# Patient Record
Sex: MALE | Race: WHITE | NOT HISPANIC OR LATINO | Employment: OTHER | URBAN - METROPOLITAN AREA
[De-identification: names, ages, dates, MRNs, and addresses within clinical notes are randomized per-mention and may not be internally consistent; named-entity substitution may affect disease eponyms.]

---

## 2017-01-23 ENCOUNTER — RADIANT APPOINTMENT (OUTPATIENT)
Dept: GENERAL RADIOLOGY | Facility: CLINIC | Age: 57
End: 2017-01-23
Attending: FAMILY MEDICINE
Payer: COMMERCIAL

## 2017-01-23 ENCOUNTER — OFFICE VISIT (OUTPATIENT)
Dept: FAMILY MEDICINE | Facility: CLINIC | Age: 57
End: 2017-01-23
Payer: COMMERCIAL

## 2017-01-23 VITALS
HEART RATE: 109 BPM | BODY MASS INDEX: 38.8 KG/M2 | HEIGHT: 69 IN | DIASTOLIC BLOOD PRESSURE: 106 MMHG | WEIGHT: 262 LBS | SYSTOLIC BLOOD PRESSURE: 164 MMHG | TEMPERATURE: 98.7 F | RESPIRATION RATE: 14 BRPM

## 2017-01-23 DIAGNOSIS — R73.9 HYPERGLYCEMIA: ICD-10-CM

## 2017-01-23 DIAGNOSIS — Z01.818 PREOP GENERAL PHYSICAL EXAM: Primary | ICD-10-CM

## 2017-01-23 DIAGNOSIS — Z87.891 FORMER SMOKER: ICD-10-CM

## 2017-01-23 DIAGNOSIS — H26.9 LEFT CATARACT: ICD-10-CM

## 2017-01-23 DIAGNOSIS — I10 BENIGN ESSENTIAL HYPERTENSION: ICD-10-CM

## 2017-01-23 LAB
ALBUMIN UR-MCNC: NEGATIVE MG/DL
ANION GAP SERPL CALCULATED.3IONS-SCNC: 11 MMOL/L (ref 3–14)
APPEARANCE UR: CLEAR
BILIRUB UR QL STRIP: NEGATIVE
BUN SERPL-MCNC: 15 MG/DL (ref 7–30)
CALCIUM SERPL-MCNC: 8.8 MG/DL (ref 8.5–10.1)
CHLORIDE SERPL-SCNC: 94 MMOL/L (ref 94–109)
CO2 SERPL-SCNC: 24 MMOL/L (ref 20–32)
COLOR UR AUTO: YELLOW
CREAT SERPL-MCNC: 0.61 MG/DL (ref 0.66–1.25)
ERYTHROCYTE [DISTWIDTH] IN BLOOD BY AUTOMATED COUNT: 12.2 % (ref 10–15)
GFR SERPL CREATININE-BSD FRML MDRD: ABNORMAL ML/MIN/1.7M2
GLUCOSE SERPL-MCNC: 321 MG/DL (ref 70–99)
GLUCOSE UR STRIP-MCNC: >=1000 MG/DL
HBA1C MFR BLD: 12.3 % (ref 4.3–6)
HCT VFR BLD AUTO: 45.3 % (ref 40–53)
HGB BLD-MCNC: 15.3 G/DL (ref 13.3–17.7)
HGB UR QL STRIP: ABNORMAL
KETONES UR STRIP-MCNC: 15 MG/DL
LEUKOCYTE ESTERASE UR QL STRIP: NEGATIVE
MCH RBC QN AUTO: 28.2 PG (ref 26.5–33)
MCHC RBC AUTO-ENTMCNC: 33.8 G/DL (ref 31.5–36.5)
MCV RBC AUTO: 83 FL (ref 78–100)
NITRATE UR QL: NEGATIVE
PH UR STRIP: 5 PH (ref 5–7)
PLATELET # BLD AUTO: 342 10E9/L (ref 150–450)
POTASSIUM SERPL-SCNC: 4.3 MMOL/L (ref 3.4–5.3)
RBC # BLD AUTO: 5.43 10E12/L (ref 4.4–5.9)
RBC #/AREA URNS AUTO: NORMAL /HPF (ref 0–2)
SODIUM SERPL-SCNC: 129 MMOL/L (ref 133–144)
SP GR UR STRIP: 1.02 (ref 1–1.03)
TSH SERPL DL<=0.005 MIU/L-ACNC: 1.6 MU/L (ref 0.4–4)
URN SPEC COLLECT METH UR: ABNORMAL
UROBILINOGEN UR STRIP-ACNC: 0.2 EU/DL (ref 0.2–1)
WBC # BLD AUTO: 9.2 10E9/L (ref 4–11)
WBC #/AREA URNS AUTO: NORMAL /HPF (ref 0–2)

## 2017-01-23 PROCEDURE — 80048 BASIC METABOLIC PNL TOTAL CA: CPT | Performed by: FAMILY MEDICINE

## 2017-01-23 PROCEDURE — 99205 OFFICE O/P NEW HI 60 MIN: CPT | Performed by: FAMILY MEDICINE

## 2017-01-23 PROCEDURE — 81001 URINALYSIS AUTO W/SCOPE: CPT | Performed by: FAMILY MEDICINE

## 2017-01-23 PROCEDURE — 84443 ASSAY THYROID STIM HORMONE: CPT | Performed by: FAMILY MEDICINE

## 2017-01-23 PROCEDURE — 71020 XR CHEST 2 VW: CPT

## 2017-01-23 PROCEDURE — 85027 COMPLETE CBC AUTOMATED: CPT | Performed by: FAMILY MEDICINE

## 2017-01-23 PROCEDURE — 93000 ELECTROCARDIOGRAM COMPLETE: CPT | Performed by: FAMILY MEDICINE

## 2017-01-23 PROCEDURE — 83036 HEMOGLOBIN GLYCOSYLATED A1C: CPT | Performed by: FAMILY MEDICINE

## 2017-01-23 PROCEDURE — 36415 COLL VENOUS BLD VENIPUNCTURE: CPT | Performed by: FAMILY MEDICINE

## 2017-01-23 RX ORDER — METOPROLOL SUCCINATE 25 MG/1
12.5 TABLET, EXTENDED RELEASE ORAL DAILY
Qty: 15 TABLET | Refills: 0 | Status: SHIPPED | OUTPATIENT
Start: 2017-01-23 | End: 2017-02-16

## 2017-01-23 NOTE — MR AVS SNAPSHOT
After Visit Summary   1/23/2017    Juan Balderrama    MRN: [id number]           Patient Information     Date Of Birth          1960        Visit Information        Provider Department      1/23/2017 9:00 AM Mauricio Pearce MD [facility]        Today's Diagnoses     Preop general physical exam    -  1     Right cataract         Benign essential hypertension         Former smoker           Care Instructions    Due to uncontrolled blood pressure, start Metoprolol XL 12.5 mg once a night.  Return to clinic on Thursday January 26 for recheck of your blood pressure.  Your blood pressure today is uncontrolled.  Due to this medical treatment should be started to prevent complications.  Blood, urine, chest xray and EKG tests are ordered as baseline.  Low salt, low fat diet. Exercise as tolerated 30 mins per day 3 days a week.  Take meds as prescribed; call if with side effects.       Before Your Surgery      Call your surgeon if there is any change in your health. This includes signs of a cold or flu (such as a sore throat, runny nose, cough, rash or fever).    Do not smoke, drink alcohol or take over the counter medicine (unless your surgeon or primary care doctor tells you to) for the 24 hours before and after surgery.    If you take prescribed drugs: Follow your doctor s orders about which medicines to take and which to stop until after surgery.    Eating and drinking prior to surgery: follow the instructions from your surgeon  Take a shower or bath the night before surgery. Use the soap your surgeon gave you to gently clean your skin. If you do not have soap from your surgeon, use your regular soap. Do not shave or scrub the surgery site.  Wear clean pajamas and have clean sheets on your bed.   Low-Salt Diet (2 Grams/Day)  This diet eliminates foods that are high in salt and restricts the amount of salt that you cook with. It is most often used for patients with high blood  pressure, edema (fluid retention), kidney, liver, and heart disease.  Table salt contains the mineral sodium. The body needs sodium to work normally. But too much sodium can make your health problems worse. Your health care provider is recommending a low-salt (also called low-sodium) diet for you. Your total daily allowance of salt (sodium) is 2 grams. This equals 2,000 milligrams (mg). It is less than 1 teaspoon of table salt. This means you can have only about 700 mg of sodium at each meal.  When you cook, limit the salt you use. And if you can avoid using salt, even better. Do not add salt at the table. So, throw away the saltshaker!  When shopping, read the package labels. Salt is often called  sodium  on the label. Choose foods that are salt-free, low salt, or very low salt. Note that foods with reduced salt or reduced sodium may not lower your salt intake enough.    Beverages  Ok: Tea, coffee, carbonated beverages, juices  Avoid: Flavored international coffees, electrolyte replacement drinks, sports beverages  Bread and cereals  Ok: Low sodium bread and rolls, cereals, cakes; low-salt crackers, matzo crackers  Avoid: Salted crackers, pretzels, popcorn; Maldivian toast, pancakes, muffins  Desserts  Ok: Ice cream, frozen yogurt, juice bars, gelatin, cookies and pies, sugar, honey, jelly, hard candy  Avoid: Most pies, cakes and cookies prepared or processed with salt, instant pudding  Meats  Ok: All fresh meat, fish, poultry, low-salt tuna, eggs, egg substitute  Avoid: Smoked, pickled, brine-cured, or salted meats and fish. This includes donovan, chipped beef, corned beef, hot dogs, luncheon meats, ham, kosher meats, salt pork, sausage, canned tuna, salted codfish, smoked salmon, herring, sardines, or anchovies.  Dairy  Ok: Milk, chocolate milk, hot chocolate mix,  low-salt  cheeses, and yogurt  Avoid: Processed cheese, cheese spreads, Roquefort, Camembert, and cottage cheese, buttermilk, instant breakfast  drink  Beans, potatoes, and pasta  Ok: Dry beans, split peas, lentils, potatoes, rice, macaroni, pasta, spaghetti without added salt  Avoid: Potato chips, tortilla chips, and similar products  Soups  Ok: Low-salt soups and broths made with allowed foods  Avoid: Bouillon cubes, soups with smoked or salted meats, regular soup and broth  Vegetables  Ok: Most are okay; low-salt tomato and vegetable juices  Avoid: Sauerkraut and other brine-soaked vegetables, pickles and other pickled vegetables, tomato juice, olives  Seasoning and spices  Ok: Most seasonings are okay. Good substitutes for salt include: fresh herb blends, hot sauce, lemon, garlic, wood, vinegar, dry mustard, parsley, cilantro, horseradish, tomato paste, regular margarine, mayonnaise, butter, cream cheese, vegetable oil, cream, low-salt salad dressing and gravy  Avoid: Regular ketchup, relishes, pickles, soy sauce, teriyaki sauce, Worcestershire sauce, BBQ sauce, tartar sauce, meat tenderizer, chili sauce, regular gravy, regular salad dressing    7064-9384 [employer]. [address] [place] [address]. All rights reserved. This information is not intended as a substitute for professional medical care. Always follow your healthcare professional's instructions.                Follow-ups after your visit        Who to contact     If you have questions or need follow up information about today's clinic visit or your schedule please contact [facility] directly at [phone].  Normal or non-critical lab and imaging results will be communicated to you by MyChart, letter or phone within 4 business days after the clinic has received the results. If you do not hear from us within 7 days, please contact the clinic through MyChart or phone. If you have a critical or abnormal lab result, we will notify you by phone as soon as possible.  Submit refill requests through [employer] or call your pharmacy and they will forward the refill  "request to us. Please allow 3 business days for your refill to be completed.          Additional Information About Your Visit        [employer] Information     [employer] lets you send messages to your doctor, view your test results, renew your prescriptions, schedule appointments and more. To sign up, go to [email or web address] . Click on \"Log in\" on the left side of the screen, which will take you to the Welcome page. Then click on \"Sign up Now\" on the right side of the page.     You will be asked to enter the access code listed below, as well as some personal information. Please follow the directions to create your username and password.     Your access code is: [email or web address]  Expires: 2017  9:29 AM     Your access code will  in 90 days. If you need help or a new code, please call your [facility] clinic or [phone].        Care EveryWhere ID     This is your [facility] EveryWhere ID. This could be used by other organizations to access your [facility] medical records  [id number]        Your Vitals Were     Pulse Temperature Respirations Height BMI (Body Mass Index)       109 98.7  F (37.1  C) (Tympanic) 16 5' 9.13\" (1.756 m) 38.54 kg/m2        Blood Pressure from Last 3 Encounters:   17 185/105   14 155/100    Weight from Last 3 Encounters:   17 262 lb (118.842 kg)   14 265 lb (120.203 kg)              We Performed the Following     *UA reflex to Microscopic     Basic metabolic panel     CBC with platelets     EKG 12-lead complete w/read - Clinics     TSH with free T4 reflex     XR Chest 2 Views          Today's Medication Changes          These changes are accurate as of: 17  9:29 AM.  If you have any questions, ask your nurse or doctor.               Start taking these medicines.        Dose/Directions    metoprolol 25 MG 24 hr tablet   Commonly known as:  TOPROL-XL   Used for:  Benign essential hypertension   Started by:  Mauricio Pearce MD        Dose:  12.5 mg   Take " 0.5 tablets (12.5 mg) by mouth daily   Quantity:  15 tablet   Refills:  0            Where to get your medicines      These medications were sent to [facility] - [place], [place] - [address]   [address] [facility] [address]     Phone:  [phone]    - metoprolol 25 MG 24 hr tablet             Primary Care Provider    None       No address on file        Thank you!     Thank you for choosing [facility]  for your care. Our goal is always to provide you with excellent care. Hearing back from our patients is one way we can continue to improve our services. Please take a few minutes to complete the written survey that you may receive in the mail after your visit with us. Thank you!             Your Updated Medication List - Protect others around you: Learn how to safely use, store and throw away your medicines at www.disposemymeds.org.          This list is accurate as of: 1/23/17  9:29 AM.  Always use your most recent med list.                   Brand Name Dispense Instructions for use    EPINEPHrine 0.3 MG/0.3ML injection     1 each    Inject 0.3 mLs (0.3 mg) into the muscle once as needed for anaphylaxis       metoprolol 25 MG 24 hr tablet    TOPROL-XL    15 tablet    Take 0.5 tablets (12.5 mg) by mouth daily

## 2017-01-23 NOTE — NURSING NOTE
"Chief Complaint   Patient presents with     Pre-Op Exam     DOS:  2/6/17 for KPE PCL surger (right eye cataract)       Initial /105 mmHg  Pulse 109  Temp(Src) 98.7  F (37.1  C) (Tympanic)  Resp 16  Ht 5' 9.13\" (1.756 m)  Wt 262 lb (118.842 kg)  BMI 38.54 kg/m2 Estimated body mass index is 38.54 kg/(m^2) as calculated from the following:    Height as of this encounter: 5' 9.13\" (1.756 m).    Weight as of this encounter: 262 lb (118.842 kg).  BP completed using cuff size: large  "

## 2017-01-23 NOTE — PROGRESS NOTES
[facility]  [address] [address]  [phone]  Dept: [phone]    PRE-OP EVALUATION:  Today's date: 2017    Juan Balderrama (: 1960) presents for pre-operative evaluation assessment as requested by Dr. Jed Khalli.  He requires evaluation and anesthesia risk assessment prior to undergoing surgery/procedure for treatment of KPE PCL (left eye cataract) surgery.    Date of Surgery/ Procedure: 2017  Time of Surgery/ Procedure: to be determined  [facility]Surgical [facility]: [facility]  Fax number for surgical facility: [phone]  Primary Physician: None  Type of Anesthesia Anticipated: to be determined    Patient has a Health Care Directive or Living Will:  NO    1. NO - Do you have a history of heart attack, stroke, stent, bypass or surgery on an artery in the head, neck, heart or legs?  2. NO - Do you ever have any pain or discomfort in your chest?  3. NO - Do you have a history of  Heart Failure?  4. NO - Are you troubled by shortness of breath when: walking on the level, up a slight hill or at night?  5. NO - Do you currently have a cold, bronchitis or other respiratory infection?  6. NO - Do you have a cough, shortness of breath or wheezing?  7. NO - Do you sometimes get pains in the calves of your legs when you walk?  8. NO - Do you or anyone in your family have previous history of blood clots?  9. NO - Do you or does anyone in your family have a serious bleeding problem such as prolonged bleeding following surgeries or cuts?  10. NO - Have you ever had problems with anemia or been told to take iron pills?  11. NO - Have you had any abnormal blood loss such as black, tarry or bloody stools, or abnormal vaginal bleeding?  12. NO - Have you ever had a blood transfusion?  13. NO - Have you or any of your relatives ever had problems with anesthesia?  14. NO - Do you have sleep apnea, excessive snoring or daytime drowsiness?  15. NO - Do you have any  prosthetic heart valves?  16. NO - Do you have prosthetic joints?  17. NO - Is there any chance that you may be pregnant?      HPI:                                                      Brief HPI related to upcoming procedure: Patient has left cataract that interferes with vision, hence procedure planned.  Reviewed above with patient.      Patient has no documented chronic condition but has very high blood pressure today. Patient does not have a PCP anywhere.   HYPERTENSION - Patient has longstanding history of mod-severe HTN , currently denies any symptoms referable to elevated blood pressure. Specifically denies chest pain, palpitations, dyspnea, orthopnea, PND or peripheral edema. Blood pressure readings have not been in normal range. Current medication regimen is as listed below. Patient denies any side effects of medication.                                                                                                                                                                                          .    MEDICAL HISTORY:                                                      Patient Active Problem List    Diagnosis Date Noted     Left cataract 01/25/2017     Priority: Medium     Benign essential hypertension 01/23/2017     Priority: Medium     Former smoker 01/23/2017     Priority: Medium     Uncontrolled type 2 diabetes mellitus without complication, without long-term current use of insulin (H) 01/23/2017     Priority: Medium      History reviewed. No pertinent past medical history.  History reviewed. No pertinent past surgical history.  Current Outpatient Prescriptions   Medication Sig Dispense Refill     metoprolol (TOPROL-XL) 25 MG 24 hr tablet Take 0.5 tablets (12.5 mg) by mouth daily 15 tablet 0     EPINEPHrine (EPIPEN) 0.3 MG/0.3ML injection Inject 0.3 mLs (0.3 mg) into the muscle once as needed for anaphylaxis 1 each 1     OTC products: Advil PRN.    Allergies   Allergen Reactions     Penicillins  "Swelling     Sulfamethoxazole Swelling      Latex Allergy: NO    Social History   Substance Use Topics     Smoking status: Never Smoker      Smokeless tobacco: Not on file     Alcohol Use: Yes      Comment: social     History   Drug Use No       REVIEW OF SYSTEMS:                                                    C: NEGATIVE for fever, chills, change in weight  I: NEGATIVE for worrisome rashes, moles or lesions  E: NEGATIVE for vision changes or irritation  E/M: NEGATIVE for ear, mouth and throat problems  R: NEGATIVE for significant cough or SOB  CV: NEGATIVE for chest pain, palpitations or peripheral edema  GI: NEGATIVE for nausea, abdominal pain, heartburn, or change in bowel habits  : NEGATIVE for frequency, dysuria, or hematuria  M: NEGATIVE for significant arthralgias or myalgia  N: NEGATIVE for weakness, dizziness or paresthesias  E: NEGATIVE for temperature intolerance, skin/hair changes  H: NEGATIVE for bleeding problems  P: NEGATIVE for changes in mood or affect    EXAM:                                                    /106 mmHg  Pulse 109  Temp(Src) 98.7  F (37.1  C) (Tympanic)  Resp 14  Ht 5' 9.13\" (1.756 m)  Wt 262 lb (118.842 kg)  BMI 38.54 kg/m2  GENERAL APPEARANCE:  Obese, alert and no distress  EYES: pink conj, no icterus, PERRL, EOMI  HENT: ear canals and TM's normal, nose and mouth without ulcers or lesions, oropharynx clear and oral mucous membranes moist  NECK: no adenopathy, no asymmetry, masses, or scars and thyroid normal to palpation  RESP: lungs clear to auscultation - no rales, rhonchi or wheezes  CV: regular rates and rhythm, normal S1 S2, no S3 or S4, no murmur, click or rub, no peripheral edema and peripheral pulses strong  ABDOMEN: soft, nontender, no hepatosplenomegaly, no masses and bowel sounds normal  RECTAL: normal sphincter tone, no rectal masses, prostate slightly enlarged but smooth, soft and nontender  MS: no musculoskeletal defects are noted and gait is age " appropriate without ataxia  SKIN: no suspicious lesions or rashes  NEURO: Normal strength and tone, sensory exam grossly normal, mentation intact and speech normal    DIAGNOSTICS:                                                      EKG: sinus rhythm with poor R wave progression, normal axis, normal intervals, no acute ST/T changes c/w ischemia, no LVH by voltage criteria, there are no prior tracings available  Labs Resulted Today:   Results for orders placed or performed in visit on 01/23/17   XR Chest 2 Views    Narrative    XR CHEST 2 VW 1/23/2017 10:01 AM    COMPARISON: None.    HISTORY: Hypertension      Impression    IMPRESSION: Cardiac silhouette and pulmonary vasculature are within  normal limits. No focal airspace disease, pleural effusion or  pneumothorax.    SEYMOUR GRAFF   CBC with platelets   Result Value Ref Range    WBC 9.2 4.0 - 11.0 10e9/L    RBC Count 5.43 4.4 - 5.9 10e12/L    Hemoglobin 15.3 13.3 - 17.7 g/dL    Hematocrit 45.3 40.0 - 53.0 %    MCV 83 78 - 100 fl    MCH 28.2 26.5 - 33.0 pg    MCHC 33.8 31.5 - 36.5 g/dL    RDW 12.2 10.0 - 15.0 %    Platelet Count 342 150 - 450 10e9/L   Basic metabolic panel   Result Value Ref Range    Sodium 129 (L) 133 - 144 mmol/L    Potassium 4.3 3.4 - 5.3 mmol/L    Chloride 94 94 - 109 mmol/L    Carbon Dioxide 24 20 - 32 mmol/L    Anion Gap 11 3 - 14 mmol/L    Glucose 321 (H) 70 - 99 mg/dL    Urea Nitrogen 15 7 - 30 mg/dL    Creatinine 0.61 (L) 0.66 - 1.25 mg/dL    GFR Estimate >90  Non  GFR Calc   >60 mL/min/1.7m2    GFR Estimate If Black >90   GFR Calc   >60 mL/min/1.7m2    Calcium 8.8 8.5 - 10.1 mg/dL   *UA reflex to Microscopic   Result Value Ref Range    Color Urine Yellow     Appearance Urine Clear     Glucose Urine >=1000 (A) NEG mg/dL    Bilirubin Urine Negative NEG    Ketones Urine 15 (A) NEG mg/dL    Specific Gravity Urine 1.025 1.003 - 1.035    Blood Urine Trace (A) NEG    pH Urine 5.0 5.0 - 7.0 pH    Protein Albumin  Urine Negative NEG mg/dL    Urobilinogen Urine 0.2 0.2 - 1.0 EU/dL    Nitrite Urine Negative NEG    Leukocyte Esterase Urine Negative NEG    Source Midstream Urine    TSH with free T4 reflex   Result Value Ref Range    TSH 1.60 0.40 - 4.00 mU/L   Urine Microscopic   Result Value Ref Range    WBC Urine O - 2 0 - 2 /HPF    RBC Urine O - 2 0 - 2 /HPF   Hemoglobin A1c   Result Value Ref Range    Hemoglobin A1C 12.3 (H) 4.3 - 6.0 %       No results for input(s): HGB, PLT, INR, NA, POTASSIUM, CR, A1C in the last 82386 hours.     IMPRESSION:                                                    Reason for surgery/procedure: right cataract.  Diagnosis/reason for consult: preop evaluation; hypertension, fromer smoker    The proposed surgical procedure is considered LOW risk.    REVISED CARDIAC RISK INDEX  The patient has the following serious cardiovascular risks for perioperative complications such as (MI, PE, VFib and 3  AV Block):  Uncontrolled hypertension; new hyperglycemia/type 2 diabetes mellitus  INTERPRETATION: 2 risks: Class III (moderate risk - 6.6% complication rate)    The patient has the following additional risks for perioperative complications:  No identified additional risks  Score not calculated. Missing: Total Cholesterol, HDL      ICD-10-CM    1. Preop general physical exam Z01.818 Urine Microscopic     DIABETES EDUCATOR REFERRAL   2. Left cataract H26.9    3. Benign essential hypertension I10 metoprolol (TOPROL-XL) 25 MG 24 hr tablet     EKG 12-lead complete w/read - Clinics     CBC with platelets     Basic metabolic panel     *UA reflex to Microscopic     TSH with free T4 reflex     XR Chest 2 Views     DIABETES EDUCATOR REFERRAL   4. Former smoker Z87.891    5. Hyperglycemia R73.9 Hemoglobin A1c   6. Uncontrolled type 2 diabetes mellitus without complication, without long-term current use of insulin (H) E11.65 DIABETES EDUCATOR REFERRAL       RECOMMENDATIONS:                                                       --Consult hospital rounder / IM to assist post-op medical management  Routine DVT precautions recommended.    Cardiovascular Risk  Performs 4 METs exercise without symptoms (Light housework (dusting, washing dishes), Climb a flight of stairs and Walk on level ground at 15 minutes per mile (4 miles/hour)) .       --Patient is to take all scheduled medications on the day of surgery EXCEPT for modifications listed below.    Diabetes Medication Use  Incidental finding of hyperglycemia on labs today.  Will add a1c to labs done today.  Patient will be requested to follow up in clinic for diabetes management start.  He will be started on meds and approval for surgery will be based on his a1c today.        ACE Inhibitor or Angiotensin Receptor Blocker (ARB) Use  Not on ACE-I yet but patient will be started on this for HTN and nephroprotection, and he may take it on day of surgery to help maintain blood pressure.      Surgery is NOT recommended due to uncontrolled diabetes (A1C >8.5%, Glucose >200 mg/dl). Stabilization required prior to elective surgery. Referral to Diabetes Educator (Preoperative Intensive Glucose Management)  and patient to be seen in next 1-2 days in clinic by provider for diabetes treatment start.       Signed Electronically by: Mauricio Pearce MD    Copy of this evaluation report is provided to requesting physician.    [facility] Preop Guidelines

## 2017-01-23 NOTE — PATIENT INSTRUCTIONS
Due to uncontrolled blood pressure, start Metoprolol XL 12.5 mg once a night.  Return to clinic on Thursday January 26 for recheck of your blood pressure.  Your blood pressure today is uncontrolled.  Due to this medical treatment should be started to prevent complications.  Blood, urine, chest xray and EKG tests are ordered as baseline.  Low salt, low fat diet. Exercise as tolerated 30 mins per day 3 days a week.  Take meds as prescribed; call if with side effects.       Before Your Surgery      Call your surgeon if there is any change in your health. This includes signs of a cold or flu (such as a sore throat, runny nose, cough, rash or fever).    Do not smoke, drink alcohol or take over the counter medicine (unless your surgeon or primary care doctor tells you to) for the 24 hours before and after surgery.    If you take prescribed drugs: Follow your doctor s orders about which medicines to take and which to stop until after surgery.    Eating and drinking prior to surgery: follow the instructions from your surgeon  Take a shower or bath the night before surgery. Use the soap your surgeon gave you to gently clean your skin. If you do not have soap from your surgeon, use your regular soap. Do not shave or scrub the surgery site.  Wear clean pajamas and have clean sheets on your bed.   Low-Salt Diet (2 Grams/Day)  This diet eliminates foods that are high in salt and restricts the amount of salt that you cook with. It is most often used for patients with high blood pressure, edema (fluid retention), kidney, liver, and heart disease.  Table salt contains the mineral sodium. The body needs sodium to work normally. But too much sodium can make your health problems worse. Your health care provider is recommending a low-salt (also called low-sodium) diet for you. Your total daily allowance of salt (sodium) is 2 grams. This equals 2,000 milligrams (mg). It is less than 1 teaspoon of table salt. This means you can have only  about 700 mg of sodium at each meal.  When you cook, limit the salt you use. And if you can avoid using salt, even better. Do not add salt at the table. So, throw away the saltshaker!  When shopping, read the package labels. Salt is often called  sodium  on the label. Choose foods that are salt-free, low salt, or very low salt. Note that foods with reduced salt or reduced sodium may not lower your salt intake enough.    Beverages  Ok: Tea, coffee, carbonated beverages, juices  Avoid: Flavored international coffees, electrolyte replacement drinks, sports beverages  Bread and cereals  Ok: Low sodium bread and rolls, cereals, cakes; low-salt crackers, matzo crackers  Avoid: Salted crackers, pretzels, popcorn; Luxem[place]ish toast, pancakes, muffins  Desserts  Ok: Ice cream, frozen yogurt, juice bars, gelatin, cookies and pies, sugar, honey, jelly, hard candy  Avoid: Most pies, cakes and cookies prepared or processed with salt, instant pudding  Meats  Ok: All fresh meat, fish, poultry, low-salt tuna, eggs, egg substitute  Avoid: Smoked, pickled, brine-cured, or salted meats and fish. This includes donovan, chipped beef, corned beef, hot dogs, luncheon meats, ham, kosher meats, salt pork, sausage, canned tuna, salted codfish, smoked salmon, herring, sardines, or anchovies.  Dairy  Ok: Milk, chocolate milk, hot chocolate mix,  low-salt  cheeses, and yogurt  Avoid: Processed cheese, cheese spreads, Roquefort, Camembert, and cottage cheese, buttermilk, instant breakfast drink  Beans, potatoes, and pasta  Ok: Dry beans, split peas, lentils, potatoes, rice, macaroni, pasta, spaghetti without added salt  Avoid: Potato chips, tortilla chips, and similar products  Soups  Ok: Low-salt soups and broths made with allowed foods  Avoid: Bouillon cubes, soups with smoked or salted meats, regular soup and broth  Vegetables  Ok: Most are okay; low-salt tomato and vegetable juices  Avoid: Sauerkraut and other brine-soaked vegetables, pickles and  other pickled vegetables, tomato juice, olives  Seasoning and spices  Ok: Most seasonings are okay. Good substitutes for salt include: fresh herb blends, hot sauce, lemon, garlic, wood, vinegar, dry mustard, parsley, cilantro, horseradish, tomato paste, regular margarine, mayonnaise, butter, cream cheese, vegetable oil, cream, low-salt salad dressing and gravy  Avoid: Regular ketchup, relishes, pickles, soy sauce, teriyaki sauce, Worcestershire sauce, BBQ sauce, tartar sauce, meat tenderizer, chili sauce, regular gravy, regular salad dressing    9397-5401 [employer]. [address] [place] [address], [place] [address]. All rights reserved. This information is not intended as a substitute for professional medical care. Always follow your healthcare professional's instructions.

## 2017-01-25 ENCOUNTER — OFFICE VISIT (OUTPATIENT)
Dept: FAMILY MEDICINE | Facility: CLINIC | Age: 57
End: 2017-01-25
Payer: COMMERCIAL

## 2017-01-25 VITALS
WEIGHT: 262 LBS | SYSTOLIC BLOOD PRESSURE: 168 MMHG | BODY MASS INDEX: 38.8 KG/M2 | DIASTOLIC BLOOD PRESSURE: 112 MMHG | HEART RATE: 103 BPM | HEIGHT: 69 IN | TEMPERATURE: 97.7 F

## 2017-01-25 DIAGNOSIS — E66.01 MORBID OBESITY, UNSPECIFIED OBESITY TYPE (H): ICD-10-CM

## 2017-01-25 DIAGNOSIS — H26.9 LEFT CATARACT: ICD-10-CM

## 2017-01-25 DIAGNOSIS — I10 UNCONTROLLED HYPERTENSION: ICD-10-CM

## 2017-01-25 DIAGNOSIS — L97.529 FOOT ULCER, LEFT, WITH UNSPECIFIED SEVERITY (H): ICD-10-CM

## 2017-01-25 LAB
CREAT UR-MCNC: 86 MG/DL
MICROALBUMIN UR-MCNC: 121 MG/L
MICROALBUMIN/CREAT UR: 140.7 MG/G CR (ref 0–17)

## 2017-01-25 PROCEDURE — 99215 OFFICE O/P EST HI 40 MIN: CPT | Performed by: FAMILY MEDICINE

## 2017-01-25 PROCEDURE — 99207 C FOOT EXAM  NO CHARGE: CPT | Performed by: FAMILY MEDICINE

## 2017-01-25 PROCEDURE — 82043 UR ALBUMIN QUANTITATIVE: CPT | Performed by: FAMILY MEDICINE

## 2017-01-25 RX ORDER — LISINOPRIL 5 MG/1
5 TABLET ORAL DAILY
Qty: 30 TABLET | Refills: 0 | Status: SHIPPED | OUTPATIENT
Start: 2017-01-25 | End: 2017-02-01

## 2017-01-25 RX ORDER — ATORVASTATIN CALCIUM 10 MG/1
10 TABLET, FILM COATED ORAL DAILY
Qty: 90 TABLET | Refills: 0 | Status: SHIPPED | OUTPATIENT
Start: 2017-01-25 | End: 2017-04-27

## 2017-01-25 NOTE — PROGRESS NOTES
"  SUBJECTIVE:                                                    Juan Balderrama is a 56 year old male who presents to clinic today for the following health issues:  Chief Complaint   Patient presents with     Diabetes     Pt here to discuss plans for new dx of diabetes.       Concern - new Diabetic     Onset: Incidental finding on preop labs 2 days ago; patient has not known himself to be diabetic; no previous regular primary care      Description:   A1c was 12.3  And random glucose was >300 2 days ago  Patient denies polyuria, polydipsia, polyphagia, dizziness, LOC, HA, BOV, numbness or tingling in extremities    Intensity: severe    Progression of Symptoms:  n/a    Accompanying Signs & Symptoms:  At end of encounter, patient did say he has a \"blister on my left foot\" that has been present for the last month.  He denies pain, discharge or bleeding from it; denies treatment.       Previous history of similar problem:   Patient does not know this dx before 2 days ago    Precipitating factors:   Worsened by: n/a    Alleviating factors:  Improved by: n/a       Therapies Tried and outcome: none yet    Also still having high b/p.  Started metoprolol XL 12.5 mg daily 2 days ago.   Denies chest pain, dyspnea, HA, BOV, dizziness or urinary changes.          Amount of exercise or daily activities, outside of work: none    Problems taking medications regularly No    Medication side effects: none    Diet: low salt    Social History   Substance Use Topics     Smoking status: Never Smoker      Smokeless tobacco: Not on file     Alcohol Use: Yes      Comment: social        Problem list and histories reviewed & adjusted, as indicated.  Additional history: as documented    Current med at start of this visit: Metoprolol XL 12.5 mg daily.    Patient Active Problem List   Diagnosis     Benign essential hypertension     Former smoker     Uncontrolled type 2 diabetes mellitus without complication, without long-term current use of insulin " "(H)     Left cataract     Morbid obesity, unspecified obesity type (H)     History reviewed. No pertinent past surgical history.    Social History   Substance Use Topics     Smoking status: Never Smoker      Smokeless tobacco: Not on file     Alcohol Use: Yes      Comment: social     History reviewed. No pertinent family history.      Allergies   Allergen Reactions     Penicillins Swelling     Sulfamethoxazole Swelling     Recent Labs   Lab Test  01/23/17   1001  01/23/17   1000   A1C  12.3*   --    CR   --   0.61*   GFRESTIMATED   --   >90  Non  GFR Calc     GFRESTBLACK   --   >90   GFR Calc     POTASSIUM   --   4.3   TSH   --   1.60      BP Readings from Last 3 Encounters:   01/25/17 168/112   01/23/17 164/106   08/26/14 155/100    Wt Readings from Last 3 Encounters:   01/25/17 262 lb (118.842 kg)   01/23/17 262 lb (118.842 kg)   08/26/14 265 lb (120.203 kg)                  Labs reviewed in EPIC    ROS:  C: NEGATIVE for fever, chills, change in weight  I: NEGATIVE for worrisome rashes, moles or lesions  EYES: BOV due to cataracts  E/M: NEGATIVE for ear, mouth and throat problems  R: NEGATIVE for significant cough or SOB  CV: NEGATIVE for chest pain, palpitations or peripheral edema  GI: NEGATIVE for nausea, abdominal pain, heartburn, or change in bowel habits  : NEGATIVE for frequency, dysuria, or hematuria  M: NEGATIVE for significant arthralgias or myalgia  N: NEGATIVE for weakness, dizziness or paresthesias  E: NEGATIVE for temperature intolerance, skin/hair changes  H: NEGATIVE for bleeding problems  P: NEGATIVE for changes in mood or affect    OBJECTIVE:                                                    /112 mmHg  Pulse 103  Temp(Src) 97.7  F (36.5  C) (Tympanic)  Ht 5' 9\" (1.753 m)  Wt 262 lb (118.842 kg)  BMI 38.67 kg/m2  Body mass index is 38.67 kg/(m^2).  GENERAL: morbidly obese, alert and no distress, ambulatory w/o assist  EYES: no icterus; pink " conjunctivae.  MS: no edema  SKIN: no suspicious lesions, no rashes  ABD: protuberant  nontender  FOOT EXAM: on left foot, there is a 3 cm round 4 mm deep dry ulcer with dark reddish brown base and hyperkeratotic borders; no erythema either foot; pedal pulses fair bilaterally; monofilament: no deficit    Rest of exam deferred as it has been done 2 days ago    Diagnostic test results:  Diagnostic Test Results:  Results for orders placed or performed in visit on 01/23/17   XR Chest 2 Views    Narrative    XR CHEST 2 VW 1/23/2017 10:01 AM    COMPARISON: None.    HISTORY: Hypertension      Impression    IMPRESSION: Cardiac silhouette and pulmonary vasculature are within  normal limits. No focal airspace disease, pleural effusion or  pneumothorax.    [facility]YM[facility] GRAFF   CBC with platelets   Result Value Ref Range    WBC 9.2 4.0 - 11.0 10e9/L    RBC Count 5.43 4.4 - 5.9 10e12/L    Hemoglobin 15.3 13.3 - 17.7 g/dL    Hematocrit 45.3 40.0 - 53.0 %    MCV 83 78 - 100 fl    MCH 28.2 26.5 - 33.0 pg    MCHC 33.8 31.5 - 36.5 g/dL    RDW 12.2 10.0 - 15.0 %    Platelet Count 342 150 - 450 10e9/L   Basic metabolic panel   Result Value Ref Range    Sodium 129 (L) 133 - 144 mmol/L    Potassium 4.3 3.4 - 5.3 mmol/L    Chloride 94 94 - 109 mmol/L    Carbon Dioxide 24 20 - 32 mmol/L    Anion Gap 11 3 - 14 mmol/L    Glucose 321 (H) 70 - 99 mg/dL    Urea Nitrogen 15 7 - 30 mg/dL    Creatinine 0.61 (L) 0.66 - 1.25 mg/dL    GFR Estimate >90  Non  GFR Calc   >60 mL/min/1.7m2    GFR Estimate If Black >90   GFR Calc   >60 mL/min/1.7m2    Calcium 8.8 8.5 - 10.1 mg/dL   *UA reflex to Microscopic   Result Value Ref Range    Color Urine Yellow     Appearance Urine Clear     Glucose Urine >=1000 (A) NEG mg/dL    Bilirubin Urine Negative NEG    Ketones Urine 15 (A) NEG mg/dL    Specific Gravity Urine 1.025 1.003 - 1.035    Blood Urine Trace (A) NEG    pH Urine 5.0 5.0 - 7.0 pH    Protein Albumin Urine Negative NEG mg/dL     Urobilinogen Urine 0.2 0.2 - 1.0 EU/dL    Nitrite Urine Negative NEG    Leukocyte Esterase Urine Negative NEG    Source Midstream Urine    TSH with free T4 reflex   Result Value Ref Range    TSH 1.60 0.40 - 4.00 mU/L   Urine Microscopic   Result Value Ref Range    WBC Urine O - 2 0 - 2 /HPF    RBC Urine O - 2 0 - 2 /HPF   Hemoglobin A1c   Result Value Ref Range    Hemoglobin A1C 12.3 (H) 4.3 - 6.0 %        ASSESSMENT/PLAN:                                                        ICD-10-CM    1. Uncontrolled type 2 diabetes mellitus with complication, with long-term current use of insulin (H) E11.8 FOOT EXAM    E11.65 Lipid panel reflex to direct LDL    Z79.4 Albumin Random Urine Quantitative     atorvastatin (LIPITOR) 10 MG tablet     metFORMIN (GLUCOPHAGE) 500 MG tablet     insulin glargine (LANTUS SOLOSTAR) 100 UNIT/ML injection     insulin pen needle (BD YENNY U/F) 32G X 4 MM     order for DME     order for DME     order for DME     ORTHO  REFERRAL  New diagnosis; uncontrolled.  Spent more than half this visit discussing nature, course, treatment, monitoring, lifestyle changes, and preventative recommendations for diabetics.  Daily foot care, flu vaccine every year, pneumovax 23, annual eye exam.     2. Uncontrolled hypertension I10 lisinopril (PRINIVIL/ZESTRIL) 5 MG tablet  Continue Metoprolol XL 12.5 daily.  Low salt, low fat diet. Exercise as written in AVS discussed.  Take meds as prescribed; call if with side effects.   RN visit 1 week for BP recheck.     3. Foot ulcer, left, with unspecified severity (H) L97.529 ORTHO  REFERRAL  Discussed with patient the ulcer he showed today will likely need further evaluation and possible treatment by podiatry. He concurred with referral.  Advised daily foot care.     4. Left cataract H26.9 Discussed with patient current guidelines for approval for surgical procedures.    His cataract surgery won't be approved until his diabetes and blood pressure is under  stable control.     5. Morbid obesity, unspecified obesity type (H) E66.01 Discussed risks of obesity: heart disease, stroke, end-organ damage, worsening DM, decreased quality of life  Counselled on diet, exercise and weight loss regimen       Total time: 40 mins, more than 50 % spent in addressing the above conditions    Follow up with Provider - 2 months   Follow up with: Nurse 1 week for BP recheck  Patient Instructions   You are diagnosed with Uncontrolled Type 2 diabetes mellitus.  As discussed today, the following are recommended:    - Medications:   Start the following    Lantus 14 units subcutaneously (injection) every bedtime.    Metformin 500 mg twice a day orally    Lisinopril 5 mg orally once a day.    Atorvastatin 10 mg orally once a day.   Continue the following:    Metoprolol XL 12.5 mg orally once a day.  - Carbohydrate controlled diet; see below for details  - Exercise: 150 mins of aerobic activity per week as minimum goal  - Weight management  - Diabetes educator consultation - schedule this by returning the call you received yesterday  - Check Blood sugars at home: once before breakfast and once 1-2 hrs after dinner  - Do not walk barefoot; check feet daily for sores.  - You will be contacted in 1-2 business days to get a schedule for the foot specialist  - get a flu vaccine every fall  - you are recommended to be vaccinated for pneumonia; you may request for this at your soonest convenience.  - get annual eye exam, and regular dental exams  - Repeat blood tests: Mid-March 2017 - call to make lab appointment for then    Schedule nurse visit in 7-10 days for recheck of your blood pressure.  Further adjustment of your blood pressure medication will be given then as appropriate.    For your planned cataract surgery, approval to proceed with the procedure may be given once your blood pressure is under good and stable control, and your hemoglobin A1c blood test is below 8.5%.        Mauricio Pearce,  MD  [facility]

## 2017-01-25 NOTE — MR AVS SNAPSHOT
After Visit Summary   1/25/2017    Juan Balderrama    MRN: [id number]           Patient Information     Date Of Birth          1960        Visit Information        Provider Department      1/25/2017 9:20 AM Mauricio Pearce MD [facility]        Today's Diagnoses     Uncontrolled type 2 diabetes mellitus without complication, without long-term current use of insulin (H)    -  1     Uncontrolled hypertension         Foot ulcer, left, with unspecified severity (H)         Left cataract           Care Instructions    You are diagnosed with Uncontrolled Type 2 diabetes mellitus.  As discussed today, the following are recommended:    - Medications:   Start the following    Lantus 14 units subcutaneously (injection) every bedtime.    Metformin 500 mg twice a day orally    Lisinopril 5 mg orally once a day.    Atorvastatin 10 mg orally once a day.   Continue the following:    Metoprolol XL 12.5 mg orally once a day.  - Carbohydrate controlled diet; see below for details  - Exercise: 150 mins of aerobic activity per week as minimum goal  - Weight management  - Diabetes educator consultation - schedule this by returning the call you received yesterday  - Check Blood sugars at home: once before breakfast and once 1-2 hrs after dinner  - Do not walk barefoot; check feet daily for sores.  - You will be contacted in 1-2 business days to get a schedule for the foot specialist  - get a flu vaccine every fall  - you are recommended to be vaccinated for pneumonia; you may request for this at your soonest convenience.  - get annual eye exam, and regular dental exams  - Repeat blood tests: Mid-March 2017 - call to make lab appointment for then    Schedule nurse visit in 7-10 days for recheck of your blood pressure.  Further adjustment of your blood pressure medication will be given then as appropriate.    For your planned cataract surgery, approval to proceed with the procedure may be given once  your blood pressure is under good and stable control, and your hemoglobin A1c blood test is below 8.5%.        Follow-ups after your visit        Additional Services     ORTHO  REFERRAL       [facility] is referring you to the Orthopedic  Services at [facility].       The  Representative will assist you in the coordination of your Orthopedic and Musculoskeletal Care as prescribed by your physician.    The  Representative will call you within 1 business day to help schedule your appointment, or you may contact the  Representative at:    All areas ~ [phone]     Type of Referral : [facility]       Timeframe requested: 1 - 2 days    Coverage of these services is subject to the terms and limitations of your health insurance plan.  Please call member services at your health plan with any benefit or coverage questions.      If X-rays, CT or MRI's have been performed, please contact the facility where they were done to arrange for , prior to your scheduled appointment.  Please bring this referral request to your appointment and present it to your specialist.                  Your next 10 appointments already scheduled     Jan 26, 2017  7:00 AM   Pre-[place]icio [place]ce [place]   [facility]    [address] [address]   [phone]            Feb 01, 2017 11:15 AM   Nurse Only with [place] [facility] [place]   [facility]    [address] [address]   [phone]            Feb 01, 2017 12:00 PM   LAB with [place] [facility]   [facility]    [address] [address]   [phone]           Patient must bring picture ID.  Patient should be prepared to give a urine specimen  Please do not eat 10-12 hours before your  "appointment if you are coming in fasting for labs on lipids, cholesterol, or glucose (sugar).  Pregnant women should follow their Care Team instructions. Water with medications is okay. Do not drink coffee or other fluids.   If you have concerns about taking  your medications, please ask at office or if scheduling via Zixi, send a message by clicking on Secure Messaging, Message Your Care Team.            Feb 01, 2017  1:00 PM   Diabetic Education with [place] [facility] ED RESOURCE   [facility] ([facility])    [address] [address] [phone]              Future tests that were ordered for you today     Open Future Orders        Priority Expected Expires Ordered    Lipid panel reflex to direct LDL Routine 1/26/2017 1/25/2018 1/25/2017            Who to contact     If you have questions or need follow up information about today's clinic visit or your schedule please contact [facility] directly at [phone].  Normal or non-critical lab and imaging results will be communicated to you by Careerisehart, letter or phone within 4 business days after the clinic has received the results. If you do not hear from us within 7 days, please contact the clinic through Careerisehart or phone. If you have a critical or abnormal lab result, we will notify you by phone as soon as possible.  Submit refill requests through Zixi or call your pharmacy and they will forward the refill request to us. Please allow 3 business days for your refill to be completed.          Additional Information About Your Visit        Zixi Information     Zixi lets you send messages to your doctor, view your test results, renew your prescriptions, schedule appointments and more. To sign up, go to [email or web address] . Click on \"Log in\" on the left side of the screen, which will take you to the Welcome page. Then click on \"Sign up Now\" on the right side of the page.     You will be asked to " "enter the access code listed below, as well as some personal information. Please follow the directions to create your username and password.     Your access code is: [email or web address]  Expires: 2017  9:29 AM     Your access code will  in 90 days. If you need help or a new code, please call your [facility] clinic or [phone].        Care EveryWhere ID     This is your Care EveryWhere ID. This could be used by other organizations to access your [facility] medical records  [id number]        Your Vitals Were     Pulse Temperature Height BMI (Body Mass Index)          103 97.7  F (36.5  C) (Tympanic) 5' 9\" (1.753 m) 38.67 kg/m2         Blood Pressure from Last 3 Encounters:   17 168/112   17 164/106   14 155/100    Weight from Last 3 Encounters:   17 262 lb (118.842 kg)   17 262 lb (118.842 kg)   14 265 lb (120.203 kg)              We Performed the Following     Albumin Random Urine Quantitative     FOOT EXAM     ORTHO  REFERRAL          Today's Medication Changes          These changes are accurate as of: 17 10:11 AM.  If you have any questions, ask your nurse or doctor.               Start taking these medicines.        Dose/Directions    atorvastatin 10 MG tablet   Commonly known as:  LIPITOR   Used for:  Uncontrolled type 2 diabetes mellitus without complication, without long-term current use of insulin (H)   Started by:  Mauricio Pearce MD        Dose:  10 mg   Take 1 tablet (10 mg) by mouth daily   Quantity:  90 tablet   Refills:  0       insulin glargine 100 UNIT/ML injection   Commonly known as:  LANTUS SOLOSTAR   Used for:  Uncontrolled type 2 diabetes mellitus without complication, without long-term current use of insulin (H)   Started by:  Mauricio Pearce MD        Dose:  14 Units   Inject 14 Units Subcutaneous At Bedtime   Quantity:  15 mL   Refills:  1       insulin pen needle 32G X 4 MM   Commonly known as:  BD YENNY U/F   Used " for:  Uncontrolled type 2 diabetes mellitus without complication, without long-term current use of insulin (H)   Started by:  Mauricio Pearce MD        Use one time at bedtime or as directed.   Quantity:  100 each   Refills:  prn       lisinopril 5 MG tablet   Commonly known as:  PRINIVIL/ZESTRIL   Used for:  Uncontrolled hypertension   Started by:  Mauricio Pearce MD        Dose:  5 mg   Take 1 tablet (5 mg) by mouth daily   Quantity:  30 tablet   Refills:  0       metFORMIN 500 MG tablet   Commonly known as:  GLUCOPHAGE   Used for:  Uncontrolled type 2 diabetes mellitus without complication, without long-term current use of insulin (H)   Started by:  Mauricio Pearce MD        Dose:  500 mg   Take 1 tablet (500 mg) by mouth 2 times daily (with meals)   Quantity:  180 tablet   Refills:  0       * order for DME   Used for:  Uncontrolled type 2 diabetes mellitus without complication, without long-term current use of insulin (H)   Started by:  Mauricio Pearce MD        Glucometer, brand as covered by insurance.   Quantity:  1 each   Refills:  0       * order for DME   Used for:  Uncontrolled type 2 diabetes mellitus without complication, without long-term current use of insulin (H)   Started by:  Mauricio Pearce MD        Lancets bid and prn   Quantity:  200 each   Refills:  4       * order for DME   Used for:  Uncontrolled type 2 diabetes mellitus without complication, without long-term current use of insulin (H)   Started by:  Mauricio Pearce MD        Test strips for pt's glucometer, brand as covered by insurance Test bid and prn.   Quantity:  200 each   Refills:  4       * Notice:  This list has 3 medication(s) that are the same as other medications prescribed for you. Read the directions carefully, and ask your doctor or other care provider to review them with you.         Where to get your medicines      These medications were sent to [id number] - [place], [place] - [address]   [address] [facility] [address]     Phone:  [phone]    - atorvastatin 10 MG tablet  - insulin glargine 100 UNIT/ML injection  - insulin pen needle 32G X 4 MM  - lisinopril 5 MG tablet  - metFORMIN 500 MG tablet      Some of these will need a paper prescription and others can be bought over the counter.  Ask your nurse if you have questions.     Bring a paper prescription for each of these medications    - order for DME  - order for DME  - order for DME             Primary Care Provider    None       No address on file        Thank you!     Thank you for choosing [facility]  for your care. Our goal is always to provide you with excellent care. Hearing back from our patients is one way we can continue to improve our services. Please take a few minutes to complete the written survey that you may receive in the mail after your visit with us. Thank you!             Your Updated Medication List - Protect others around you: Learn how to safely use, store and throw away your medicines at www.disposemymeds.org.          This list is accurate as of: 1/25/17 10:11 AM.  Always use your most recent med list.                   Brand Name Dispense Instructions for use    atorvastatin 10 MG tablet    LIPITOR    90 tablet    Take 1 tablet (10 mg) by mouth daily       EPINEPHrine 0.3 MG/0.3ML injection     1 each    Inject 0.3 mLs (0.3 mg) into the muscle once as needed for anaphylaxis       insulin glargine 100 UNIT/ML injection    LANTUS SOLOSTAR    15 mL    Inject 14 Units Subcutaneous At Bedtime       insulin pen needle 32G X 4 MM    BD YENNY U/F    100 each    Use one time at bedtime or as directed.       lisinopril 5 MG tablet    PRINIVIL/ZESTRIL    30 tablet    Take 1 tablet (5 mg) by mouth daily       metFORMIN 500 MG tablet    GLUCOPHAGE    180 tablet    Take 1 tablet (500 mg) by mouth 2 times daily (with meals)       metoprolol 25 MG 24 hr tablet     TOPROL-XL    15 tablet    Take 0.5 tablets (12.5 mg) by mouth daily       * order for DME     1 each    Glucometer, brand as covered by insurance.       * order for DME     200 each    Lancets bid and prn       * order for DME     200 each    Test strips for pt's glucometer, brand as covered by insurance Test bid and prn.       * Notice:  This list has 3 medication(s) that are the same as other medications prescribed for you. Read the directions carefully, and ask your doctor or other care provider to review them with you.

## 2017-01-25 NOTE — PATIENT INSTRUCTIONS
You are diagnosed with Uncontrolled Type 2 diabetes mellitus.  As discussed today, the following are recommended:    - Medications:   Start the following    Lantus 14 units subcutaneously (injection) every bedtime.    Metformin 500 mg twice a day orally    Lisinopril 5 mg orally once a day.    Atorvastatin 10 mg orally once a day.   Continue the following:    Metoprolol XL 12.5 mg orally once a day.  - Carbohydrate controlled diet; see below for details  - Exercise: 150 mins of aerobic activity per week as minimum goal  - Weight management  - Diabetes educator consultation - schedule this by returning the call you received yesterday  - Check Blood sugars at home: once before breakfast and once 1-2 hrs after dinner  - Do not walk barefoot; check feet daily for sores.  - You will be contacted in 1-2 business days to get a schedule for the foot specialist  - get a flu vaccine every fall  - you are recommended to be vaccinated for pneumonia; you may request for this at your soonest convenience.  - get annual eye exam, and regular dental exams  - Repeat blood tests: Mid-March 2017 - call to make lab appointment for then    Schedule nurse visit in 7-10 days for recheck of your blood pressure.  Further adjustment of your blood pressure medication will be given then as appropriate.    For your planned cataract surgery, approval to proceed with the procedure may be given once your blood pressure is under good and stable control, and your hemoglobin A1c blood test is below 8.5%.

## 2017-01-25 NOTE — NURSING NOTE
"Chief Complaint   Patient presents with     Diabetes     Pt here to discuss plans for new dx of diabetes.       Initial /112 mmHg  Pulse 103  Temp(Src) 97.7  F (36.5  C) (Tympanic)  Ht 5' 9\" (1.753 m)  Wt 262 lb (118.842 kg)  BMI 38.67 kg/m2 Estimated body mass index is 38.67 kg/(m^2) as calculated from the following:    Height as of this encounter: 5' 9\" (1.753 m).    Weight as of this encounter: 262 lb (118.842 kg).  BP completed using cuff size: eryn Villegas CMA    "

## 2017-02-01 ENCOUNTER — TELEPHONE (OUTPATIENT)
Dept: FAMILY MEDICINE | Facility: CLINIC | Age: 57
End: 2017-02-01

## 2017-02-01 ENCOUNTER — ALLIED HEALTH/NURSE VISIT (OUTPATIENT)
Dept: FAMILY MEDICINE | Facility: CLINIC | Age: 57
End: 2017-02-01
Payer: COMMERCIAL

## 2017-02-01 ENCOUNTER — ALLIED HEALTH/NURSE VISIT (OUTPATIENT)
Dept: EDUCATION SERVICES | Facility: CLINIC | Age: 57
End: 2017-02-01
Payer: COMMERCIAL

## 2017-02-01 VITALS — HEART RATE: 90 BPM | DIASTOLIC BLOOD PRESSURE: 96 MMHG | OXYGEN SATURATION: 99 % | SYSTOLIC BLOOD PRESSURE: 160 MMHG

## 2017-02-01 VITALS — BODY MASS INDEX: 38.67 KG/M2 | WEIGHT: 262 LBS

## 2017-02-01 DIAGNOSIS — E11.9 DIABETES MELLITUS, TYPE 2 (H): Primary | ICD-10-CM

## 2017-02-01 DIAGNOSIS — I10 BENIGN ESSENTIAL HYPERTENSION: Primary | ICD-10-CM

## 2017-02-01 DIAGNOSIS — I10 UNCONTROLLED HYPERTENSION: Primary | ICD-10-CM

## 2017-02-01 LAB
CHOLEST SERPL-MCNC: 168 MG/DL
HDLC SERPL-MCNC: 32 MG/DL
LDLC SERPL CALC-MCNC: 75 MG/DL
NONHDLC SERPL-MCNC: 136 MG/DL
TRIGL SERPL-MCNC: 303 MG/DL

## 2017-02-01 PROCEDURE — 80061 LIPID PANEL: CPT | Performed by: FAMILY MEDICINE

## 2017-02-01 PROCEDURE — G0108 DIAB MANAGE TRN  PER INDIV: HCPCS

## 2017-02-01 PROCEDURE — 99207 ZZC NO CHARGE NURSE ONLY: CPT

## 2017-02-01 PROCEDURE — 36415 COLL VENOUS BLD VENIPUNCTURE: CPT | Performed by: FAMILY MEDICINE

## 2017-02-01 RX ORDER — METFORMIN HCL 500 MG
TABLET, EXTENDED RELEASE 24 HR ORAL
Qty: 120 TABLET | Refills: 3 | Status: SHIPPED | OUTPATIENT
Start: 2017-02-01 | End: 2017-06-14

## 2017-02-01 NOTE — PATIENT INSTRUCTIONS
My Diabetes Care Goals:    Healthy Eating: Eat three meals and up to three snacks per day.  Meals: 45-60 grams Snacks: 15-30 grams Keep a food log.    Being Active: Block time for exercise.  3 days a week. Log your activity.    Monitoring: Test blood sugar 2 times per day.  Blood sugar goals are: Before meals:  2 hrs after a meal: <180    Taking Medication: Increase Lantus to 17 units at bedtime.  Increase Metformin 500 mg to 1 tablet before breakfast and 2 tablets before dinner for 1 week and then increase to 2 twice a day.    Follow up:  Contact me after you have been on Metformin at 2000 mg daily and let me know where blood sugar is. [phone].  We will decide if we need to increase Lantus.  Follow-up diabetes education appointment scheduled on March 1 at 1pm.      Bring blood glucose meter and logbook with you to all doctor and follow-up appointments.     [facility] for the [facility]:  For Your Diabetes Education and Nutrition Appointments Call:  [phone]   For Diabetes Education or Nutrition Related Questions:   Phone: [phone]  E-mail: [email or web address]  Fax: [phone]   If you need a medication refill please contact your pharmacy. Please allow 3 business days for your refills to be completed.    Instructions for emailing the Diabetes Educators    If you need to communicate a non-urgent message to a Diabetes Educator via email, please send to [email or web address].    Please follow the following email guidelines:    Subject line: Secure: your clinic name (example: Secure: [facility])  In the email please include: First name, middle initial, last name and date of birth.    We will be in touch with you within one (1) business day.

## 2017-02-01 NOTE — PROGRESS NOTES
Diabetes Self Management Training: Initial Assessment Visit for Newly Diagnosed Patients (Complete AADE Goals Flowsheet)    Juan Balderrama presents today for education related to Type 2 diabetes.    He is accompanied by self    Patient's diabetes management related comments/concerns: does not want to take drugs    Patient's emotional response to diabetes: expresses readiness to learn and concern for health and well-being    Patient would like this visit to be focused around the following diabetes-related behaviors and goals: Assistance with making lifestyle changes    ASSESSMENT:  Patient Problem List and Family Medical History reviewed for relevant medical history, current medical status, and diabetes risk factors.    Current Diabetes Management per Patient:  Taking diabetes medications?   yes:     Diabetes Medication(s)     Biguanides Sig    metFORMIN (GLUCOPHAGE) 500 MG tablet Take 1 tablet (500 mg) by mouth 2 times daily (with meals)    Insulin Sig    insulin glargine (LANTUS SOLOSTAR) 100 UNIT/ML injection Inject 14 Units Subcutaneous At Bedtime          *Abbreviated insulin dose documentation key: Insulin Trade Name ([id number]-bedtime) - i.e. Humalog 5-5-5-0 (Humalog 5 units at breakfast, 5 units at lunch, and 5 units at dinner).    Past Diabetes Education: No    Patient glucose self monitoring as follows: two times daily.   BG meter: Accu-Chek Pauline Connect meter  BG results: Running in the mid 200s    BG values are: Not in goal  Patient's most recent A1C     12.3   1/23/2017 is not meeting goal of <7.0    Nutrition:  Patient eats 3 meals per day and eats out more than once a week    Breakfast - (7a) 1/2 grapefruit, 8 oz skim milk, 1 breakfast bar  Lunch - (11-1p) chicken cheese wrap, diet pepsi  Dinner - (6-8p) turkey breast, vegetables, 1 potato, 12 oz milk  Snacks - peanuts or ice cream--has cut these out    Beverages: Alcohol 1-2 on Fridays/day, Milk 3-4/day, Pop (Diet) 2/day, Coffee 0-1/day  "and Water 4-6/day    Cultural/Mormonism diet restrictions: No     Biggest Challenge to Healthy Eating: travel, eating is part of the job    Physical Activity:    Type: walking, 100 situps, 50 push ups, has [employer] gym membership   Duration: 30 minutes  Frequency: 3-5days/week  Barriers: time  Limitations:none    Diabetes Risk Factors:  age over 45 years, hypertension, overweight/obesity and inactivity    Diabetes Complications:  Acute Complications: At risk for hypoglycemia? yes  Symptoms of low blood sugar? Has not ha  Patient carries a carbohydrate source with them regularly: No     Vitals:  Wt 118.842 kg (262 lb)  Estimated body mass index is 38.67 kg/(m^2) as calculated from the following:    Height as of 1/25/17: 1.753 m (5' 9\").    Weight as of this encounter: 118.842 kg (262 lb).   Last 3 BP:   BP Readings from Last 3 Encounters:   02/01/17 160/96   01/25/17 168/112   01/23/17 164/106       History   Smoking status     Never Smoker    Smokeless tobacco     Not on file       Labs:  A1C     12.3   1/23/2017  GLC      321   1/23/2017  No results found for this basename: LDL  No results found for: HDL]  GFR ESTIMATE   Date Value Ref Range Status   01/23/2017 >90  Non  GFR Calc   >60 mL/min/1.7m2 Final     GFR ESTIMATE IF BLACK   Date Value Ref Range Status   01/23/2017 >90   GFR Calc   >60 mL/min/1.7m2 Final     CR     0.61   1/23/2017  No results found for this basename: microalbumin    Socio/Economic Considerations:    Support system: family    Health Beliefs and Attitudes:   Patient Activation Measure Survey Score:  No flowsheet data found.    Stage of Change: ACTION (Actively working towards change)      Diabetes knowledge and skills assessment:     Patient is knowledgeable in diabetes management concepts related to: Monitoring and Taking Medication    Patient needs further education on the following diabetes management concepts: Healthy Eating, Being Active, Monitoring, " Taking Medication and Problem Solving    Barriers to Learning Assessment: No Barriers identified    Based on learning assessment above, most appropriate setting for further diabetes education would be: Individual setting.    INTERVENTION:   Blood sugar remains above target despite Metformin and insulin.  Will increase both today.      We covered carb counting and insulin use today.  Grupo seemed to understand the information.  He wants to lose weight and run a 5 FanGo this year.    Education provided today on:  AADE Self-Care Behaviors:  Healthy Eating: carbohydrate counting, consistency in amount, composition, and timing of food intake, weight reduction and label reading  Being Active: relationship to blood glucose, describe appropriate activity program and precautions to take  Monitoring: log and interpret results, individual blood glucose targets and frequency of monitoring  Taking Medication: action of prescribed medication, drawing up, administering and storing injectable diabetes medications, proper site selection and rotation for injections, side effects of prescribed medications and when to take medications  Problem Solving: high blood glucose - causes, signs/symptoms, treatment and prevention, low blood glucose - causes, signs/symptoms, treatment and prevention, carrying a carbohydrate source at all times and when to call health care provider    Opportunities for ongoing education and support in diabetes-self management were discussed.    Pt verbalized understanding of concepts discussed and recommendations provided today.       Education Materials Provided:  [place] Understanding Diabetes Booklet, Safe Disposal Options for Needles & Syringes and BG Log Sheet    PLAN:  See Patient Instructions for co-developed, patient-stated behavior change goals.  AVS printed and provided to patient today.    FOLLOW-UP:  Follow-up appointment scheduled on March 1.  Chart routed to referring provider.    Ongoing plan for  education and support: Follow-up visit with diabetes educator in 4 weeks  Time Spent: 60 minutes  Encounter Type: Individual    Any diabetes medication dose changes were made via the CDE Protocol and Collaborative Practice Agreement with the patient's referring provider. A copy of this encounter was shared with the provider.

## 2017-02-01 NOTE — NURSING NOTE
No side effects of meds. Watching salt intake. 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 Blood pressure is still elevated in clinic today. He will meet with diabetic education today. He also had labs drawn today.   BP Readings from Last 6 Encounters:   02/01/17 160/96   01/25/17 168/112   01/23/17 164/106   08/26/14 155/100          [facility] to leave detailed message.

## 2017-02-01 NOTE — MR AVS SNAPSHOT
After Visit Summary   2/1/2017    Juan Balderrama    MRN: [id number]           Patient Information     Date Of Birth          1960        Visit Information        Provider Department      2/1/2017 1:00 PM [place] [facility]        Care Instructions    My Diabetes Care Goals:    Healthy Eating: Eat three meals and up to three snacks per day.  Meals: 45-60 grams Snacks: 15-30 grams Keep a food log.    Being Active: Block time for exercise.  3 days a week. Log your activity.    Monitoring: Test blood sugar 2 times per day.  Blood sugar goals are: Before meals:  2 hrs after a meal: <180    Taking Medication: Increase Lantus to 17 units at bedtime.  Increase Metformin 500 mg to 1 tablet before breakfast and 2 tablets before dinner for 1 week and then increase to 2 twice a day.    Follow up:  Contact me after you have been on Metformin at 2000 mg daily and let me know where blood sugar is. [phone].  We will decide if we need to increase Lantus.  Follow-up diabetes education appointment scheduled on March 1 at 1pm.      Bring blood glucose meter and logbook with you to all doctor and follow-up appointments.     [place] [facility] for the [facility]:  For Your Diabetes Education and Nutrition Appointments Call:  [phone]   For Diabetes Education or Nutrition Related Questions:   Phone: [phone]  E-mail: [email or web address]  Fax: [phone]   If you need a medication refill please contact your pharmacy. Please allow 3 business days for your refills to be completed.    Instructions for emailing the Diabetes Educators    If you need to communicate a non-urgent message to a Diabetes Educator via email, please send to [email or web address].    Please follow the following email guidelines:    Subject line: Secure: your clinic name (example: Secure: [facility])  In the email please include: First name, middle initial,  "last name and date of birth.    We will be in touch with you within one (1) business day.           Follow-ups after your visit        Your next 10 appointments already scheduled     Feb 02, 2017  8:00 AM   New Visit with Joel Boston DPM   [facility] ([facility])    [address]  [address]  [place] [address]   [phone]            Mar 01, 2017  1:00 PM   Diabetic Education with [place] [facility]   [facility] ([facility])    [address] [address]   [phone]              Who to contact     If you have questions or need follow up information about today's clinic visit or your schedule please contact [facility] directly at [phone].  Normal or non-critical lab and imaging results will be communicated to you by [employer]hart, letter or phone within 4 business days after the clinic has received the results. If you do not hear from us within 7 days, please contact the clinic through MyChart or phone. If you have a critical or abnormal lab result, we will notify you by phone as soon as possible.  Submit refill requests through [employer] or call your pharmacy and they will forward the refill request to us. Please allow 3 business days for your refill to be completed.          Additional Information About Your Visit        [employer] Information     [employer] lets you send messages to your doctor, view your test results, renew your prescriptions, schedule appointments and more. To sign up, go to [email or web address] . Click on \"Log in\" on the left side of the screen, which will take you to the Welcome page. Then click on \"Sign up Now\" on the right side of the page.     You will be asked to enter the access code listed below, as well as some personal information. Please follow the directions to create your username and password.     Your access code is: [email or web address]  Expires: 4/23/2017  9:29 " AM     Your access code will  in 90 days. If you need help or a new code, please call your [facility] clinic or [phone].        Care EveryWhere ID     This is your Care EveryWhere ID. This could be used by other organizations to access your [facility] medical records  [id number]         Blood Pressure from Last 3 Encounters:   17 160/96   17 168/112   17 164/106    Weight from Last 3 Encounters:   17 118.842 kg (262 lb)   17 118.842 kg (262 lb)   17 118.842 kg (262 lb)              Today, you had the following     No orders found for display       Primary Care Provider    None       No address on file        Thank you!     Thank you for choosing [facility]  for your care. Our goal is always to provide you with excellent care. Hearing back from our patients is one way we can continue to improve our services. Please take a few minutes to complete the written survey that you may receive in the mail after your visit with us. Thank you!             Your Updated Medication List - Protect others around you: Learn how to safely use, store and throw away your medicines at www.disposemymeds.org.          This list is accurate as of: 17  1:52 PM.  Always use your most recent med list.                   Brand Name Dispense Instructions for use    atorvastatin 10 MG tablet    LIPITOR    90 tablet    Take 1 tablet (10 mg) by mouth daily       EPINEPHrine 0.3 MG/0.3ML injection     1 each    Inject 0.3 mLs (0.3 mg) into the muscle once as needed for anaphylaxis       insulin glargine 100 UNIT/ML injection    LANTUS SOLOSTAR    15 mL    Inject 14 Units Subcutaneous At Bedtime       insulin pen needle 32G X 4 MM    BD YENNY U/F    100 each    Use one time at bedtime or as directed.       lisinopril 5 MG tablet    PRINIVIL/ZESTRIL    30 tablet    Take 1 tablet (5 mg) by mouth daily       metFORMIN 500 MG tablet    GLUCOPHAGE    180 tablet    Take 1 tablet (500 mg) by mouth  2 times daily (with meals)       metoprolol 25 MG 24 hr tablet    TOPROL-XL    15 tablet    Take 0.5 tablets (12.5 mg) by mouth daily       * order for DME     1 each    Glucometer, brand as covered by insurance.       * order for DME     200 each    Lancets bid and prn       * order for DME     200 each    Test strips for pt's glucometer, brand as covered by insurance Test bid and prn.       * Notice:  This list has 3 medication(s) that are the same as other medications prescribed for you. Read the directions carefully, and ask your doctor or other care provider to review them with you.

## 2017-02-01 NOTE — TELEPHONE ENCOUNTER
No side effects of meds. Watching salt intake. 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 Blood pressure is still elevated in clinic today. He will meet with diabetic education today. He also had labs drawn today.   BP Readings from Last 6 Encounters:   02/01/17 160/96   01/25/17 168/112   01/23/17 164/106   08/26/14 155/100          [facility] to leave detailed message.

## 2017-02-02 ENCOUNTER — OFFICE VISIT (OUTPATIENT)
Dept: PODIATRY | Facility: CLINIC | Age: 57
End: 2017-02-02
Payer: COMMERCIAL

## 2017-02-02 VITALS — HEIGHT: 69 IN | BODY MASS INDEX: 38.8 KG/M2 | WEIGHT: 262 LBS

## 2017-02-02 DIAGNOSIS — L97.529 DIABETIC ULCER OF LEFT FOOT ASSOCIATED WITH TYPE 2 DIABETES MELLITUS (H): Primary | ICD-10-CM

## 2017-02-02 DIAGNOSIS — E11.621 DIABETIC ULCER OF LEFT FOOT ASSOCIATED WITH TYPE 2 DIABETES MELLITUS (H): Primary | ICD-10-CM

## 2017-02-02 PROCEDURE — 99203 OFFICE O/P NEW LOW 30 MIN: CPT | Mod: 25 | Performed by: PODIATRIST

## 2017-02-02 PROCEDURE — 11042 DBRDMT SUBQ TIS 1ST 20SQCM/<: CPT | Performed by: PODIATRIST

## 2017-02-02 RX ORDER — LISINOPRIL 10 MG/1
10 TABLET ORAL DAILY
Qty: 30 TABLET | Refills: 0 | Status: SHIPPED | OUTPATIENT
Start: 2017-02-02 | End: 2017-03-10

## 2017-02-02 NOTE — PROGRESS NOTES
Juan Balderrama is a 56 year old male who presents to the office with a chief complaint of an ulcer on the bottom of the left foot.  The patient relates the ulcer has been present for the past several weeks.  The patient denies any fever, chills, nausea or vomiting.  The patient denies any pus or blood draining from the ulcer.  The patient denies any redness or swelling around the ulcer.  The patient denies of any pain involving the ulcer.  The patient denies any previous ulceration on either foot or leg.  The patient relates blood sugars are under control.       REVIEW OF SYSTEMS:  Constitutional, HEENT, cardiovascular, pulmonary, GI, , musculoskeletal, neuro, skin, endocrine and psych systems are negative, except as otherwise noted.     PAST MEDICAL HISTORY: No past medical history on file.     PAST SURGICAL HISTORY: No past surgical history on file.     MEDICATIONS:   Current outpatient prescriptions:      order for DME, Equipment being ordered:  walker shoe, Disp: 1 Units, Rfl: 0     metFORMIN (GLUCOPHAGE-XR) 500 MG 24 hr tablet, Take 1 before breakfast and 2 before dinner for a week and then increase to two before breakfast and two before dinner, Disp: 120 tablet, Rfl: 3     atorvastatin (LIPITOR) 10 MG tablet, Take 1 tablet (10 mg) by mouth daily, Disp: 90 tablet, Rfl: 0     insulin pen needle (BD YENNY U/F) 32G X 4 MM, Use one time at bedtime or as directed., Disp: 100 each, Rfl: prn     order for DME, Glucometer, brand as covered by insurance., Disp: 1 each, Rfl: 0     order for DME, Lancets bid and prn, Disp: 200 each, Rfl: 4     order for DME, Test strips for pt's glucometer, brand as covered by insurance Test bid and prn., Disp: 200 each, Rfl: 4     metoprolol (TOPROL-XL) 25 MG 24 hr tablet, Take 0.5 tablets (12.5 mg) by mouth daily, Disp: 15 tablet, Rfl: 0     EPINEPHrine (EPIPEN) 0.3 MG/0.3ML injection, Inject 0.3 mLs (0.3 mg) into the muscle once as needed for anaphylaxis, Disp: 1 each, Rfl: 1      "lisinopril (PRINIVIL/ZESTRIL) 10 MG tablet, Take 1 tablet (10 mg) by mouth daily, Disp: 30 tablet, Rfl: 0     insulin glargine (LANTUS SOLOSTAR) 100 UNIT/ML injection, Inject 17 Units Subcutaneous At Bedtime, Disp: 15 mL, Rfl: 1     ALLERGIES:    Allergies   Allergen Reactions     Penicillins Swelling     Sulfamethoxazole Swelling        SOCIAL HISTORY:   Social History     Social History     Marital Status:      Spouse Name: N/A     Number of Children: N/A     Years of Education: N/A     Occupational History     Not on file.     Social History Main Topics     Smoking status: Never Smoker      Smokeless tobacco: Not on file     Alcohol Use: Yes      Comment: social     Drug Use: No     Sexual Activity: Not on file     Other Topics Concern     Parent/Sibling W/ Cabg, Mi Or Angioplasty Before 65f 55m? No     Social History Narrative        FAMILY HISTORY: No family history on file.     EXAM:Vitals: Ht 1.753 m (5' 9\")  Wt 118.842 kg (262 lb)  BMI 38.67 kg/m2  BMI= Body mass index is 38.67 kg/(m^2).  Weight management plan: Patient was referred to their PCP to discuss a diet and exercise plan.    General appearance: Patient is alert and fully cooperative with history & exam.  No sign of distress is noted during the visit.     Psychiatric: Affect is pleasant & appropriate.  Patient appears motivated to improve health.     Respiratory: Breathing is regular & unlabored while sitting.     HEENT: Hearing is intact to spoken word.  Speech is clear.  No gross evidence of visual impairment that would impact ambulation.     Dermatologic:  One notes grade II ulceration located on the plantar aspect of the foot underlying the third metatarsal on the right.  The ulcer measures out to be approximately 2.5 cm x 3.0 cm x 0.2 cm.  One notes negative probing to bone, negative tracking.  The subcutaneous granular base appears beefy red with hyperkeratotic wound borders as well as a slight serous drainage and absence of mal odor. "  There is no noted swelling, purulent drainage or ascending erythema.    Vascular: DP & PT pulses are intact & regular bilaterally.  No significant edema or varicosities noted.  CFT and skin temperature is normal to both lower extremities.     Neurologic: Lower extremity sensation is absent to light touch.  No evidence of weakness or contracture in the lower extremities.  Noted evidence of neuropathy.     Musculoskeletal: Patient is ambulatory without assistive device or brace.  No gross ankle deformity noted.  No foot or ankle joint effusion is noted.    Radiographic evaluation including an AP, lateral and medial oblique evaluation of the left foot reveals no cortical erosions or periosteal elevation.  All joint margins appear stable.  There is no apparent fracture or tumor formation noted.  There is no evidence of foreign body or gas in the tissue.    Assessment: Grade 2 mal perforans ulceration associated with Type II Diabetes on the left foot.    Plan:  I have explained to Juan the condition of the diabetic malperforans ulceration.  After verbal consent, #15 blade was used to debride ulcer down to and including subcutaneous tissue.  Bleeding controlled with light pressure.   No drainage noted.  No anesthesia was used due to neuropathy. Dry dressing applied to foot.  Patient tolerated procedure well.  I explained to him  potential risks infection and loss of tissue, including loss of limb.  The patient was prescribed a DH walker shoe.  The patient will follow up in two weeks for further evaluation.        JESUS Boston D.P.M., [email or web address].

## 2017-02-02 NOTE — NURSING NOTE
"Chief Complaint   Patient presents with     Consult     left foot ulcer       Initial Ht 1.753 m (5' 9\")  Wt 118.842 kg (262 lb)  BMI 38.67 kg/m2 Estimated body mass index is 38.67 kg/(m^2) as calculated from the following:    Height as of this encounter: 1.753 m (5' 9\").    Weight as of this encounter: 118.842 kg (262 lb).  BP completed using cuff size: NA (Not Taken)      "

## 2017-02-02 NOTE — TELEPHONE ENCOUNTER
Uncontrolled BP in spite of lisinopril 5 mg daily.  Increase lisinopril to 10 mg once a day.  Reinforce sodium restriction.  Recheck BP in 2 weeks.  Rx with new dose sent to patient's pharmacy.

## 2017-02-16 ENCOUNTER — OFFICE VISIT (OUTPATIENT)
Dept: PODIATRY | Facility: CLINIC | Age: 57
End: 2017-02-16
Payer: COMMERCIAL

## 2017-02-16 VITALS — WEIGHT: 262 LBS | HEIGHT: 69 IN | BODY MASS INDEX: 38.8 KG/M2

## 2017-02-16 DIAGNOSIS — E11.621 DIABETIC ULCER OF LEFT FOOT ASSOCIATED WITH TYPE 2 DIABETES MELLITUS (H): ICD-10-CM

## 2017-02-16 DIAGNOSIS — L97.529 DIABETIC ULCER OF LEFT FOOT ASSOCIATED WITH TYPE 2 DIABETES MELLITUS (H): ICD-10-CM

## 2017-02-16 DIAGNOSIS — M79.672 LEFT FOOT PAIN: Primary | ICD-10-CM

## 2017-02-16 DIAGNOSIS — I10 BENIGN ESSENTIAL HYPERTENSION: ICD-10-CM

## 2017-02-16 PROCEDURE — 11042 DBRDMT SUBQ TIS 1ST 20SQCM/<: CPT | Performed by: PODIATRIST

## 2017-02-16 RX ORDER — METOPROLOL SUCCINATE 25 MG/1
12.5 TABLET, EXTENDED RELEASE ORAL DAILY
Qty: 45 TABLET | Refills: 0 | Status: SHIPPED | OUTPATIENT
Start: 2017-02-16 | End: 2017-03-07

## 2017-02-16 NOTE — TELEPHONE ENCOUNTER
"Per Dr Pearce's note 2/1/17 \"Uncontrolled BP in spite of lisinopril 5 mg daily.  Increase lisinopril to 10 mg once a day.  Reinforce sodium restriction.  Recheck BP in 2 weeks.  Rx with new dose sent to patient's pharmacy.\"    Prescription approved per [facility] Refill Protocol.  Martina Hill RNC    "

## 2017-02-16 NOTE — TELEPHONE ENCOUNTER
Metoprolol      Last Written Prescription Date: 01/23/17  Last Fill Quantity: 15, # refills: 0    Last Office Visit with G, P or [facility] prescribing provider:  01/25/17   Future Office Visit:        BP Readings from Last 3 Encounters:   02/01/17 (!) 160/96   01/25/17 (!) 168/112   01/23/17 (!) 164/106

## 2017-02-16 NOTE — NURSING NOTE
"Chief Complaint   Patient presents with     RECHECK     f/u foot ulcer       Initial Ht 1.753 m (5' 9\")  Wt 118.8 kg (262 lb)  BMI 38.69 kg/m2 Estimated body mass index is 38.69 kg/(m^2) as calculated from the following:    Height as of this encounter: 1.753 m (5' 9\").    Weight as of this encounter: 118.8 kg (262 lb).  Medication Reconciliation: complete    "

## 2017-02-16 NOTE — PATIENT INSTRUCTIONS
FOOT ULCER (WOUND) EDUCATION    Ulceration ofthe foot involves a break or hole in the skin. Skin is our best protection against infection. Skin is quite durable, however, the underlying tissues are fragile. For this reason, the wound is likely to deepen rapidly. Deep wounds usually get infected and require amputation. Prompt healing is therefore essential to avoid limb loss.     Foot ulcers do not heal without intervention. Walking on the foot and living your normal life is not typically compatible with healing the sore. Successful healing will require several months of significant alteration of your daily activities.   Ulcer complications frequently develop. This primarily includes infection of skin, which then spreads deep into your joints, bones and tendons. Spreading infection may travel up your leg and into other parts of your body. Deep infection is usually treated with amputation ofpart ofyour foot or your leg. Signs of infection include fever, chills, nausea, vomiting, erratic blood sugars, local redness, pus, strong odor and localized warmth. Signs of infection should be taken seriously. Prompt evaluation in the clinic or hospital emergency room is required.   Ulcer treatment requires debridement or surgical removal of devitalized tissue. Your doctor will trim away callused, moistened, unhealthy tissue from the wound surface and margin. This helps to clean the wound and allows proper inspection. Debridement also stimulates healing even though the wound originally appears larger. Expect some bleeding with each debridement. You will be given instruction regarding wound bandaging. This often includes ointment and gauze. Avoid tape directly on the skin. Hand washing is essential since most infections will come from your fingertips. Ulcer care requires a no touch technique. Your fingers should not touch the margin or base of the wound.    Important areas to Help with healin. Debriding the wound -getting rid  of bad tissue makes way for good tissue to promote       healing  2. Addressing foot deformities - such as hammertoes and bunions that could be causing     increased pressure  3. Pressure Reduction - If pressure remains to the wound, it won't heal  4. Good Pulses - If bloodflow is not getting to the foot, the ulcer will not heal  5. Good nutrition - If you are not getting proper nutrition, again, your body can't heal.       Proteins are really good.  6. Infection control - keeping the ulcer clean with wound cleanser. Do NOT SOAK the      foot.    7. Moisture control - keeping edema down and making sure that drainage is getting      pulled away from the ulcer is important.      Key Benefits Of Debridement  So why is debridement so important? Debridement has the following purposes.4,5    Debridement reduces bioburden to help control or reduce infection. Even if an ulcer is not  infected,  the bacterial bioburden causes increased local inflammation.    Debridement allows more accurate visualization of the wound base and edges, which allows for more precise staging.    Debridement removes necrotic/non-viable tissue, which impedes wound healing, causes protein loss and can be a nidus for infection.    Debridement stimulates new circulation (angiogenesis) and allows adequate oxygen delivery to the wound.    Debridement removes undermining and tunneling, and may help reduce abscess formation.    Debridement releases healing growth factors at the edge of the wound.    Debridement prepares the wound bed by leaving only tissues that are capable of regenerating.

## 2017-02-16 NOTE — PROGRESS NOTES
Juan returns to the office for reevaluation of the left foot.  The patient relates following the instructions given at the last visit with noted healing of the ulcer.  The patient relates overall more  improvement in healing of the ulcer of the left foot.  The patient relates no fever or chills.  .    PAST MEDICAL HISTORY: No past medical history on file.    BMI= Body mass index is 38.69 kg/(m^2).    Weight management plan: Patient was referred to their PCP to discuss a diet and exercise plan.    Physical Exam:    General: The patient appears to have a pleasant mental affect.    Lower extremity physical exam:  Neurovascular status is intact with palpable pedal pulses and intact epicritic sensations.  Muscular exam is within normal limits to major muscle groups.  Integument is intact.      One notes decreased edema.  One notes a full thickness ulcer on the plantar aspect of the fifth metatarsal measuring approximately 1 cm x 1 cm. X 0.5 cm.  No exposed bone noted.  Granular base with hyperkeratotic borders noted.  No drainage noted.         Assessment:      ICD-10-CM    1. Left foot pain M79.672 DEBRIDE SKIN/SUBQ TISSUE   2. Diabetic ulcer of left foot associated with type 2 diabetes mellitus (H) E11.621 DEBRIDE SKIN/SUBQ TISSUE    L97.529        Plan:  I have explained to Juan about the conditions.   After verbal consent, excisional debridement was performed on ulcer.  #15 blade was used to debride ulcer down to and including subcutaneous tissue. Bleeding controlled with light pressure.   No drainage noted.  No anesthesia was used due to neuropathy. Dry dressing applied to foot.  Patient tolerated procedure well.  The patient is to remain in the  walker shoe to continue to offload the ulcer.  The patient will return in two weeks for reevaluation of the ulcer.      Disclaimer: This note consists of symbols derived from keyboarding, dictation and/or voice recognition software. As a result, there may be errors  in the script that have gone undetected. Please consider this when interpreting information found in this chart.       JESUS Boston D.P.M., [email or web address].

## 2017-02-16 NOTE — MR AVS SNAPSHOT
After Visit Summary   2/16/2017    Juan Balderrama    MRN: [id number]           Patient Information     Date Of Birth          1960        Visit Information        Provider Department      2/16/2017 8:00 AM Joel oBston DPM [facility] [place]        Today's Diagnoses     Left foot pain    -  1    Diabetic ulcer of left foot associated with type 2 diabetes mellitus (H)          Care Instructions    FOOT ULCER (WOUND) EDUCATION    Ulceration ofthe foot involves a break or hole in the skin. Skin is our best protection against infection. Skin is quite durable, however, the underlying tissues are fragile. For this reason, the wound is likely to deepen rapidly. Deep wounds usually get infected and require amputation. Prompt healing is therefore essential to avoid limb loss.     Foot ulcers do not heal without intervention. Walking on the foot and living your normal life is not typically compatible with healing the sore. Successful healing will require several months of significant alteration of your daily activities.   Ulcer complications frequently develop. This primarily includes infection of skin, which then spreads deep into your joints, bones and tendons. Spreading infection may travel up your leg and into other parts of your body. Deep infection is usually treated with amputation ofpart ofyour foot or your leg. Signs of infection include fever, chills, nausea, vomiting, erratic blood sugars, local redness, pus, strong odor and localized warmth. Signs of infection should be taken seriously. Prompt evaluation in the clinic or hospital emergency room is required.   Ulcer treatment requires debridement or surgical removal of devitalized tissue. Your doctor will trim away callused, moistened, unhealthy tissue from the wound surface and margin. This helps to clean the wound and allows proper inspection. Debridement also stimulates healing even though the wound originally  appears larger. Expect some bleeding with each debridement. You will be given instruction regarding wound bandaging. This often includes ointment and gauze. Avoid tape directly on the skin. Hand washing is essential since most infections will come from your fingertips. Ulcer care requires a no touch technique. Your fingers should not touch the margin or base of the wound.    Important areas to Help with healin. Debriding the wound -getting rid of bad tissue makes way for good tissue to promote       healing  2. Addressing foot deformities - such as hammertoes and bunions that could be causing     increased pressure  3. Pressure Reduction - If pressure remains to the wound, it won't heal  4. Good Pulses - If bloodflow is not getting to the foot, the ulcer will not heal  5. Good nutrition - If you are not getting proper nutrition, again, your body can't heal.       Proteins are really good.  6. Infection control - keeping the ulcer clean with wound cleanser. Do NOT SOAK the      foot.    7. Moisture control - keeping edema down and making sure that drainage is getting      pulled away from the ulcer is important.      Key Benefits Of Debridement  So why is debridement so important? Debridement has the following purposes.4,5    Debridement reduces bioburden to help control or reduce infection. Even if an ulcer is not  infected,  the bacterial bioburden causes increased local inflammation.    Debridement allows more accurate visualization of the wound base and edges, which allows for more precise staging.    Debridement removes necrotic/non-viable tissue, which impedes wound healing, causes protein loss and can be a nidus for infection.    Debridement stimulates new circulation (angiogenesis) and allows adequate oxygen delivery to the wound.    Debridement removes undermining and tunneling, and may help reduce abscess formation.    Debridement releases healing growth factors at the edge of the wound.    Debridement  "prepares the wound bed by leaving only tissues that are capable of regenerating.            Follow-ups after your visit        Follow-up notes from your care team     Return in about 2 weeks (around 3/2/2017).      Your next 10 appointments already scheduled     Mar 01, 2017  1:00 PM CST   Diabetic Education with [place] [facility]   [facility] ([facility])    [address] [address]   [phone]              Who to contact     If you have questions or need follow up information about today's clinic visit or your schedule please contact [facility] directly at [phone].  Normal or non-critical lab and imaging results will be communicated to you by MyChart, letter or phone within 4 business days after the clinic has received the results. If you do not hear from us within 7 days, please contact the clinic through MyChart or phone. If you have a critical or abnormal lab result, we will notify you by phone as soon as possible.  Submit refill requests through Status Overload or call your pharmacy and they will forward the refill request to us. Please allow 3 business days for your refill to be completed.          Additional Information About Your Visit        MyChart Information     Status Over[employer] lets you send messages to your doctor, view your test results, renew your prescriptions, schedule appointments and more. To sign up, go to [email or web address] . Click on \"Log in\" on the left side of the screen, which will take you to the Welcome page. Then click on \"Sign up Now\" on the right side of the page.     You will be asked to enter the access code listed below, as well as some personal information. Please follow the directions to create your username and password.     Your access code is: [email or web address]  Expires: 2017  9:29 AM     Your access code will  in 90 days. If you need help or a new code, please call your [place] clinic or " "[phone].        Care EveryWhere ID     This is your Care EveryWhere ID. This could be used by other organizations to access your [facility] medical records  [id number]        Your Vitals Were     Height BMI (Body Mass Index)                1.753 m (5' 9\") 38.69 kg/m2           Blood Pressure from Last 3 Encounters:   02/01/17 (!) 160/96   01/25/17 (!) 168/112   01/23/17 (!) 164/106    Weight from Last 3 Encounters:   02/16/17 118.8 kg (262 lb)   02/02/17 118.8 kg (262 lb)   02/01/17 118.8 kg (262 lb)              We Performed the Following     DEBRIDE SKIN/SUBQ TISSUE        Primary Care Provider    None       No address on file        Thank you!     Thank you for choosing [facility]  for your care. Our goal is always to provide you with excellent care. Hearing back from our patients is one way we can continue to improve our services. Please take a few minutes to complete the written survey that you may receive in the mail after your visit with us. Thank you!             Your Updated Medication List - Protect others around you: Learn how to safely use, store and throw away your medicines at www.disposemymeds.org.          This list is accurate as of: 2/16/17  8:23 AM.  Always use your most recent med list.                   Brand Name Dispense Instructions for use    atorvastatin 10 MG tablet    LIPITOR    90 tablet    Take 1 tablet (10 mg) by mouth daily       EPINEPHrine 0.3 MG/0.3ML injection     1 each    Inject 0.3 mLs (0.3 mg) into the muscle once as needed for anaphylaxis       insulin glargine 100 UNIT/ML injection    LANTUS SOLOSTAR    15 mL    Inject 17 Units Subcutaneous At Bedtime       insulin pen needle 32G X 4 MM    BD YENNY U/F    100 each    Use one time at bedtime or as directed.       lisinopril 10 MG tablet    PRINIVIL/ZESTRIL    30 tablet    Take 1 tablet (10 mg) by mouth daily       metFORMIN 500 MG 24 hr tablet    GLUCOPHAGE-XR    120 tablet    Take 1 " before breakfast and 2 before dinner for a week and then increase to two before breakfast and two before dinner       metoprolol 25 MG 24 hr tablet    TOPROL-XL    15 tablet    Take 0.5 tablets (12.5 mg) by mouth daily       * order for DME     1 each    Glucometer, brand as covered by insurance.       * order for DME     200 each    Lancets bid and prn       * order for DME     200 each    Test strips for pt's glucometer, brand as covered by insurance Test bid and prn.       * order for DME     1 Units    Equipment being ordered: DH walker shoe       * Notice:  This list has 4 medication(s) that are the same as other medications prescribed for you. Read the directions carefully, and ask your doctor or other care provider to review them with you.

## 2017-02-28 ENCOUNTER — TELEPHONE (OUTPATIENT)
Dept: FAMILY MEDICINE | Facility: CLINIC | Age: 57
End: 2017-02-28

## 2017-02-28 NOTE — TELEPHONE ENCOUNTER
BP Readings from Last 6 Encounters:   02/01/17 (!) 160/96   01/25/17 (!) 168/112   01/23/17 (!) 164/106   08/26/14 (!) 155/100   Advised needs an appt. Jahaira Howard RN

## 2017-02-28 NOTE — TELEPHONE ENCOUNTER
Reason for Call:  Other pre-op     Detailed comments: pt is calling stating he needs surgical approval for his eye surgery   Seen in clinic 1/25/17 and was told his hypertension was uncontrolled and was not given clearance  Pt is wanting this pre-op/office approved for his surgery  Pt knows PCP is out of office and does not feel another clinic apt is needed to get this addressed        Phone Number Patient can be reached at: Home number on file [phone] (home)    Best Time: any     Can we leave a detailed message on this number? YES    Call taken on 2/28/2017 at 1:30 PM by Sara Ratliff

## 2017-03-01 ENCOUNTER — ALLIED HEALTH/NURSE VISIT (OUTPATIENT)
Dept: EDUCATION SERVICES | Facility: CLINIC | Age: 57
End: 2017-03-01

## 2017-03-01 VITALS — BODY MASS INDEX: 37.8 KG/M2 | WEIGHT: 256 LBS

## 2017-03-01 NOTE — PATIENT INSTRUCTIONS
My Diabetes Care Goals:    Healthy Eating: Eat three meals and up to three snacks per day.  Try to stick to the following meal plan:  Meals 45-60 grams Snacks: 15-30 grams    Being Active: Keep doing your exercise and start walking when you can    Monitoring: Check your blood sugar twice a day.    Taking Medication: Increase your Lantus to 23 units at bedtime. Increase your Lantus by 2 units every three days until your first morning blood sugar is under 130 mg/dl      Follow up:  Follow-up diabetes education appointment scheduled on 3/31/17.  1pm     Bring blood glucose meter and logbook with you to all doctor and follow-up appointments.     [facility] for the [facility]:  For Your Diabetes Education and Nutrition Appointments Call:  [phone]   For Diabetes Education or Nutrition Related Questions:   Phone: [phone]  E-mail: [email or web address]  Fax: [phone]   If you need a medication refill please contact your pharmacy. Please allow 3 business days for your refills to be completed.    Instructions for emailing the Diabetes Educators    If you need to communicate a non-urgent message to a Diabetes Educator via email, please send to [email or web address].    Please follow the following email guidelines:    Subject line: Secure: your clinic name (example: Secure: [facility])  In the email please include: First name, middle initial, last name and date of birth.    We will be in touch with you within one (1) business day.

## 2017-03-01 NOTE — PROGRESS NOTES
Diabetes Self Management Training: Follow-up Visit    Juan Balderrama presents today for education related to Type 2 diabetes.    He is accompanied by self    Patient's diabetes management related comments/concerns: would like to get off insulin and be on Metformin    His feelings about diabetes are stressed.      Patient would like this visit to be focused around the following diabetes-related behaviors and goals: Diabetes pathophysiology    ASSESSMENT:  Patient Problem List reviewed for relevant medical history and current medical status.    Current Diabetes Management per Patient:  Taking diabetes medications?   yes:     Diabetes Medication(s)     Biguanides Sig    metFORMIN (GLUCOPHAGE-XR) 500 MG 24 hr tablet Take 1 before breakfast and 2 before dinner for a week and then increase to two before breakfast and two before dinner    Insulin Sig    insulin glargine (LANTUS SOLOSTAR) 100 UNIT/ML injection Inject 17 Units Subcutaneous At Bedtime      , Oral Medications: Metformin - Dose: 1000, Time: breakfast and supper, Injectable Medications: Lantus 17 units at bedtime, Problems taking diabetes medications regularly? No , Side effects? No     *Abbreviated insulin dose documentation key: Insulin Trade Name (gedlvhgof-vmrfz-pxohan-bedtime) - i.e. Humalog 5-5-5-0 (Humalog 5 units at breakfast, 5 units at lunch, and 5 units at dinner).    Patient glucose self monitoring as follows: one time daily.   BG meter: One Touch Verio Flex meter  BG results: not available     BG values are: Not in goal  Patient's most recent   Lab Results   Component Value Date    A1C 12.3 01/23/2017    is not meeting goal of <8.0    Nutrition:  Patient eats 3 meals per day, snacks frequently throughout the day, eats out more than once a week and counts carbohydrates in grams      Biggest Challenge to Healthy Eating: eating out    Physical Activity:    Type: push ups and situps, plans to start walking when feet heal  Duration:15 minutes  Frequency:  "5 days/week  Barriers: travels for work  Limitations: open area on foot    Diabetes Complications:  Acute Complications: At risk for hypoglycemia? no  Symptoms of low blood? Has never had  Frequency of hypoglycemia: never  Patient carries a carbohydrate source with them regularly: Yes   Type of carbohydrate: lifesavers  Patient wears medical identification for diabetes: No   Symptoms of hyperglycemia? none    Vitals:    Estimated body mass index is 38.69 kg/(m^2) as calculated from the following:    Height as of 2/16/17: 1.753 m (5' 9\").    Weight as of 2/16/17: 118.8 kg (262 lb).   Last 3 BP:   BP Readings from Last 3 Encounters:   02/01/17 (!) 160/96   01/25/17 (!) 168/112   01/23/17 (!) 164/106       History   Smoking Status     Never Smoker   Smokeless Tobacco     Not on file       Labs:  Lab Results   Component Value Date    A1C 12.3 01/23/2017     Lab Results   Component Value Date     01/23/2017     Lab Results   Component Value Date    LDL 75 02/01/2017     HDL Cholesterol   Date Value Ref Range Status   02/01/2017 32 (L) >39 mg/dL Final   ]  GFR Estimate   Date Value Ref Range Status   01/23/2017 >90  Non  GFR Calc   >60 mL/min/1.7m2 Final     GFR Estimate If Black   Date Value Ref Range Status   01/23/2017 >90   GFR Calc   >60 mL/min/1.7m2 Final     Lab Results   Component Value Date    CR 0.61 01/23/2017     No results found for: MICROALBUMIN    Health Beliefs and Attitudes:   Patient Activation Measure Survey Score:  No flowsheet data found.    Stage of Change: ACTION (Actively working towards change)    Progress toward meeting diabetes-related behavioral goals:    GOALS % Met Goal   Healthy Eating  100%   Physical Activity     Monitoring  unknown   Medication Taking     Problem Solving  100%   Healthy Coping     Risk Reduction           Diabetes knowledge and skills assessment:     Patient is knowledgeable in diabetes management concepts related to: Healthy Eating, " Being Active and Taking Medication    Patient needs further education on the following diabetes management concepts: Being Active, Taking Medication, Problem Solving, Reducing Risks and Healthy Coping    Barriers to Learning Assessment: No Barriers identified    Based on learning assessment above, most appropriate setting for further diabetes education would be: Individual setting.    INTERVENTION:  Juan did not bring his meter today.  He reports blood sugars in the low 200s.  He did not increase his insulin at all.  He states he would like to get off insulin today.      Some time was spent discussing a titration schedule.  He needs to go up on his insulin.  He has concern that this will cause his weight to go up.      He did return with a completed food log and he appears to be carb counting correctly.    He has had an open area on his foot and was instructed to stay off it it until it is healed.  He has done calisthenics for 15 minutes 5 days a week though.    Education provided today on:  AADE Self-Care Behaviors:  Being Active: relationship to blood glucose, describe appropriate activity program and precautions to take  Monitoring: log and interpret results, individual blood glucose targets and frequency of monitoring  Taking Medication: action of prescribed medication, side effects of prescribed medications and when to take medications  Problem Solving: high blood glucose - causes, signs/symptoms, treatment and prevention, low blood glucose - causes, signs/symptoms, treatment and prevention, carrying a carbohydrate source at all times, when to call health care provider and medical identification  Reducing Risks: A1C - goals, relating to blood glucose levels, how often to check  Healthy Coping: benefits of making appropriate lifestyle changes, utilize support systems and methods for coping with stress    Opportunities for ongoing education and support in diabetes-self management were discussed.    Pt  verbalized understanding of concepts discussed and recommendations provided today.       Education Materials Provided:  BG Log Sheet    PLAN:  See Patient Instructions for co-developed, patient-stated behavior change goals.  AVS printed and provided to patient today.    FOLLOW-UP:  Follow-up appointment scheduled on 3/31/17.  Chart routed to referring provider.    Ongoing plan for education and support: Follow-up visit with diabetes educator in 1 month    Time Spent: 30 minutes  Encounter Type: Individual    Any diabetes medication dose changes were made via the CDE Protocol and Collaborative Practice Agreement with the patient's referring provider. A copy of this encounter was shared with the provider.

## 2017-03-01 NOTE — MR AVS SNAPSHOT
After Visit Summary   3/1/2017    Juan Balderrama    MRN: [id number]           Patient Information     Date Of Birth          1960        Visit Information        Provider Department      3/1/2017 1:00 PM [place] [facility] RESOURCE [facility]        Care Instructions    My Diabetes Care Goals:    Healthy Eating: Eat three meals and up to three snacks per day.  Try to stick to the following meal plan:  Meals 45-60 grams Snacks: 15-30 grams    Being Active: Keep doing your exercise and start walking when you can    Monitoring: Check your blood sugar twice a day.    Taking Medication: Increase your Lantus to 23 units at bedtime. Increase your Lantus by 2 units every three days until your first morning blood sugar is under 130 mg/dl      Follow up:  Follow-up diabetes education appointment scheduled on 3/31/17.  1pm     Bring blood glucose meter and logbook with you to all doctor and follow-up appointments.     [facility]:  For Your Diabetes Education and Nutrition Appointments Call:  [phone]   For Diabetes Education or Nutrition Related Questions:   Phone: [phone]  E-mail: [email or web address]  Fax: [phone]   If you need a medication refill please contact your pharmacy. Please allow 3 business days for your refills to be completed.    Instructions for emailing the Diabetes Educators    If you need to communicate a non-urgent message to a Diabetes Educator via email, please send to [email or web address].    Please follow the following email guidelines:    Subject line: Secure: your clinic name (example: Secure: [facility])  In the email please include: First name, middle initial, last name and date of birth.    We will be in touch with you within one (1) business day.           Follow-ups after your visit        Your next 10 appointments already scheduled     Mar 06, 2017  7:40 AM [facility] with Mauricio Guzman  "Yumiko Pearce [facility]   [facility]    [address]  [facility] [address]   [phone]            Mar 31, 2017  1:00 PM CDT   Diabetic Education with [place] [facility] [place] RESOURCE   [facility] ([facility])    [address]  [facility] [address]   [phone]              Who to contact     If you have questions or need follow up information about today's clinic visit or your schedule please contact [facility] directly at [phone].  Normal or non-critical lab and imaging results will be communicated to you by [employer], letter or phone within 4 business days after the clinic has received the results. If you do not hear from us within 7 days, please contact the clinic through [employer] or phone. If you have a critical or abnormal lab result, we will notify you by phone as soon as possible.  Submit refill requests through [employer] or call your pharmacy and they will forward the refill request to us. Please allow 3 business days for your refill to be completed.          Additional Information About Your Visit        [employer] Information     [employer] lets you send messages to your doctor, view your test results, renew your prescriptions, schedule appointments and more. To sign up, go to [email or web address] . Click on \"Log in\" on the left side of the screen, which will take you to the Welcome page. Then click on \"Sign up Now\" on the right side of the page.     You will be asked to enter the access code listed below, as well as some personal information. Please follow the directions to create your username and password.     Your access code is: [email or web address]  Expires: 2017  9:29 AM     Your access code will  in 90 days. If you need help or a new code, please call your [facility] or [phone].        Care EveryWhere ID     This is your Care EveryWhere ID. This could be used by other organizations to access your " [facility] medical records  [id number]        Your Vitals Were     BMI (Body Mass Index)                   37.8 kg/m2            Blood Pressure from Last 3 Encounters:   02/01/17 (!) 160/96   01/25/17 (!) 168/112   01/23/17 (!) 164/106    Weight from Last 3 Encounters:   03/01/17 116.1 kg (256 lb)   02/16/17 118.8 kg (262 lb)   02/02/17 118.8 kg (262 lb)              Today, you had the following     No orders found for display       Primary Care Provider    None       No address on file        Thank you!     Thank you for choosing [facility]  for your care. Our goal is always to provide you with excellent care. Hearing back from our patients is one way we can continue to improve our services. Please take a few minutes to complete the written survey that you may receive in the mail after your visit with us. Thank you!             Your Updated Medication List - Protect others around you: Learn how to safely use, store and throw away your medicines at www.disposemymeds.org.          This list is accurate as of: 3/1/17  1:25 PM.  Always use your most recent med list.                   Brand Name Dispense Instructions for use    atorvastatin 10 MG tablet    LIPITOR    90 tablet    Take 1 tablet (10 mg) by mouth daily       EPINEPHrine 0.3 MG/0.3ML injection     1 each    Inject 0.3 mLs (0.3 mg) into the muscle once as needed for anaphylaxis       insulin glargine 100 UNIT/ML injection    LANTUS SOLOSTAR    15 mL    Inject 17 Units Subcutaneous At Bedtime       insulin pen needle 32G X 4 MM    BD YENNY U/F    100 each    Use one time at bedtime or as directed.       lisinopril 10 MG tablet    PRINIVIL/ZESTRIL    30 tablet    Take 1 tablet (10 mg) by mouth daily       metFORMIN 500 MG 24 hr tablet    GLUCOPHAGE-XR    120 tablet    Take 1 before breakfast and 2 before dinner for a week and then increase to two before breakfast and two before dinner       metoprolol 25 MG 24 hr tablet    TOPROL-XL    45 tablet     Take 0.5 tablets (12.5 mg) by mouth daily       * order for DME     1 each    Glucometer, brand as covered by insurance.       * order for DME     200 each    Lancets bid and prn       * order for DME     200 each    Test strips for pt's glucometer, brand as covered by insurance Test bid and prn.       * order for DME     1 Units    Equipment being ordered: DH walker shoe       * Notice:  This list has 4 medication(s) that are the same as other medications prescribed for you. Read the directions carefully, and ask your doctor or other care provider to review them with you.

## 2017-03-02 ENCOUNTER — TELEPHONE (OUTPATIENT)
Dept: FAMILY MEDICINE | Facility: CLINIC | Age: 57
End: 2017-03-02

## 2017-03-06 ENCOUNTER — OFFICE VISIT (OUTPATIENT)
Dept: FAMILY MEDICINE | Facility: CLINIC | Age: 57
End: 2017-03-06
Payer: COMMERCIAL

## 2017-03-06 VITALS
HEIGHT: 69 IN | SYSTOLIC BLOOD PRESSURE: 164 MMHG | BODY MASS INDEX: 38.06 KG/M2 | TEMPERATURE: 97.6 F | DIASTOLIC BLOOD PRESSURE: 90 MMHG | WEIGHT: 257 LBS | HEART RATE: 89 BPM

## 2017-03-06 DIAGNOSIS — Z01.818 PREOP GENERAL PHYSICAL EXAM: Primary | ICD-10-CM

## 2017-03-06 DIAGNOSIS — I10 BENIGN ESSENTIAL HYPERTENSION: ICD-10-CM

## 2017-03-06 DIAGNOSIS — E66.01 MORBID OBESITY, UNSPECIFIED OBESITY TYPE (H): ICD-10-CM

## 2017-03-06 DIAGNOSIS — H26.9 LEFT CATARACT: ICD-10-CM

## 2017-03-06 LAB
ANION GAP SERPL CALCULATED.3IONS-SCNC: 9 MMOL/L (ref 3–14)
BUN SERPL-MCNC: 20 MG/DL (ref 7–30)
CALCIUM SERPL-MCNC: 9.4 MG/DL (ref 8.5–10.1)
CHLORIDE SERPL-SCNC: 98 MMOL/L (ref 94–109)
CO2 SERPL-SCNC: 27 MMOL/L (ref 20–32)
CREAT SERPL-MCNC: 0.7 MG/DL (ref 0.66–1.25)
GFR SERPL CREATININE-BSD FRML MDRD: ABNORMAL ML/MIN/1.7M2
GLUCOSE SERPL-MCNC: 195 MG/DL (ref 70–99)
HBA1C MFR BLD: 10.3 % (ref 4.3–6)
POTASSIUM SERPL-SCNC: 4.6 MMOL/L (ref 3.4–5.3)
SODIUM SERPL-SCNC: 134 MMOL/L (ref 133–144)

## 2017-03-06 PROCEDURE — 80048 BASIC METABOLIC PNL TOTAL CA: CPT | Performed by: FAMILY MEDICINE

## 2017-03-06 PROCEDURE — 83036 HEMOGLOBIN GLYCOSYLATED A1C: CPT | Performed by: FAMILY MEDICINE

## 2017-03-06 PROCEDURE — 36415 COLL VENOUS BLD VENIPUNCTURE: CPT | Performed by: FAMILY MEDICINE

## 2017-03-06 PROCEDURE — 99215 OFFICE O/P EST HI 40 MIN: CPT | Performed by: FAMILY MEDICINE

## 2017-03-06 RX ORDER — METOPROLOL SUCCINATE 25 MG/1
25 TABLET, EXTENDED RELEASE ORAL DAILY
Qty: 30 TABLET | Refills: 1 | COMMUNITY
Start: 2017-03-06 | End: 2017-03-09

## 2017-03-06 NOTE — NURSING NOTE
"Chief Complaint   Patient presents with     Pre-Op Exam       Initial /86  Pulse 89  Temp 97.6  F (36.4  C) (Tympanic)  Ht 5' 9\" (1.753 m)  Wt 257 lb (116.6 kg)  BMI 37.95 kg/m2 Estimated body mass index is 37.95 kg/(m^2) as calculated from the following:    Height as of this encounter: 5' 9\" (1.753 m).    Weight as of this encounter: 257 lb (116.6 kg).  Medication Reconciliation: complete    "

## 2017-03-06 NOTE — PATIENT INSTRUCTIONS
Go to [facility] now for your blood draw.  You will be contacted in 1-2 days for results of your lab tests.  Further recommendations to be given once lab tests are available.    Increase Metoprolol XL to 25 mg (total) daily.  Continue Lisinopril 10 mg daily.  Low salt, low fat diet.   SCHEDULE NURSE VISIT IN 1 WEEK FOR RECHECK OF YOUR BLOOD PRESSURE.    Observe low salt, low fat, and carbohydrate-controlled diet.    Be consistent with moderate intensity exercise with a goal total of 150 minutes per week.    Your eye surgeon will be notified of the recommendations once all lab tests are available.    Your blood sugars are too high to safely have surgery. High blood sugars before and after surgery can increase your risk of getting an infection after surgery and can increase the time it takes you to recover from surgery. We need to work together to get your blood sugars better managed before you have surgery.    Continue to see diabetes education.    Before your visit, it's important that you check your blood sugars before each meal. Bring your meter and your log book to the appointment.    Continue titrating your Lantus as prescribed.    Once your blood sugar readings have been below 200 for a full week, we can work with your surgeon to get surgery scheduled.      Before Your Surgery      Call your surgeon if there is any change in your health. This includes signs of a cold or flu (such as a sore throat, runny nose, cough, rash or fever).    Do not smoke, drink alcohol or take over the counter medicine (unless your surgeon or primary care doctor tells you to) for the 24 hours before and after surgery.    If you take prescribed drugs: Follow your doctor s orders about which medicines to take and which to stop until after surgery.    Eating and drinking prior to surgery: follow the instructions from your surgeon    Take a shower or bath the night before surgery. Use the soap your surgeon gave you to gently clean your  skin. If you do not have soap from your surgeon, use your regular soap. Do not shave or scrub the surgery site.  Wear clean pajamas and have clean sheets on your bed.

## 2017-03-06 NOTE — MR AVS SNAPSHOT
After Visit Summary   3/6/2017    Juan Balderrama    MRN: [id number]           Patient Information     Date Of Birth          1960        Visit Information        Provider Department      3/6/2017 7:40 AM Mauricio Pearce MD [facility]        Today's Diagnoses     Preop general physical exam    -  1    Left cataract        Uncontrolled type 2 diabetes mellitus without complication, without long-term current use of insulin (H)        Benign essential hypertension        Morbid obesity, unspecified obesity type (H)          Care Instructions    Go to [facility] now for your blood draw.  You will be contacted in 1-2 days for results of your lab tests.  Further recommendations to be given once lab tests are available.    Increase Metoprolol XL to 25 mg (total) daily.  Continue Lisinopril 10 mg daily.  Low salt, low fat diet.   SCHEDULE NURSE VISIT IN 1 WEEK FOR RECHECK OF YOUR BLOOD PRESSURE.    Observe low salt, low fat, and carbohydrate-controlled diet.    Be consistent with moderate intensity exercise with a goal total of 150 minutes per week.    Your eye surgeon will be notified of the recommendations once all lab tests are available.      Before Your Surgery      Call your surgeon if there is any change in your health. This includes signs of a cold or flu (such as a sore throat, runny nose, cough, rash or fever).    Do not smoke, drink alcohol or take over the counter medicine (unless your surgeon or primary care doctor tells you to) for the 24 hours before and after surgery.    If you take prescribed drugs: Follow your doctor s orders about which medicines to take and which to stop until after surgery.    Eating and drinking prior to surgery: follow the instructions from your surgeon    Take a shower or bath the night before surgery. Use the soap your surgeon gave you to gently clean your skin. If you do not have soap from your surgeon, use your regular soap. Do not shave or  "scrub the surgery site.  Wear clean pajamas and have clean sheets on your bed.         Follow-ups after your visit        Follow-up notes from your care team     Return in about 1 week (around 3/13/2017) for BP Recheck.      Your next 10 appointments already scheduled     Mar 31, 2017  1:00 PM CDT   Diabetic Education with [place] [facility] ED RESOURCE   [facility] ([facility])    [address] [address] [phone]              Who to contact     If you have questions or need follow up information about today's clinic visit or your schedule please contact [facility] directly at [phone].  Normal or non-critical lab and imaging results will be communicated to you by MyChart, letter or phone within 4 business days after the clinic has received the results. If you do not hear from us within 7 days, please contact the clinic through [employer] or phone. If you have a critical or abnormal lab result, we will notify you by phone as soon as possible.  Submit refill requests through [employer] or call your pharmacy and they will forward the refill request to us. Please allow 3 business days for your refill to be completed.          Additional Information About Your Visit        MyChart Information     [employer] lets you send messages to your doctor, view your test results, renew your prescriptions, schedule appointments and more. To sign up, go to [email or web address] . Click on \"Log in\" on the left side of the screen, which will take you to the Welcome page. Then click on \"Sign up Now\" on the right side of the page.     You will be asked to enter the access code listed below, as well as some personal information. Please follow the directions to create your username and password.     Your access code is: [email or web address]  Expires: 2017  9:29 AM     Your access code will  in 90 days. If you need help or a new code, please call your [facility] or " "[phone].        Care EveryWhere ID     This is your Care EveryWhere ID. This could be used by other organizations to access your [facility] medical records  [id number]        Your Vitals Were     Pulse Temperature Height BMI (Body Mass Index)          89 97.6  F (36.4  C) (Tympanic) 5' 9\" (1.753 m) 37.95 kg/m2         Blood Pressure from Last 3 Encounters:   03/06/17 164/90   02/01/17 (!) 160/96   01/25/17 (!) 168/112    Weight from Last 3 Encounters:   03/06/17 257 lb (116.6 kg)   03/01/17 256 lb (116.1 kg)   02/16/17 262 lb (118.8 kg)              We Performed the Following     Basic metabolic panel     Hemoglobin A1c          Today's Medication Changes          These changes are accurate as of: 3/6/17  8:17 AM.  If you have any questions, ask your nurse or doctor.               These medicines have changed or have updated prescriptions.        Dose/Directions    metoprolol 25 MG 24 hr tablet   Commonly known as:  TOPROL-XL   This may have changed:  how much to take   Used for:  Benign essential hypertension   Changed by:  Mauricio Pearce MD        Dose:  25 mg   Take 1 tablet (25 mg) by mouth daily   Quantity:  30 tablet   Refills:  1                Primary Care Provider    None       No address on file        Thank you!     Thank you for choosing [facility]  for your care. Our goal is always to provide you with excellent care. Hearing back from our patients is one way we can continue to improve our services. Please take a few minutes to complete the written survey that you may receive in the mail after your visit with us. Thank you!             Your Updated Medication List - Protect others around you: Learn how to safely use, store and throw away your medicines at www.disposemymeds.org.          This list is accurate as of: 3/6/17  8:17 AM.  Always use your most recent med list.                   Brand Name Dispense Instructions for use    atorvastatin 10 MG tablet    LIPITOR    90 " tablet    Take 1 tablet (10 mg) by mouth daily       EPINEPHrine 0.3 MG/0.3ML injection     1 each    Inject 0.3 mLs (0.3 mg) into the muscle once as needed for anaphylaxis       insulin glargine 100 UNIT/ML injection    LANTUS SOLOSTAR    15 mL    23 units at bedtime And increase by 2 units every 3 days until your fasting blood sugar is less than 130  Max dose 68 units       insulin pen needle 32G X 4 MM    BD YENNY U/F    100 each    Use one time at bedtime or as directed.       lisinopril 10 MG tablet    PRINIVIL/ZESTRIL    30 tablet    Take 1 tablet (10 mg) by mouth daily       metFORMIN 500 MG 24 hr tablet    GLUCOPHAGE-XR    120 tablet    Take 1 before breakfast and 2 before dinner for a week and then increase to two before breakfast and two before dinner       metoprolol 25 MG 24 hr tablet    TOPROL-XL    30 tablet    Take 1 tablet (25 mg) by mouth daily       * order for DME     1 each    Glucometer, brand as covered by insurance.       * order for DME     200 each    Lancets bid and prn       * order for DME     200 each    Test strips for pt's glucometer, brand as covered by insurance Test bid and prn.       * order for DME     1 Units    Equipment being ordered: DH walker shoe       * Notice:  This list has 4 medication(s) that are the same as other medications prescribed for you. Read the directions carefully, and ask your doctor or other care provider to review them with you.

## 2017-03-06 NOTE — PROGRESS NOTES
[facility]  [address] [address]  [phone]  Dept: [phone]    PRE-OP EVALUATION:  Today's date: 3/6/2017    Juan Balderrama (: 1960) presents for pre-operative evaluation assessment as requested by Dr. Khalil.  He requires evaluation and anesthesia risk assessment prior to undergoing surgery/procedure for treatment of  .  Proposed procedure: Left Cataract Surgery     Date of Surgery/ Procedure: 3/14/2017  Time of Surgery/ Procedure: 830  [facility]   Fax number for surgical facility: [phone]  Primary Physician: None  Type of Anesthesia Anticipated: Local    Patient has a Health Care Directive or Living Will:  NO    1. NO - Do you have a history of heart attack, stroke, stent, bypass or surgery on an artery in the head, neck, heart or legs?  2. NO - Do you ever have any pain or discomfort in your chest?  3. NO - Do you have a history of  Heart Failure?  4. NO - Are you troubled by shortness of breath when: walking on the level, up a slight hill or at night?  5. NO - Do you currently have a cold, bronchitis or other respiratory infection?  6. NO - Do you have a cough, shortness of breath or wheezing?  7. NO - Do you sometimes get pains in the calves of your legs when you walk?  8. NO - Do you or anyone in your family have previous history of blood clots?  9. NO - Do you or does anyone in your family have a serious bleeding problem such as prolonged bleeding following surgeries or cuts?  10. NO - Have you ever had problems with anemia or been told to take iron pills?  11. NO - Have you had any abnormal blood loss such as black, tarry or bloody stools, or abnormal vaginal bleeding?  12. NO - Have you ever had a blood transfusion?  13. NO - Have you or any of your relatives ever had problems with anesthesia?  14. NO - Do you have sleep apnea, excessive snoring or daytime drowsiness?  15. NO - Do you have any  prosthetic heart valves?  16. NO - Do you have prosthetic joints?  17. NO - Is there any chance that you may be pregnant?      HPI:                                                      Brief HPI related to upcoming procedure: Patient has left cataract causing cloudy vision.  Hence the planned procedure.  His planned surgery in January was held because of new uncontrolled DM and hypertension.      See problem list for active medical problems.  Problems all longstanding and stable, except as noted/documented.  See ROS for pertinent symptoms related to these conditions.                                                                                                  .  DIABETES - Patient has a longstanding history of DiabetesType Type II . Patient is being treated with diet, oral agents and insulin injections and denies significant side effects. Control has been poor. Complicating factors include but are not limited to: high cholesterol uncontrolled due to uncontrolled glucose, eye - cataract, HTN - uncontrolled still and obesity - needs weight management.                                                                                                             .  HYPERTENSION - Patient has longstanding history of mod-severe HTN , currently denies any symptoms referable to elevated blood pressure. Specifically denies chest pain, palpitations, dyspnea, orthopnea, PND or peripheral edema. Blood pressure readings have not been in normal range. Current medication regimen is as listed below. Patient denies any side effects of medication.                                                                                                                                                                                            .  HYPERLIPIDEMIA - Patient has a long history of significant Hyperlipidemia requiring medication for treatment with recent poor control. Patient reports no problems or side effects with the medication.                                                                                                                                                        .    MEDICAL HISTORY:                                                      Patient Active Problem List    Diagnosis Date Noted     Left cataract 01/25/2017     Priority: Medium     Morbid obesity, unspecified obesity type (H) 01/25/2017     Priority: Medium     Benign essential hypertension 01/23/2017     Priority: Medium     Former smoker 01/23/2017     Priority: Medium     Uncontrolled type 2 diabetes mellitus without complication, without long-term current use of insulin (H) 01/23/2017     Priority: Medium      History reviewed. No pertinent past medical history.  History reviewed. No pertinent past surgical history.  Current Outpatient Prescriptions   Medication Sig Dispense Refill     metoprolol (TOPROL-XL) 25 MG 24 hr tablet Take 1 tablet (25 mg) by mouth daily 30 tablet 1     insulin glargine (LANTUS SOLOSTAR) 100 UNIT/ML injection 23 units at bedtime And increase by 2 units every 3 days until your fasting blood sugar is less than 130  Max dose 68 units 15 mL 1     lisinopril (PRINIVIL/ZESTRIL) 10 MG tablet Take 1 tablet (10 mg) by mouth daily 30 tablet 0     metFORMIN (GLUCOPHAGE-XR) 500 MG 24 hr tablet Take 1 before breakfast and 2 before dinner for a week and then increase to two before breakfast and two before dinner 120 tablet 3     atorvastatin (LIPITOR) 10 MG tablet Take 1 tablet (10 mg) by mouth daily 90 tablet 0     order for DME Equipment being ordered: DH walker shoe 1 Units 0     insulin pen needle (BD YENNY U/F) 32G X 4 MM Use one time at bedtime or as directed. 100 each prn     order for DME Glucometer, brand as covered by insurance. 1 each 0     order for DME Lancets bid and prn 200 each 4     order for DME Test strips for pt's glucometer, brand as covered by insurance Test bid and prn. 200 each 4     EPINEPHrine (EPIPEN) 0.3 MG/0.3ML injection Inject  "0.3 mLs (0.3 mg) into the muscle once as needed for anaphylaxis 1 each 1     OTC products: None, except as noted above    Allergies   Allergen Reactions     Penicillins Swelling     Sulfamethoxazole Swelling      Latex Allergy: NO    Social History   Substance Use Topics     Smoking status: Never Smoker     Smokeless tobacco: Not on file     Alcohol use Yes      Comment: social     History   Drug Use No       REVIEW OF SYSTEMS:                                                    C: NEGATIVE for fever, chills, change in weight  I: NEGATIVE for worrisome rashes, moles or lesions  E: NEGATIVE for vision changes or irritation  E/M: NEGATIVE for ear, mouth and throat problems  R: NEGATIVE for significant cough or SOB  CV: NEGATIVE for chest pain, palpitations or peripheral edema  GI: NEGATIVE for nausea, abdominal pain, heartburn, or change in bowel habits  : NEGATIVE for frequency, dysuria, or hematuria  M: NEGATIVE for significant arthralgias or myalgia  N: NEGATIVE for weakness, dizziness or paresthesias  E: NEGATIVE for temperature intolerance, skin/hair changes  H: NEGATIVE for bleeding problems  P: NEGATIVE for changes in mood or affect    EXAM:                                                    /90  Pulse 89  Temp 97.6  F (36.4  C) (Tympanic)  Ht 5' 9\" (1.753 m)  Wt 257 lb (116.6 kg)  BMI 37.95 kg/m2  GENERAL APPEARANCE: morbidly obese, alert and no distress; ambulatory w/o assist  EYES: pink conj, no icterus, PERRL, EOMI  HENT: ear canals and TM's normal, nose and mouth without ulcers or lesions, oropharynx clear and oral mucous membranes moist  NECK: no adenopathy, no asymmetry, masses, or scars and thyroid normal to palpation  RESP: lungs clear to auscultation - no rales, rhonchi or wheezes  CV: regular rates and rhythm, normal S1 S2, no S3 or S4, no murmur, click or rub, no peripheral edema and peripheral pulses strong  ABDOMEN: soft, nontender, no hepatosplenomegaly, no masses and bowel sounds " normal  MS: no musculoskeletal defects are noted and gait is age appropriate without ataxia  SKIN: good turgor, no rash/jaundice/ecchymosis  NEURO: Normal strength and tone, sensory exam grossly normal, mentation intact and speech normal    DIAGNOSTICS:                                                        Labs Resulted Today:   Results for orders placed or performed in visit on 03/06/17   Hemoglobin A1c   Result Value Ref Range    Hemoglobin A1C 10.3 (H) 4.3 - 6.0 %   Basic metabolic panel   Result Value Ref Range    Sodium 134 133 - 144 mmol/L    Potassium 4.6 3.4 - 5.3 mmol/L    Chloride 98 94 - 109 mmol/L    Carbon Dioxide 27 20 - 32 mmol/L    Anion Gap 9 3 - 14 mmol/L    Glucose 195 (H) 70 - 99 mg/dL    Urea Nitrogen 20 7 - 30 mg/dL    Creatinine 0.70 0.66 - 1.25 mg/dL    GFR Estimate >90  Non  GFR Calc   >60 mL/min/1.7m2    GFR Estimate If Black >90   GFR Calc   >60 mL/min/1.7m2    Calcium 9.4 8.5 - 10.1 mg/dL       Recent Labs   Lab Test  01/23/17   1001  01/23/17   1000   HGB   --   15.3   PLT   --   342   NA   --   129*   POTASSIUM   --   4.3   CR   --   0.61*   A1C  12.3*   --         IMPRESSION:                                                    Reason for surgery/procedure: left cataract  Diagnosis/reason for consult: preop evaluation, uncontrolled type 2 DM, uncontrolled HTN, morbid obesity    The proposed surgical procedure is considered LOW risk.    REVISED CARDIAC RISK INDEX  The patient has the following serious cardiovascular risks for perioperative complications such as (MI, PE, VFib and 3  AV Block):  Diabetes Mellitus (on Insulin)  INTERPRETATION: 1 risks: Class II (low risk - 0.9% complication rate)    The patient has the following additional risks for perioperative complications:  The 10-year ASCVD risk score (Nareningrid SHOOK Jr., et al., 2013) is: 20.3%    Values used to calculate the score:      Age: 56 years      Sex: Male      Is Non- : No       Diabetic: Yes      Tobacco smoker: No      Systolic Blood Pressure: 164 mmHg      Is Prescribed Antihypertensives: Yes      HDL Cholesterol: 32 mg/dL      Total Cholesterol: 168 mg/dL  Uncontrolled hypertension      ICD-10-CM    1. Preop general physical exam Z01.818 CANCELED: EKG 12-lead complete w/read - Clinics   2. Left cataract H26.9    3. Uncontrolled type 2 diabetes mellitus without complication, without long-term current use of insulin (H) E11.65 Hemoglobin A1c     Basic metabolic panel   4. Benign essential hypertension I10 Basic metabolic panel     metoprolol (TOPROL-XL) 25 MG 24 hr tablet     CANCELED: EKG 12-lead complete w/read - Clinics   5. Morbid obesity, unspecified obesity type (H) E66.01        RECOMMENDATIONS:                                                      --Consult hospital rounder / IM to assist post-op medical management    Cardiovascular Risk  Performs 4 METs exercise without symptoms (Light housework (dusting, washing dishes), Climb a flight of stairs and Walk on level ground at 15 minutes per mile (4 miles/hour)) .   Patient is started on metoprolol xl due to persistently uncontrolled HTN.  Continue lisinopril as prescribed.  Uncontrolled diabetes is considered high cardiovascular risk, and per anesthesia guidelines for preoperative approval, A1c is recommended to be less than 8.0 to minimize risks.      --Patient is to take all scheduled medications on the day of surgery EXCEPT for modifications listed below.    Diabetes Medication Use  -----Take 80% of long acting insulin (e.g. Lantus, NPH) while NPO (fasting)      Anticoagulant or Antiplatelet Medication Use  ASPIRIN: Patient is at increased risk of thrombosis (e.g. MI, CVA) and aspirin 81 mg daily should be continued in the perioperative period  NSAIDS: Ibuprofen (Motrin):         Stop one day prior to surgery  Naproxen (Naprosyn):   Stop 2-3 days prior to surgery        ACE Inhibitor or Angiotensin Receptor Blocker (ARB)  Use  Ace inhibitor or Angiotensin Receptor Blocker (ARB) and will continue this medication due to the higher risk of uncontrolled perioerative hypertension     Surgery is NOT recommended due to uncontrolled diabetes (A1C >8.5%, Glucose >200 mg/dl). Stabilization required prior to elective surgery. Referral to Diabetes Educator (Preoperative Intensive Glucose Management) and strongly advised patient to keep titrating his lantus until desired glucose goals are met.       Signed Electronically by: Mauricio Pearce MD    Copy of this evaluation report is provided to requesting physician.    [place] Preop Guidelines

## 2017-03-09 ENCOUNTER — TELEPHONE (OUTPATIENT)
Dept: FAMILY MEDICINE | Facility: CLINIC | Age: 57
End: 2017-03-09

## 2017-03-09 DIAGNOSIS — I10 BENIGN ESSENTIAL HYPERTENSION: ICD-10-CM

## 2017-03-09 DIAGNOSIS — I10 UNCONTROLLED HYPERTENSION: ICD-10-CM

## 2017-03-09 RX ORDER — METOPROLOL SUCCINATE 25 MG/1
25 TABLET, EXTENDED RELEASE ORAL DAILY
Qty: 30 TABLET | Refills: 1 | Status: SHIPPED | OUTPATIENT
Start: 2017-03-09 | End: 2017-05-22

## 2017-03-09 NOTE — TELEPHONE ENCOUNTER
Routing refill request to provider for review/approval because:  Medication is reported/historical     Thank you  Kristie SKAGGS RN

## 2017-03-09 NOTE — TELEPHONE ENCOUNTER
"Reason for Call:  Other med request    Detailed comments: Patient states his dose of Metoprolol was increased but the pharmacy never got the Rx.  Looks like it was entered as \"historical\".  Please send to [facility] as patient is out of medication.    Phone Number Patient can be reached at: Home number on file [phone] (home)    Best Time: any    Can we leave a detailed message on this number? YES    Call taken on 3/9/2017 at 7:13 AM by Ashlyn Brown      "

## 2017-03-10 RX ORDER — LISINOPRIL 10 MG/1
10 TABLET ORAL DAILY
Qty: 30 TABLET | Refills: 0 | Status: SHIPPED | OUTPATIENT
Start: 2017-03-10 | End: 2017-03-28

## 2017-03-10 NOTE — TELEPHONE ENCOUNTER
Dr. Pearce,   Routing refill request to provider for review/approval because:  Last B/P   BP Readings from Last 6 Encounters:   03/06/17 164/90   02/01/17 (!) 160/96   01/25/17 (!) 168/112   01/23/17 (!) 164/106   08/26/14 (!) 155/100     Please advise, refill pending. Charis

## 2017-03-10 NOTE — TELEPHONE ENCOUNTER
Pt is also calling about Lisinopril    Lisinopril       Last Written Prescription Date: 2/2/2017  Last Fill Quantity: 30, # refills: 0    Last Office Visit with [facility], [facility] or [facility] prescribing provider:  3/6/2017   Future Office Visit:    Next 5 appointments (look out 90 days)     Mar 13, 2017  8:30 AM CDT   Nurse Only with AHMET KIM FP/IM RN   [facility]    [address] [address]   [phone]            Mar 28, 2017  7:00 AM CDT   Office Visit with Mauricio Pearce MD   [facility]    [address] [address]   [phone]                    BP Readings from Last 3 Encounters:   03/06/17 164/90   02/01/17 (!) 160/96   01/25/17 (!) 168/112

## 2017-03-13 ENCOUNTER — ALLIED HEALTH/NURSE VISIT (OUTPATIENT)
Dept: FAMILY MEDICINE | Facility: CLINIC | Age: 57
End: 2017-03-13
Payer: COMMERCIAL

## 2017-03-13 VITALS — SYSTOLIC BLOOD PRESSURE: 151 MMHG | DIASTOLIC BLOOD PRESSURE: 91 MMHG | HEART RATE: 86 BPM

## 2017-03-13 DIAGNOSIS — I10 HTN (HYPERTENSION): Primary | ICD-10-CM

## 2017-03-13 PROCEDURE — 99207 ZZC NO CHARGE NURSE ONLY: CPT

## 2017-03-13 NOTE — TELEPHONE ENCOUNTER
Patient notified.   He verbalized understanding and agreed with plan of controlling Blood Sugars, continuing taking Metoprolol, and increasing Lisinopril to 1.5mg daily.    He was advised to bring in his BP monitor from home in a week and compare with clinic BP monitor.    Patient is very anxious about getting cataract surgery done, reports he can hardly see out of his left eye.  Advised patient again about compliance with Blood Sugars, increasing Lisinopril, taking Metoprol and to concentrate on those things to decrease his anxiety which can elevate his BP.  Patient agreed.  Pt has appt scheduled with PCP on 3/28/17.    Michaela CASTANON Rn

## 2017-03-13 NOTE — NURSING NOTE
"Patient here to follow up on BP after Pre op exam 3/6/17.  Patient is anxious about getting his cataract surgery done.  He reports his BP has been under control at home and he is taking his BP medications as prescribed.  BP's from home are:  3/6/17:    147/83  3/7/17:    122/78  3/8/17:    138/79  3/9/17:    130/81  3/10/17:  133/78  3/11/17:  129/78  3/12/17:  136/81    He thinks he has \"white coat syndrome\".  Encouraged patient to bring in his home monitor to compare with clinics.    BP's today:    1st readin/95  2nd readin/91 (5 min after 1st reading.)    Above goal.  Patient want to know if there is anything he can do to get his BP down so he can have surgery.    Routing to provider.    Michaela CASTANON Rn    "

## 2017-03-13 NOTE — MR AVS SNAPSHOT
After Visit Summary   3/13/2017    Juan Balderrama    MRN: [id number]           Patient Information     Date Of Birth          1960        Visit Information        Provider Department      3/13/2017 8:30 AM AHMET KIM FP/GAMALIEL [facility]STE[facility]        Today's Diagnoses     HTN (hypertension)    -  1       Follow-ups after your visit        Your next 10 appointments already scheduled     Mar 28, 2017  7:00 AM CDT   Office Visit with Mauricio Pearce MD   [facility]    [address] [facility] [id number] [phone]           Bring a current list of meds and any records pertaining to this visit.  For Physicals, please bring immunization records and any forms needing to be filled out.  Please arrive 10 minutes early to complete paperwork.            Mar 31, 2017  1:00 PM CDT   Diabetic Education with [place] [facility]   [facility] ([facility])    [address] [id number] [phone]              Who to contact     If you have questions or need follow up information about today's clinic visit or your schedule please contact [facility] directly at [phone].  Normal or non-critical lab and imaging results will be communicated to you by MyChart, letter or phone within 4 business days after the clinic has received the results. If you do not hear from us within 7 days, please contact the clinic through MyChart or phone. If you have a critical or abnormal lab result, we will notify you by phone as soon as possible.  Submit refill requests through [employer] or call your pharmacy and they will forward the refill request to us. Please allow 3 business days for your refill to be completed.          Additional Information About Your Visit        [employer] Information     [employer] lets you send messages to your doctor, view your test results, renew your prescriptions, schedule  "appointments and more. To sign up, go to [email or web address] . Click on \"Log in\" on the left side of the screen, which will take you to the Welcome page. Then click on \"Sign up Now\" on the right side of the page.     You will be asked to enter the access code listed below, as well as some personal information. Please follow the directions to create your username and password.     Your access code is: [email or web address]  Expires: 2017 10:29 AM     Your access code will  in 90 days. If you need help or a new code, please call your [place] clinic or [phone].        Care EveryWhere ID     This is your Care EveryWhere ID. This could be used by other organizations to access your [place] medical records  [id number]        Your Vitals Were     Pulse                   86            Blood Pressure from Last 3 Encounters:   17 (!) 151/91   17 164/90   17 (!) 160/96    Weight from Last 3 Encounters:   17 257 lb (116.6 kg)   17 256 lb (116.1 kg)   17 262 lb (118.8 kg)              Today, you had the following     No orders found for display       Primary Care Provider    None       No address on file        Thank you!     Thank you for choosing [facility]  for your care. Our goal is always to provide you with excellent care. Hearing back from our patients is one way we can continue to improve our services. Please take a few minutes to complete the written survey that you may receive in the mail after your visit with us. Thank you!             Your Updated Medication List - Protect others around you: Learn how to safely use, store and throw away your medicines at www.disposemymeds.org.          This list is accurate as of: 3/13/17  8:40 AM.  Always use your most recent med list.                   Brand Name Dispense Instructions for use    atorvastatin 10 MG tablet    LIPITOR    90 tablet    Take 1 tablet (10 mg) by mouth daily       EPINEPHrine 0.3 MG/0.3ML " injection     1 each    Inject 0.3 mLs (0.3 mg) into the muscle once as needed for anaphylaxis       insulin glargine 100 UNIT/ML injection    LANTUS SOLOSTAR    15 mL    23 units at bedtime And increase by 2 units every 3 days until your fasting blood sugar is less than 130  Max dose 68 units       insulin pen needle 32G X 4 MM    BD YENNY U/F    100 each    Use one time at bedtime or as directed.       lisinopril 10 MG tablet    PRINIVIL/ZESTRIL    30 tablet    Take 1 tablet (10 mg) by mouth daily       metFORMIN 500 MG 24 hr tablet    GLUCOPHAGE-XR    120 tablet    Take 1 before breakfast and 2 before dinner for a week and then increase to two before breakfast and two before dinner       metoprolol 25 MG 24 hr tablet    TOPROL-XL    30 tablet    Take 1 tablet (25 mg) by mouth daily       * order for DME     1 each    Glucometer, brand as covered by insurance.       * order for DME     200 each    Lancets bid and prn       * order for DME     200 each    Test strips for pt's glucometer, brand as covered by insurance Test bid and prn.       * order for DME     1 Units    Equipment being ordered: DH walker shoe       * Notice:  This list has 4 medication(s) that are the same as other medications prescribed for you. Read the directions carefully, and ask your doctor or other care provider to review them with you.

## 2017-03-13 NOTE — TELEPHONE ENCOUNTER
Agree with having his blood pressure machine measurements compared against the clinic's machines.  Most of his systolic BP still in the 130's.  For diabetics, best to keep blood pressures less than 130/80.    I suggest increasing Lisinopril to 1.5 mg daily.  Continue Metoprolol XL 25 mg daily.    His diabetes is still uncontrolled and surgery cannot be approved until his blood sugars are in better control.  Plan has been given to him last week - refer to previous tel encounters and preop note.

## 2017-03-20 ENCOUNTER — ALLIED HEALTH/NURSE VISIT (OUTPATIENT)
Dept: FAMILY MEDICINE | Facility: CLINIC | Age: 57
End: 2017-03-20
Payer: COMMERCIAL

## 2017-03-20 VITALS — HEART RATE: 76 BPM | SYSTOLIC BLOOD PRESSURE: 140 MMHG | DIASTOLIC BLOOD PRESSURE: 84 MMHG

## 2017-03-20 DIAGNOSIS — I10 BENIGN ESSENTIAL HYPERTENSION: Primary | ICD-10-CM

## 2017-03-20 PROCEDURE — 99207 ZZC NO CHARGE NURSE ONLY: CPT

## 2017-03-20 NOTE — NURSING NOTE
Pt presented to clinic today to recheck blood pressure.  He is EAGER to get to cataract surgery and his elevated blood pressure and DM 2 have been precluding surgery.    Today's blood pressure is 140/92, pulse of 84.  Recheck 7 min later is 140/84, pulse of 76.    Pt's monitor from home is compared and reads 135/84, pulse of 80.  Monitor is accurate.    Pt has been working with Katey Joe regarding his blood glucoses and DM2 management.    Will route a telephone note to Dr Pearce regarding further recommendations.  Pt has a pending follow-up appt too for surgical clearance.  Naila Odom RN      BP Readings from Last 3 Encounters:   03/20/17 140/84   03/13/17 (!) 151/91   03/06/17 164/90     Lab Results   Component Value Date    CR 0.70 03/06/2017     Lab Results   Component Value Date    POTASSIUM 4.6 03/06/2017     Lab Results   Component Value Date    A1C 10.3 03/06/2017    A1C 12.3 01/23/2017

## 2017-03-20 NOTE — MR AVS SNAPSHOT
After Visit Summary   3/20/2017    Juan Balderrama    MRN: [id number]           Patient Information     Date Of Birth          1960        Visit Information        Provider Department      3/20/2017 1:00 PM AHMET KIM FP/GAMALIEL BERNSTEIN [facility]        Today's Diagnoses     Benign essential hypertension    -  1       Follow-ups after your visit        Your next 10 appointments already scheduled     Mar 28, 2017  7:00 AM CDT   Office Visit with Mauricio Pearce MD   [facility]    [address] [facility] [id number] [phone]           Bring a current list of meds and any records pertaining to this visit.  For Physicals, please bring immunization records and any forms needing to be filled out.  Please arrive 10 minutes early to complete paperwork.            Mar 31, 2017  1:00 PM CDT   Diabetic Education with [place] [facility] ED RESOURCE   [facility] ([facility])    [address] [id number] [phone]              Who to contact     If you have questions or need follow up information about today's clinic visit or your schedule please contact [facility] directly at [phone].  Normal or non-critical lab and imaging results will be communicated to you by MyChart, letter or phone within 4 business days after the clinic has received the results. If you do not hear from us within 7 days, please contact the clinic through MyChart or phone. If you have a critical or abnormal lab result, we will notify you by phone as soon as possible.  Submit refill requests through Deligic or call your pharmacy and they will forward the refill request to us. Please allow 3 business days for your refill to be completed.          Additional Information About Your Visit        Winning [employer]utta Information     Deligic lets you send messages to your doctor, view your test results, renew your prescriptions,  "schedule appointments and more. To sign up, go to [email or web address] . Click on \"Log in\" on the left side of the screen, which will take you to the Welcome page. Then click on \"Sign up Now\" on the right side of the page.     You will be asked to enter the access code listed below, as well as some personal information. Please follow the directions to create your username and password.     Your access code is: [email or web address]  Expires: 2017 10:29 AM     Your access code will  in 90 days. If you need help or a new code, please call your [place] clinic or [phone].        Care EveryWhere ID     This is your Care EveryWhere ID. This could be used by other organizations to access your [place] medical records  [id number]        Your Vitals Were     Pulse                   76            Blood Pressure from Last 3 Encounters:   17 140/84   17 (!) 151/91   17 164/90    Weight from Last 3 Encounters:   17 257 lb (116.6 kg)   17 256 lb (116.1 kg)   17 262 lb (118.8 kg)              Today, you had the following     No orders found for display         Today's Medication Changes          These changes are accurate as of: 3/20/17  5:28 PM.  If you have any questions, ask your nurse or doctor.               These medicines have changed or have updated prescriptions.        Dose/Directions    insulin glargine 100 UNIT/ML injection   Commonly known as:  LANTUS SOLOSTAR   This may have changed:    - how much to take  - when to take this  - additional instructions   Used for:  Uncontrolled type 2 diabetes mellitus with complication, with long-term current use of insulin (H)        23 units at bedtime And increase by 2 units every 3 days until your fasting blood sugar is less than 130  Max dose 68 units   Quantity:  15 mL   Refills:  1       metFORMIN 500 MG 24 hr tablet   Commonly known as:  GLUCOPHAGE-XR   This may have changed:    - how much to take  - when to take this  - " additional instructions   Used for:  Diabetes mellitus, type 2 (H)        Take 1 before breakfast and 2 before dinner for a week and then increase to two before breakfast and two before dinner   Quantity:  120 tablet   Refills:  3                Primary Care Provider Office Phone # Fax #    Mauricio Pearce [phone]       [facility]        [address] [address]        Thank you!     Thank you for choosing [facility]  for your care. Our goal is always to provide you with excellent care. Hearing back from our patients is one way we can continue to improve our services. Please take a few minutes to complete the written survey that you may receive in the mail after your visit with us. Thank you!             Your Updated Medication List - Protect others around you: Learn how to safely use, store and throw away your medicines at www.disposemymeds.org.          This list is accurate as of: 3/20/17  5:28 PM.  Always use your most recent med list.                   Brand Name Dispense Instructions for use    atorvastatin 10 MG tablet    LIPITOR    90 tablet    Take 1 tablet (10 mg) by mouth daily       EPINEPHrine 0.3 MG/0.3ML injection     1 each    Inject 0.3 mLs (0.3 mg) into the muscle once as needed for anaphylaxis       insulin glargine 100 UNIT/ML injection    LANTUS SOLOSTAR    15 mL    23 units at bedtime And increase by 2 units every 3 days until your fasting blood sugar is less than 130  Max dose 68 units       lisinopril 10 MG tablet    PRINIVIL/ZESTRIL    30 tablet    Take 1 tablet (10 mg) by mouth daily       metFORMIN 500 MG 24 hr tablet    GLUCOPHAGE-XR    120 tablet    Take 1 before breakfast and 2 before dinner for a week and then increase to two before breakfast and two before dinner       metoprolol 25 MG 24 hr tablet    TOPROL-XL    30 tablet    Take 1 tablet (25 mg) by mouth daily

## 2017-03-21 ENCOUNTER — TELEPHONE (OUTPATIENT)
Dept: FAMILY MEDICINE | Facility: CLINIC | Age: 57
End: 2017-03-21

## 2017-03-21 NOTE — TELEPHONE ENCOUNTER
Pt presented to clinic today to recheck blood pressure.  He is EAGER to get to cataract surgery and his elevated blood pressure and DM 2 have been precluding surgery.     Today's blood pressure is 140/92, pulse of 84.  Recheck 7 min later is 140/84, pulse of 76.     Pt's monitor from home is compared and reads 135/84, pulse of 80. Monitor is accurate.     Pt has been working with Katey Joe regarding his blood glucoses and DM2 management.     Will route a telephone note to Dr Pearce regarding further recommendations.  Any more adjustments until he is seen next week?      Pt has a pending follow-up appt too on 3/28/17 for surgical clearance.  Naila Odom RN            BP Readings from Last 3 Encounters:   03/20/17 140/84   03/13/17 (!) 151/91   03/06/17 164/90      Lab Results   Component Value Date     CR 0.70 03/06/2017            Lab Results   Component Value Date     POTASSIUM 4.6 03/06/2017            Lab Results   Component Value Date     A1C 10.3 03/06/2017     A1C 12.3 01/23/2017

## 2017-03-21 NOTE — TELEPHONE ENCOUNTER
Also,   1.  Pt brought in blood pressure readings from home for dr's review.  2.  Pt has been titrating his Lantus upward according to Katey Joe's orders/instructions.  He is now up to 40 units every evening.  Fasting sugars are generally under 130's now per pt's report.  Naila Odom RN

## 2017-03-21 NOTE — TELEPHONE ENCOUNTER
Keep current doses of antihypertensives.  Patient should bring his glucose monitor on his visit on 3/28/2017 to be reviewed.

## 2017-03-28 ENCOUNTER — OFFICE VISIT (OUTPATIENT)
Dept: FAMILY MEDICINE | Facility: CLINIC | Age: 57
End: 2017-03-28
Payer: COMMERCIAL

## 2017-03-28 VITALS
DIASTOLIC BLOOD PRESSURE: 78 MMHG | TEMPERATURE: 97.1 F | SYSTOLIC BLOOD PRESSURE: 150 MMHG | BODY MASS INDEX: 36.73 KG/M2 | HEART RATE: 86 BPM | WEIGHT: 248 LBS | HEIGHT: 69 IN

## 2017-03-28 DIAGNOSIS — H26.9 LEFT CATARACT: ICD-10-CM

## 2017-03-28 DIAGNOSIS — I10 BENIGN ESSENTIAL HYPERTENSION: Primary | ICD-10-CM

## 2017-03-28 PROCEDURE — 99214 OFFICE O/P EST MOD 30 MIN: CPT | Performed by: FAMILY MEDICINE

## 2017-03-28 RX ORDER — LISINOPRIL 20 MG/1
20 TABLET ORAL DAILY
Qty: 60 TABLET | Refills: 0 | Status: SHIPPED | OUTPATIENT
Start: 2017-03-28 | End: 2017-05-25

## 2017-03-28 RX ORDER — BLOOD-GLUCOSE METER
EACH MISCELLANEOUS
COMMUNITY
Start: 2017-01-25 | End: 2019-01-22

## 2017-03-28 RX ORDER — BLOOD SUGAR DIAGNOSTIC
STRIP MISCELLANEOUS
COMMUNITY
Start: 2017-03-22 | End: 2019-01-22

## 2017-03-28 RX ORDER — PREDNISOLONE ACETATE 10 MG/ML
SUSPENSION/ DROPS OPHTHALMIC
COMMUNITY
Start: 2017-01-23 | End: 2017-06-28

## 2017-03-28 RX ORDER — GATIFLOXACIN 5 MG/ML
SOLUTION/ DROPS OPHTHALMIC
COMMUNITY
Start: 2017-01-23 | End: 2017-06-28

## 2017-03-28 RX ORDER — PEN NEEDLE, DIABETIC 32GX 5/32"
NEEDLE, DISPOSABLE MISCELLANEOUS
COMMUNITY
Start: 2017-03-05 | End: 2018-12-03

## 2017-03-28 RX ORDER — LANCETS 33 GAUGE
EACH MISCELLANEOUS
COMMUNITY
Start: 2017-03-05 | End: 2019-01-22

## 2017-03-28 RX ORDER — KETOROLAC TROMETHAMINE 5 MG/ML
SOLUTION OPHTHALMIC
COMMUNITY
Start: 2017-01-23 | End: 2019-07-29

## 2017-03-28 NOTE — MR AVS SNAPSHOT
After Visit Summary   3/28/2017    Juan Balderrama    MRN: [id number]           Patient Information     Date Of Birth          1960        Visit Information        Provider Department      3/28/2017 7:00 AM Mauricio Pearce MD [facility]        Today's Diagnoses     Uncontrolled type 2 diabetes mellitus with complication, with long-term current use of insulin (H)    -  1    Benign essential hypertension        Uncontrolled hypertension          Care Instructions    Blood pressure still slightly out of ranve, but not contraindication to proceed to surgery.  Increase lisinopril to 20 mg daily.  Low salt, low fat diet. Exercise as tolerated 30 mins per day 3 days a week.  Take meds as prescribed; call if with side effects.     Blood sugar measurements better.  Continue titrating lantus, and taking metformin.  Observe low salt, low fat, and carbohydrate-controlled diet.    Continue exercise regimen discussed in the past visits.  Regular foot care; don't walk barefoot  Annual eye exam.    Flu vaccine every fall (October-November).  Blood tests: schedule lab appointment towards end of May 2017, then follow up with Dr. Pearce.    Schedule nurse visit when you get back in town in May 2017 for a blood pressure check.        Follow-ups after your visit        Your next 10 appointments already scheduled     Mar 31, 2017  1:00 PM CDT   Diabetic Education with [place] [facility] ED RESOURCE   [facility] ([facility])    [address] [id number] [phone]              Future tests that were ordered for you today     Open Future Orders        Priority Expected Expires Ordered    Hemoglobin A1c Routine 5/22/2017 6/30/2017 3/28/2017            Who to contact     If you have questions or need follow up information about today's clinic visit or your schedule please contact [facility] directly at [phone].  Normal or non-critical lab and  "imaging results will be communicated to you by MyChart, letter or phone within 4 business days after the clinic has received the results. If you do not hear from us within 7 days, please contact the clinic through [employer] or phone. If you have a critical or abnormal lab result, we will notify you by phone as soon as possible.  Submit refill requests through [employer] or call your pharmacy and they will forward the refill request to us. Please allow 3 business days for your refill to be completed.          Additional Information About Your Visit        [employer]system[employer] Data Information     [employer]Jet lets you send messages to your doctor, view your test results, renew your prescriptions, schedule appointments and more. To sign up, go to [email or web address] . Click on \"Log in\" on the left side of the screen, which will take you to the Welcome page. Then click on \"Sign up Now\" on the right side of the page.     You will be asked to enter the access code listed below, as well as some personal information. Please follow the directions to create your username and password.     Your access code is: [email or web address]  Expires: 2017 10:29 AM     Your access code will  in 90 days. If you need help or a new code, please call your [place] clinic or [phone].        Care EveryWhere ID     This is your Care EveryWhere ID. This could be used by other organizations to access your [place] medical records  [id number]        Your Vitals Were     Pulse Temperature Height BMI (Body Mass Index)          86 97.1  F (36.2  C) (Tympanic) 5' 9\" (1.753 m) 36.62 kg/m2         Blood Pressure from Last 3 Encounters:   17 150/78   17 140/84   17 (!) 151/91    Weight from Last 3 Encounters:   17 248 lb (112.5 kg)   17 257 lb (116.6 kg)   17 256 lb (116.1 kg)                 Today's Medication Changes          These changes are accurate as of: 3/28/17  7:38 AM.  If you have any questions, ask your nurse or doctor. "               These medicines have changed or have updated prescriptions.        Dose/Directions    insulin glargine 100 UNIT/ML injection   Commonly known as:  LANTUS SOLOSTAR   This may have changed:    - how much to take  - when to take this  - additional instructions   Used for:  Uncontrolled type 2 diabetes mellitus with complication, with long-term current use of insulin (H)        23 units at bedtime And increase by 2 units every 3 days until your fasting blood sugar is less than 130  Max dose 68 units   Quantity:  15 mL   Refills:  1       lisinopril 20 MG tablet   Commonly known as:  PRINIVIL/ZESTRIL   This may have changed:    - medication strength  - how much to take   Used for:  Uncontrolled hypertension   Changed by:  Mauricio Pearce MD        Dose:  20 mg   Take 1 tablet (20 mg) by mouth daily   Quantity:  60 tablet   Refills:  0       metFORMIN 500 MG 24 hr tablet   Commonly known as:  GLUCOPHAGE-XR   This may have changed:    - how much to take  - when to take this  - additional instructions   Used for:  Diabetes mellitus, type 2 (H)        Take 1 before breakfast and 2 before dinner for a week and then increase to two before breakfast and two before dinner   Quantity:  120 tablet   Refills:  3            Where to get your medicines      These medications were sent to [facility] [place], [place] - [address]   [address] [facility] [address]     Phone:  [phone]     lisinopril 20 MG tablet                Primary Care Provider Office Phone # Fax #    Mauricio Pearce [phone]       [facility]        [address] [address]        Thank you!     Thank you for choosing [facility]  for your care. Our goal is always to provide you with excellent care. Hearing back from our patients is one way we can continue to improve our services. Please take a few minutes to complete the written  survey that you may receive in the mail after your visit with us. Thank you!             Your Updated Medication List - Protect others around you: Learn how to safely use, store and throw away your medicines at www.disposemymeds.org.          This list is accurate as of: 3/28/17  7:38 AM.  Always use your most recent med list.                   Brand Name Dispense Instructions for use    atorvastatin 10 MG tablet    LIPITOR    90 tablet    Take 1 tablet (10 mg) by mouth daily       EPINEPHrine 0.3 MG/0.3ML injection     1 each    Inject 0.3 mLs (0.3 mg) into the muscle once as needed for anaphylaxis       gatifloxacin 0.5 % ophthalmic solution    ZYMAXID         insulin glargine 100 UNIT/ML injection    LANTUS SOLOSTAR    15 mL    23 units at bedtime And increase by 2 units every 3 days until your fasting blood sugar is less than 130  Max dose 68 units       ketorolac 0.5 % ophthalmic solution    ACULAR         lisinopril 20 MG tablet    PRINIVIL/ZESTRIL    60 tablet    Take 1 tablet (20 mg) by mouth daily       metFORMIN 500 MG 24 hr tablet    GLUCOPHAGE-XR    120 tablet    Take 1 before breakfast and 2 before dinner for a week and then increase to two before breakfast and two before dinner       metoprolol 25 MG 24 hr tablet    TOPROL-XL    30 tablet    Take 1 tablet (25 mg) by mouth daily       ONE TOUCH VERIO IQ test strip   Generic drug:  blood glucose monitoring          ONETOUCH DELICA LANCETS 33G Misc          ONETOUCH VERIO FLEX SYSTEM W/DEVICE Kit          prednisoLONE acetate 1 % ophthalmic susp    PRED FORTE         RELION PEN NEEDLES 32G X 4 MM   Generic drug:  insulin pen needle

## 2017-03-28 NOTE — PROGRESS NOTES
SUBJECTIVE:                                                    Juan Balderrama is a 56 year old male who presents to clinic today for the following health issues:  Chief Complaint   Patient presents with     Hypertension     Pt here for a f/u on b/p.  Scheduled for surgery on 4/3/17 and trying to get clearance for eye surgery.       Hypertension Follow-up      Outpatient blood pressures are being checked at home.  Results are 130/s/80's..    Low Salt Diet: no added salt    Denies chest pain, dyspnea, HA, BOV, dizziness or urinary changes.   On lisinopril 15 mg dailycurrently.    Patient states he will be traveling for about a month starting 2 weeks from now.      Amount of exercise or physical activity: 6-7 days/week for an average of 30-45 minutes    Problems taking medications regularly: No    Medication side effects: none    Diet: low salt and carbohydrate counting      Diabetes  Patient has been titrating lantus - now at 46 units once a night.  Glucometer review showed measurements mostly below 140 fasting in the morning; a handful of 170-180's; and one above 200.  Patient is aiming to get a left cataract surgery done 4/3/2017.  Patient has been trying to lose weight- states he has lost about 15-18 lbs.      Problem list and histories reviewed & adjusted, as indicated.  Additional history: as documented    Patient Active Problem List   Diagnosis     Benign essential hypertension     Former smoker     Left cataract     Morbid obesity, unspecified obesity type (H)     Uncontrolled type 2 diabetes mellitus with complication, with long-term current use of insulin (H)     History reviewed. No pertinent surgical history.    Social History   Substance Use Topics     Smoking status: Never Smoker     Smokeless tobacco: Not on file     Alcohol use Yes      Comment: social     History reviewed. No pertinent family history.      Current Outpatient Prescriptions   Medication Sig Dispense Refill     lisinopril  (PRINIVIL/ZESTRIL) 20 MG tablet Take 1 tablet (20 mg) by mouth daily 60 tablet 0     metoprolol (TOPROL-XL) 25 MG 24 hr tablet Take 1 tablet (25 mg) by mouth daily 30 tablet 1     insulin glargine (LANTUS SOLOSTAR) 100 UNIT/ML injection 23 units at bedtime And increase by 2 units every 3 days until your fasting blood sugar is less than 130  Max dose 68 units (Patient taking differently: 40 Units At Bedtime 23 units at bedtime And increase by 2 units every 3 days until your fasting blood sugar is less than 130  Max dose 68 units) 15 mL 1     metFORMIN (GLUCOPHAGE-XR) 500 MG 24 hr tablet Take 1 before breakfast and 2 before dinner for a week and then increase to two before breakfast and two before dinner (Patient taking differently: 1,000 mg 2 times daily (with meals) Take 1 before breakfast and 2 before dinner for a week and then increase to two before breakfast and two before dinner) 120 tablet 3     atorvastatin (LIPITOR) 10 MG tablet Take 1 tablet (10 mg) by mouth daily 90 tablet 0     Blood Glucose Monitoring Suppl (ONETOUCH VERIO FLEX SYSTEM) W/DEVICE KIT        gatifloxacin (ZYMAXID) 0.5 % ophthalmic solution        ONE TOUCH VERIO IQ test strip        RELION PEN NEEDLES 32G X 4 MM        ketorolac (ACULAR) 0.5 % ophthalmic solution        ONETOUCH DELICA LANCETS 33G MISC        prednisoLONE acetate (PRED FORTE) 1 % ophthalmic susp        [DISCONTINUED] lisinopril (PRINIVIL/ZESTRIL) 10 MG tablet Take 1 tablet (10 mg) by mouth daily 30 tablet 0     EPINEPHrine (EPIPEN) 0.3 MG/0.3ML injection Inject 0.3 mLs (0.3 mg) into the muscle once as needed for anaphylaxis 1 each 1     Allergies   Allergen Reactions     Penicillins Swelling     Sulfamethoxazole Swelling       Reviewed and updated as needed this visit by clinical staff  Tobacco  Allergies  Med Hx  Surg Hx  Fam Hx  Soc Hx      Reviewed and updated as needed this visit by Provider         ROS:  C: NEGATIVE for fever, chills, change in weight  I: NEGATIVE  "for worrisome rashes, moles or lesions  E: NEGATIVE for vision changes or irritation  R: NEGATIVE for significant cough or SOB  CV: NEGATIVE for chest pain, palpitations or peripheral edema  GI: NEGATIVE for nausea, abdominal pain, heartburn, or change in bowel habits  : NEGATIVE for frequency, dysuria, or hematuria  M: NEGATIVE for significant arthralgias or myalgia  N: NEGATIVE for weakness, dizziness or paresthesias  E: NEGATIVE for temperature intolerance, skin/hair changes  H: NEGATIVE for bleeding problems    OBJECTIVE:                                                    /78  Pulse 86  Temp 97.1  F (36.2  C) (Tympanic)  Ht 5' 9\" (1.753 m)  Wt 248 lb (112.5 kg)  BMI 36.62 kg/m2  Body mass index is 36.62 kg/(m^2).  GENERAL:  alert and no distress, ambulatory w/o assist  EYES: no icterus; pink conjunctivae  NECK: no tenderness, no adenopathy,  Thyroid not enlarged  RESP: lungs clear to auscultation - no rales, no rhonchi, no wheezes  CV: regular rates and rhythm, no murmur  MS: no edema  SKIN: no suspicious lesions, no rashes  ABD:  Protuberant, nontender    Diagnostic test results:  Diagnostic Test Results:  Results for orders placed or performed in visit on 03/06/17   Hemoglobin A1c   Result Value Ref Range    Hemoglobin A1C 10.3 (H) 4.3 - 6.0 %   Basic metabolic panel   Result Value Ref Range    Sodium 134 133 - 144 mmol/L    Potassium 4.6 3.4 - 5.3 mmol/L    Chloride 98 94 - 109 mmol/L    Carbon Dioxide 27 20 - 32 mmol/L    Anion Gap 9 3 - 14 mmol/L    Glucose 195 (H) 70 - 99 mg/dL    Urea Nitrogen 20 7 - 30 mg/dL    Creatinine 0.70 0.66 - 1.25 mg/dL    GFR Estimate >90  Non  GFR Calc   >60 mL/min/1.7m2    GFR Estimate If Black >90   GFR Calc   >60 mL/min/1.7m2    Calcium 9.4 8.5 - 10.1 mg/dL        ASSESSMENT/PLAN:                                                        ICD-10-CM    1. Benign essential hypertension I10 lisinopril (PRINIVIL/ZESTRIL) 20 MG " tablet  STill not optimally controlled but improved.  Increase lisinopril to 20 mg daily.  Low salt, low fat diet. Exercise as tolerated.  Take meds as prescribed; call if with side effects.   Patient may proceed with surgery as long as blood pressure stays below 150/90.     2. Uncontrolled type 2 diabetes mellitus with complication, with long-term current use of insulin (H) E11.8 Blood Glucose Monitoring Suppl (ONETOUCH VERIO FLEX SYSTEM) W/DEVICE KIT    E11.65 ONE TOUCH VERIO IQ test strip    Z79.4 RELION PEN NEEDLES 32G X 4 MM     ONETOUCH DELICA LANCETS 33G MISC     Hemoglobin A1c  Uncontrolled A1c 4 weeks ago, but his glucometer reviewed showed glucose mostly below 140, and frequently below 130 for the last 3 weeks.  He may continue lantus titration subQ, and metformin oral.  Carb controlled diet reinforced, exercise to gradually increase.     3. Left cataract H26.9 gatifloxacin (ZYMAXID) 0.5 % ophthalmic solution     ketorolac (ACULAR) 0.5 % ophthalmic solution     prednisoLONE acetate (PRED FORTE) 1 % ophthalmic susp  Since blood glucose mostly below 130-140 the last 2 weeks, and blood pressure at or below 150/90, there are no other absolute contraindication to proceeding with left cataract surgery.        Follow up with Provider - 2 months  RN visit when he gets back from traveling in May 2017.    Patient Instructions   Blood pressure still slightly out of ranve, but not contraindication to proceed to surgery.  Increase lisinopril to 20 mg daily.  Low salt, low fat diet. Exercise as tolerated 30 mins per day 3 days a week.  Take meds as prescribed; call if with side effects.     Blood sugar measurements better.  Continue titrating lantus, and taking metformin.  Observe low salt, low fat, and carbohydrate-controlled diet.    Continue exercise regimen discussed in the past visits.  Regular foot care; don't walk barefoot  Annual eye exam.    Flu vaccine every fall (October-November).  Blood tests: schedule lab  appointment towards end of May 2017, then follow up with Dr. Pearce.    Schedule nurse visit when you get back in town in May 2017 for a blood pressure check.      Mauricio Pearce MD  [facility]

## 2017-03-28 NOTE — NURSING NOTE
"Chief Complaint   Patient presents with     Hypertension     Pt here for a f/u on b/p.  Scheduled for surgery on 4/3/17 and trying to get clearance for eye surgery.       Initial /77  Pulse 86  Temp 97.1  F (36.2  C) (Tympanic)  Ht 5' 9\" (1.753 m)  Wt 248 lb (112.5 kg)  BMI 36.62 kg/m2 Estimated body mass index is 36.62 kg/(m^2) as calculated from the following:    Height as of this encounter: 5' 9\" (1.753 m).    Weight as of this encounter: 248 lb (112.5 kg).  Medication Reconciliation: complete  Chasity Villegas CMA    "

## 2017-03-28 NOTE — PATIENT INSTRUCTIONS
Blood pressure still slightly out of ranve, but not contraindication to proceed to surgery.  Increase lisinopril to 20 mg daily.  Low salt, low fat diet. Exercise as tolerated 30 mins per day 3 days a week.  Take meds as prescribed; call if with side effects.     Blood sugar measurements better.  Continue titrating lantus, and taking metformin.  Observe low salt, low fat, and carbohydrate-controlled diet.    Continue exercise regimen discussed in the past visits.  Regular foot care; don't walk barefoot  Annual eye exam.    Flu vaccine every fall (October-November).  Blood tests: schedule lab appointment towards end of May 2017, then follow up with Dr. Pearce.    Schedule nurse visit when you get back in town in May 2017 for a blood pressure check.

## 2017-03-29 ENCOUNTER — TELEPHONE (OUTPATIENT)
Dept: FAMILY MEDICINE | Facility: CLINIC | Age: 57
End: 2017-03-29

## 2017-03-29 NOTE — TELEPHONE ENCOUNTER
Reason for Call:  Other clarify     Detailed comments: [facility] is calling because they need to know if pt is cleared for surgery   They do not have a rounding MD to clear him per pre-op   They are an op-day surgery so they need to be cleared     Phone Number Patient can be reached at: Other phone number:  [phone] Daniel BERNSTEIN     Best Time: ASAP     Can we leave a detailed message on this number? YES    Call taken on 3/29/2017 at 1:48 PM by Sara Ratliff

## 2017-03-29 NOTE — PROGRESS NOTES
Patient was reevaluated on 3/28/2017.      Blood pressure in better range and his medications are being adjusted.  His glucose measurements reviewed in his glucometer and are mostly less than 140's for the last 2 weeks.    He may proceed with his planned left cataract surgery without further evaluation.    When he is NPO, he may take 80% of his Lantus dose at the scheduled time the night before surgery.    Follow up as recommended and stated on his clinic visit on 3/28/2017.

## 2017-03-29 NOTE — TELEPHONE ENCOUNTER
Attempted to call the surgery center but got a answering machine.  Left message for that RN to call us back. Nichole SMITH RN        Patient Instructions   Blood pressure still slightly out of ranve, but not contraindication to proceed to surgery.  Increase lisinopril to 20 mg daily.  Low salt, low fat diet. Exercise as tolerated 30 mins per day 3 days a week. Take meds as prescribed; call if with side effects.

## 2017-03-29 NOTE — TELEPHONE ENCOUNTER
Dr. Pearce,    Very confusing on the H and P and the follow up visit yesterday.  The surgery RN is wanting to know if he is cleared for the eye surgery.  I don't see the patient follow up with diabetic Ed or even had his Hgb A1C repeated.  We need to call the surgery RN back.  They are very anxious about this.  Please advise. Nichole SMITH RN

## 2017-03-29 NOTE — TELEPHONE ENCOUNTER
Dr. Pearce,    I have called the surgery center to tell them patient is approved for the cataract surgery.  They would like the addend pre op faxed to [phone].  We can do this when its done just let us know.  Thanks, Nichole SMITH RN

## 2017-03-30 NOTE — TELEPHONE ENCOUNTER
I have copied the addend H and P and faxed it to the surgery center.  I have called the surgery ctr and left a message that this has been faxed and to call us if problems. Nichole SMITH RN

## 2017-03-30 NOTE — TELEPHONE ENCOUNTER
Dr. Pearce,    At the end of the office visit notes from 3/6/17 it states patient is not approved for surgery in bold print.  Please clarify.  Thank you, Nichole SMITH RN

## 2017-04-27 ENCOUNTER — TELEPHONE (OUTPATIENT)
Dept: FAMILY MEDICINE | Facility: CLINIC | Age: 57
End: 2017-04-27

## 2017-04-27 NOTE — TELEPHONE ENCOUNTER
Atorvastatin     Last Written Prescription Date: 01/25/2017  Last Fill Quantity: 90, # refills: 0  Last Office Visit with [facility], P or [facility] prescribing provider: 03/28/2017       Lab Results   Component Value Date    CHOL 168 02/01/2017     Lab Results   Component Value Date    HDL 32 02/01/2017     Lab Results   Component Value Date    LDL 75 02/01/2017     Lab Results   Component Value Date    TRIG 303 02/01/2017     No results found for: YONIS PAL (R)

## 2017-04-28 NOTE — TELEPHONE ENCOUNTER
Routing refill request to provider for review/approval because:  Notes Recorded by Mauricio Pearce MD on 2/2/2017 at 5:32 PM  Please call patient.    Triglycerides are high and this is usually high when blood sugars are high.  HDL slighly low.      Getting diabetes under better control is expected to bring this down.  He should continue statin given on visit as this can help bring down triglyceride levels also.  Regular exercise as discussed on visit will help bring the above abnormla levels to normal.  Repeat fasting lipid panel with hemoglobin A1c and BMP in end of March 2017.    Thank you  Kristie SKAGGS RN

## 2017-05-03 ENCOUNTER — TELEPHONE (OUTPATIENT)
Dept: FAMILY MEDICINE | Facility: CLINIC | Age: 57
End: 2017-05-03

## 2017-05-03 RX ORDER — ATORVASTATIN CALCIUM 10 MG/1
TABLET, FILM COATED ORAL
Qty: 90 TABLET | Refills: 3 | Status: SHIPPED | OUTPATIENT
Start: 2017-05-03 | End: 2018-11-30

## 2017-05-03 NOTE — TELEPHONE ENCOUNTER
Panel Management Review      Composite cancer screening  Chart review shows that this patient is due/due soon for the following Colonoscopy  Summary:    Patient is due/failing the following:   COLONOSCOPY    Action needed:   Patient needs referral/order: colonoscopy    Type of outreach:    Sent letter.    Questions for provider review:    None                                                                                                                                    Chasity Villegas CMA

## 2017-05-03 NOTE — LETTER
[facility]  [address]  [place] [address]  Phone: [phone]    May 3, 2017    Juan Balderrama  [address]  [place] [address]              Dear Mr. Balderrama,    At this time you are due for a Colon Cancer Screening. Here is some information regarding this testing.     Recommended every 5-10 years, depending on your history, in order to prevent and detect colon cancer at its earliest stages.  Colon cancer is now the second leading cause of death in the [place] for both men and women and there are over 130,000 new cases and 50,000 deaths per year from colon cancer.  Colonoscopies can prevent 90-95% of these deaths.  Problem lesions can be removed before they ever become cancer. This test is not only looking for cancer, but also getting rid of precancerious lesions. You are usually given some sedation which makes the test very comfortable for most people.      If you do not wish to do a colonoscopy or cannot afford to do one, at this time, there is another option. It is called a FIT test or Fecal Immunochemical Occult Blood Test (take home stool sample kit).  It does not replace the colonoscopy for colorectal cancer screening, but it can detect hidden bleeding in the lower colon.  It does need to be repeated every year and if a positive result is obtained, you would be referred for a colonoscopy. The FIT test is really easy to do and does not require any  diet or medication restrictions and involves only one collection sample.      If you have completed either one of these tests or had a flexible sigmoidoscopy in the past five years at another facility, please have the records sent to our clinic so that we can best coordinate your care and update your chart.  Please call us if you have questions or would like arrange either to do a colonoscopy or obtain the necessary test kit for the Fecal Occult Test.      Sincerely,      Mauricio Pearce MD / mami

## 2017-05-08 NOTE — TELEPHONE ENCOUNTER
Jax Ruiz         Last Written Prescription Date: 03/02/17  Last Fill Quantity: 15ml, # refills: 1  [facility] Visit with [facility] or [facility] prescribing provider:  03/28/17        BP Readings from Last 3 Encounters:   03/28/17 150/78   03/20/17 140/84   03/13/17 (!) 151/91     Lab Results   Component Value Date    MICROL 121 01/25/2017     Lab Results   Component Value Date    UMALCR 140.70 01/25/2017     Creatinine   Date Value Ref Range Status   03/06/2017 0.70 0.66 - 1.25 mg/dL Final   ]  GFR Estimate   Date Value Ref Range Status   03/06/2017 >90  Non  GFR Calc   >60 mL/min/1.7m2 Final   01/23/2017 >90  Non  GFR Calc   >60 mL/min/1.7m2 Final     GFR Estimate If Black   Date Value Ref Range Status   03/06/2017 >90   GFR Calc   >60 mL/min/1.7m2 Final   01/23/2017 >90   GFR Calc   >60 mL/min/1.7m2 Final     Lab Results   Component Value Date    CHOL 168 02/01/2017     Lab Results   Component Value Date    HDL 32 02/01/2017     Lab Results   Component Value Date    LDL 75 02/01/2017     Lab Results   Component Value Date    TRIG 303 02/01/2017     No results found for: CHOLHDLRATIO  No results found for: AST  No results found for: ALT  Lab Results   Component Value Date    A1C 10.3 03/06/2017    A1C 12.3 01/23/2017     Potassium   Date Value Ref Range Status   03/06/2017 4.6 3.4 - 5.3 mmol/L Final

## 2017-05-22 DIAGNOSIS — I10 BENIGN ESSENTIAL HYPERTENSION: ICD-10-CM

## 2017-05-22 NOTE — TELEPHONE ENCOUNTER
Metoprolol     Last Written Prescription Date: 03/09/17  Last Fill Quantity: 30, # refills: 1    Last Office Visit with G, P or [facility] prescribing provider:  03/28/17   Future Office Visit:        BP Readings from Last 3 Encounters:   03/28/17 150/78   03/20/17 140/84   03/13/17 (!) 151/91

## 2017-05-25 ENCOUNTER — TELEPHONE (OUTPATIENT)
Dept: FAMILY MEDICINE | Facility: CLINIC | Age: 57
End: 2017-05-25

## 2017-05-25 DIAGNOSIS — I10 BENIGN ESSENTIAL HYPERTENSION: ICD-10-CM

## 2017-05-25 RX ORDER — METOPROLOL SUCCINATE 25 MG/1
TABLET, EXTENDED RELEASE ORAL
Qty: 30 TABLET | Refills: 0 | Status: SHIPPED | OUTPATIENT
Start: 2017-05-25 | End: 2017-05-30

## 2017-05-30 RX ORDER — METOPROLOL SUCCINATE 25 MG/1
25 TABLET, EXTENDED RELEASE ORAL DAILY
Qty: 30 TABLET | Refills: 0 | Status: SHIPPED | OUTPATIENT
Start: 2017-05-30 | End: 2018-11-30

## 2017-05-30 RX ORDER — LISINOPRIL 20 MG/1
TABLET ORAL
Qty: 30 TABLET | Refills: 0 | Status: SHIPPED | OUTPATIENT
Start: 2017-05-30 | End: 2017-06-28

## 2017-05-30 NOTE — TELEPHONE ENCOUNTER
Pt is calling wanting his medication filled today because he is going out of town   Please advise  [phone]  Thank you     Sara Avelar  [facility]

## 2017-05-30 NOTE — TELEPHONE ENCOUNTER
Patient was wanting lisinopril and metoprolol refilled for a trip to [place].  He is leaving tomorrow. Refills handled in another encounter.    Haley Shelton RN

## 2017-06-04 ENCOUNTER — TELEPHONE (OUTPATIENT)
Dept: FAMILY MEDICINE | Facility: CLINIC | Age: 57
End: 2017-06-04

## 2017-06-05 NOTE — TELEPHONE ENCOUNTER
Lantsu solostar         Last Written Prescription Date: 05/08/2017  Last Fill Quantity: 12, # refills: 0  Last Office Visit with G, [facility] or [facility] prescribing provider:  03/28/2017        BP Readings from Last 3 Encounters:   03/28/17 150/78   03/20/17 140/84   03/13/17 (!) 151/91     Lab Results   Component Value Date    MICROL 121 01/25/2017     Lab Results   Component Value Date    UMALCR 140.70 01/25/2017     Creatinine   Date Value Ref Range Status   03/06/2017 0.70 0.66 - 1.25 mg/dL Final   ]  GFR Estimate   Date Value Ref Range Status   03/06/2017 >90  Non  GFR Calc   >60 mL/min/1.7m2 Final   01/23/2017 >90  Non  GFR Calc   >60 mL/min/1.7m2 Final     GFR Estimate If Black   Date Value Ref Range Status   03/06/2017 >90   GFR Calc   >60 mL/min/1.7m2 Final   01/23/2017 >90   GFR Calc   >60 mL/min/1.7m2 Final     Lab Results   Component Value Date    CHOL 168 02/01/2017     Lab Results   Component Value Date    HDL 32 02/01/2017     Lab Results   Component Value Date    LDL 75 02/01/2017     Lab Results   Component Value Date    TRIG 303 02/01/2017     No results found for: CHOLHDLRATIO  No results found for: AST  No results found for: ALT  Lab Results   Component Value Date    A1C 10.3 03/06/2017    A1C 12.3 01/23/2017     Potassium   Date Value Ref Range Status   03/06/2017 4.6 3.4 - 5.3 mmol/L Final     Erik PAL (R)

## 2017-06-06 RX ORDER — INSULIN GLARGINE 100 [IU]/ML
INJECTION, SOLUTION SUBCUTANEOUS
Qty: 20 ML | Refills: 0 | Status: SHIPPED | OUTPATIENT
Start: 2017-06-06 | End: 2017-06-28

## 2017-06-06 NOTE — TELEPHONE ENCOUNTER
Patient notified he needs to be seen. [place] so we scheduled him for 6/28/2017 and one month sent over to the pharmacy.    Liliana Jackson RN

## 2017-06-06 NOTE — TELEPHONE ENCOUNTER
Pt will be out of this tonight and wondering if he can get this refilled.    [facility]

## 2017-06-14 ENCOUNTER — TELEPHONE (OUTPATIENT)
Dept: FAMILY MEDICINE | Facility: CLINIC | Age: 57
End: 2017-06-14

## 2017-06-14 DIAGNOSIS — E11.9 DIABETES MELLITUS, TYPE 2 (H): ICD-10-CM

## 2017-06-14 NOTE — TELEPHONE ENCOUNTER
Glucophage         Last Written Prescription Date: 02/01/2017  Last Fill Quantity: 120, # refills: 3  Last Office Visit with [facility], [facility] or  [facility] prescribing provider:  03/28/2017   Next 5 appointments (look out 90 days)     Jun 28, 2017  9:00 AM CDT   SHORT with Mauricio [facility]ce MD   [facility]    [address] [address]   [phone]                   BP Readings from Last 3 Encounters:   03/28/17 150/78   03/20/17 140/84   03/13/17 (!) 151/91     Lab Results   Component Value Date    MICROL 121 01/25/2017     Lab Results   Component Value Date    UMALCR 140.70 01/25/2017     Creatinine   Date Value Ref Range Status   03/06/2017 0.70 0.66 - 1.25 mg/dL Final   ]  GFR Estimate   Date Value Ref Range Status   03/06/2017 >90  Non  GFR Calc   >60 mL/min/1.7m2 Final   01/23/2017 >90  Non  GFR Calc   >60 mL/min/1.7m2 Final     GFR Estimate If Black   Date Value Ref Range Status   03/06/2017 >90   GFR Calc   >60 mL/min/1.7m2 Final   01/23/2017 >90   GFR Calc   >60 mL/min/1.7m2 Final     Lab Results   Component Value Date    CHOL 168 02/01/2017     Lab Results   Component Value Date    HDL 32 02/01/2017     Lab Results   Component Value Date    LDL 75 02/01/2017     Lab Results   Component Value Date    TRIG 303 02/01/2017     No results found for: CHOLHDLRATIO  No results found for: AST  No results found for: ALT  Lab Results   Component Value Date    A1C 10.3 03/06/2017    A1C 12.3 01/23/2017     Potassium   Date Value Ref Range Status   03/06/2017 4.6 3.4 - 5.3 mmol/L Final

## 2017-06-20 RX ORDER — METFORMIN HCL 500 MG
1000 TABLET, EXTENDED RELEASE 24 HR ORAL 2 TIMES DAILY WITH MEALS
Qty: 120 TABLET | Refills: 0 | Status: SHIPPED | OUTPATIENT
Start: 2017-06-20 | End: 2018-11-30

## 2017-06-20 NOTE — TELEPHONE ENCOUNTER
Pt reports that he is out of this medication. I have refilled for one month. Pt has an appt to see the provider on 6/28/17.  Pt remains very upset with the process of obtaining refills. I have advised the pt to follow up on these complaints during the next office visit.     Nancy Keyes RN

## 2017-06-28 ENCOUNTER — OFFICE VISIT (OUTPATIENT)
Dept: FAMILY MEDICINE | Facility: CLINIC | Age: 57
End: 2017-06-28
Payer: COMMERCIAL

## 2017-06-28 VITALS
SYSTOLIC BLOOD PRESSURE: 162 MMHG | DIASTOLIC BLOOD PRESSURE: 98 MMHG | WEIGHT: 273 LBS | TEMPERATURE: 98.7 F | RESPIRATION RATE: 14 BRPM | HEART RATE: 89 BPM | BODY MASS INDEX: 40.43 KG/M2 | HEIGHT: 69 IN

## 2017-06-28 DIAGNOSIS — I10 BENIGN ESSENTIAL HYPERTENSION: ICD-10-CM

## 2017-06-28 DIAGNOSIS — Z11.59 NEED FOR HEPATITIS C SCREENING TEST: ICD-10-CM

## 2017-06-28 DIAGNOSIS — Z23 NEED FOR VACCINATION: ICD-10-CM

## 2017-06-28 DIAGNOSIS — Z12.11 SCREENING FOR MALIGNANT NEOPLASM OF COLON: ICD-10-CM

## 2017-06-28 LAB
ANION GAP SERPL CALCULATED.3IONS-SCNC: 7 MMOL/L (ref 3–14)
BUN SERPL-MCNC: 15 MG/DL (ref 7–30)
CALCIUM SERPL-MCNC: 9.2 MG/DL (ref 8.5–10.1)
CHLORIDE SERPL-SCNC: 100 MMOL/L (ref 94–109)
CO2 SERPL-SCNC: 27 MMOL/L (ref 20–32)
CREAT SERPL-MCNC: 0.66 MG/DL (ref 0.66–1.25)
GFR SERPL CREATININE-BSD FRML MDRD: ABNORMAL ML/MIN/1.7M2
GLUCOSE SERPL-MCNC: 139 MG/DL (ref 70–99)
HBA1C MFR BLD: 7.3 % (ref 4.3–6)
HCV AB SERPL QL IA: NORMAL
POTASSIUM SERPL-SCNC: 4.7 MMOL/L (ref 3.4–5.3)
SODIUM SERPL-SCNC: 134 MMOL/L (ref 133–144)

## 2017-06-28 PROCEDURE — 83036 HEMOGLOBIN GLYCOSYLATED A1C: CPT | Performed by: FAMILY MEDICINE

## 2017-06-28 PROCEDURE — 90715 TDAP VACCINE 7 YRS/> IM: CPT | Performed by: FAMILY MEDICINE

## 2017-06-28 PROCEDURE — 90472 IMMUNIZATION ADMIN EACH ADD: CPT | Performed by: FAMILY MEDICINE

## 2017-06-28 PROCEDURE — 80048 BASIC METABOLIC PNL TOTAL CA: CPT | Performed by: FAMILY MEDICINE

## 2017-06-28 PROCEDURE — 99214 OFFICE O/P EST MOD 30 MIN: CPT | Mod: 25 | Performed by: FAMILY MEDICINE

## 2017-06-28 PROCEDURE — 90732 PPSV23 VACC 2 YRS+ SUBQ/IM: CPT | Performed by: FAMILY MEDICINE

## 2017-06-28 PROCEDURE — 36415 COLL VENOUS BLD VENIPUNCTURE: CPT | Performed by: FAMILY MEDICINE

## 2017-06-28 PROCEDURE — 90471 IMMUNIZATION ADMIN: CPT | Performed by: FAMILY MEDICINE

## 2017-06-28 PROCEDURE — 86803 HEPATITIS C AB TEST: CPT | Performed by: FAMILY MEDICINE

## 2017-06-28 RX ORDER — LISINOPRIL 20 MG/1
30 TABLET ORAL DAILY
Qty: 45 TABLET | Refills: 0 | Status: SHIPPED | OUTPATIENT
Start: 2017-06-28 | End: 2018-11-29 | Stop reason: SINTOL

## 2017-06-28 NOTE — NURSING NOTE
"Chief Complaint   Patient presents with     Diabetes     3 month recheck/refills.       Initial BP (!) 166/94  Pulse 89  Temp 98.7  F (37.1  C) (Tympanic)  Resp 16  Ht 5' 9\" (1.753 m)  Wt 273 lb (123.8 kg)  BMI 40.32 kg/m2 Estimated body mass index is 40.32 kg/(m^2) as calculated from the following:    Height as of this encounter: 5' 9\" (1.753 m).    Weight as of this encounter: 273 lb (123.8 kg).    Medication Reconciliation:  complete    Tarah Nava CMA (AAMA)  "

## 2017-06-28 NOTE — MR AVS SNAPSHOT
After Visit Summary   6/28/2017    Juan Balderrama    MRN: [id number]           Patient Information     Date Of Birth          1960        Visit Information        Provider Department      6/28/2017 9:00 AM Mauricio Pearce MD [facility]        Today's Diagnoses     Uncontrolled type 2 diabetes mellitus with complication, with long-term current use of insulin (H)    -  1    Benign essential hypertension        Need for hepatitis C screening test        Screening for malignant neoplasm of colon          Care Instructions    Go to [facility] now for your blood draw.  You will be contacted in 1-2 days for results of your lab tests.    TAke your medications as prescribed.  Increase lisinopril 20mg to 1.5 tablets orally once a day.  Schedule nurse visit in 7-10 days for recheck of your blood pressure.    Observe carbohydrate-controlled, low salt and low fat diet.  Eat 5 cups of vegetables, fruits and whole grains per day.  Limit starchy food (white rice, white bread, white pasta, white potatoes) to less than a cup per meal.  Minimize sweets, junk food and fastfood.  Exercise: moderate intensity sustained for at least 30 mins per episode, goal of 150 mins per week at least  Weight loss goal: 5-10 % of current weight in 4-6 months for starters.    Schedule colonoscopy for screening for colon cancer at your soonest convenience.    For further refills of mediations, request the refill at least 2-3 days before you run out of any medication.          Follow-ups after your visit        Additional Services     GASTROENTEROLOGY ADULT REF PROCEDURE ONLY       Last Lab Result: Creatinine (mg/dL)       Date                     Value                 03/06/2017               0.70             ----------  Body mass index is 40.32 kg/(m^2).     Needed:  No  Language:  English    Patient will be contacted to schedule procedure.     Please be aware that coverage of these services is subject  to the terms and limitations of your health insurance plan.  Call member services at your health plan with any benefit or coverage questions.  Any procedures must be performed at a [facility] facility OR coordinated by your clinic's referral office.    Please bring the following with you to your appointment:    (1) Any X-Rays, CTs or MRIs which have been performed.  Contact the facility where they were done to arrange for  prior to your scheduled appointment.    (2) List of current medications   (3) This referral request   (4) Any documents/labs given to you for this referral                  Who to contact     If you have questions or need follow up information about today's clinic visit or your schedule please contact [facility] directly at [phone].  Normal or non-critical lab and imaging results will be communicated to you by Lavantehart, letter or phone within 4 business days after the clinic has received the results. If you do not hear from us within 7 days, please contact the clinic through Lavantehart or phone. If you have a critical or abnormal lab result, we will notify you by phone as soon as possible.  Submit refill requests through Zakazaka or call your pharmacy and they will forward the refill request to us. Please allow 3 business days for your refill to be completed.          Additional Information About Your Visit        Zakazaka Information     Zakazaka gives you secure access to your electronic health record. If you see a primary care provider, you can also send messages to your care team and make appointments. If you have questions, please call your primary care clinic.  If you do not have a primary care provider, please call [phone] and they will assist you.        Care EveryWhere ID     This is your Care EveryWhere ID. This could be used by other organizations to access your [facility] medical records  [id number]        Your Vitals Were     Pulse Temperature Respirations Height  "BMI (Body Mass Index)       89 98.7  F (37.1  C) (Tympanic) 16 5' 9\" (1.753 m) 40.32 kg/m2        Blood Pressure from Last 3 Encounters:   06/28/17 (!) 166/94   03/28/17 150/78   03/20/17 140/84    Weight from Last 3 Encounters:   06/28/17 273 lb (123.8 kg)   03/28/17 248 lb (112.5 kg)   03/06/17 257 lb (116.6 kg)              We Performed the Following     **Hepatitis C Screen Reflex to RNA FUTURE anytime     Basic metabolic panel     GASTROENTEROLOGY ADULT REF PROCEDURE ONLY     Hemoglobin A1c          Today's Medication Changes          These changes are accurate as of: 6/28/17  9:34 AM.  If you have any questions, ask your nurse or doctor.               These medicines have changed or have updated prescriptions.        Dose/Directions    insulin glargine 100 UNIT/ML injection   Commonly known as:  LANTUS SOLOSTAR   This may have changed:  See the new instructions.   Used for:  Uncontrolled type 2 diabetes mellitus with complication, with long-term current use of insulin (H)        INJECT 23-68 UNITS SUBCUTANEOUSLY AT BEDTIME AS DIRECTED   Quantity:  20 mL   Refills:  0       lisinopril 20 MG tablet   Commonly known as:  PRINIVIL/ZESTRIL   This may have changed:  See the new instructions.   Used for:  Benign essential hypertension        Dose:  30 mg   Take 1.5 tablets (30 mg) by mouth daily   Quantity:  45 tablet   Refills:  0            Where to get your medicines      These medications were sent to [facility] [address] [place] [address] [address]   [address] [facility] [address]     Phone:  [phone]     insulin glargine 100 UNIT/ML injection    lisinopril 20 MG tablet                Primary Care Provider Office Phone # Fax #    Mauricio Pearce [phone]       [address] [address]        Equal Access to Services     ADRIAN LEZAMA AH: Marcel Serrano, alfreda puentes, scottie israel " marlene websterbrenda victorianoshree arriazaaan ah. So [facility] [phone].    ATENCIÓN: Si candela mary, tiene a haynes disposición servicios gratuitos de asistencia lingüística. Omar al [phone].    We comply with applicable federal civil rights laws and [place] laws. We do not discriminate on the basis of race, color, national origin, age, disability sex, sexual orientation or gender identity.            Thank you!     Thank you for choosing [facility]  for your care. Our goal is always to provide you with excellent care. Hearing back from our patients is one way we can continue to improve our services. Please take a few minutes to complete the written survey that you may receive in the mail after your visit with us. Thank you!             Your Updated Medication List - Protect others around you: Learn how to safely use, store and throw away your medicines at www.disposemymeds.org.          This list is accurate as of: 6/28/17  9:34 AM.  Always use your most recent med list.                   Brand Name Dispense Instructions for use Diagnosis    atorvastatin 10 MG tablet    LIPITOR    90 tablet    TAKE ONE TABLET BY MOUTH ONCE DAILY    Uncontrolled type 2 diabetes mellitus with complication, with long-term current use of insulin (H)       EPINEPHrine 0.3 MG/0.3ML injection     1 each    Inject 0.3 mLs (0.3 mg) into the muscle once as needed for anaphylaxis        insulin glargine 100 UNIT/ML injection    LANTUS SOLOSTAR    20 mL    INJECT 23-68 UNITS SUBCUTANEOUSLY AT BEDTIME AS DIRECTED    Uncontrolled type 2 diabetes mellitus with complication, with long-term current use of insulin (H)       ketorolac 0.5 % ophthalmic solution    ACULAR      Left cataract       lisinopril 20 MG tablet    PRINIVIL/ZESTRIL    45 tablet    Take 1.5 tablets (30 mg) by mouth daily    Benign essential hypertension       metFORMIN 500 MG 24 hr tablet    GLUCOPHAGE-XR    120 tablet    Take 2 tablets (1,000 mg) by  mouth 2 times daily (with meals)    Diabetes mellitus, type 2 (H)       metoprolol 25 MG 24 hr tablet    TOPROL-XL    30 tablet    Take 1 tablet (25 mg) by mouth daily    Benign essential hypertension       ONE TOUCH VERIO IQ test strip   Generic drug:  blood glucose monitoring       Uncontrolled type 2 diabetes mellitus with complication, with long-term current use of insulin (H)       ONETOUCH DELICA LANCETS 33G Misc       Uncontrolled type 2 diabetes mellitus with complication, with long-term current use of insulin (H)       StudioNow VERIO FLEX SYSTEM W/DEVICE Kit       Uncontrolled type 2 diabetes mellitus with complication, with long-term current use of insulin (H)       RELION PEN NEEDLES 32G X 4 MM   Generic drug:  insulin pen needle       Uncontrolled type 2 diabetes mellitus with complication, with long-term current use of insulin (H)

## 2017-06-28 NOTE — PATIENT INSTRUCTIONS
Go to [facility] now for your blood draw.  You will be contacted in 1-2 days for results of your lab tests.    TAke your medications as prescribed.  Increase lisinopril 20mg to 1.5 tablets orally once a day.  Schedule nurse visit in 7-10 days for recheck of your blood pressure.    Observe carbohydrate-controlled, low salt and low fat diet.  Eat 5 cups of vegetables, fruits and whole grains per day.  Limit starchy food (white rice, white bread, white pasta, white potatoes) to less than a cup per meal.  Minimize sweets, junk food and fastfood.  Exercise: moderate intensity sustained for at least 30 mins per episode, goal of 150 mins per week at least  Weight loss goal: 5-10 % of current weight in 4-6 months for starters.    Schedule colonoscopy for screening for colon cancer at your soonest convenience.    For further refills of mediations, request the refill at least 2-3 days before you run out of any medication.

## 2017-06-28 NOTE — PROGRESS NOTES
SUBJECTIVE:                                                    Juan Balderrama is a 56 year old male who presents to clinic today for the following health issues:      Diabetes Follow-up    Patient is checking blood sugars: twice daily.    Blood sugar testing frequency justification: Uncontrolled diabetes  Results are as follows:         am - 120-140 before breakfast; again around 5:00 pm. 110 blood sugars    Diabetic concerns: None     Symptoms of hypoglycemia (low blood sugar): none     Paresthesias (numbness or burning in feet) or sores: No     Date of last diabetic eye exam: April 3, 2017; [facility]  See last DM lab below.  Patient states he is currently at Lantus 58 units SQ nightly.      Amount of exercise or physical activity: None    Problems taking medications regularly: No    Medication side effects: none    Diet: regular (no restrictions), low salt and carbohydrate counting    Patient states glucometer is broken. He states he is supposed to get a new one from the .    Hypertension Follow-up      Outpatient blood pressures are not being checked.    Low Salt Diet: not monitoring salt    Patient reports he is taking his med.      Problem list and histories reviewed & adjusted, as indicated.  Additional history: as documented    Patient Active Problem List   Diagnosis     Benign essential hypertension     Former smoker     Left cataract     Morbid obesity, unspecified obesity type (H)     Uncontrolled type 2 diabetes mellitus with complication, with long-term current use of insulin (H)     No past surgical history on file.    Social History   Substance Use Topics     Smoking status: Never Smoker     Smokeless tobacco: Not on file     Alcohol use Yes      Comment: social     No family history on file.      Current Outpatient Prescriptions   Medication Sig Dispense Refill     insulin glargine (LANTUS SOLOSTAR) 100 UNIT/ML injection INJECT 23-68 UNITS SUBCUTANEOUSLY AT BEDTIME AS DIRECTED 20  mL 0     lisinopril (PRINIVIL/ZESTRIL) 20 MG tablet Take 1.5 tablets (30 mg) by mouth daily 45 tablet 0     metFORMIN (GLUCOPHAGE-XR) 500 MG 24 hr tablet Take 2 tablets (1,000 mg) by mouth 2 times daily (with meals) 120 tablet 0     metoprolol (TOPROL-XL) 25 MG 24 hr tablet Take 1 tablet (25 mg) by mouth daily 30 tablet 0     atorvastatin (LIPITOR) 10 MG tablet TAKE ONE TABLET BY MOUTH ONCE DAILY 90 tablet 3     Blood Glucose Monitoring Suppl (ONETOUCH VERIO FLEX SYSTEM) W/DEVICE KIT        [DISCONTINUED] LANTUS SOLOSTAR 100 UNIT/ML soln INJECT 23-68 UNITS SUBCUTANEOUSLY AT BEDTIME AS DIRECTED; NEEDS TO BE SEEN FOR FURTHER REFILLS 20 mL 0     [DISCONTINUED] lisinopril (PRINIVIL/ZESTRIL) 20 MG tablet TAKE ONE TABLET BY MOUTH ONCE DAILY 30 tablet 0     ONE TOUCH VERIO IQ test strip        RELION PEN NEEDLES 32G X 4 MM        ketorolac (ACULAR) 0.5 % ophthalmic solution        ONETOUCH DELICA LANCETS 33G MISC        EPINEPHrine (EPIPEN) 0.3 MG/0.3ML injection Inject 0.3 mLs (0.3 mg) into the muscle once as needed for anaphylaxis 1 each 1     Allergies   Allergen Reactions     Penicillins Swelling     Sulfamethoxazole Swelling       Reviewed and updated as needed this visit by clinical staff  Allergies       Reviewed and updated as needed this visit by Provider  Allergies         ROS:  C: NEGATIVE for fever, chills, change in weight  I: NEGATIVE for worrisome rashes, moles or lesions  E: NEGATIVE for vision changes or irritation  E/M: NEGATIVE for ear, mouth and throat problems  R: NEGATIVE for significant cough or SOB  CV: NEGATIVE for chest pain, palpitations or peripheral edema  GI: NEGATIVE for nausea, abdominal pain, heartburn, or change in bowel habits  : NEGATIVE for frequency, dysuria, or hematuria  M: NEGATIVE for significant arthralgias or myalgia  N: NEGATIVE for weakness, dizziness or paresthesias  E: NEGATIVE for temperature intolerance, skin/hair changes  H: NEGATIVE for bleeding problems    OBJECTIVE:  "                                                   BP (!) 166/94  Pulse 89  Temp 98.7  F (37.1  C) (Tympanic)  Resp 16  Ht 5' 9\" (1.753 m)  Wt 273 lb (123.8 kg)  BMI 40.32 kg/m2  Body mass index is 40.32 kg/(m^2).  GENERAL: obese,  alert and no distress, ambulatory w/o assist  EYES: no icterus; pink conjunctivae.  NECK: no tenderness, no adenopathy,  Thyroid not enlarged  RESP: lungs clear to auscultation - no rales, no rhonchi, no wheezes  CV: regular rates and rhythm, no murmur  MS: no edema  SKIN: no suspicious lesions, no rashes  NEURO: strength and tone- normal, sensory exam- grossly normal, mentation- intact, speech- normal, reflexes- symmetric  ABD:  nontender  FOOT EXAM: no ulcer/erythema; pedal pulses strong bilaterally; monofilament: no deficit    Diagnostic test results:  Diagnostic Test Results:  Results for orders placed or performed in visit on 06/28/17   Hemoglobin A1c   Result Value Ref Range    Hemoglobin A1C 7.3 (H) 4.3 - 6.0 %   Basic metabolic panel   Result Value Ref Range    Sodium 134 133 - 144 mmol/L    Potassium 4.7 3.4 - 5.3 mmol/L    Chloride 100 94 - 109 mmol/L    Carbon Dioxide 27 20 - 32 mmol/L    Anion Gap 7 3 - 14 mmol/L    Glucose 139 (H) 70 - 99 mg/dL    Urea Nitrogen 15 7 - 30 mg/dL    Creatinine 0.66 0.66 - 1.25 mg/dL    GFR Estimate >90  Non  GFR Calc   >60 mL/min/1.7m2    GFR Estimate If Black >90   GFR Calc   >60 mL/min/1.7m2    Calcium 9.2 8.5 - 10.1 mg/dL        ASSESSMENT/PLAN:                                                        ICD-10-CM    1. Uncontrolled type 2 diabetes mellitus with complication, with long-term current use of insulin (H) E11.8 insulin glargine (LANTUS SOLOSTAR) 100 UNIT/ML injection    E11.65 Hemoglobin A1c    Z79.4 Basic metabolic panel  Needed to have A1c available to assess overall progress.  Will keep current meds as is.  If A1c high, will need to reinforce lantus titration.  Reinforced carbohydrate control.  " Regular exercise as tolerated. Weight management.  Regular foot care, annual eye exam.  Flu vaccine every year.     2. Benign essential hypertension I10 lisinopril (PRINIVIL/ZESTRIL) 20 MG tablet     Basic metabolic panel  Low salt, low fat diet. Exercise as tolerated.  Take meds as prescribed; call if with side effects.      3. Need for hepatitis C screening test Z11.59 **Hepatitis C Screen Reflex to RNA FUTURE anytime  Patient was advised on recommended screening and preventive health recommendations.  He verbalized understanding and agreed to the plans below.     4. Screening for malignant neoplasm of colon Z12.11 GASTROENTEROLOGY ADULT REF PROCEDURE ONLY     5. Need for vaccination Z23 Pneumococcal vaccine 23 valent PPSV23  (Pneumovax) [30962]     ADMIN: Vaccine, Initial (74956)     TDAP VACCINE (ADACEL)     EA ADD'L VACCINE       Follow up with Provider - depending on lab results.   Patient Instructions   Go to [facility] now for your blood draw.  You will be contacted in 1-2 days for results of your lab tests.    TAke your medications as prescribed.  Increase lisinopril 20mg to 1.5 tablets orally once a day.  Schedule nurse visit in 7-10 days for recheck of your blood pressure.    Observe carbohydrate-controlled, low salt and low fat diet.  Eat 5 cups of vegetables, fruits and whole grains per day.  Limit starchy food (white rice, white bread, white pasta, white potatoes) to less than a cup per meal.  Minimize sweets, junk food and fastfood.  Exercise: moderate intensity sustained for at least 30 mins per episode, goal of 150 mins per week at least  Weight loss goal: 5-10 % of current weight in 4-6 months for starters.    Schedule colonoscopy for screening for colon cancer at your soonest convenience.    For further refills of mediations, request the refill at least 2-3 days before you run out of any medication.      Mauricio Pearce MD  [facility]

## 2017-06-28 NOTE — NURSING NOTE
Screening Questionnaire for Adult Immunization    Are you sick today?   No   Do you have allergies to medications, food, a vaccine component or latex?   No   Have you ever had a serious reaction after receiving a vaccination?   No   Do you have a long-term health problem with heart disease, lung disease, asthma, kidney disease, metabolic disease (e.g. diabetes), anemia, or other blood disorder?   No   Do you have cancer, leukemia, HIV/AIDS, or any other immune system problem?   No   In the past 3 months, have you taken medications that affect  your immune system, such as prednisone, other steroids, or anticancer drugs; drugs for the treatment of rheumatoid arthritis, Crohn s disease, or psoriasis; or have you had radiation treatments?   No   Have you had a seizure, or a brain or other nervous system problem?   No   During the past year, have you received a transfusion of blood or blood     products, or been given immune (gamma) globulin or antiviral drug?   No   For women: Are you pregnant or is there a chance you could become        pregnant during the next month?   No   Have you received any vaccinations in the past 4 weeks?   No     Immunization questionnaire answers were all negative.      MNVFC doesn't apply on this patient    Per orders of Dr. Pearce, injection of adacel, pneumovax given by Chasity Villegas. Patient instructed to remain in clinic for 20 minutes afterwards, and to report any adverse reaction to me immediately.       Screening performed by Chasity Villegas on 6/28/2017 at 9:45 AM.

## 2017-08-02 ENCOUNTER — TELEPHONE (OUTPATIENT)
Dept: FAMILY MEDICINE | Facility: CLINIC | Age: 57
End: 2017-08-02

## 2017-08-02 NOTE — TELEPHONE ENCOUNTER
Records show that last BP recorded is elevated.  Patient did not schedule Bp recheck with RN as directed on visit 6/28/2017.  Please have patient schedule nurse visit for recheck of blood pressure.  Please remind patient to make sure he takes his blood pressure everyday.   If no phone contact after 2 attempts, please send letter.

## 2017-08-07 NOTE — TELEPHONE ENCOUNTER
Left detailed message on patient's answering machine to schedule free BP check with clinic RN.  Also asked patient to return call to clinic and ask for Dr. Pearce's care team.  Will postphone for one week to see if patient has scheduled BP recheck.

## 2017-10-09 ENCOUNTER — TELEPHONE (OUTPATIENT)
Dept: FAMILY MEDICINE | Facility: CLINIC | Age: 57
End: 2017-10-09

## 2017-10-09 NOTE — LETTER
October 24, 2017      Juan Balderrama  [address] [place]  [place] [address]        Dear Juan,     In order to ensure we are providing the best quality care, we have reviewed your chart and see that you are due for:  Your blood pressure was elevated at your last visit and your provider would like you to come in for an office visit for a follow up.    Please call the clinic at your earliest convenience to schedule an appointment.  [phone]    Thank you for trusting us with your health care.  Sincerely,    Your [facility] Care Team/lw

## 2017-10-10 NOTE — TELEPHONE ENCOUNTER
Pt due for f/u office visit for hypertension.  Left message for pt to return call.  Chasity Villegas,CMA

## 2017-10-24 NOTE — TELEPHONE ENCOUNTER
Panel Management Review      Patient has the following on his problem list:     Hypertension   Last three blood pressure readings:  BP Readings from Last 3 Encounters:   06/28/17 (!) 162/98   03/28/17 150/78   03/20/17 140/84     Blood pressure: FAILED    HTN Guidelines:  Age 18-59 BP range:  Less than 140/90  Age 60-85 with Diabetes:  Less than 140/90  Age 60-85 without Diabetes:  less than 150/90        Composite cancer screening  Chart review shows that this patient is due/due soon for the following Colonoscopy  Summary:    Patient is due/failing the following:   BP CHECK    Action needed:   Patient needs office visit for hypertension.    Type of outreach:    No response to phone calls, letter with recommendations mailed to patient.    Questions for provider review:    None                                                                                                                                    MICHELLE Estevez, CMA

## 2017-10-30 NOTE — TELEPHONE ENCOUNTER
Please contact patient re: his elevated blood pressure and to schedule at least a RN visit for a recheck.    Patient has had a letter sent out to him to schedule colon cancer screening from [facility].

## 2017-10-31 NOTE — TELEPHONE ENCOUNTER
See 10/19/17 panel management encounter, letter has been mailed to the patient 10/24/17, no response to the phone calls.

## 2017-12-06 ENCOUNTER — TELEPHONE (OUTPATIENT)
Dept: FAMILY MEDICINE | Facility: CLINIC | Age: 57
End: 2017-12-06

## 2017-12-06 DIAGNOSIS — E78.5 HYPERLIPIDEMIA LDL GOAL <70: ICD-10-CM

## 2017-12-13 ENCOUNTER — TELEPHONE (OUTPATIENT)
Dept: FAMILY MEDICINE | Facility: CLINIC | Age: 57
End: 2017-12-13

## 2017-12-13 NOTE — LETTER
Juan Maloney  [address] [place]  [place] [address]        Dear Juan:      We have a message for you from your healthcare provider Dr. Pearce.  We have attempted my chart and telephone.  Please see the message below and if you have questions call us at [phone].        Good afternoon, Juan!     I am checking in with you regarding your diabetes and hypertension.       To verify that your diabetes and hypertension management and control is optimal, you are advised to schedule a lab appointment at your soonest convenience. Be fasting for 12 hours (no food but can drink water).     After that lab is done, please also schedule a clinic appointment to discuss result and recheck your blood pressure again.     Be advised that letting these conditions not in good control poses a great health risk to you!     We look forward to seeing you in the clinic.     Yours truly,   Dr. Pearce

## 2017-12-13 NOTE — TELEPHONE ENCOUNTER
Patient has not read [employer] message.  Please call:    Flex afternoonJuan!     I am checking in with you regarding your diabetes and hypertension.       To verify that your diabetes and hypertension management and control is optimal, you are advised to schedule a lab appointment at your soonest convenience. Be fasting for 12 hours (no food but can drink water).     After that lab is done, please also schedule a clinic appointment to discuss result and recheck your blood pressure again.     Be advised that letting these conditions not in good control poses a great health risk to you!     We look forward to seeing you in the clinic.     Yours truly,   Dr. Pearce

## 2017-12-14 NOTE — TELEPHONE ENCOUNTER
Left message for the patient to call the clinic.  I have sent a letter with the my chart message from Dr. Pearce. Nichole SMITH RN

## 2017-12-18 NOTE — TELEPHONE ENCOUNTER
Left message for patient to call back. Alphonso KAMINSKI     
Letter was sent by RN 12/14/17.  
[employer] message sent for DM and HTN panel management.  Labs ordered.    If patient does not reply by 12/11/2017, please call patient.  
(0) independent

## 2018-04-03 ENCOUNTER — TELEPHONE (OUTPATIENT)
Dept: FAMILY MEDICINE | Facility: CLINIC | Age: 58
End: 2018-04-03

## 2018-04-03 NOTE — TELEPHONE ENCOUNTER
Pt is due for Diabetes/BP recheck with Dr. Pearce. Thanks!  Panel Management Review      Patient has the following on his problem list:     Diabetes    ASA: Failed    Last A1C  Lab Results   Component Value Date    A1C 7.3 06/28/2017    A1C 10.3 03/06/2017    A1C 12.3 01/23/2017     A1C tested: Passed    Last LDL:    Lab Results   Component Value Date    CHOL 168 02/01/2017     Lab Results   Component Value Date    HDL 32 02/01/2017     Lab Results   Component Value Date    LDL 75 02/01/2017     Lab Results   Component Value Date    TRIG 303 02/01/2017     No results found for: CHOLHDLRATIO  Lab Results   Component Value Date    NHDL 136 02/01/2017       Is the patient on a Statin? YES             Is the patient on Aspirin? NO    Medications     HMG CoA Reductase Inhibitors    atorvastatin (LIPITOR) 10 MG tablet          Last three blood pressure readings:  BP Readings from Last 3 Encounters:   06/28/17 (!) 162/98   03/28/17 150/78   03/20/17 140/84       Date of last diabetes office visit: 6/28/17     Tobacco History:     History   Smoking Status     Never Smoker   Smokeless Tobacco     Not on file         Hypertension   Last three blood pressure readings:  BP Readings from Last 3 Encounters:   06/28/17 (!) 162/98   03/28/17 150/78   03/20/17 140/84     Blood pressure: FAILED    HTN Guidelines:  Age 18-59 BP range:  Less than 140/90  Age 60-85 with Diabetes:  Less than 140/90  Age 60-85 without Diabetes:  less than 150/90      Composite cancer screening  Chart review shows that this patient is due/due soon for the following Colonoscopy  Summary:    Patient is due/failing the following:   BP CHECK and COLONOSCOPY    Action needed:   Patient needs office visit for Diabetes and Hypertension Recheck.    Type of outreach:    Phone, left message for patient to call back.     Questions for provider review:    None                                                                                                                                     Deann Mcmullen MA  2:01 PM 4/3/2018       Chart routed to none .

## 2018-11-29 ENCOUNTER — OFFICE VISIT (OUTPATIENT)
Dept: FAMILY MEDICINE | Facility: CLINIC | Age: 58
End: 2018-11-29
Payer: COMMERCIAL

## 2018-11-29 VITALS
HEART RATE: 74 BPM | TEMPERATURE: 98 F | DIASTOLIC BLOOD PRESSURE: 82 MMHG | RESPIRATION RATE: 14 BRPM | HEIGHT: 68 IN | BODY MASS INDEX: 42.65 KG/M2 | OXYGEN SATURATION: 99 % | SYSTOLIC BLOOD PRESSURE: 154 MMHG | WEIGHT: 281.4 LBS

## 2018-11-29 DIAGNOSIS — Z71.89 ADVANCED DIRECTIVES, COUNSELING/DISCUSSION: ICD-10-CM

## 2018-11-29 DIAGNOSIS — E11.9 CONTROLLED TYPE 2 DIABETES MELLITUS WITHOUT COMPLICATION, WITHOUT LONG-TERM CURRENT USE OF INSULIN (H): Primary | ICD-10-CM

## 2018-11-29 DIAGNOSIS — E78.5 HYPERLIPIDEMIA LDL GOAL <70: ICD-10-CM

## 2018-11-29 DIAGNOSIS — I10 UNCONTROLLED HYPERTENSION: ICD-10-CM

## 2018-11-29 DIAGNOSIS — Z12.11 SCREENING FOR MALIGNANT NEOPLASM OF COLON: ICD-10-CM

## 2018-11-29 LAB
ANION GAP SERPL CALCULATED.3IONS-SCNC: 6 MMOL/L (ref 3–14)
BUN SERPL-MCNC: 28 MG/DL (ref 7–30)
CALCIUM SERPL-MCNC: 8.9 MG/DL (ref 8.5–10.1)
CHLORIDE SERPL-SCNC: 103 MMOL/L (ref 94–109)
CHOLEST SERPL-MCNC: 138 MG/DL
CO2 SERPL-SCNC: 28 MMOL/L (ref 20–32)
CREAT SERPL-MCNC: 0.89 MG/DL (ref 0.66–1.25)
CREAT UR-MCNC: 96 MG/DL
GFR SERPL CREATININE-BSD FRML MDRD: 88 ML/MIN/1.7M2
GLUCOSE SERPL-MCNC: 149 MG/DL (ref 70–99)
HBA1C MFR BLD: 6.5 % (ref 0–5.6)
HDLC SERPL-MCNC: 33 MG/DL
LDLC SERPL CALC-MCNC: 37 MG/DL
MICROALBUMIN UR-MCNC: 9 MG/L
MICROALBUMIN/CREAT UR: 9.44 MG/G CR (ref 0–17)
NONHDLC SERPL-MCNC: 105 MG/DL
POTASSIUM SERPL-SCNC: 5.2 MMOL/L (ref 3.4–5.3)
SODIUM SERPL-SCNC: 137 MMOL/L (ref 133–144)
TRIGL SERPL-MCNC: 340 MG/DL
TSH SERPL DL<=0.005 MIU/L-ACNC: 3.07 MU/L (ref 0.4–4)

## 2018-11-29 PROCEDURE — 83036 HEMOGLOBIN GLYCOSYLATED A1C: CPT | Performed by: FAMILY MEDICINE

## 2018-11-29 PROCEDURE — 82043 UR ALBUMIN QUANTITATIVE: CPT | Performed by: FAMILY MEDICINE

## 2018-11-29 PROCEDURE — 99214 OFFICE O/P EST MOD 30 MIN: CPT | Performed by: FAMILY MEDICINE

## 2018-11-29 PROCEDURE — 36415 COLL VENOUS BLD VENIPUNCTURE: CPT | Performed by: FAMILY MEDICINE

## 2018-11-29 PROCEDURE — 84443 ASSAY THYROID STIM HORMONE: CPT | Performed by: FAMILY MEDICINE

## 2018-11-29 PROCEDURE — 99207 C FOOT EXAM  NO CHARGE: CPT | Performed by: FAMILY MEDICINE

## 2018-11-29 PROCEDURE — 80061 LIPID PANEL: CPT | Performed by: FAMILY MEDICINE

## 2018-11-29 PROCEDURE — 80048 BASIC METABOLIC PNL TOTAL CA: CPT | Performed by: FAMILY MEDICINE

## 2018-11-29 RX ORDER — LOSARTAN POTASSIUM 100 MG/1
100 TABLET ORAL DAILY
COMMUNITY
End: 2018-11-29

## 2018-11-29 RX ORDER — LOSARTAN POTASSIUM 100 MG/1
100 TABLET ORAL DAILY
Qty: 90 TABLET | Refills: 3 | Status: SHIPPED | OUTPATIENT
Start: 2018-11-29 | End: 2019-11-12

## 2018-11-29 RX ORDER — HYDROCHLOROTHIAZIDE 25 MG/1
25 TABLET ORAL DAILY
COMMUNITY
End: 2018-12-27

## 2018-11-29 NOTE — MR AVS SNAPSHOT
After Visit Summary   11/29/2018    Juan Balderrama    MRN: [id number]           Patient Information     Date Of Birth          1960        Visit Information        Provider Department      11/29/2018 7:20 AM Mauricio Pearce MD [facility]        Today's Diagnoses     Controlled type 2 diabetes mellitus without complication, without long-term current use of insulin (H)    -  1    Uncontrolled hypertension        Hyperlipidemia LDL goal <70        Screening for malignant neoplasm of colon        Advanced directives, counseling/discussion          Care Instructions    Go to [facility] now for your blood draw.    You will be contacted in 1-2 days for results of your lab tests.    Be sure to take all your medications as scheduled. Do not miss doses.    Be consistent with carbohydrate controlled, low salt, low trans fat and saturated fat diet.  Eat food rich in omega-3-fatty acids as you tolerate. (salmon, olive oil)  Eat 5 cups of vegetables, fruits and whole grains per day.  Limit starchy food (white rice, white bread, white pasta, white potatoes) to less than a cup per meal.  Minimize sweets, junk food and fastfood. Limit soda beverages to one serving per day; best to avoid it altogether though.  Exercise: moderate intensity sustained for at least 30 mins per episode, goal of 150 mins per week at least  Combine cardiovascular and resistance exercises.  These exercise recommendations are in addition to your daily activity at work or home.  Work on losing weight.    Regular foot care; don't walk barefoot  Annual eye exam.    Flu vaccine every fall (October-November).    Read the Honoring Choices handout, and fill out the Advance Directives form.  You may have it notarized or witnessed, and provide clinic a copy of the form.          Follow-ups after your visit        Who to contact     If you have questions or need follow up information about today's clinic visit or your schedule  "please contact [facility] directly at [phone].  Normal or non-critical lab and imaging results will be communicated to you by MyChart, letter or phone within 4 business days after the clinic has received the results. If you do not hear from us within 7 days, please contact the clinic through Phybridgehart or phone. If you have a critical or abnormal lab result, we will notify you by phone as soon as possible.  Submit refill requests through KnowNow or call your pharmacy and they will forward the refill request to us. Please allow 3 business days for your refill to be completed.          Additional Information About Your Visit        PhybridgeharElectrikus Information     KnowNow gives you secure access to your electronic health record. If you see a primary care provider, you can also send messages to your care team and make appointments. If you have questions, please call your primary care clinic.  If you do not have a primary care provider, please call [phone] and they will assist you.        Care EveryWhere ID     This is your Care EveryWhere ID. This could be used by other organizations to access your [facility] medical records  [id number]        Your Vitals Were     Pulse Temperature Respirations Height Pulse Oximetry BMI (Body Mass Index)    74 98  F (36.7  C) (Tympanic) 14 5' 8\" (1.727 m) 99% 42.79 kg/m2       Blood Pressure from Last 3 Encounters:   11/29/18 154/82   06/28/17 (!) 162/98   03/28/17 150/78    Weight from Last 3 Encounters:   11/29/18 281 lb 6.4 oz (127.6 kg)   06/28/17 273 lb (123.8 kg)   03/28/17 248 lb (112.5 kg)              We Performed the Following     Albumin Random Urine Quantitative with Creat Ratio     Basic metabolic panel     FOOT EXAM     Hemoglobin A1c     Lipid panel reflex to direct LDL Fasting     TSH with free T4 reflex          Today's Medication Changes          These changes are accurate as of 11/29/18  8:05 AM.  If you have any questions, ask your nurse or doctor.       "         Start taking these medicines.        Dose/Directions    order for DME   Used for:  Controlled type 2 diabetes mellitus without complication, without long-term current use of insulin (H)   Started by:  Mauricio Pearce MD        Test strips for pt's glucometer, brand as covered by insurance Test once a day and prn.   Quantity:  200 each   Refills:  4         Stop taking these medicines if you haven't already. Please contact your care team if you have questions.     lisinopril 20 MG tablet   Commonly known as:  PRINIVIL/ZESTRIL   Stopped by:  Mauricio Pearce MD                Where to get your medicines      These medications were sent to [facility] - [place], [place] - [address]  [address] [address]     Phone:  [phone]     insulin glargine 100 UNIT/ML pen    losartan 100 MG tablet         Some of these will need a paper prescription and others can be bought over the counter.  Ask your nurse if you have questions.     Bring a paper prescription for each of these medications     order for DME                Primary Care Provider Office Phone # Fax #    Mauricio Pearce [phone] [address] [facility] [address]        Equal Access to Services     ADRIAN LEZAMA AH: Marcel barth Soruy, waaxda luqadaha, qaybta kaalmada adeegyada, marlene rodriguez. So [facility] [phone].    ATENCIÓN: Si habla español, tiene a haynes disposición servicios gratuitos de asistencia lingüística. Omar al [phone].    We comply with applicable federal civil rights laws and [place] laws. We do not discriminate on the basis of race, color, national origin, age, disability, sex, sexual orientation, or gender identity.            Thank you!     Thank you for choosing [facility]  for your care. Our goal is always to provide you with excellent care. Hearing back from our patients is  one way we can continue to improve our services. Please take a few minutes to complete the written survey that you may receive in the mail after your visit with us. Thank you!             Your Updated Medication List - Protect others around you: Learn how to safely use, store and throw away your medicines at www.disposemymeds.org.          This list is accurate as of 11/29/18  8:05 AM.  Always use your most recent med list.                   Brand Name Dispense Instructions for use Diagnosis    atorvastatin 10 MG tablet    LIPITOR    90 tablet    TAKE ONE TABLET BY MOUTH ONCE DAILY    Uncontrolled type 2 diabetes mellitus with complication, with long-term current use of insulin (H)       EPINEPHrine 0.3 MG/0.3ML injection 2-pack    EPIPEN/ADRENACLICK/or ANY BX GENERIC EQUIV    1 each    Inject 0.3 mLs (0.3 mg) into the muscle once as needed for anaphylaxis        hydrochlorothiazide 25 MG tablet    HYDRODIURIL     Take 25 mg by mouth daily        insulin glargine 100 UNIT/ML pen    LANTUS SOLOSTAR    20 mL    INJECT 23-68 UNITS SUBCUTANEOUSLY AT BEDTIME AS DIRECTED    Controlled type 2 diabetes mellitus without complication, without long-term current use of insulin (H)       ketorolac 0.5 % ophthalmic solution    ACULAR      Left cataract       losartan 100 MG tablet    COZAAR    90 tablet    Take 1 tablet (100 mg) by mouth daily    Controlled type 2 diabetes mellitus without complication, without long-term current use of insulin (H), Uncontrolled hypertension       metFORMIN 500 MG 24 hr tablet    GLUCOPHAGE-XR    120 tablet    Take 2 tablets (1,000 mg) by mouth 2 times daily (with meals)    Diabetes mellitus, type 2 (H)       metoprolol succinate 25 MG 24 hr tablet    TOPROL-XL    30 tablet    Take 1 tablet (25 mg) by mouth daily    Benign essential hypertension       ONETOUCH DELICA LANCETS 33G Misc       Uncontrolled type 2 diabetes mellitus with complication, with long-term current use of insulin (H)        RealtimeBoardIO FLEX SYSTEM w/Device Kit       Uncontrolled type 2 diabetes mellitus with complication, with long-term current use of insulin (H)       ONETOUCH VERIO IQ test strip   Generic drug:  blood glucose monitoring       Uncontrolled type 2 diabetes mellitus with complication, with long-term current use of insulin (H)       order for DME     200 each    Test strips for pt's glucometer, brand as covered by insurance Test once a day and prn.    Controlled type 2 diabetes mellitus without complication, without long-term current use of insulin (H)       RELION PEN NEEDLES 32G X 4 MM miscellaneous   Generic drug:  insulin pen needle       Uncontrolled type 2 diabetes mellitus with complication, with long-term current use of insulin (H)

## 2018-11-29 NOTE — PROGRESS NOTES
SUBJECTIVE:   Juan Balderrama is a 58 year old male who presents to clinic today for the following health issues:    Chief Complaint   Patient presents with     Diabetes     is over seas so needs refills.     Allergies     no longer alergic to Penicillins, had skin test done. Added the Hydrochlorothizide and Cozarr to list. Not taking the lisinopril anymore beacause of cough.     Flu Shot     already got flu shot in October.       Diabetes Follow-up      Patient is checking blood sugars: 90's-110's times a week    Diabetic concerns: None     Symptoms of hypoglycemia (low blood sugar): none     Paresthesias (numbness or burning in feet) or sores: Yes sore on right foot     Date of last diabetic eye exam: going in for cataracts surgery in a month, was in a week ago.     Diabetes Management Resources    Hyperlipidemia Follow-Up      Rate your low fat/cholesterol diet?: fair    Taking statin?  Yes, no muscle aches from statin    Other lipid medications/supplements?:  none    Hypertension Follow-up      Outpatient blood pressures are being checked at home.  Results are 119-130's/68-81    Low Salt Diet: no added salt    Had ADR to lisinopril.    On losartan now instead.    Also on beta blocker and hydrochlorothiazide    BP Readings from Last 2 Encounters:   11/29/18 154/82   06/28/17 (!) 162/98     Hemoglobin A1C (%)   Date Value   11/29/2018 6.5 (H)   06/28/2017 7.3 (H)     LDL Cholesterol Calculated (mg/dL)   Date Value   11/29/2018 37   02/01/2017 75       Amount of exercise or physical activity: 6-7 days/week for an average of 15-30 minutes    Problems taking medications regularly: No    Medication side effects: none    Diet: low salt      Colon cancer screen.    Problem list and histories reviewed & adjusted, as indicated.  Additional history: as documented    Patient Active Problem List   Diagnosis     Benign essential hypertension     Former smoker     Left cataract     Morbid obesity, unspecified obesity type (H)      Uncontrolled type 2 diabetes mellitus with complication, with long-term current use of insulin (H)     Hyperlipidemia LDL goal <70     History reviewed. No pertinent surgical history.    Social History   Substance Use Topics     Smoking status: Never Smoker     Smokeless tobacco: Never Used     Alcohol use Yes      Comment: social     History reviewed. No pertinent family history.      Current Outpatient Prescriptions   Medication Sig Dispense Refill     atorvastatin (LIPITOR) 10 MG tablet TAKE ONE TABLET BY MOUTH ONCE DAILY 90 tablet 3     EPINEPHrine (EPIPEN) 0.3 MG/0.3ML injection Inject 0.3 mLs (0.3 mg) into the muscle once as needed for anaphylaxis 1 each 1     hydrochlorothiazide (HYDRODIURIL) 25 MG tablet Take 25 mg by mouth daily       insulin glargine (LANTUS SOLOSTAR PEN) 100 UNIT/ML pen INJECT 23-68 UNITS SUBCUTANEOUSLY AT BEDTIME AS DIRECTED 20 mL 0     losartan (COZAAR) 100 MG tablet Take 1 tablet (100 mg) by mouth daily 90 tablet 3     metFORMIN (GLUCOPHAGE-XR) 500 MG 24 hr tablet Take 2 tablets (1,000 mg) by mouth 2 times daily (with meals) 120 tablet 0     metoprolol (TOPROL-XL) 25 MG 24 hr tablet Take 1 tablet (25 mg) by mouth daily 30 tablet 0     order for DME Test strips for pt's glucometer, brand as covered by insurance Test once a day and prn. 200 each 4     Blood Glucose Monitoring Suppl (ONETOUCH VERIO FLEX SYSTEM) W/DEVICE KIT        ketorolac (ACULAR) 0.5 % ophthalmic solution        ONE TOUCH VERIO IQ test strip        ONETOUCH DELICA LANCETS 33G MISC        RELION PEN NEEDLES 32G X 4 MM        [DISCONTINUED] insulin glargine (LANTUS SOLOSTAR) 100 UNIT/ML injection INJECT 23-68 UNITS SUBCUTANEOUSLY AT BEDTIME AS DIRECTED (Patient not taking: Reported on 11/29/2018) 20 mL 0     [DISCONTINUED] losartan (COZAAR) 100 MG tablet Take 100 mg by mouth daily       Allergies   Allergen Reactions     Penicillins Swelling     Sulfamethoxazole Swelling     Recent Labs   Lab Test  11/29/18   0811   06/28/17   0946  03/06/17   0824  02/01/17   1122   01/23/17   1000   A1C  6.5*  7.3*  10.3*   --    < >   --    LDL  37   --    --   75   --    --    HDL  33*   --    --   32*   --    --    TRIG  340*   --    --   303*   --    --    CR  0.89  0.66  0.70   --    --   0.61*   GFRESTIMATED  88  >90  Non  GFR Calc    >90  Non  GFR Calc     --    --   >90  Non  GFR Calc     GFRESTBLACK  >90  >90  African American GFR Calc    >90   GFR Calc     --    --   >90   GFR Calc     POTASSIUM  5.2  4.7  4.6   --    --   4.3   TSH  3.07   --    --    --    --   1.60    < > = values in this interval not displayed.      BP Readings from Last 3 Encounters:   11/29/18 154/82   06/28/17 (!) 162/98   03/28/17 150/78    Wt Readings from Last 3 Encounters:   11/29/18 281 lb 6.4 oz (127.6 kg)   06/28/17 273 lb (123.8 kg)   03/28/17 248 lb (112.5 kg)                    Reviewed and updated as needed this visit by clinical staff  Tobacco  Allergies  Meds  Med Hx  Surg Hx  Fam Hx  Soc Hx      Reviewed and updated as needed this visit by Provider         ROS:  C: NEGATIVE for fever, chills, or change in weight  I: NEGATIVE for worrisome rashes, moles or lesions  E: NEGATIVE for vision changes or irritation  ENT: NEGATIVE for ear, mouth and throat problems  R: NEGATIVE for significant cough or SOB  CV: NEGATIVE for chest pain, palpitations or peripheral edema  GI: NEGATIVE for nausea, abdominal pain, heartburn, or change in bowel habits  :negative for dysuria, hematuria, decreased urinary stream, or discharge  M: NEGATIVE for significant arthralgias or myalgia  N: NEGATIVE for weakness, dizziness or paresthesias  E: NEGATIVE for temperature intolerance, skin/hair changes  H: NEGATIVE for bleeding problems  P: NEGATIVE for changes in mood or affect    OBJECTIVE:                                                    /82  Pulse 74  Temp 98  F (36.7  C)  "(Tympanic)  Resp 14  Ht 5' 8\" (1.727 m)  Wt 281 lb 6.4 oz (127.6 kg)  SpO2 99%  BMI 42.79 kg/m2  Body mass index is 42.79 kg/(m^2).  GENERAL: healthy, alert and no distress, ambulatory w/o assist  EYES: no icterus; pink conjunctivae.  NECK: no tenderness, no adenopathy,  Thyroid not enlarged  RESP: lungs clear to auscultation - no rales, no rhonchi, no wheezes  CV: regular rates and rhythm, no murmur  MS: mild pitting bipedal edema from feet to mid-lower leg  SKIN: no suspicious lesions, no rashes; mild erythema of the right anterior distal lower leg but no induration or TTP  NEURO: strength and tone- normal, sensory exam- grossly normal, mentation- intact, speech- normal, reflexes- symmetric  ABD:  nontender  FOOT EXAM: wound dressing in place on right midfoot; pedal pulses strong bilaterally; monofilament: hypoesthesia of toes    Diagnostic test results:  Diagnostic Test Results:  Results for orders placed or performed in visit on 11/29/18   Albumin Random Urine Quantitative with Creat Ratio   Result Value Ref Range    Creatinine Urine 96 mg/dL    Albumin Urine mg/L 9 mg/L    Albumin Urine mg/g Cr 9.44 0 - 17 mg/g Cr   Basic metabolic panel   Result Value Ref Range    Sodium 137 133 - 144 mmol/L    Potassium 5.2 3.4 - 5.3 mmol/L    Chloride 103 94 - 109 mmol/L    Carbon Dioxide 28 20 - 32 mmol/L    Anion Gap 6 3 - 14 mmol/L    Glucose 149 (H) 70 - 99 mg/dL    Urea Nitrogen 28 7 - 30 mg/dL    Creatinine 0.89 0.66 - 1.25 mg/dL    GFR Estimate 88 >60 mL/min/1.7m2    GFR Estimate If Black >90 >60 mL/min/1.7m2    Calcium 8.9 8.5 - 10.1 mg/dL   Hemoglobin A1c   Result Value Ref Range    Hemoglobin A1C 6.5 (H) 0 - 5.6 %   Lipid panel reflex to direct LDL Fasting   Result Value Ref Range    Cholesterol 138 <200 mg/dL    Triglycerides 340 (H) <150 mg/dL    HDL Cholesterol 33 (L) >39 mg/dL    LDL Cholesterol Calculated 37 <100 mg/dL    Non HDL Cholesterol 105 <130 mg/dL   TSH with free T4 reflex   Result Value Ref " Range    TSH 3.07 0.40 - 4.00 mU/L        ASSESSMENT/PLAN:                                                        ICD-10-CM    1. Controlled type 2 diabetes mellitus without complication, without long-term current use of insulin (H) E11.9 FOOT EXAM     Albumin Random Urine Quantitative with Creat Ratio     Basic metabolic panel     Hemoglobin A1c     Lipid panel reflex to direct LDL Fasting     TSH with free T4 reflex     insulin glargine (LANTUS SOLOSTAR PEN) 100 UNIT/ML pen     losartan (COZAAR) 100 MG tablet     order for DME  Reinforced carbohydrate control.  Regular exercise reinforced.  Regular foot care, annual eye exam.  Flu vaccine every year.     2. Uncontrolled hypertension I10 losartan (COZAAR) 100 MG tablet  Patient said he ran out of losartan few days ago so has not taken this since then. Also has not taken his meds today.  Advised adherence to med admin schedule.  Low salt diet.     3. Hyperlipidemia LDL goal <70 E78.5 Lipid panel reflex to direct LDL Fasting  Reinforced heart healthy lifestyle.  Continue statin.     4. Screening for malignant neoplasm of colon Z12.11 Ordered colonoscopy.     5. Advanced directives, counseling/discussion Z71.89        Follow up with Provider - 3 months or prn   Patient Instructions   Go to Clinic [facility] now for your blood draw.    You will be contacted in 1-2 days for results of your lab tests.    Be sure to take all your medications as scheduled. Do not miss doses.    Be consistent with carbohydrate controlled, low salt, low trans fat and saturated fat diet.  Eat food rich in omega-3-fatty acids as you tolerate. (salmon, olive oil)  Eat 5 cups of vegetables, fruits and whole grains per day.  Limit starchy food (white rice, white bread, white pasta, white potatoes) to less than a cup per meal.  Minimize sweets, junk food and fastfood. Limit soda beverages to one serving per day; best to avoid it altogether though.  Exercise: moderate intensity sustained for at least 30  mins per episode, goal of 150 mins per week at least  Combine cardiovascular and resistance exercises.  These exercise recommendations are in addition to your daily activity at work or home.  Work on losing weight.    Regular foot care; don't walk barefoot  Annual eye exam.    Flu vaccine every fall (October-November).    Read the Honoring Choices handout, and fill out the Advance Directives form.  You may have it notarized or witnessed, and provide clinic a copy of the form.      Mauricio Pearce MD  [facility]

## 2018-11-29 NOTE — PATIENT INSTRUCTIONS
Go to Clinic B now for your blood draw.    You will be contacted in 1-2 days for results of your lab tests.    Be sure to take all your medications as scheduled. Do not miss doses.    Be consistent with carbohydrate controlled, low salt, low trans fat and saturated fat diet.  Eat food rich in omega-3-fatty acids as you tolerate. (salmon, olive oil)  Eat 5 cups of vegetables, fruits and whole grains per day.  Limit starchy food (white rice, white bread, white pasta, white potatoes) to less than a cup per meal.  Minimize sweets, junk food and fastfood. Limit soda beverages to one serving per day; best to avoid it altogether though.  Exercise: moderate intensity sustained for at least 30 mins per episode, goal of 150 mins per week at least  Combine cardiovascular and resistance exercises.  These exercise recommendations are in addition to your daily activity at work or home.  Work on losing weight.    Regular foot care; don't walk barefoot  Annual eye exam.    Flu vaccine every fall (October-November).    Read the Honoring Choices handout, and fill out the Advance Directives form.  You may have it notarized or witnessed, and provide clinic a copy of the form.

## 2018-11-30 ENCOUNTER — TELEPHONE (OUTPATIENT)
Dept: FAMILY MEDICINE | Facility: CLINIC | Age: 58
End: 2018-11-30

## 2018-11-30 DIAGNOSIS — I10 BENIGN ESSENTIAL HYPERTENSION: ICD-10-CM

## 2018-11-30 RX ORDER — ATORVASTATIN CALCIUM 10 MG/1
20 TABLET, FILM COATED ORAL DAILY
Qty: 90 TABLET | Refills: 3 | Status: SHIPPED | OUTPATIENT
Start: 2018-11-30 | End: 2019-03-31

## 2018-11-30 RX ORDER — METFORMIN HCL 500 MG
1000 TABLET, EXTENDED RELEASE 24 HR ORAL 2 TIMES DAILY WITH MEALS
Qty: 180 TABLET | Refills: 3 | Status: SHIPPED | OUTPATIENT
Start: 2018-11-30 | End: 2019-03-18

## 2018-11-30 RX ORDER — METOPROLOL SUCCINATE 25 MG/1
25 TABLET, EXTENDED RELEASE ORAL DAILY
Qty: 90 TABLET | Refills: 3 | Status: SHIPPED | OUTPATIENT
Start: 2018-11-30 | End: 2018-12-27

## 2018-11-30 RX ORDER — INSULIN GLARGINE 100 [IU]/ML
INJECTION, SOLUTION SUBCUTANEOUS
Qty: 20 ML | Refills: 11 | Status: SHIPPED | OUTPATIENT
Start: 2018-11-30 | End: 2019-07-29

## 2018-11-30 NOTE — TELEPHONE ENCOUNTER
Patient called clinic  and reported is out of his BP medication. Patient verbalized displeasure that these medications were not refilled yesterday at Office visit as requested.   Provider please review and advise. Thank you.

## 2018-11-30 NOTE — TELEPHONE ENCOUNTER
"Requested Prescriptions   Pending Prescriptions Disp Refills     metFORMIN (GLUCOPHAGE-XR) 500 MG 24 hr tablet 120 tablet 0     Sig: Take 2 tablets (1,000 mg) by mouth 2 times daily (with meals)    Biguanide Agents Failed    11/30/2018 12:46 PM       Failed - Blood pressure less than 140/90 in past 6 months    BP Readings from Last 3 Encounters:   11/29/18 154/82   06/28/17 (!) 162/98   03/28/17 150/78                Passed - Patient has documented LDL within the past 12 mos.    Recent Labs   Lab Test  11/29/18   0811   LDL  37            Passed - Patient has had a Microalbumin in the past 15 mos.    Recent Labs   Lab Test  11/29/18   0811   MICROL  9   UMALCR  9.44            Passed - Patient is age 10 or older       Passed - Patient has documented A1c within the specified period of time.    If HgbA1C is 8 or greater, it needs to be on file within the past 3 months.  If less than 8, must be on file within the past 6 months.     Recent Labs   Lab Test  11/29/18   0811   A1C  6.5*            Passed - Patient's CR is NOT>1.4 OR Patient's EGFR is NOT<45 within past 12 mos.    Recent Labs   Lab Test  11/29/18   0811   GFRESTIMATED  88   GFRESTBLACK  >90       Recent Labs   Lab Test  11/29/18   0811   CR  0.89            Passed - Patient does NOT have a diagnosis of CHF.       Passed - Recent (6 mo) or future (30 days) visit within the authorizing provider's specialty    Patient had office visit in the last 6 months or has a visit in the next 30 days with authorizing provider or within the authorizing provider's specialty.  See \"Patient Info\" tab in inbasket, or \"Choose Columns\" in Meds & Orders section of the refill encounter.       Routing refill request to provider for review/approval because:  Labs out of range:  BP           metoprolol succinate ER (TOPROL-XL) 25 MG 24 hr tablet 30 tablet 0     Sig: Take 1 tablet (25 mg) by mouth daily    Beta-Blockers Protocol Failed    11/30/2018 12:46 PM       Failed - Blood " "pressure under 140/90 in past 12 months    BP Readings from Last 3 Encounters:   11/29/18 154/82   06/28/17 (!) 162/98   03/28/17 150/78                Passed - Patient is age 6 or older       Passed - Recent (12 mo) or future (30 days) visit within the authorizing provider's specialty    Patient had office visit in the last 12 months or has a visit in the next 30 days with authorizing provider or within the authorizing provider's specialty.  See \"Patient Info\" tab in inbasket, or \"Choose Columns\" in Meds & Orders section of the refill encounter.         Routing refill request to provider for review/approval because:  Labs out of range:  BP           atorvastatin (LIPITOR) 10 MG tablet 90 tablet 3     Sig: Take 1 tablet (10 mg) by mouth daily    Statins Protocol Passed    11/30/2018 12:46 PM       Passed - LDL on file in past 12 months    Recent Labs   Lab Test  11/29/18   0811   LDL  37            Passed - No abnormal creatine kinase in past 12 months    No lab results found.            Passed - Recent (12 mo) or future (30 days) visit within the authorizing provider's specialty    Patient had office visit in the last 12 months or has a visit in the next 30 days with authorizing provider or within the authorizing provider's specialty.  See \"Patient Info\" tab in inbasket, or \"Choose Columns\" in Meds & Orders section of the refill encounter.             Passed - Patient is age 18 or older        Entered by Mauricio Pearce MD at 11/29/2018  1:59 PM   Read by Juan Balderrama at 11/29/2018  2:29 PM   Colby Martinez.     Diabetes labs show that your diabetes is in good control. This is great!  Continue your current medications.     Triglycerides (bad fat) still moderately high.   I suggest increasing atorvastatin 10 mg to 2 tablets a night.   Follow low fat diet as printed in your after visit summary.   Repeat fasting cholesterol test in 3 months to see if improving or not.      Routing refill request to provider " for review/approval because:  Please send in a new script with updated orders.   Pended.

## 2018-11-30 NOTE — TELEPHONE ENCOUNTER
"Routing refill request to provider for review/approval because:  BP uncontrolled.    1 month supply given yesterday based on if the patient need 68 units daily.     68 units/30 days= 2040 units/ month- 20 wY=3805 Units  Okay for further refills?     Arcelia CALDWELL RN          Requested Prescriptions   Pending Prescriptions Disp Refills     LANTUS SOLOSTAR 100 UNIT/ML soln [Pharmacy Med Name: LANT SOLOSTAR     INJ]  1     Sig: INJECT 23 TO 68 UNITS SUBCUTANEOUSLY DAILY AT BEDTIME AS DIRECTED    Long Acting Insulin Protocol Failed    11/30/2018 11:10 AM       Failed - Blood pressure less than 140/90 in past 6 months    BP Readings from Last 3 Encounters:   11/29/18 154/82   06/28/17 (!) 162/98   03/28/17 150/78          Passed - LDL on file in past 12 months    Recent Labs   Lab Test  11/29/18   0811   LDL  37          Passed - Microalbumin on file in past 12 months    Recent Labs   Lab Test  11/29/18   0811   MICROL  9   UMALCR  9.44            Passed - Serum creatinine on file in past 12 months    Recent Labs   Lab Test  11/29/18   0811   CR  0.89            Passed - HgbA1C in past 3 or 6 months    If HgbA1C is 8 or greater, it needs to be on file within the past 3 months.  If less than 8, must be on file within the past 6 months.     Recent Labs   Lab Test  11/29/18   0811   A1C  6.5*            Passed - Patient is age 18 or older       Passed - Recent (6 mo) or future (30 days) visit within the authorizing provider's specialty    Patient had office visit in the last 6 months or has a visit in the next 30 days with authorizing provider or within the authorizing provider's specialty.  See \"Patient Info\" tab in inbasket, or \"Choose Columns\" in Meds & Orders section of the refill encounter.              "

## 2018-12-01 DIAGNOSIS — E11.9 DIABETES MELLITUS (H): Primary | ICD-10-CM

## 2018-12-01 RX ORDER — INSULIN GLARGINE 100 [IU]/ML
INJECTION, SOLUTION SUBCUTANEOUS
Qty: 15 ML | Refills: 1 | Status: SHIPPED | OUTPATIENT
Start: 2018-12-01 | End: 2019-10-03

## 2018-12-01 NOTE — TELEPHONE ENCOUNTER
Clinic Action Needed: None  FNA Triage Call  Presenting Problem: Sindy, Pharmacist at [facility] [phone] called to request an order for Basaglar to replace Colby Lantus order due to insurance no longer covering Lantus. Juan is out of Insulin at this time.   FNA Paged on call Provider Dr Yady Henriquez,  [place] [facility] and received a TO to switch to Basaglar.   FNA notified Pharmacy.     Routed to:  AMANDEEP Zimmer RN/FNA

## 2018-12-01 NOTE — TELEPHONE ENCOUNTER
Prior Authorization Retail Medication Request    Medication/Dose: Jax Ruiz  ICD code (if different than what is on RX):    Previously Tried and Failed:  glucophage  Rationale:  Patient has used since 2017    Insurance Name:  [phone]  Insurance ID:  Not provided  Sure Script - [id number]      Pharmacy Information (if different than what is on RX)  Name:    Phone:

## 2018-12-01 NOTE — TELEPHONE ENCOUNTER
Dr. Pearce,  Received a request from pharmacy to change prescription to Basaglar since Lantus is not covered.

## 2018-12-03 DIAGNOSIS — E11.9 CONTROLLED TYPE 2 DIABETES MELLITUS WITHOUT COMPLICATION, WITHOUT LONG-TERM CURRENT USE OF INSULIN (H): ICD-10-CM

## 2018-12-03 RX ORDER — LANCETS 33 GAUGE
EACH MISCELLANEOUS
Status: CANCELLED | OUTPATIENT
Start: 2018-12-03

## 2018-12-03 RX ORDER — PEN NEEDLE, DIABETIC 32GX 5/32"
NEEDLE, DISPOSABLE MISCELLANEOUS
Qty: 100 EACH | Refills: 1 | Status: SHIPPED | OUTPATIENT
Start: 2018-12-03 | End: 2019-05-04

## 2018-12-03 RX ORDER — LANCETS 33 GAUGE
1 EACH MISCELLANEOUS DAILY
Qty: 100 EACH | Refills: 1 | Status: SHIPPED | OUTPATIENT
Start: 2018-12-03 | End: 2019-03-04 | Stop reason: ALTCHOICE

## 2018-12-03 NOTE — TELEPHONE ENCOUNTER
Prescription approved per [facility] Refill Protocol.  Aline CASTANON RN

## 2018-12-03 NOTE — TELEPHONE ENCOUNTER
"Requested Prescriptions   Pending Prescriptions Disp Refills     order for  each 4    Last Written Prescription Date:  11/29/18  Last Fill Quantity: 200,  # refills: 4   Last office visit: 11/29/2018 with prescribing provider:  Portia   Future Office Visit:     Sig: Test strips for pt's glucometer, brand as covered by insurance Test once a day and prn.    There is no refill protocol information for this order        ONETOUCH DELICA LANCETS 33G MISC      Diabetic Supplies Protocol Passed    12/3/2018  7:25 AM   Last Written Prescription Date:  3/5/17  Last Fill Quantity: ?,  # refills: ?   Last office visit: 11/29/2018 with prescribing provider:  Portia   Future Office Visit:        Passed - Patient is 18 years of age or older       Passed - Recent (6 mo) or future (30 days) visit within the authorizing provider's specialty    Patient had office visit in the last 6 months or has a visit in the next 30 days with authorizing provider.  See \"Patient Info\" tab in inGrocery Shopping Networket, or \"Choose Columns\" in Meds & Orders section of the refill encounter.            RELION PEN NEEDLES 32G X 4 MM miscellaneous 100 each     Diabetic Supplies Protocol Passed    12/3/2018  7:25 AM   Last Written Prescription Date:  3/5/17  Last Fill Quantity: ?,  # refills: ?   Last office visit: 11/29/2018 with prescribing provider:  Portia   Future Office Visit:        Passed - Patient is 18 years of age or older       Passed - Recent (6 mo) or future (30 days) visit within the authorizing provider's specialty    Patient had office visit in the last 6 months or has a visit in the next 30 days with authorizing provider.  See \"Patient Info\" tab in inbasket, or \"Choose Columns\" in Meds & Orders section of the refill encounter.              "

## 2018-12-19 ENCOUNTER — TELEPHONE (OUTPATIENT)
Dept: FAMILY MEDICINE | Facility: CLINIC | Age: 58
End: 2018-12-19

## 2018-12-19 NOTE — TELEPHONE ENCOUNTER
Pt calling back and given the message, pt not happy he was under the impression that 11/29 visit included pre-op, pt agreed to make an appt.  Cassy Valdes on 12/19/2018 at 1:24 PM

## 2018-12-19 NOTE — TELEPHONE ENCOUNTER
I left a message for the patient to return my call.  CSS can deliver message below, pt needs to schedule a separate appt for pre-op.    Joan BRANDT RN

## 2018-12-19 NOTE — TELEPHONE ENCOUNTER
Reason for Call:  Other appointment    Detailed comments: his 11/29 pt says was supposed to include a pre-op appt, can he have it updated per pt request to add pre op visit    Phone Number Patient can be reached at: Home number on file [phone] (home)    Best Time: any    Can we leave a detailed message on this number? YES    Call taken on 12/19/2018 at 10:26 AM by Cassy Valdes

## 2018-12-20 ENCOUNTER — OFFICE VISIT (OUTPATIENT)
Dept: PODIATRY | Facility: CLINIC | Age: 58
End: 2018-12-20
Payer: COMMERCIAL

## 2018-12-20 VITALS
DIASTOLIC BLOOD PRESSURE: 92 MMHG | BODY MASS INDEX: 42.63 KG/M2 | HEART RATE: 95 BPM | WEIGHT: 281.31 LBS | SYSTOLIC BLOOD PRESSURE: 179 MMHG | HEIGHT: 68 IN

## 2018-12-20 DIAGNOSIS — L97.411 DIABETIC ULCER OF RIGHT MIDFOOT ASSOCIATED WITH TYPE 2 DIABETES MELLITUS, LIMITED TO BREAKDOWN OF SKIN (H): ICD-10-CM

## 2018-12-20 DIAGNOSIS — E11.621 DIABETIC ULCER OF RIGHT MIDFOOT ASSOCIATED WITH TYPE 2 DIABETES MELLITUS, LIMITED TO BREAKDOWN OF SKIN (H): ICD-10-CM

## 2018-12-20 DIAGNOSIS — I89.0 LYMPHEDEMA OF RIGHT LOWER EXTREMITY: Primary | ICD-10-CM

## 2018-12-20 PROCEDURE — 11042 DBRDMT SUBQ TIS 1ST 20SQCM/<: CPT | Performed by: PODIATRIST

## 2018-12-20 PROCEDURE — 99213 OFFICE O/P EST LOW 20 MIN: CPT | Mod: 25 | Performed by: PODIATRIST

## 2018-12-20 RX ORDER — CLINDAMYCIN HCL 300 MG
300 CAPSULE ORAL 4 TIMES DAILY
Qty: 40 CAPSULE | Refills: 0 | Status: SHIPPED | OUTPATIENT
Start: 2018-12-20 | End: 2018-12-30

## 2018-12-20 ASSESSMENT — MIFFLIN-ST. JEOR: SCORE: 2080.5

## 2018-12-20 NOTE — PROGRESS NOTES
Juan Balderrama is a 58 year old male who presents to the office with a chief complaint of an ulcer on the bottom of the right foot.  The patient relates the ulcer has been present for the past several weeks.  The patient denies any fever, chills, nausea or vomiting.  The patient denies any pus or blood draining from the ulcer.  The patient denies any redness or swelling around the ulcer.  The patient denies of any pain involving the ulcer.  The patient denies any previous ulceration on either foot or leg.  The patient relates blood sugars are under control.         REVIEW OF SYSTEMS:  Constitutional, HEENT, cardiovascular, pulmonary, GI, , musculoskeletal, neuro, skin, endocrine and psych systems are negative, except as otherwise noted.     PAST MEDICAL HISTORY: History reviewed. No pertinent past medical history.     PAST SURGICAL HISTORY: History reviewed. No pertinent surgical history.     MEDICATIONS:   Current Outpatient Medications:      atorvastatin (LIPITOR) 10 MG tablet, Take 2 tablets (20 mg) by mouth daily, Disp: 90 tablet, Rfl: 3     blood glucose monitoring (NO BRAND SPECIFIED) test strip, Use to test blood sugars one time daily or as directed, Disp: 100 strip, Rfl: 1     Blood Glucose Monitoring Suppl (ONETOUCH VERIO FLEX SYSTEM) W/DEVICE KIT, , Disp: , Rfl:      EPINEPHrine (EPIPEN) 0.3 MG/0.3ML injection, Inject 0.3 mLs (0.3 mg) into the muscle once as needed for anaphylaxis, Disp: 1 each, Rfl: 1     hydrochlorothiazide (HYDRODIURIL) 25 MG tablet, Take 25 mg by mouth daily, Disp: , Rfl:      insulin glargine (BASAGLAR KWIKPEN) 100 UNIT/ML pen, INJECT 23 TO 68 UNITS SUBCUTANEOUSLY DAILY AT BEDTIME AS DIRECTED, Disp: 15 mL, Rfl: 1     ketorolac (ACULAR) 0.5 % ophthalmic solution, , Disp: , Rfl:      LANTUS SOLOSTAR 100 UNIT/ML soln, INJECT 23 TO 68 UNITS SUBCUTANEOUSLY DAILY AT BEDTIME AS DIRECTED, Disp: 20 mL, Rfl: 11     losartan (COZAAR) 100 MG tablet, Take 1 tablet (100 mg) by mouth daily, Disp:  "90 tablet, Rfl: 3     metFORMIN (GLUCOPHAGE-XR) 500 MG 24 hr tablet, Take 2 tablets (1,000 mg) by mouth 2 times daily (with meals), Disp: 180 tablet, Rfl: 3     metoprolol succinate ER (TOPROL-XL) 25 MG 24 hr tablet, Take 1 tablet (25 mg) by mouth daily, Disp: 90 tablet, Rfl: 3     ONE TOUCH VERIO IQ test strip, , Disp: , Rfl:      ONETOUCH DELICA LANCETS 33G MISC, 1 lancet daily Or as directed., Disp: 100 each, Rfl: 1     ONETOUCH DELICA LANCETS 33G MISC, , Disp: , Rfl:      order for DME, Test strips for pt's glucometer, brand as covered by insurance Test once a day and prn., Disp: 200 each, Rfl: 4     RELION PEN NEEDLES 32G X 4 MM miscellaneous, Use to test blood sugars one time daily or as directed, Disp: 100 each, Rfl: 1     ALLERGIES:    Allergies   Allergen Reactions     Sulfamethoxazole Swelling        SOCIAL HISTORY:   Social History     Socioeconomic History     Marital status:      Spouse name: Not on file     Number of children: Not on file     Years of education: Not on file     Highest education level: Not on file   Social Needs     Financial resource strain: Not on file     Food insecurity - worry: Not on file     Food insecurity - inability: Not on file     Transportation needs - medical: Not on file     Transportation needs - non-medical: Not on file   Occupational History     Not on file   Tobacco Use     Smoking status: Never Smoker     Smokeless tobacco: Never Used   Substance and Sexual Activity     Alcohol use: Yes     Comment: social     Drug use: No     Sexual activity: Not on file   Other Topics Concern     Parent/sibling w/ CABG, MI or angioplasty before 65F 55M? No   Social History Narrative     Not on file        FAMILY HISTORY: History reviewed. No pertinent family history.     EXAM:Vitals: BP (!) 179/92 (BP Location: Left arm, Patient Position: Chair, Cuff Size: Adult Large)   Pulse 95   Ht 1.727 m (5' 8\")   Wt 127.6 kg (281 lb 4.9 oz)   BMI 42.77 kg/m    BMI= Body mass index " is 42.77 kg/m .  Weight management plan: Patient was referred to their PCP to discuss a diet and exercise plan.    General appearance: Patient is alert and fully cooperative with history & exam.  No sign of distress is noted during the visit.     Psychiatric: Affect is pleasant & appropriate.  Patient appears motivated to improve health.     Respiratory: Breathing is regular & unlabored while sitting.     HEENT: Hearing is intact to spoken word.  Speech is clear.  No gross evidence of visual impairment that would impact ambulation.     Dermatologic:  One notes grade II ulceration located on the plantar aspect of the foot underlying the midfoot  on the right.  The ulcer measures out to be approximately 5 cm x 4 cm x 0.2 cm.  One notes negative probing to bone, negative tracking.  The subcutaneous granular base appears beefy red with hyperkeratotic wound borders as well as a slight serous drainage and absence of mal odor.  There is  noted swelling and  erythema.    Vascular: DP & PT pulses are intact & regular bilaterally.  Noted significant edema on the right lower extremity..  CFT and skin temperature is normal to both lower extremities.     Neurologic: Lower extremity sensation is absent to light touch.  No evidence of weakness or contracture in the lower extremities.  Noted evidence of neuropathy.     Musculoskeletal: Patient is ambulatory without assistive device or brace.  No gross ankle deformity noted.  No foot or ankle joint effusion is noted.       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    Plan:  I have explained to Juan the condition of the diabetic malperforans ulceration.  After verbal consent, #15 blade was used to debride ulcer down to and including subcutaneous tissue.  Bleeding controlled with light pressure.   No drainage noted.  No anesthesia was used due to neuropathy. Dry dressing applied to foot.  Patient tolerated procedure well.  I explained to him  potential risks  infection and loss of tissue, including loss of limb.  Patient was referred to physical therapy for lymphedema treatment.  The patient was referred to wound care clinic for further evaluation and wound care treatment options.  The patient was prescribed a 10-day course of clindamycin 300 mg 4 times daily.  The patient will follow up in two weeks for further evaluation.        JESUS Boston D.P.M., [email or web address].[email or web address].

## 2018-12-20 NOTE — LETTER
12/20/2018         RE: Juan Balderrama  [id number] [place]  [place] [address]        Dear Colleague,    Thank you for referring your patient, Juan Balderrama, to the [facility]. Please see a copy of my visit note below.    Juan Balderrama is a 58 year old male who presents to the office with a chief complaint of an ulcer on the bottom of the right foot.  The patient relates the ulcer has been present for the past several weeks.  The patient denies any fever, chills, nausea or vomiting.  The patient denies any pus or blood draining from the ulcer.  The patient denies any redness or swelling around the ulcer.  The patient denies of any pain involving the ulcer.  The patient denies any previous ulceration on either foot or leg.  The patient relates blood sugars are under control.         REVIEW OF SYSTEMS:  Constitutional, HEENT, cardiovascular, pulmonary, GI, , musculoskeletal, neuro, skin, endocrine and psych systems are negative, except as otherwise noted.     PAST MEDICAL HISTORY: History reviewed. No pertinent past medical history.     PAST SURGICAL HISTORY: History reviewed. No pertinent surgical history.     MEDICATIONS:   Current Outpatient Medications:      atorvastatin (LIPITOR) 10 MG tablet, Take 2 tablets (20 mg) by mouth daily, Disp: 90 tablet, Rfl: 3     blood glucose monitoring (NO BRAND SPECIFIED) test strip, Use to test blood sugars one time daily or as directed, Disp: 100 strip, Rfl: 1     Blood Glucose Monitoring Suppl (ONETOUCH VERIO FLEX SYSTEM) W/DEVICE KIT, , Disp: , Rfl:      EPINEPHrine (EPIPEN) 0.3 MG/0.3ML injection, Inject 0.3 mLs (0.3 mg) into the muscle once as needed for anaphylaxis, Disp: 1 each, Rfl: 1     hydrochlorothiazide (HYDRODIURIL) 25 MG tablet, Take 25 mg by mouth daily, Disp: , Rfl:      insulin glargine (BASAGLAR KWIKPEN) 100 UNIT/ML pen, INJECT 23 TO 68 UNITS SUBCUTANEOUSLY DAILY AT BEDTIME AS DIRECTED, Disp: 15 mL, Rfl: 1     ketorolac  (ACULAR) 0.5 % ophthalmic solution, , Disp: , Rfl:      LANTUS SOLOSTAR 100 UNIT/ML soln, INJECT 23 TO 68 UNITS SUBCUTANEOUSLY DAILY AT BEDTIME AS DIRECTED, Disp: 20 mL, Rfl: 11     losartan (COZAAR) 100 MG tablet, Take 1 tablet (100 mg) by mouth daily, Disp: 90 tablet, Rfl: 3     metFORMIN (GLUCOPHAGE-XR) 500 MG 24 hr tablet, Take 2 tablets (1,000 mg) by mouth 2 times daily (with meals), Disp: 180 tablet, Rfl: 3     metoprolol succinate ER (TOPROL-XL) 25 MG 24 hr tablet, Take 1 tablet (25 mg) by mouth daily, Disp: 90 tablet, Rfl: 3     ONE TOUCH VERIO IQ test strip, , Disp: , Rfl:      ONETOUCH DELICA LANCETS 33G MISC, 1 lancet daily Or as directed., Disp: 100 each, Rfl: 1     ONETOUCH DELICA LANCETS 33G MISC, , Disp: , Rfl:      order for DME, Test strips for pt's glucometer, brand as covered by insurance Test once a day and prn., Disp: 200 each, Rfl: 4     RELION PEN NEEDLES 32G X 4 MM miscellaneous, Use to test blood sugars one time daily or as directed, Disp: 100 each, Rfl: 1     ALLERGIES:    Allergies   Allergen Reactions     Sulfamethoxazole Swelling        SOCIAL HISTORY:   Social History     Socioeconomic History     Marital status:      Spouse name: Not on file     Number of children: Not on file     Years of education: Not on file     Highest education level: Not on file   Social Needs     Financial resource strain: Not on file     Food insecurity - worry: Not on file     Food insecurity - inability: Not on file     Transportation needs - medical: Not on file     Transportation needs - non-medical: Not on file   Occupational History     Not on file   Tobacco Use     Smoking status: Never Smoker     Smokeless tobacco: Never Used   Substance and Sexual Activity     Alcohol use: Yes     Comment: social     Drug use: No     Sexual activity: Not on file   Other Topics Concern     Parent/sibling w/ CABG, MI or angioplasty before 65F 55M? No   Social History Narrative     Not on file        FAMILY  "HISTORY: History reviewed. No pertinent family history.     EXAM:Vitals: BP (!) 179/92 (BP Location: Left arm, Patient Position: Chair, Cuff Size: Adult Large)   Pulse 95   Ht 1.727 m (5' 8\")   Wt 127.6 kg (281 lb 4.9 oz)   BMI 42.77 kg/m     BMI= Body mass index is 42.77 kg/m .  Weight management plan: Patient was referred to their PCP to discuss a diet and exercise plan.    General appearance: Patient is alert and fully cooperative with history & exam.  No sign of distress is noted during the visit.     Psychiatric: Affect is pleasant & appropriate.  Patient appears motivated to improve health.     Respiratory: Breathing is regular & unlabored while sitting.     HEENT: Hearing is intact to spoken word.  Speech is clear.  No gross evidence of visual impairment that would impact ambulation.     Dermatologic:  One notes grade II ulceration located on the plantar aspect of the foot underlying the midfoot  on the right.  The ulcer measures out to be approximately 5 cm x 4 cm x 0.2 cm.  One notes negative probing to bone, negative tracking.  The subcutaneous granular base appears beefy red with hyperkeratotic wound borders as well as a slight serous drainage and absence of mal odor.  There is  noted swelling and  erythema.    Vascular: DP & PT pulses are intact & regular bilaterally.  Noted significant edema on the right lower extremity..  CFT and skin temperature is normal to both lower extremities.     Neurologic: Lower extremity sensation is absent to light touch.  No evidence of weakness or contracture in the lower extremities.  Noted evidence of neuropathy.     Musculoskeletal: Patient is ambulatory without assistive device or brace.  No gross ankle deformity noted.  No foot or ankle joint effusion is noted.       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    Plan:  I have explained to Juan the condition of the diabetic malperforans ulceration.  After verbal consent, #15 blade was used " to debride ulcer down to and including subcutaneous tissue.  Bleeding controlled with light pressure.   No drainage noted.  No anesthesia was used due to neuropathy. Dry dressing applied to foot.  Patient tolerated procedure well.  I explained to him  potential risks infection and loss of tissue, including loss of limb.  Patient was referred to physical therapy for lymphedema treatment.  The patient was referred to wound care clinic for further evaluation and wound care treatment options.  The patient was prescribed a 10-day course of clindamycin 300 mg 4 times daily.  The patient will follow up in two weeks for further evaluation.        MALINI Pena.PSTEWART., F.A.C.F.A.S.    Again, thank you for allowing me to participate in the care of your patient.        Sincerely,        Joel Boston DPM

## 2018-12-20 NOTE — PATIENT INSTRUCTIONS
Patient to follow up with Primary Care provider regarding elevated blood pressure.    FOOT ULCER (WOUND) EDUCATION  Ulceration ofthe foot involves a break or hole in the skin. Skin is our best protection against infection. Skin is quite durable, however, the underlying tissues are fragile. For this reason, the wound is likely to deepen rapidly. Deep wounds usually get infected and require amputation. Prompt healing is therefore essential to avoid limb loss.     Foot ulcers do not heal without intervention. Walking on the foot and living your normal life is not typically compatible with healing the sore. Successful healing will require several months of significant alteration of your daily activities.   Ulcer complications frequently develop. This primarily includes infection of skin, which then spreads deep into your joints, bones and tendons. Spreading infection may travel up your leg and into other parts of your body. Deep infection is usually treated with amputation ofpart ofyour foot or your leg. Signs of infection include fever, chills, nausea, vomiting, erratic blood sugars, local redness, pus, strong odor and localized warmth. Signs of infection should be taken seriously. Prompt evaluation in the clinic or hospital emergency room is required.   Ulcer treatment requires debridement or surgical removal of devitalized tissue. Your doctor will trim away callused, moistened, unhealthy tissue from the wound surface and margin. This helps to clean the wound and allows proper inspection. Debridement also stimulates healing even though the wound originally appears larger. Expect some bleeding with each debridement. You will be given instruction regarding wound bandaging. This often includes ointment and gauze. Avoid tape directly on the skin. Hand washing is essential since most infections will come from your fingertips. Ulcer care requires a no touch technique. Your fingers should not touch the margin or base of the  wound.    HELPFUL HEALING TIPS:  1. Debridement: Getting rid of bad tissue makes way for good tissue to promote healing  2. Addressing Foot Deformities: Hammertoes and bunions can cause increased pressure  3. Pressure Reduction: If pressure remains to the wound, it won't heal  4. Good Pulses: If bloodflow is not getting to the foot, the ulcer will not heal  5. Good Nutrition: If you are not getting proper nutrition your body can't heal.Protein!  6. Infection Control: Keep the ulcer clean with wound cleanser. DO NOT SOAK IT!  7. Moisture Control: Keep edema down and make sure that drainage is getting pulled away from the ulcer    IMPORTANCE OF DEBRIDEMENT    Reduces bioburden to help control or reduce infection. Even if an ulcer is not  infected,  the bacterial bioburden causes increased local inflammation.    Allows more accurate visualization of the wound base and edges, which allows for more precise staging.    Removes necrotic/non-viable tissue, which impedes wound healing, causes protein loss and can be a nidus for infection.    Stimulates new circulation (angiogenesis) and allows adequate oxygen delivery to the wound.    Removes undermining and tunneling, and may help reduce abscess formation.    Releases healing growth factors at the edge of the wound.    Prepares the wound bed by leaving only tissues that are capable of regenerating.

## 2018-12-20 NOTE — NURSING NOTE
"Chief Complaint   Patient presents with     Consult     ulcer on bottom of right foot and wants antibiotic for celluitis       Initial BP (!) 179/92 (BP Location: Left arm, Patient Position: Chair, Cuff Size: Adult Large)   Pulse 95   Ht 1.727 m (5' 8\")   Wt 127.6 kg (281 lb 4.9 oz)   BMI 42.77 kg/m   Estimated body mass index is 42.77 kg/m  as calculated from the following:    Height as of this encounter: 1.727 m (5' 8\").    Weight as of this encounter: 127.6 kg (281 lb 4.9 oz).  Medications and allergies reviewed.    Ramila NAYAK, GORDY    "

## 2018-12-27 ENCOUNTER — OFFICE VISIT (OUTPATIENT)
Dept: FAMILY MEDICINE | Facility: CLINIC | Age: 58
End: 2018-12-27
Payer: COMMERCIAL

## 2018-12-27 VITALS
SYSTOLIC BLOOD PRESSURE: 180 MMHG | BODY MASS INDEX: 45.41 KG/M2 | TEMPERATURE: 98.3 F | DIASTOLIC BLOOD PRESSURE: 94 MMHG | HEART RATE: 72 BPM | HEIGHT: 68 IN | WEIGHT: 299.6 LBS

## 2018-12-27 DIAGNOSIS — E66.01 MORBID OBESITY, UNSPECIFIED OBESITY TYPE (H): ICD-10-CM

## 2018-12-27 DIAGNOSIS — Z01.818 PREOP GENERAL PHYSICAL EXAM: Primary | ICD-10-CM

## 2018-12-27 DIAGNOSIS — E11.9 CONTROLLED TYPE 2 DIABETES MELLITUS WITHOUT COMPLICATION, WITHOUT LONG-TERM CURRENT USE OF INSULIN (H): ICD-10-CM

## 2018-12-27 DIAGNOSIS — E78.5 HYPERLIPIDEMIA LDL GOAL <70: ICD-10-CM

## 2018-12-27 DIAGNOSIS — H26.9 CATARACT OF RIGHT EYE, UNSPECIFIED CATARACT TYPE: ICD-10-CM

## 2018-12-27 DIAGNOSIS — I10 BENIGN ESSENTIAL HYPERTENSION: ICD-10-CM

## 2018-12-27 PROCEDURE — 99207 ZZC FOR CODING REVIEW: CPT | Performed by: FAMILY MEDICINE

## 2018-12-27 PROCEDURE — 99215 OFFICE O/P EST HI 40 MIN: CPT | Performed by: FAMILY MEDICINE

## 2018-12-27 RX ORDER — METOPROLOL SUCCINATE 25 MG/1
50 TABLET, EXTENDED RELEASE ORAL DAILY
Qty: 90 TABLET | Refills: 3
Start: 2018-12-27 | End: 2019-01-22

## 2018-12-27 ASSESSMENT — MIFFLIN-ST. JEOR: SCORE: 2163.48

## 2018-12-27 NOTE — PROGRESS NOTES
[facility]  [address] [address]  [phone]  Dept: [phone]    PRE-OP EVALUATION:  Today's date: 2018    Juan Balderrama (: 1960) presents for pre-operative evaluation assessment as requested by Dr. Khalil.  He requires evaluation and anesthesia risk assessment prior to undergoing surgery/procedure for treatment of cataracts .    Proposed Surgery/ Procedure: Removal of right cataract  Date of Surgery/ Procedure: 19  Time of Surgery/ Procedure: unknown  Hospital/Surgical Facility: [facility]  Fax number for surgical facility: [phone]  Primary Physician: Mauricio Pearce  Type of Anesthesia Anticipated: to be determined    Patient has a Health Care Directive or Living Will:  NO, pt has, is working on filling out     1. NO - Do you have a history of heart attack, stroke, stent, bypass or surgery on an artery in the head, neck, heart or legs?  2. NO - Do you ever have any pain or discomfort in your chest?  3. NO - Do you have a history of  Heart Failure?  4. NO - Are you troubled by shortness of breath when: walking on the level, up a slight hill or at night?  5. NO - Do you currently have a cold, bronchitis or other respiratory infection?  6. NO - Do you have a cough, shortness of breath or wheezing?  7. NO - Do you sometimes get pains in the calves of your legs when you walk?  8. NO - Do you or anyone in your family have previous history of blood clots?  9. NO - Do you or does anyone in your family have a serious bleeding problem such as prolonged bleeding following surgeries or cuts?  10. NO - Have you ever had problems with anemia or been told to take iron pills?  11. NO - Have you had any abnormal blood loss such as black, tarry or bloody stools, or abnormal vaginal bleeding?  12. NO - Have you ever had a blood transfusion?  13. NO - Have you or any of your relatives ever had problems with anesthesia?  14. NO - Do you have  sleep apnea, excessive snoring or daytime drowsiness?  15. NO - Do you have any prosthetic heart valves?  16. NO - Do you have prosthetic joints?  17. NO - Is there any chance that you may be pregnant?      HPI:     HPI related to upcoming procedure: Patient has right cataract causing BOV. Hence, planned surgery. Reviewed above with patient.      See problem list for active medical problems.  Problems all longstanding and stable, except as noted/documented.  See ROS for pertinent symptoms related to these conditions.                                                                                                                                                          .  DIABETES - Patient has a longstanding history of DiabetesType Type II . Patient is being treated with oral agents and denies significant side effects. Control has been good. Complicating factors include but are not limited to: hypertension, hyperlipidemia and morbid obesity .                                                                                                                            .  HYPERLIPIDEMIA - Patient has a long history of significant Hyperlipidemia requiring medication for treatment with recent fair control. Patient reports no problems or side effects with the medication.                                                                                                                                                       .  HYPERTENSION - Patient has longstanding history of HTN , currently denies any symptoms referable to elevated blood pressure. Specifically denies chest pain, palpitations, dyspnea, orthopnea, PND or peripheral edema. Blood pressure readings have not been in normal range. Current medication regimen is as listed below. Patient denies any side effects of medication.                                                                                                                                                                                           .    MEDICAL HISTORY:     Patient Active Problem List    Diagnosis Date Noted     Hyperlipidemia LDL goal <70 12/06/2017     Priority: Medium     Uncontrolled type 2 diabetes mellitus with complication, with long-term current use of insulin (H) 03/28/2017     Priority: Medium     Left cataract 01/25/2017     Priority: Medium     Morbid obesity, unspecified obesity type (H) 01/25/2017     Priority: Medium     Benign essential hypertension 01/23/2017     Priority: Medium     Former smoker 01/23/2017     Priority: Medium      History reviewed. No pertinent past medical history.  History reviewed. No pertinent surgical history.  Current Outpatient Medications   Medication Sig Dispense Refill     atorvastatin (LIPITOR) 10 MG tablet Take 2 tablets (20 mg) by mouth daily 90 tablet 3     clindamycin (CLEOCIN) 300 MG capsule Take 1 capsule (300 mg) by mouth 4 times daily for 10 days 40 capsule 0     insulin glargine (BASAGLAR KWIKPEN) 100 UNIT/ML pen INJECT 23 TO 68 UNITS SUBCUTANEOUSLY DAILY AT BEDTIME AS DIRECTED 15 mL 1     LANTUS SOLOSTAR 100 UNIT/ML soln INJECT 23 TO 68 UNITS SUBCUTANEOUSLY DAILY AT BEDTIME AS DIRECTED 20 mL 11     losartan (COZAAR) 100 MG tablet Take 1 tablet (100 mg) by mouth daily 90 tablet 3     metFORMIN (GLUCOPHAGE-XR) 500 MG 24 hr tablet Take 2 tablets (1,000 mg) by mouth 2 times daily (with meals) 180 tablet 3     metoprolol succinate ER (TOPROL-XL) 25 MG 24 hr tablet Take 2 tablets (50 mg) by mouth daily 90 tablet 3     blood glucose monitoring (NO BRAND SPECIFIED) test strip Use to test blood sugars one time daily or as directed 100 strip 1     Blood Glucose Monitoring Suppl (ONETOUCH VERIO FLEX SYSTEM) W/DEVICE KIT        EPINEPHrine (EPIPEN) 0.3 MG/0.3ML injection Inject 0.3 mLs (0.3 mg) into the muscle once as needed for anaphylaxis 1 each 1     ketorolac (ACULAR) 0.5 % ophthalmic solution        ONE TOUCH VERIO IQ test strip        ONETOUCH DELICA LANCETS  "33G MISC 1 lancet daily Or as directed. 100 each 1     ONETOUCH DELICA LANCETS 33G MISC        order for DME Test strips for pt's glucometer, brand as covered by insurance Test once a day and prn. 200 each 4     RELION PEN NEEDLES 32G X 4 MM miscellaneous Use to test blood sugars one time daily or as directed 100 each 1     OTC products: None, except as noted above    Allergies   Allergen Reactions     Sulfamethoxazole Swelling      Latex Allergy: NO    Social History     Tobacco Use     Smoking status: Never Smoker     Smokeless tobacco: Never Used   Substance Use Topics     Alcohol use: Yes     Comment: social     History   Drug Use No       REVIEW OF SYSTEMS:   CONSTITUTIONAL: NEGATIVE for fever, chills, change in weight  INTEGUMENTARY/SKIN: redness of distal right leg skin; healing foot ulcer on right (on abx - has 5 days left)  EYES: NEGATIVE for vision changes or irritation  ENT/MOUTH: NEGATIVE for ear, mouth and throat problems  RESP: NEGATIVE for significant cough or SOB  CV: NEGATIVE for chest pain, palpitations or peripheral edema  GI: NEGATIVE for nausea, abdominal pain, heartburn, or change in bowel habits  : NEGATIVE for frequency, dysuria, or hematuria  MUSCULOSKELETAL: healing foot ulcer on right  NEURO: NEGATIVE for weakness, dizziness or paresthesias  ENDOCRINE: NEGATIVE for temperature intolerance, skin/hair changes  HEME: NEGATIVE for bleeding problems  PSYCHIATRIC: NEGATIVE for changes in mood or affect      EXAM:   BP (!) 180/94   Pulse 72   Temp 98.3  F (36.8  C)   Ht 1.727 m (5' 8\")   Wt 135.9 kg (299 lb 9.6 oz)   BMI 45.55 kg/m    GENERAL APPEARANCE: morbidly obese, alert and no distress; ambulatory w/o assist  EYES: pink conj, no icterus, PERRL, EOMI  HENT: ear canals and TM's normal, nose and mouth without ulcers or lesions, oropharynx clear and oral mucous membranes moist  NECK: no adenopathy, no asymmetry, masses, or scars and thyroid normal to palpation  RESP: lungs clear to " auscultation - no rales, rhonchi or wheezes  CV: regular rates and rhythm, normal S1 S2, no S3 or S4, no murmur, click or rub, no peripheral edema and peripheral pulses strong  ABDOMEN: soft, nontender, no hepatosplenomegaly, no masses and bowel sounds normal  MS: no musculoskeletal defects are noted and gait is age appropriate without ataxia  SKIN: good turgor, no rash/jaundice/ecchymosis  NEURO: Normal strength and tone, sensory exam grossly normal, mentation intact and speech normal    DIAGNOSTICS:   No labs or EKG required for low risk surgery (cataract, skin procedure, breast biopsy, etc)    Recent Labs   Lab Test 11/29/18  0811 06/28/17  0946  01/23/17  1000   HGB  --   --   --  15.3   PLT  --   --   --  342    134   < > 129*   POTASSIUM 5.2 4.7   < > 4.3   CR 0.89 0.66   < > 0.61*   A1C 6.5* 7.3*   < >  --     < > = values in this interval not displayed.        IMPRESSION:   Reason for surgery/procedure: rigth cataract  Diagnosis/reason for consult: preop evaluation; multiple chronic medical conditions.    The proposed surgical procedure is considered LOW risk.    REVISED CARDIAC RISK INDEX  The patient has the following serious cardiovascular risks for perioperative complications such as (MI, PE, VFib and 3  AV Block):  Diabetes Mellitus (on Insulin)  INTERPRETATION: 1 risks: Class II (low risk - 0.9% complication rate)    The patient has the following additional risks for perioperative complications:  The 10-year ASCVD risk score (Nareningrid SHOOK Jr., et al., 2013) is: 22.1%    Values used to calculate the score:      Age: 58 years      Sex: Male      Is Non- : No      Diabetic: Yes      Tobacco smoker: No      Systolic Blood Pressure: 180 mmHg      Is BP treated: Yes      HDL Cholesterol: 33 mg/dL      Total Cholesterol: 138 mg/dL  Morbid obesity      ICD-10-CM    1. Preop general physical exam Z01.818    2. Cataract of right eye, unspecified cataract type H26.9    3. Controlled type  2 diabetes mellitus without complication, without long-term current use of insulin (H) E11.9    4. Benign essential hypertension I10 metoprolol succinate ER (TOPROL-XL) 25 MG 24 hr tablet   5. Hyperlipidemia LDL goal <70 E78.5    6. Morbid obesity, unspecified obesity type (H) E66.01        RECOMMENDATIONS:     --Consult hospital rounder / IM to assist post-op medical management  Routine DVT precautions recommended.    Cardiovascular Risk  Performs 4 METs exercise without symptoms (Light housework (dusting, washing dishes), Climb a flight of stairs, Walk on level ground at 15 minutes per mile (4 miles/hour) and Shoveling) .   Patient is already on a Beta Blocker. Continue Betablocker therapy after surgery, using Beta blocker order set as necessary for NPO status.  Increase metoprolol XL 25 mg to 2 tablets a day due to uncontrolled HTN. Recheck with RN 1/2/2019. Reinforced sodium restriction.      --Patient is to take all scheduled medications on the day of surgery EXCEPT for modifications listed below.    Diabetes Medication Use  Insulin expected to be administered by patient before he goes on NPO night before surgery so no adjustment needed.  -----Hold usual oral and non-insulin diabetic meds (e.g. Metformin, Actos, Glipizide) while NPO.       ACE Inhibitor or Angiotensin Receptor Blocker (ARB) Use  Ace inhibitor or Angiotensin Receptor Blocker (ARB) and will continue this medication due to the higher risk of uncontrolled perioperative hypertension (e.g. neurosurgical procedure)      APPROVAL GIVEN to proceed with proposed procedure, without further diagnostic evaluation, as long as his blood pressure recheck in 5 days show much better reading with the adjustment in metoprolol.       Signed Electronically by: Mauricio Pearce MD    Copy of this evaluation report is provided to requesting physician.    [facility] Preop Guidelines    Revised Cardiac Risk Index

## 2018-12-27 NOTE — PATIENT INSTRUCTIONS
Increase metoprolol XL 25 mg to TWO TABLETS ONCE A DAY.  All medications will remain unchanged.    Schedule nurse visit on 1/2/2018 for blood pressure recheck.    Follow preoperative instructions given by your surgeon.  Your recommendations will be available to your surgeon through fax.    Be consistent with low salt diet.  You may take metoprolol XL on morning of surgery if they are scheduled to be taken then. Take only with a small sip of water.  All other scheduled medication may be held on morning of surgery, and resumed when you are allowed to eat.       Before Your Surgery      Call your surgeon if there is any change in your health. This includes signs of a cold or flu (such as a sore throat, runny nose, cough, rash or fever).    Do not smoke, drink alcohol or take over the counter medicine (unless your surgeon or primary care doctor tells you to) for the 24 hours before and after surgery.    If you take prescribed drugs: Follow your doctor s orders about which medicines to take and which to stop until after surgery.    Eating and drinking prior to surgery: follow the instructions from your surgeon    Take a shower or bath the night before surgery. Use the soap your surgeon gave you to gently clean your skin. If you do not have soap from your surgeon, use your regular soap. Do not shave or scrub the surgery site.  Wear clean pajamas and have clean sheets on your bed.

## 2018-12-31 ENCOUNTER — TELEPHONE (OUTPATIENT)
Dept: FAMILY MEDICINE | Facility: CLINIC | Age: 58
End: 2018-12-31

## 2018-12-31 NOTE — TELEPHONE ENCOUNTER
Reason for Call:  Other clearance for     Detailed comments: [facility] calling to ask for clearance for surgery after patient gets his B/P re-checked and weight re- checked on 1-2-19.  Please fax to [phone] when obtained.    Phone Number  [facility] can be reached at: Other phone number:  [phone]    Best Time: any    Can we leave a detailed message on this number? YES    Call taken on 12/31/2018 at 10:57 AM by Ashlyn Brown

## 2019-01-02 ENCOUNTER — ALLIED HEALTH/NURSE VISIT (OUTPATIENT)
Dept: FAMILY MEDICINE | Facility: CLINIC | Age: 59
End: 2019-01-02
Payer: COMMERCIAL

## 2019-01-02 ENCOUNTER — TELEPHONE (OUTPATIENT)
Dept: FAMILY MEDICINE | Facility: CLINIC | Age: 59
End: 2019-01-02

## 2019-01-02 VITALS
HEART RATE: 74 BPM | OXYGEN SATURATION: 96 % | DIASTOLIC BLOOD PRESSURE: 78 MMHG | RESPIRATION RATE: 18 BRPM | SYSTOLIC BLOOD PRESSURE: 150 MMHG

## 2019-01-02 DIAGNOSIS — I10 BENIGN ESSENTIAL HYPERTENSION: Primary | ICD-10-CM

## 2019-01-02 PROCEDURE — 99207 ZZC NO CHARGE NURSE ONLY: CPT

## 2019-01-02 NOTE — TELEPHONE ENCOUNTER
Increase metoprolol ER to 25 mg to 3 tablets orally a day since his BP is still above goal.  Have patient recheck BP one last time with RN on 1/4/2018, Friday.  Final instructions and recommendation for his surgery planned on 1/7/2018 will be given after that.

## 2019-01-02 NOTE — NURSING NOTE
Patient comes into the clinic today for a BP CK.  Medications and allergies are gone over with the patient and are up to date.  Nichole SMITH RN

## 2019-01-02 NOTE — TELEPHONE ENCOUNTER
Nichole Castro RN          1/2/19 8:24 AM   Note      Dr. Pearce,     Medications and allergies are gone over with the patient and are up to date.  Patient comes into the clinic today for a BP CK.  Nichole SMITH RN        Vital Signs 1/2/2019 1/2/2019   Systolic 152 150   Diastolic 84 78   Pulse 76 74   Temperature       Respirations 18 18   Weight (LB)       Height       BMI (Calculated)       O2 97 96

## 2019-01-02 NOTE — TELEPHONE ENCOUNTER
Dr. Pearce,    Medications and allergies are gone over with the patient and are up to date.  Patient comes into the clinic today for a BP CK.  Nichole SMITH RN      Vital Signs 1/2/2019 1/2/2019   Systolic 152 150   Diastolic 84 78   Pulse 76 74   Temperature     Respirations 18 18   Weight (LB)     Height     BMI (Calculated)     O2 97 96

## 2019-01-03 NOTE — TELEPHONE ENCOUNTER
Pt calling back and given the message, pt will come in on 1/4 to recheck bp Cassy Valdes on 1/3/2019 at 9:13 AM

## 2019-01-04 ENCOUNTER — TELEPHONE (OUTPATIENT)
Dept: FAMILY MEDICINE | Facility: CLINIC | Age: 59
End: 2019-01-04

## 2019-01-04 ENCOUNTER — ALLIED HEALTH/NURSE VISIT (OUTPATIENT)
Dept: FAMILY MEDICINE | Facility: CLINIC | Age: 59
End: 2019-01-04
Payer: COMMERCIAL

## 2019-01-04 VITALS — SYSTOLIC BLOOD PRESSURE: 158 MMHG | HEART RATE: 77 BPM | OXYGEN SATURATION: 98 % | DIASTOLIC BLOOD PRESSURE: 88 MMHG

## 2019-01-04 DIAGNOSIS — Z01.30 BP CHECK: Primary | ICD-10-CM

## 2019-01-04 PROCEDURE — 99207 ZZC NO CHARGE NURSE ONLY: CPT

## 2019-01-04 NOTE — PROGRESS NOTES
"S-(situation): BP Check    B-(background):     \"Increase metoprolol ER to 25 mg to 3 tablets orally a day since his BP is still above goal.  Have patient recheck BP one last time with RN on 1/4/2018, Friday.  Final instructions and recommendation for his surgery planned on 1/7/2018 will be given after that.\" 12/31/tele enc     A-(assessment): Home /68 pulse 72  Per patient report.    Patient reports \"white coat syndrome\"  Patient reported anxiety regarding upcoming surgery  Patient stated frustration with fron desk staff at clinic  Patient reports that he is not interested in increasing metoprolol medication.    Vital Signs 1/4/2019 1/4/2019   Systolic 172 158   Diastolic 92 88   Pulse 77    Temperature     Respirations     Weight (LB)     Height     BMI (Calculated)     O2 98        R-(recommendations): router to provider    Randy Lutz RN      "

## 2019-01-04 NOTE — TELEPHONE ENCOUNTER
[facility] calling looking for approval for surgery 1/7 in the   [phone]  Cassy Valdes on 1/4/2019 at 1:54 PM

## 2019-01-07 NOTE — TELEPHONE ENCOUNTER
Addended his preop visit note.  He has been reported asymptomatic and adherent with medication. He reports home BPP to be 112/68.     He is given approval to proceed with cataract surgery if his blood pressure is below 150/100.

## 2019-01-07 NOTE — PROGRESS NOTES
Patient's repeat blood pressures in the clinic remain above goal of 130/80. Patient reports Bps at home to be 112/68 when he went to clinic on 1/4/2018. He reports being adherent to meds and reports no symptoms related to hypertension.     He may proceed with cataract surgery as long as BP remains below 150/100.

## 2019-01-22 ENCOUNTER — TELEPHONE (OUTPATIENT)
Dept: FAMILY MEDICINE | Facility: CLINIC | Age: 59
End: 2019-01-22

## 2019-01-22 DIAGNOSIS — I10 BENIGN ESSENTIAL HYPERTENSION: ICD-10-CM

## 2019-01-22 RX ORDER — METOPROLOL SUCCINATE 25 MG/1
TABLET, EXTENDED RELEASE ORAL
Qty: 90 TABLET | Refills: 0 | Status: SHIPPED | OUTPATIENT
Start: 2019-01-22 | End: 2019-02-18

## 2019-01-22 NOTE — TELEPHONE ENCOUNTER
BP Readings from Last 6 Encounters:   01/04/19 158/88   01/02/19 150/78   12/27/18 (!) 180/94   12/20/18 (!) 179/92   11/29/18 154/82   06/28/17 (!) 162/98     Lab Results   Component Value Date    CR 0.89 11/29/2018     Lab Results   Component Value Date    POTASSIUM 5.2 11/29/2018     Medication is being filled for 1 time refill only due to:  Patient needs to be seen because blood pressure remains above goal.     Pt notified.    Naila Odom RN

## 2019-01-22 NOTE — TELEPHONE ENCOUNTER
Per 12/31/19 telephone note:  Mauricio Pearce MD   4:59 PM   Note      Increase metoprolol ER to 25 mg to 3 tablets orally a day since his BP is still above goal.  Have patient recheck BP one last time with RN on 1/4/2018, Friday.  Final instructions and recommendation for his surgery planned on 1/7/2018 will be given after that.

## 2019-01-22 NOTE — TELEPHONE ENCOUNTER
Reason for Call:  Medication or medication refill: Metoprolol     Do you use a [facility]?  Name of the pharmacy and phone number for the current request:  [facility] [place] [phone]    Name of the medication requested: Metoprolol Dr. Pearce increased to 3 tabs a day he needs a refill      Can we leave a detailed message on this number? YES    Phone number patient can be reached at: Cell number on file:    Telephone Information:   Mobile [phone]       Best Time: any    Call taken on 1/22/2019 at 10:23 AM by Tatiana Faye

## 2019-01-28 ENCOUNTER — TELEPHONE (OUTPATIENT)
Dept: FAMILY MEDICINE | Facility: CLINIC | Age: 59
End: 2019-01-28

## 2019-01-28 NOTE — LETTER
January 28, 2019      Juan Balderrama  [address]  [place] [address]          At this time you are due for a Colon Cancer Screening.  We did order this test for you on 11/29/18.  Here is some information regarding this testing.     Recommended every 5-10 years, depending on your history, in order to prevent and detect colon cancer at its earliest stages.  Colon cancer is now the second leading cause of death in the [place] for both men and women and there are over 130,000 new cases and 50,000 deaths per year from colon cancer.  Colonoscopies can prevent 90-95% of these deaths.  Problem lesions can be removed before they ever become cancer. This test is not only looking for cancer, but also getting rid of precancerious lesions. You are usually given some sedation which makes the test very comfortable for most people.      If you do not wish to do a colonoscopy or cannot afford to do one, at this time, there is another option. It is called a FIT test or Fecal Immunochemical Occult Blood Test (take home stool sample kit).  It does not replace the colonoscopy for colorectal cancer screening, but it can detect hidden bleeding in the lower colon.  It does need to be repeated every year and if a positive result is obtained, you would be referred for a colonoscopy. The FIT test is really easy to do and does not require any  diet or medication restrictions and involves only one collection sample.      If you have completed either one of these tests or had a flexible sigmoidoscopy in the past five years at another facility, please have the records sent to our clinic so that we can best coordinate your care and update your chart.  Please call us if you have questions or would like arrange either to do a colonoscopy or obtain the necessary test kit for the Fecal Occult Test.          Sincerely,        Mauricio Pearce MD

## 2019-02-14 DIAGNOSIS — I10 BENIGN ESSENTIAL HYPERTENSION: ICD-10-CM

## 2019-02-14 NOTE — TELEPHONE ENCOUNTER
"Requested Prescriptions   Pending Prescriptions Disp Refills     metoprolol succinate ER (TOPROL-XL) 25 MG 24 hr tablet  Last Written Prescription Date:  01/22/19  Last Fill Quantity: 90,  # refills: 0   Last office visit: 1/4/2019 with prescribing provider:  Thomas Pearce   Future Office Visit:     90 tablet 0     Sig: 3 tablets daily    Beta-Blockers Protocol Failed - 2/14/2019  4:40 PM       Failed - Blood pressure under 140/90 in past 12 months    BP Readings from Last 3 Encounters:   01/04/19 158/88   01/02/19 150/78   12/27/18 (!) 180/94          Passed - Patient is age 6 or older       Passed - Recent (12 mo) or future (30 days) visit within the authorizing provider's specialty    Patient had office visit in the last 12 months or has a visit in the next 30 days with authorizing provider or within the authorizing provider's specialty.  See \"Patient Info\" tab in inbasket, or \"Choose Columns\" in Meds & Orders section of the refill encounter.             Passed - Medication is active on med list              "

## 2019-02-15 NOTE — TELEPHONE ENCOUNTER
Routing refill request to provider for review/approval because:  BPs  Yoli already given x1, pt was notified 1/22/19 he needs to be seen    BP Readings from Last 6 Encounters:   01/04/19 158/88   01/02/19 150/78   12/27/18 (!) 180/94   12/20/18 (!) 179/92   11/29/18 154/82   06/28/17 (!) 162/98     Joan BRANDT RN

## 2019-02-18 RX ORDER — METOPROLOL SUCCINATE 25 MG/1
TABLET, EXTENDED RELEASE ORAL
Qty: 90 TABLET | Refills: 0 | Status: SHIPPED | OUTPATIENT
Start: 2019-02-18 | End: 2019-07-03

## 2019-02-18 NOTE — TELEPHONE ENCOUNTER
Refilled x 1 month. Please have patient schedule RN vsiit to recheck BP as last Bp was out of control.

## 2019-02-18 NOTE — TELEPHONE ENCOUNTER
I reached him, advised one month had been sent and Dr Pearce would like BP rechecked.   He agreed to do the RN BP recheck and says he will call back to schedule.   Martina Hill RNC

## 2019-03-04 ENCOUNTER — TELEPHONE (OUTPATIENT)
Dept: FAMILY MEDICINE | Facility: CLINIC | Age: 59
End: 2019-03-04

## 2019-03-04 RX ORDER — BLOOD-GLUCOSE METER
EACH MISCELLANEOUS
Qty: 1 KIT | Refills: 0 | Status: SHIPPED | OUTPATIENT
Start: 2019-03-04 | End: 2019-07-29

## 2019-03-04 NOTE — TELEPHONE ENCOUNTER
Alternative requested please switch to ACCU CHECK GANGA or ACCU CHECK GUIDE and we need RXs for meter, test strips and lancets

## 2019-03-18 DIAGNOSIS — I10 BENIGN ESSENTIAL HYPERTENSION: ICD-10-CM

## 2019-03-18 RX ORDER — METFORMIN HCL 500 MG
1000 TABLET, EXTENDED RELEASE 24 HR ORAL 2 TIMES DAILY WITH MEALS
Qty: 360 TABLET | Refills: 0 | Status: SHIPPED | OUTPATIENT
Start: 2019-03-18 | End: 2019-06-27

## 2019-03-18 NOTE — TELEPHONE ENCOUNTER
"Requested Prescriptions   Pending Prescriptions Disp Refills     metFORMIN (GLUCOPHAGE-XR) 500 MG 24 hr tablet 180 tablet 3     Sig: Take 2 tablets (1,000 mg) by mouth 2 times daily (with meals)    Biguanide Agents Failed - 3/18/2019 12:24 PM       Failed - Blood pressure less than 140/90 in past 6 months    BP Readings from Last 3 Encounters:   01/04/19 158/88   01/02/19 150/78   12/27/18 (!) 180/94                Passed - Patient has documented LDL within the past 12 mos.    Recent Labs   Lab Test 11/29/18  0811   LDL 37            Passed - Patient has had a Microalbumin in the past 15 mos.    Recent Labs   Lab Test 11/29/18  0811   MICROL 9   UMALCR 9.44            Passed - Patient is age 10 or older       Passed - Patient has documented A1c within the specified period of time.    If HgbA1C is 8 or greater, it needs to be on file within the past 3 months.  If less than 8, must be on file within the past 6 months.     Recent Labs   Lab Test 11/29/18  0811   A1C 6.5*            Passed - Patient's CR is NOT>1.4 OR Patient's EGFR is NOT<45 within past 12 mos.    Recent Labs   Lab Test 11/29/18  0811   GFRESTIMATED 88   GFRESTBLACK >90       Recent Labs   Lab Test 11/29/18  0811   CR 0.89            Passed - Patient does NOT have a diagnosis of CHF.       Passed - Medication is active on med list       Passed - Recent (6 mo) or future (30 days) visit within the authorizing provider's specialty    Patient had office visit in the last 6 months or has a visit in the next 30 days with authorizing provider or within the authorizing provider's specialty.  See \"Patient Info\" tab in inbasket, or \"Choose Columns\" in Meds & Orders section of the refill encounter.              "

## 2019-03-18 NOTE — TELEPHONE ENCOUNTER
Pharmacy comments: Script clarification needed. Only #120 left on RX, Ins requires 90 days (#360) Thank you.

## 2019-03-18 NOTE — TELEPHONE ENCOUNTER
"Requested Prescriptions   Pending Prescriptions Disp Refills     metoprolol succinate ER (TOPROL-XL) 25 MG 24 hr tablet [Pharmacy Med Name: METOPROLOL SUCC ER 25 MG TAB]  Last Written Prescription Date:  2/18/19  Last Fill Quantity: 90,  # refills: 0   Last office visit: 1/4/2019 with prescribing provider:  Portia   Future Office Visit:     90 tablet 0     Sig: 3 TABLETS ORALLY DAILY. PATIENT NEEDS RN VISIT TO CHECK BP BEFORE OTHER REFILLS ARE GIVEN    Beta-Blockers Protocol Failed - 3/18/2019  1:31 AM       Failed - Blood pressure under 140/90 in past 12 months    BP Readings from Last 3 Encounters:   01/04/19 158/88   01/02/19 150/78   12/27/18 (!) 180/94                Passed - Patient is age 6 or older       Passed - Recent (12 mo) or future (30 days) visit within the authorizing provider's specialty    Patient had office visit in the last 12 months or has a visit in the next 30 days with authorizing provider or within the authorizing provider's specialty.  See \"Patient Info\" tab in inbasket, or \"Choose Columns\" in Meds & Orders section of the refill encounter.             Passed - Medication is active on med list          "

## 2019-03-19 RX ORDER — METOPROLOL SUCCINATE 25 MG/1
TABLET, EXTENDED RELEASE ORAL
Qty: 90 TABLET | Refills: 0 | OUTPATIENT
Start: 2019-03-19

## 2019-03-20 NOTE — TELEPHONE ENCOUNTER
Patient needs this today and he is very upset that this is not being addressed.   Consent was obtained from the patient. The risks, benefits and alternatives to therapy were discussed in detail. Specifically, the risks of infection, scarring, bleeding, prolonged wound healing, nerve injury, incomplete removal, allergy to anesthesia and recurrence were addressed. Alternatives to ED&C, such as: surgical removal and XRT were also discussed.  Prior to the procedure, the treatment site was clearly identified and confirmed by the patient. All components of Universal Protocol/PAUSE Rule completed.

## 2019-04-01 RX ORDER — ATORVASTATIN CALCIUM 10 MG/1
20 TABLET, FILM COATED ORAL DAILY
Qty: 180 TABLET | Refills: 1 | Status: SHIPPED | OUTPATIENT
Start: 2019-04-01 | End: 2019-10-04

## 2019-04-01 NOTE — TELEPHONE ENCOUNTER
"Requested Prescriptions   Pending Prescriptions Disp Refills     atorvastatin (LIPITOR) 10 MG tablet [Pharmacy Med Name: ATORVASTATIN 10 MG TABLET] 90 tablet 2    Last Written Prescription Date:  11/30/18  Last Fill Quantity: 90 tab,  # refills: 3   Last office visit: 1/4/2019 with prescribing provider:  Nurse   Future Office Visit:     Sig: TAKE 2 TABLETS (20 MG) BY MOUTH DAILY    Statins Protocol Passed - 3/31/2019  9:33 AM       Passed - LDL on file in past 12 months    Recent Labs   Lab Test 11/29/18  0811   LDL 37            Passed - No abnormal creatine kinase in past 12 months    No lab results found.            Passed - Recent (12 mo) or future (30 days) visit within the authorizing provider's specialty    Patient had office visit in the last 12 months or has a visit in the next 30 days with authorizing provider or within the authorizing provider's specialty.  See \"Patient Info\" tab in inbasket, or \"Choose Columns\" in Meds & Orders section of the refill encounter.             Passed - Medication is active on med list       Passed - Patient is age 18 or older          "

## 2019-05-04 DIAGNOSIS — E11.9 CONTROLLED TYPE 2 DIABETES MELLITUS WITHOUT COMPLICATION, WITHOUT LONG-TERM CURRENT USE OF INSULIN (H): ICD-10-CM

## 2019-05-06 RX ORDER — PEN NEEDLE, DIABETIC 32GX 5/32"
NEEDLE, DISPOSABLE MISCELLANEOUS
Qty: 100 EACH | Refills: 0 | Status: SHIPPED | OUTPATIENT
Start: 2019-05-06 | End: 2019-07-29

## 2019-05-06 NOTE — TELEPHONE ENCOUNTER
"Requested Prescriptions   Pending Prescriptions Disp Refills     BD YENNY U/F 32G X 4 MM insulin pen needle [Pharmacy Med Name: BD UF YENNY PEN NEEDLE [id number]]  1     Sig: USE TO TEST BLOOD SUGARS ONE TIME DAILY OR AS DIRECTED   Last Written Prescription Date:  12/3/18  Last Fill Quantity: 100 each,  # refills: 1   Last office visit: 1/4/2019 with prescribing provider:  Nurse   Future Office Visit:        Diabetic Supplies Protocol Passed - 5/4/2019  7:15 PM        Passed - Medication is active on med list        Passed - Patient is 18 years of age or older        Passed - Recent (6 mo) or future (30 days) visit within the authorizing provider's specialty     Patient had office visit in the last 6 months or has a visit in the next 30 days with authorizing provider.  See \"Patient Info\" tab in inbasket, or \"Choose Columns\" in Meds & Orders section of the refill encounter.              "

## 2019-05-06 NOTE — TELEPHONE ENCOUNTER
Prescription approved per [facility] Refill Protocol.    Joan BRANDT RN

## 2019-05-09 ENCOUNTER — TELEPHONE (OUTPATIENT)
Dept: FAMILY MEDICINE | Facility: CLINIC | Age: 59
End: 2019-05-09

## 2019-06-27 RX ORDER — METFORMIN HCL 500 MG
TABLET, EXTENDED RELEASE 24 HR ORAL
Qty: 360 TABLET | Refills: 0 | Status: SHIPPED | OUTPATIENT
Start: 2019-06-27 | End: 2019-09-27

## 2019-06-27 NOTE — TELEPHONE ENCOUNTER
Refilled 90 day supply. However, patient is due for new A1c and clinic follow up for blood pressure. Please call patient to schedule lab and clinic appointment for diabetes and hypertension management within the next 2 weeks.

## 2019-06-27 NOTE — TELEPHONE ENCOUNTER
"S:  Refill request for metformin    B:  LOV 12/27/18  Metofrmin last written 3/18/19 for 3 month supply    A:  Requested Prescriptions   Pending Prescriptions Disp Refills     metFORMIN (GLUCOPHAGE-XR) 500 MG 24 hr tablet [Pharmacy Med Name: METFORMIN HCL  MG TABLET] 360 tablet 0     Sig: TAKE 2 TABLETS (1,000 MG) BY MOUTH 2 TIMES DAILY (WITH MEALS) NEED TO BE SEEN FOR FURTHER REFILLS       Biguanide Agents Failed - 6/27/2019 10:24 AM        Failed - Blood pressure less than 140/90 in past 6 months     BP Readings from Last 3 Encounters:   01/04/19 158/88   01/02/19 150/78   12/27/18 (!) 180/94                 Failed - Patient has documented A1c within the specified period of time.     If HgbA1C is 8 or greater, it needs to be on file within the past 3 months.  If less than 8, must be on file within the past 6 months.     Recent Labs   Lab Test 11/29/18  0811   A1C 6.5*             Passed - Patient has documented LDL within the past 12 mos.     Recent Labs   Lab Test 11/29/18  0811   LDL 37             Passed - Patient has had a Microalbumin in the past 15 mos.     Recent Labs   Lab Test 11/29/18  0811   MICROL 9   UMALCR 9.44             Passed - Patient is age 10 or older        Passed - Patient's CR is NOT>1.4 OR Patient's EGFR is NOT<45 within past 12 mos.     Recent Labs   Lab Test 11/29/18  0811   GFRESTIMATED 88   GFRESTBLACK >90       Recent Labs   Lab Test 11/29/18  0811   CR 0.89             Passed - Patient does NOT have a diagnosis of CHF.        Passed - Medication is active on med list        Passed - Recent (6 mo) or future (30 days) visit within the authorizing provider's specialty     Patient had office visit in the last 6 months or has a visit in the next 30 days with authorizing provider or within the authorizing provider's specialty.  See \"Patient Info\" tab in inbasket, or \"Choose Columns\" in Meds & Orders section of the refill encounter.            Fails [facility] refill protocol recent A1C " 6.5    R:  Routed to provider for review    Randy Lutz RN

## 2019-07-03 ENCOUNTER — TELEPHONE (OUTPATIENT)
Dept: FAMILY MEDICINE | Facility: CLINIC | Age: 59
End: 2019-07-03

## 2019-07-03 DIAGNOSIS — I10 BENIGN ESSENTIAL HYPERTENSION: ICD-10-CM

## 2019-07-03 RX ORDER — METOPROLOL SUCCINATE 25 MG/1
75 TABLET, EXTENDED RELEASE ORAL DAILY
Qty: 42 TABLET | Refills: 0 | Status: SHIPPED | OUTPATIENT
Start: 2019-07-03 | End: 2019-07-30

## 2019-07-03 NOTE — TELEPHONE ENCOUNTER
Routing refill request to provider for review/approval because:  Yoli given x2 and patient did not follow up, please advise    Patient was filling previous prescriptions and is now out of medication. Per last notes from Dr. Pearce on 12/31/18:     Increase metoprolol ER to 25 mg to 3 tablets orally a day since his BP is still above goal.  Have patient recheck BP one last time with RN on 1/4/2018, Friday.  Final instructions and recommendation for his surgery planned on 1/7/2018 will be given after that.      BP Readings from Last 3 Encounters:   01/04/19 158/88   01/02/19 150/78   12/27/18 (!) 180/94     Patient remains above goal and has not scheduled appointment with RN for BP Check.      MARIA A IveyN, RN

## 2019-07-03 NOTE — TELEPHONE ENCOUNTER
Script Clarification: Can we get a new order for 90 days at 3 tabs daily, confusion as to what dose pt is on, so orders were deactivated... Could you send ASAP as patient is out, and does he need to be seen? Thanks      Regarding METOPOLOL SUCCINATE ER 25MG 24 HR TABLET

## 2019-07-03 NOTE — TELEPHONE ENCOUNTER
Left message for patient to check pharmacy and to follow up for Rn BP check in 2 weeks.  Call the [facility] clinic back with questions or concerns.    Michaela CASTANON Rn

## 2019-07-29 ENCOUNTER — ALLIED HEALTH/NURSE VISIT (OUTPATIENT)
Dept: FAMILY MEDICINE | Facility: CLINIC | Age: 59
End: 2019-07-29
Payer: COMMERCIAL

## 2019-07-29 ENCOUNTER — TELEPHONE (OUTPATIENT)
Dept: FAMILY MEDICINE | Facility: CLINIC | Age: 59
End: 2019-07-29

## 2019-07-29 VITALS — DIASTOLIC BLOOD PRESSURE: 86 MMHG | SYSTOLIC BLOOD PRESSURE: 158 MMHG | HEART RATE: 76 BPM

## 2019-07-29 DIAGNOSIS — I10 BENIGN ESSENTIAL HYPERTENSION: ICD-10-CM

## 2019-07-29 DIAGNOSIS — I10 BENIGN ESSENTIAL HYPERTENSION: Primary | ICD-10-CM

## 2019-07-29 PROCEDURE — 99207 ZZC NO CHARGE NURSE ONLY: CPT

## 2019-07-29 NOTE — TELEPHONE ENCOUNTER
S-(situation): RN blood pressure recheck     B-(background): Juan Balderrama is a 58 year old year old patient who comes in today for a Blood Pressure check because of ongoing blood pressure monitoring and needs refill of metoprolol.  Metoprolol is prescribed at 75 mg daily but pt is taking 50 mg daily of metoprolol.    Was hoping to get by with less medication and still be controlled.  Need refilled.  Took last dose this morning and is out of medication.     Vital Signs as repeated by RN:  144/88, pulse of 76  158/86, pulse of 76, recheck 5 min later.     Patient is not taking medication as prescribed.  Taking 50 mg of metoprolol.    Patient is tolerating medications well.  Patient is monitoring Blood Pressure at home.  Home readings vary from 125's/80's to 150's/90's per pt report.     Current complaints: none        A-(assessment): elevated blood pressure reading with clinic RN today.  Above goal.    This is pt's 3rd nurse visit since he saw Dr Pearce.  Last office visit with Dr Pearce was 12/27/18.     R-(recommendations): Routed to Dr Pearce in a telephone note for further instructions.  Naila Odom RN            BP Readings from Last 6 Encounters:   07/29/19 (!) 158/86   01/04/19 158/88   01/02/19 150/78   12/27/18 (!) 180/94   12/20/18 (!) 179/92   11/29/18 154/82            Lab Results   Component Value Date     CR 0.89 11/29/2018            Lab Results   Component Value Date     POTASSIUM 5.2 11/29/2018

## 2019-07-29 NOTE — TELEPHONE ENCOUNTER
"Requested Prescriptions   Pending Prescriptions Disp Refills     metoprolol succinate ER (TOPROL-XL) 25 MG 24 hr tablet [Pharmacy Med Name: METOPROLOL SUCC ER 25 MG TAB] 42 tablet 0     Sig: TAKE 3 TABLETS (75 MG) BY MOUTH DAILY BLOOD PRESSURE CHECK NEEDED FOR FURTHER REFILLS.       Beta-Blockers Protocol Failed - 7/29/2019  9:54 AM        Failed - Blood pressure under 140/90 in past 12 months     BP Readings from Last 3 Encounters:   01/04/19 158/88   01/02/19 150/78   12/27/18 (!) 180/94                 Passed - Patient is age 6 or older        Passed - Recent (12 mo) or future (30 days) visit within the authorizing provider's specialty     Patient had office visit in the last 12 months or has a visit in the next 30 days with authorizing provider or within the authorizing provider's specialty.  See \"Patient Info\" tab in inbasket, or \"Choose Columns\" in Meds & Orders section of the refill encounter.              Passed - Medication is active on med list        Last Written Prescription Date:  7/2/19  Last Fill Quantity: 42,  # refills: 0   Last office visit: 1/4/2019 with prescribing provider:     Future Office Visit:   Next 5 appointments (look out 90 days)    Jul 29, 2019  1:00 PM CDT  Nurse Only with AHMET KIM FP/IM RN  [facility] ([facility]) [address] [facility] [address] [id number]  [phone]           "

## 2019-07-29 NOTE — NURSING NOTE
S-(situation): RN blood pressure recheck    B-(background): Juan Balderrama is a 58 year old year old patient who comes in today for a Blood Pressure check because of ongoing blood pressure monitoring and needs refill of metoprolol.  Pt is taking 50 mg daily of metoprolol.  Was hoping to get by with less medication and still be controlled.  Need refilled.  Took last dose this morning.    Vital Signs as repeated by RN:  144/88, pulse of 76  158/86, pulse of 76, recheck 5 min later.    Patient is not taking medication as prescribed.  Taking 50 mg of metoprolol.    Patient is tolerating medications well.  Patient is monitoring Blood Pressure at home.  Home readings vary from 125/80 to 150's/90 per pt report.    Current complaints: none      A-(assessment): elevated blood pressure reading with clinic RN today.  Above goal.    This is pt's 3rd nurse visit since he saw Dr Pearce.  Last office visit with Dr Pearce was 12/27/18.    R-(recommendations): Routed to Dr Pearce in a telephone note for further instructions.  Naila Odom RN      BP Readings from Last 6 Encounters:   07/29/19 (!) 158/86   01/04/19 158/88   01/02/19 150/78   12/27/18 (!) 180/94   12/20/18 (!) 179/92   11/29/18 154/82     Lab Results   Component Value Date    CR 0.89 11/29/2018     Lab Results   Component Value Date    POTASSIUM 5.2 11/29/2018

## 2019-07-29 NOTE — TELEPHONE ENCOUNTER
Advise patient to take metoprolol XL 75 mg total a day.  Then follow up with provider after 2-3 weeks.

## 2019-07-30 RX ORDER — METOPROLOL SUCCINATE 25 MG/1
75 TABLET, EXTENDED RELEASE ORAL DAILY
Qty: 21 TABLET | Refills: 0 | Status: SHIPPED | OUTPATIENT
Start: 2019-07-30 | End: 2019-09-24

## 2019-07-30 RX ORDER — METOPROLOL SUCCINATE 25 MG/1
75 TABLET, EXTENDED RELEASE ORAL DAILY
Qty: 90 TABLET | Refills: 0 | Status: SHIPPED | OUTPATIENT
Start: 2019-07-30 | End: 2019-08-26

## 2019-07-30 NOTE — TELEPHONE ENCOUNTER
Message left for patient to check the pharmacy.  Needs office visit and lab for further refills per last refill. Nichole SMITH RN

## 2019-07-30 NOTE — TELEPHONE ENCOUNTER
Called pt and message below relayed. He just ran out of pills this morning. He says he will call back to set up an appt.  Medication is being filled for 1 time refill only due to:  Needs to follow up in 2-3 weeks with PCP.    Joan BRANDT RN

## 2019-08-01 DIAGNOSIS — E11.9 CONTROLLED TYPE 2 DIABETES MELLITUS WITHOUT COMPLICATION, WITHOUT LONG-TERM CURRENT USE OF INSULIN (H): ICD-10-CM

## 2019-08-02 RX ORDER — PEN NEEDLE, DIABETIC 32GX 5/32"
NEEDLE, DISPOSABLE MISCELLANEOUS
Qty: 100 EACH | Refills: 3 | Status: SHIPPED | OUTPATIENT
Start: 2019-08-02 | End: 2019-09-23

## 2019-08-02 NOTE — TELEPHONE ENCOUNTER
Routing refill request to provider for review/approval because:  Patient needs to be seen because:  6 months.    Thank you    Kristie SKAGGS RN

## 2019-08-26 DIAGNOSIS — Z91.030 BEE STING ALLERGY: Primary | ICD-10-CM

## 2019-08-26 DIAGNOSIS — I10 BENIGN ESSENTIAL HYPERTENSION: ICD-10-CM

## 2019-08-26 NOTE — TELEPHONE ENCOUNTER
"Patient calls back, Patient informed of message below from provider. And that provider advised to see him for BP. Patient  states \" I'm not gonna pay all that money and see the doctor for a blood pressure check\".  Patient raises voice at this writer and is rude. Patient is advised to please not raise his voice at this writer. Patient states \"i'll make a nurse visit when I am back in town next week but I'm not gonna see the doctor for a BP check.      Routing refill request to provider for review/approval because:  Labs out of range:  /86        "

## 2019-08-26 NOTE — TELEPHONE ENCOUNTER
"Requested Prescriptions   Pending Prescriptions Disp Refills     metoprolol succinate ER (TOPROL-XL) 25 MG 24 hr tablet [Pharmacy Med Name: METOPROLOL SUCC ER 25 MG TAB] 90 tablet 0     Sig: TAKE 3 TABLETS (75 MG) BY MOUTH DAILY BLOOD PRESSURE CHECK NEEDED FOR FURTHER REFILLS.   Last Written Prescription Date:  7/30/19  Last Fill Quantity: 21 tab,  # refills: 0   Last office visit: 7/29/2019 with prescribing provider:  Nurse   Future Office Visit:        Beta-Blockers Protocol Failed - 8/26/2019  1:32 AM        Failed - Blood pressure under 140/90 in past 12 months     BP Readings from Last 3 Encounters:   07/29/19 (!) 158/86   01/04/19 158/88   01/02/19 150/78                 Passed - Patient is age 6 or older        Passed - Recent (12 mo) or future (30 days) visit within the authorizing provider's specialty     Patient had office visit in the last 12 months or has a visit in the next 30 days with authorizing provider or within the authorizing provider's specialty.  See \"Patient Info\" tab in inbasket, or \"Choose Columns\" in Meds & Orders section of the refill encounter.              Passed - Medication is active on med list          "

## 2019-08-26 NOTE — TELEPHONE ENCOUNTER
Patient calling stating he was stung by a bee and realized his Epi-pen is . Requesting a new script.

## 2019-08-26 NOTE — TELEPHONE ENCOUNTER
Per 7/29/19 refill encounter:  Mauricio Pearce MD 6:42 PM   Note      Advise patient to take metoprolol XL 75 mg total a day.  Then follow up with provider after 2-3 weeks.          Called pt and message below relayed. He just ran out of pills this morning. He says he will call back to set up an appt.  Medication is being filled for 1 time refill only due to:  Needs to follow up in 2-3 weeks with PCP.     Joan BRANDT RN      Labs out of range:  BP      Patient needs a BP recheck.   Attempted to contact patient, no answer, left voice message to call back.

## 2019-08-26 NOTE — TELEPHONE ENCOUNTER
"Requested Prescriptions   Pending Prescriptions Disp Refills     EPINEPHrine (EPIPEN/ADRENACLICK/OR ANY BX GENERIC EQUIV) 0.3 MG/0.3ML injection 2-pack 1 each 1     Sig: Inject 0.3 mLs (0.3 mg) into the muscle once as needed for anaphylaxis       Anaphylaxis Kits Protocol Passed - 8/26/2019  3:55 PM        Passed - Recent (12 mo) or future (30 days) visit witin the authorizing provider's specialty     Patient had office visit in the last 12 months or has a visit in the next 30 days with authorizing provider or within the authorizing provider's specialty.  See \"Patient Info\" tab in inbasket, or \"Choose Columns\" in Meds & Orders section of the refill encounter.              Passed - Patient is age 5 or older        Passed - Medication is active on med list        Routing refill request to provider for review/approval because:  Prescribed by alternative provider.         "

## 2019-08-27 RX ORDER — EPINEPHRINE 0.3 MG/.3ML
0.3 INJECTION SUBCUTANEOUS
Qty: 1 EACH | Refills: 1 | Status: SHIPPED | OUTPATIENT
Start: 2019-08-27 | End: 2023-10-30

## 2019-08-27 RX ORDER — METOPROLOL SUCCINATE 25 MG/1
75 TABLET, EXTENDED RELEASE ORAL DAILY
Qty: 30 TABLET | Refills: 0 | Status: SHIPPED | OUTPATIENT
Start: 2019-08-27 | End: 2019-09-23

## 2019-08-27 NOTE — TELEPHONE ENCOUNTER
Routing refill request to provider for review/approval because:  Last ordered by Иван Nugent PA-C 8/26/2014    Joan BRANDT RN

## 2019-08-27 NOTE — TELEPHONE ENCOUNTER
Refill another 30 tablets. Still needs provider visit.  Patient has other chronic conditions to be addressed in clinic such as his cholesterol level - needs lab done - has future order. Provider visit after that is done.

## 2019-08-28 NOTE — TELEPHONE ENCOUNTER
Message left for patient to check the pharmacy.  Patient told of the need to have office visit after his fasting labs. Nichole SMITH RN

## 2019-09-23 ENCOUNTER — ALLIED HEALTH/NURSE VISIT (OUTPATIENT)
Dept: FAMILY MEDICINE | Facility: CLINIC | Age: 59
End: 2019-09-23
Payer: COMMERCIAL

## 2019-09-23 ENCOUNTER — TELEPHONE (OUTPATIENT)
Dept: FAMILY MEDICINE | Facility: CLINIC | Age: 59
End: 2019-09-23

## 2019-09-23 VITALS — DIASTOLIC BLOOD PRESSURE: 82 MMHG | HEART RATE: 76 BPM | SYSTOLIC BLOOD PRESSURE: 162 MMHG

## 2019-09-23 DIAGNOSIS — E11.9 CONTROLLED TYPE 2 DIABETES MELLITUS WITHOUT COMPLICATION, WITHOUT LONG-TERM CURRENT USE OF INSULIN (H): Primary | ICD-10-CM

## 2019-09-23 DIAGNOSIS — I10 BENIGN ESSENTIAL HYPERTENSION: ICD-10-CM

## 2019-09-23 DIAGNOSIS — I10 BENIGN ESSENTIAL HYPERTENSION: Primary | ICD-10-CM

## 2019-09-23 PROCEDURE — 99207 ZZC NO CHARGE NURSE ONLY: CPT

## 2019-09-23 NOTE — TELEPHONE ENCOUNTER
S-(situation): Pt returns for RN blood pressure recheck.  I reminded pt that Dr Pearce wants to see pt in clinic for follow up of his uncontrolled hypertension and uncontrolled diabetes.  Pt explains that he travels a lot with his work and that it is difficult to get appts to be seen when he is home.   Pt knows he will be in town in November.  He did schedule to be seen 11/11/19 with fasting labs in advance.  Pt asks if Dr Pearce is willing to manage him by phone in the meantime?     B-(background): Last seen 12/27/18.  Pt has uncontrolled type 2 DM and uncontrolled blood pressures.  Pt updates that his current dose of Basaglar insulin is typically 60 unit(s) at bedtime.  He had been titrating this dose upward.     A-(assessment): Juan Balderrama is a 58 year old year old patient who comes in today for a Blood Pressure check because of ongoing blood pressure monitoring of uncontrolled blood pressures.  Metoprolol was increased to 75 mg daily from 50 mg daily at last office visit last December.     Vital Signs as repeated by RN:  162/84, pulse of 80  162/82, pulse of 76, rechecked 5 min later.     Patient is taking medication as prescribed  Patient is tolerating medications well.  Patient is not monitoring Blood Pressure at home.    Current complaints: none     R-(recommendations): Routed to Dr Pearce in a telephone note for further instructions.  Please advise.  Thank you.         BP Readings from Last 6 Encounters:   09/23/19 (!) 162/82   07/29/19 (!) 158/86   01/04/19 158/88   01/02/19 150/78   12/27/18 (!) 180/94   12/20/18 (!) 179/92            Lab Results   Component Value Date     CR 0.89 11/29/2018            Lab Results   Component Value Date     POTASSIUM 5.2 11/29/2018

## 2019-09-24 RX ORDER — METOPROLOL SUCCINATE 100 MG/1
100 TABLET, EXTENDED RELEASE ORAL DAILY
Qty: 90 TABLET | Refills: 0 | Status: SHIPPED | OUTPATIENT
Start: 2019-09-24 | End: 2019-11-12

## 2019-09-24 NOTE — TELEPHONE ENCOUNTER
Rx for metoprolol sent.    If patient can stop by for fasting labs before he leaves so we can have updated ones before his follow up in November, that will be good. His last a1c was good, but that was almost a year ago.

## 2019-09-24 NOTE — TELEPHONE ENCOUNTER
Refill on medication was not sent. Pended updated script to new strength of 100 mg.     Unsure how long you wanted to fill for.     Patient states understanding but mentioned he was only going out of town in October. He should be home more often after this upcoming job.      Arcelia CALDWELL BSN, RN

## 2019-09-24 NOTE — TELEPHONE ENCOUNTER
Increase metoprolol to 100 mg daily. Refill sent.    It is difficult, and potentially detrimental to keep adjusting diabetes and other disease treatments for months without knowing blood sugars, and other parameters used to monitor efficacy and safety of treatment.      Establishing concurrent care with diabetes educators can help monitor blood sugars better.    I would also recommend patient consult with endocrinology to help manage his diabetes more.

## 2019-09-27 RX ORDER — METFORMIN HCL 500 MG
TABLET, EXTENDED RELEASE 24 HR ORAL
Qty: 120 TABLET | Refills: 0 | Status: SHIPPED | OUTPATIENT
Start: 2019-09-27 | End: 2019-10-26

## 2019-09-27 NOTE — TELEPHONE ENCOUNTER
Medication is being filled for 1 time refill only due to:  Patient needs to be seen because due for appt.   Has pending appt 11-11-19.  Aline CASTANON RN

## 2019-09-27 NOTE — TELEPHONE ENCOUNTER
Requested Prescriptions   Pending Prescriptions Disp Refills     metFORMIN (GLUCOPHAGE-XR) 500 MG 24 hr tablet [Pharmacy Med Name: METFORMIN HCL  MG TABLET] 360 tablet 0     Sig: TAKE 2 TABLETS (1,000 MG) BY MOUTH 2 TIMES DAILY (WITH MEALS) NEED TO BE SEEN FOR FURTHER REFILLS  Last Written Prescription Date:  6/27/2019  Last Fill Quantity: 360,  # refills: 0   Last office visit: 12/27/2018 with prescribing provider:  Portia    Future Office Visit:   Next 5 appointments (look out 90 days)    Nov 11, 2019  7:00 AM CST  [facility]  [facility] at: [facility] [address] [facility] [address]  [phone]                Biguanide Agents Failed - 9/27/2019  1:32 AM        Failed - Blood pressure less than 140/90 in past 6 months     BP Readings from Last 3 Encounters:   09/23/19 (!) 162/82   07/29/19 (!) 158/86   01/04/19 158/88                 Failed - Patient has documented A1c within the specified period of time.     If HgbA1C is 8 or greater, it needs to be on file within the past 3 months.  If less than 8, must be on file within the past 6 months.     Recent Labs   Lab Test 11/29/18  0811   A1C 6.5*             Passed - Patient has documented LDL within the past 12 mos.     Recent Labs   Lab Test 11/29/18  0811   LDL 37             Passed - Patient has had a Microalbumin in the past 15 mos.     Recent Labs   Lab Test 11/29/18  0811   MICROL 9   UMALCR 9.44             Passed - Patient is age 10 or older        Passed - Patient's CR is NOT>1.4 OR Patient's EGFR is NOT<45 within past 12 mos.     Recent Labs   Lab Test 11/29/18  0811   GFRESTIMATED 88   GFRESTBLACK >90       Recent Labs   Lab Test 11/29/18  0811   CR 0.89             Passed - Patient does NOT have a diagnosis of CHF.        Passed - Medication is active on med list        Passed - Recent (6 mo) or future (30 days) visit within the authorizing provider's  "specialty     Patient had office visit in the last 6 months or has a visit in the next 30 days with authorizing provider or within the authorizing provider's specialty.  See \"Patient Info\" tab in inbasket, or \"Choose Columns\" in Meds & Orders section of the refill encounter.              "

## 2019-10-03 DIAGNOSIS — E11.9 DIABETES MELLITUS (H): ICD-10-CM

## 2019-10-03 DIAGNOSIS — E11.9 CONTROLLED TYPE 2 DIABETES MELLITUS WITHOUT COMPLICATION, WITHOUT LONG-TERM CURRENT USE OF INSULIN (H): ICD-10-CM

## 2019-10-03 RX ORDER — INSULIN GLARGINE 100 [IU]/ML
INJECTION, SOLUTION SUBCUTANEOUS
Qty: 15 ML | Refills: 1 | Status: SHIPPED | OUTPATIENT
Start: 2019-10-03 | End: 2019-11-12

## 2019-10-03 NOTE — TELEPHONE ENCOUNTER
"Requested Prescriptions   Pending Prescriptions Disp Refills     insulin glargine (BASAGLAR KWIKPEN) 100 UNIT/ML pen [Pharmacy Med Name: BASAGLAR 100 UNIT/ML KWIKPEN]  11     Sig: INJECT 23-68 UNITS SUBCUTANEOUS EVERY NIGHT AT BEDTIME AS DIRECTED       Long Acting Insulin Protocol Failed - 10/3/2019  2:29 AM        Failed - Blood pressure less than 140/90 in past 6 months     BP Readings from Last 3 Encounters:   09/23/19 (!) 162/82   07/29/19 (!) 158/86   01/04/19 158/88                 Failed - HgbA1C in past 3 or 6 months     If HgbA1C is 8 or greater, it needs to be on file within the past 3 months.  If less than 8, must be on file within the past 6 months.     Recent Labs   Lab Test 11/29/18  0811   A1C 6.5*             Passed - LDL on file in past 12 months     Recent Labs   Lab Test 11/29/18  0811   LDL 37             Passed - Microalbumin on file in past 12 months     Recent Labs   Lab Test 11/29/18  0811   MICROL 9   UMALCR 9.44             Passed - Serum creatinine on file in past 12 months     Recent Labs   Lab Test 11/29/18  0811   CR 0.89             Passed - Medication is active on med list        Passed - Patient is age 18 or older        Passed - Recent (6 mo) or future (30 days) visit within the authorizing provider's specialty     Patient had office visit in the last 6 months or has a visit in the next 30 days with authorizing provider or within the authorizing provider's specialty.  See \"Patient Info\" tab in inbasket, or \"Choose Columns\" in Meds & Orders section of the refill encounter.            Last Written Prescription Date:  12/1/18  Last Fill Quantity: 15,  # refills: 1   Last office visit: 9/23/2019 with prescribing provider:  Portia   Future Office Visit:   Next 5 appointments (look out 90 days)    Nov 12, 2019  7:00 AM CST  [facility]  [facility]  Arrive at: Clinic [facility] [address] [facility] [place] " [id number]  [phone]

## 2019-10-03 NOTE — TELEPHONE ENCOUNTER
Routing refill request to provider for review/approval because:  Last ordered by Dr. Henriquez  BP out of range  Labs not current  Appt 11/12/19 with Dr. Portia BRANDT RN

## 2019-10-04 RX ORDER — ATORVASTATIN CALCIUM 10 MG/1
20 TABLET, FILM COATED ORAL DAILY
Qty: 120 TABLET | Refills: 0 | Status: SHIPPED | OUTPATIENT
Start: 2019-10-04 | End: 2019-11-03

## 2019-10-04 NOTE — TELEPHONE ENCOUNTER
"S:  Refill request for atorvastatin 10mg tab take 2 tab (20mg) daily    B:  LOV 12/27/2018  atorvastatin 10mg tab take 2 tab (20mg) daily last written 4/1/19 for 6 month supply    A:  Requested Prescriptions   Pending Prescriptions Disp Refills     atorvastatin (LIPITOR) 10 MG tablet [Pharmacy Med Name: ATORVASTATIN 10 MG TABLET] 180 tablet 1     Sig: TAKE 2 TABLETS (20 MG) BY MOUTH DAILY       Statins Protocol Passed - 10/4/2019  1:45 PM        Passed - LDL on file in past 12 months     Recent Labs   Lab Test 11/29/18  0811   LDL 37             Passed - No abnormal creatine kinase in past 12 months     No lab results found.             Passed - Recent (12 mo) or future (30 days) visit within the authorizing provider's specialty     Patient has had an office visit with the authorizing provider or a provider within the authorizing providers department within the previous 12 mos or has a future within next 30 days. See \"Patient Info\" tab in inbasket, or \"Choose Columns\" in Meds & Orders section of the refill encounter.              Passed - Medication is active on med list        Passed - Patient is age 18 or older        Passes [facility] refill protocol    R:  Filled per G refill protocol    No further action necessary.  Encounter closed.    Randy Lutz RN    "

## 2019-10-17 ENCOUNTER — OFFICE VISIT (OUTPATIENT)
Dept: PODIATRY | Facility: CLINIC | Age: 59
End: 2019-10-17
Payer: COMMERCIAL

## 2019-10-17 VITALS
SYSTOLIC BLOOD PRESSURE: 191 MMHG | DIASTOLIC BLOOD PRESSURE: 92 MMHG | WEIGHT: 272 LBS | HEART RATE: 71 BPM | BODY MASS INDEX: 40.29 KG/M2 | HEIGHT: 69 IN

## 2019-10-17 DIAGNOSIS — L97.425 DIABETIC ULCER OF LEFT MIDFOOT ASSOCIATED WITH TYPE 2 DIABETES MELLITUS, WITH MUSCLE INVOLVEMENT WITHOUT EVIDENCE OF NECROSIS (H): Primary | ICD-10-CM

## 2019-10-17 DIAGNOSIS — E11.621 DIABETIC ULCER OF LEFT MIDFOOT ASSOCIATED WITH TYPE 2 DIABETES MELLITUS, WITH MUSCLE INVOLVEMENT WITHOUT EVIDENCE OF NECROSIS (H): Primary | ICD-10-CM

## 2019-10-17 PROCEDURE — 11042 DBRDMT SUBQ TIS 1ST 20SQCM/<: CPT | Performed by: PODIATRIST

## 2019-10-17 PROCEDURE — 99213 OFFICE O/P EST LOW 20 MIN: CPT | Mod: 25 | Performed by: PODIATRIST

## 2019-10-17 RX ORDER — INFLUENZA A VIRUS A/GUANGDONG-MAONAN/SWL1536/2019 CNIC-1909 (H1N1) ANTIGEN (FORMALDEHYDE INACTIVATED), INFLUENZA A VIRUS A/HONG KONG/2671/2019 (H3N2) ANTIGEN (FORMALDEHYDE INACTIVATED), INFLUENZA B VIRUS B/PHUKET/3073/2013 ANTIGEN (FORMALDEHYDE INACTIVATED), AND INFLUENZA B VIRUS B/WASHINGTON/02/2019 ANTIGEN (FORMALDEHYDE INACTIVATED) 15; 15; 15; 15 UG/.5ML; UG/.5ML; UG/.5ML; UG/.5ML
INJECTION, SUSPENSION INTRAMUSCULAR
COMMUNITY
Start: 2019-09-25 | End: 2019-11-12

## 2019-10-17 RX ORDER — PEN NEEDLE, DIABETIC 32GX 5/32"
NEEDLE, DISPOSABLE MISCELLANEOUS
Refills: 3 | COMMUNITY
Start: 2019-08-07 | End: 2020-08-03

## 2019-10-17 RX ORDER — CLINDAMYCIN HCL 300 MG
300 CAPSULE ORAL 3 TIMES DAILY
Qty: 42 CAPSULE | Refills: 0 | Status: SHIPPED | OUTPATIENT
Start: 2019-10-17 | End: 2019-11-08

## 2019-10-17 RX ORDER — BLOOD-GLUCOSE METER
EACH MISCELLANEOUS
Refills: 0 | COMMUNITY
Start: 2019-03-04

## 2019-10-17 ASSESSMENT — MIFFLIN-ST. JEOR: SCORE: 2049.16

## 2019-10-17 NOTE — TELEPHONE ENCOUNTER
Lisinopril     Last Written Prescription Date: 03/28/17  Last Fill Quantity: 60, # refills: 0  Last Office Visit with G, P or [facility] prescribing provider: 03/28/17       Potassium   Date Value Ref Range Status   03/06/2017 4.6 3.4 - 5.3 mmol/L Final     Creatinine   Date Value Ref Range Status   03/06/2017 0.70 0.66 - 1.25 mg/dL Final     BP Readings from Last 3 Encounters:   03/28/17 150/78   03/20/17 140/84   03/13/17 (!) 151/91       
Patient states that he is going out of town tomorrow to [facility] for 3 weeks and really needs these refilled because he is out.    Haley Shelton RN    
Pt is calling wanting his medication filled today because he is going out of town   Please advise  [phone]  Thank you     Sara Avelar  [facility]    
Spontaneous, unlabored and symmetrical

## 2019-10-17 NOTE — PROGRESS NOTES
Juan Balderrama is a 59 year old male who presents to the office with a chief complaint of an ulcer on the bottom of the left foot.  The patient relates the ulcer has been present for the past several weeks.  The patient denies any fever, chills, nausea or vomiting.  The patient denies any pus or blood draining from the ulcer.  The patient denies any redness or swelling around the ulcer.  The patient denies of any pain involving the ulcer.  The patient denies any previous ulceration on either foot or leg.  The patient relates blood sugars are under control.       REVIEW OF SYSTEMS:  Constitutional, HEENT, cardiovascular, pulmonary, GI, , musculoskeletal, neuro, skin, endocrine and psych systems are negative, except as otherwise noted.     PAST MEDICAL HISTORY: No past medical history on file.     PAST SURGICAL HISTORY: No past surgical history on file.     MEDICATIONS:   Current Outpatient Medications:      atorvastatin (LIPITOR) 10 MG tablet, TAKE 2 TABLETS (20 MG) BY MOUTH DAILY, Disp: 120 tablet, Rfl: 0     BD YENNY U/F 32G X 4 MM insulin pen needle, USE TO TEST BLOOD SUGARS ONE TIME DAILY OR AS DIRECTED, Disp: , Rfl: 3     blood glucose (ACCU-CHEK GANGA) test strip, Use to test blood sugar 1 times daily or as directed., Disp: 100 strip, Rfl: 3     blood glucose monitoring (ACCU-CHEK GANGA PLUS) meter device kit, USE TO TEST BLOOD SUGAR 1 TIMES DAILY OR AS DIRECTED., Disp: , Rfl: 0     blood glucose monitoring (ACCU-CHEK MULTICLIX) lancets, USE TO TEST BLOOD SUGAR 1 TIMES DAILY OR AS DIRECTED., Disp: , Rfl: 3     clindamycin (CLEOCIN) 300 MG capsule, Take 1 capsule (300 mg) by mouth 3 times daily for 14 days, Disp: 42 capsule, Rfl: 0     EPINEPHrine (EPIPEN/ADRENACLICK/OR ANY BX GENERIC EQUIV) 0.3 MG/0.3ML injection 2-pack, Inject 0.3 mLs (0.3 mg) into the muscle once as needed for anaphylaxis, Disp: 1 each, Rfl: 1     FLUZONE QUADRIVALENT 0.5 ML injection, , Disp: , Rfl:      insulin glargine (BASAGLAR KWIKPEN)  100 UNIT/ML pen, INJECT 23-68 UNITS SUBCUTANEOUS EVERY NIGHT AT BEDTIME AS DIRECTED, Disp: 15 mL, Rfl: 1     losartan (COZAAR) 100 MG tablet, Take 1 tablet (100 mg) by mouth daily, Disp: 90 tablet, Rfl: 3     metFORMIN (GLUCOPHAGE-XR) 500 MG 24 hr tablet, TAKE 2 TABLETS (1,000 MG) BY MOUTH 2 TIMES DAILY (WITH MEALS) NEED TO BE SEEN FOR FURTHER REFILLS, Disp: 120 tablet, Rfl: 0     metoprolol succinate ER (TOPROL-XL) 100 MG 24 hr tablet, Take 1 tablet (100 mg) by mouth daily Blood pressure check needed for further refills., Disp: 90 tablet, Rfl: 0     ALLERGIES:    Allergies   Allergen Reactions     Sulfamethoxazole Swelling     throat        SOCIAL HISTORY:   Social History     Socioeconomic History     Marital status:      Spouse name: Not on file     Number of children: Not on file     Years of education: Not on file     Highest education level: Not on file   Occupational History     Not on file   Social Needs     Financial resource strain: Not on file     Food insecurity:     Worry: Not on file     Inability: Not on file     Transportation needs:     Medical: Not on file     Non-medical: Not on file   Tobacco Use     Smoking status: Never Smoker     Smokeless tobacco: Never Used   Substance and Sexual Activity     Alcohol use: Yes     Comment: social     Drug use: No     Sexual activity: Not on file   Lifestyle     Physical activity:     Days per week: Not on file     Minutes per session: Not on file     Stress: Not on file   Relationships     Social connections:     Talks on phone: Not on file     Gets together: Not on file     Attends [employer] witness service: Not on file     Active member of club or organization: Not on file     Attends meetings of clubs or organizations: Not on file     Relationship status: Not on file     Intimate partner violence:     Fear of current or ex partner: Not on file     Emotionally abused: Not on file     Physically abused: Not on file     Forced sexual activity: Not on file  "  Other Topics Concern     Parent/sibling w/ CABG, MI or angioplasty before 65F 55M? No   Social History Narrative     Not on file        FAMILY HISTORY: No family history on file.     EXAM:Vitals: BP (!) 191/92   Pulse 71   Ht 1.753 m (5' 9\")   Wt 123.4 kg (272 lb)   BMI 40.17 kg/m    BMI= Body mass index is 40.17 kg/m .  Weight management plan: Patient was referred to their PCP to discuss a diet and exercise plan.    General appearance: Patient is alert and fully cooperative with history & exam.  No sign of distress is noted during the visit.     Psychiatric: Affect is pleasant & appropriate.  Patient appears motivated to improve health.     Respiratory: Breathing is regular & unlabored while sitting.     HEENT: Hearing is intact to spoken word.  Speech is clear.  No gross evidence of visual impairment that would impact ambulation.     Dermatologic:  One notes grade II ulceration located on the plantar aspect of the foot underlying the cuboid on the left.  The ulcer measures out to be approximately 4  cm x 4 cm x 0.5 cm.  One notes negative probing to bone, negative tracking.  The subcutaneous granular base appears beefy red with hyperkeratotic wound borders as well as a slight serous drainage and absence of mal odor.  There is no noted swelling, purulent drainage or ascending erythema.    Vascular: DP & PT pulses are intact & regular bilaterally.  No significant edema or varicosities noted.  CFT and skin temperature is normal to both lower extremities.     Neurologic: Lower extremity sensation is absent to light touch.  No evidence of weakness or contracture in the lower extremities.  Noted evidence of neuropathy.     Musculoskeletal: Patient is ambulatory without assistive device or brace.  No gross ankle deformity noted.  No foot or ankle joint effusion is noted.    Radiographic evaluation including an AP, lateral and medial oblique evaluation of the left foot reveals no cortical erosions or periosteal " elevation.  All joint margins appear stable.  There is no apparent fracture or tumor formation noted.  There is no evidence of foreign body or gas in the tissue.    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    Plan:  I have explained to Juan the condition of the diabetic malperforans ulceration.  After verbal consent, #15 blade was used to debride ulcer down to and including subcutaneous tissue.  Bleeding controlled with light pressure.   No drainage noted.  No anesthesia was used due to neuropathy. Dry dressing applied to foot.  Patient tolerated procedure well.  The patient was sent down for labs including CBC CRP and ESR.  The patient was sent to [facility] for fitting of a  offloading walker shoe.  The patient was referred to [facility] wound care clinic for further wound care treatment options.  The patient was placed on oral clindamycin 300 mg p.o. 3 times daily for 2 weeks.  The patient will return in 2 weeks for reevaluation.  I explained to him  potential risks infection and loss of tissue, including loss of limb.  The patient will follow up in two weeks for further evaluation.        JESUS Boston D.P.M., [email or web address].[email or web address].

## 2019-10-17 NOTE — PATIENT INSTRUCTIONS
Juan to follow up with Primary Care provider regarding elevated blood pressure.  FOOT ULCER (WOUND) EDUCATION  Ulceration ofthe foot involves a break or hole in the skin. Skin is our best protection against infection. Skin is quite durable, however, the underlying tissues are fragile. For this reason, the wound is likely to deepen rapidly. Deep wounds usually get infected and require amputation. Prompt healing is therefore essential to avoid limb loss.     Foot ulcers do not heal without intervention. Walking on the foot and living your normal life is not typically compatible with healing the sore. Successful healing will require several months of significant alteration of your daily activities.   Ulcer complications frequently develop. This primarily includes infection of skin, which then spreads deep into your joints, bones and tendons. Spreading infection may travel up your leg and into other parts of your body. Deep infection is usually treated with amputation ofpart ofyour foot or your leg. Signs of infection include fever, chills, nausea, vomiting, erratic blood sugars, local redness, pus, strong odor and localized warmth. Signs of infection should be taken seriously. Prompt evaluation in the clinic or hospital emergency room is required.   Ulcer treatment requires debridement or surgical removal of devitalized tissue. Your doctor will trim away callused, moistened, unhealthy tissue from the wound surface and margin. This helps to clean the wound and allows proper inspection. Debridement also stimulates healing even though the wound originally appears larger. Expect some bleeding with each debridement. You will be given instruction regarding wound bandaging. This often includes ointment and gauze. Avoid tape directly on the skin. Hand washing is essential since most infections will come from your fingertips. Ulcer care requires a no touch technique. Your fingers should not touch the margin or base of the  wound.    HELPFUL HEALING TIPS:  1. Debridement: Getting rid of bad tissue makes way for good tissue to promote healing  2. Addressing Foot Deformities: Hammertoes and bunions can cause increased pressure  3. Pressure Reduction: If pressure remains to the wound, it won't heal  4. Good Pulses: If bloodflow is not getting to the foot, the ulcer will not heal  5. Good Nutrition: If you are not getting proper nutrition your body can't heal.Protein!  6. Infection Control: Keep the ulcer clean with wound cleanser. DO NOT SOAK IT!  7. Moisture Control: Keep edema down and make sure that drainage is getting pulled away from the ulcer    IMPORTANCE OF DEBRIDEMENT    Reduces bioburden to help control or reduce infection. Even if an ulcer is not  infected,  the bacterial bioburden causes increased local inflammation.    Allows more accurate visualization of the wound base and edges, which allows for more precise staging.    Removes necrotic/non-viable tissue, which impedes wound healing, causes protein loss and can be a nidus for infection.    Stimulates new circulation (angiogenesis) and allows adequate oxygen delivery to the wound.    Removes undermining and tunneling, and may help reduce abscess formation.    Releases healing growth factors at the edge of the wound.    Prepares the wound bed by leaving only tissues that are capable of regenerating.

## 2019-10-17 NOTE — LETTER
10/17/2019         RE: Juan Balderrama  [address] [place]  [place] [address]        Dear Colleague,    Thank you for referring your patient, Juan Balderrama, to the [facility]. Please see a copy of my visit note below.    Juan Balderrama is a 59 year old male who presents to the office with a chief complaint of an ulcer on the bottom of the left foot.  The patient relates the ulcer has been present for the past several weeks.  The patient denies any fever, chills, nausea or vomiting.  The patient denies any pus or blood draining from the ulcer.  The patient denies any redness or swelling around the ulcer.  The patient denies of any pain involving the ulcer.  The patient denies any previous ulceration on either foot or leg.  The patient relates blood sugars are under control.       REVIEW OF SYSTEMS:  Constitutional, HEENT, cardiovascular, pulmonary, GI, , musculoskeletal, neuro, skin, endocrine and psych systems are negative, except as otherwise noted.     PAST MEDICAL HISTORY: No past medical history on file.     PAST SURGICAL HISTORY: No past surgical history on file.     MEDICATIONS:   Current Outpatient Medications:      atorvastatin (LIPITOR) 10 MG tablet, TAKE 2 TABLETS (20 MG) BY MOUTH DAILY, Disp: 120 tablet, Rfl: 0     BD YENNY U/F 32G X 4 MM insulin pen needle, USE TO TEST BLOOD SUGARS ONE TIME DAILY OR AS DIRECTED, Disp: , Rfl: 3     blood glucose (ACCU-CHEK GANGA) test strip, Use to test blood sugar 1 times daily or as directed., Disp: 100 strip, Rfl: 3     blood glucose monitoring (ACCU-CHEK GANGA PLUS) meter device kit, USE TO TEST BLOOD SUGAR 1 TIMES DAILY OR AS DIRECTED., Disp: , Rfl: 0     blood glucose monitoring (ACCU-CHEK MULTICLIX) lancets, USE TO TEST BLOOD SUGAR 1 TIMES DAILY OR AS DIRECTED., Disp: , Rfl: 3     clindamycin (CLEOCIN) 300 MG capsule, Take 1 capsule (300 mg) by mouth 3 times daily for 14 days, Disp: 42 capsule, Rfl: 0     EPINEPHrine  (EPIPEN/ADRENACLICK/OR ANY BX GENERIC EQUIV) 0.3 MG/0.3ML injection 2-pack, Inject 0.3 mLs (0.3 mg) into the muscle once as needed for anaphylaxis, Disp: 1 each, Rfl: 1     FLUZONE QUADRIVALENT 0.5 ML injection, , Disp: , Rfl:      insulin glargine (BASAGLAR KWIKPEN) 100 UNIT/ML pen, INJECT 23-68 UNITS SUBCUTANEOUS EVERY NIGHT AT BEDTIME AS DIRECTED, Disp: 15 mL, Rfl: 1     losartan (COZAAR) 100 MG tablet, Take 1 tablet (100 mg) by mouth daily, Disp: 90 tablet, Rfl: 3     metFORMIN (GLUCOPHAGE-XR) 500 MG 24 hr tablet, TAKE 2 TABLETS (1,000 MG) BY MOUTH 2 TIMES DAILY (WITH MEALS) NEED TO BE SEEN FOR FURTHER REFILLS, Disp: 120 tablet, Rfl: 0     metoprolol succinate ER (TOPROL-XL) 100 MG 24 hr tablet, Take 1 tablet (100 mg) by mouth daily Blood pressure check needed for further refills., Disp: 90 tablet, Rfl: 0     ALLERGIES:    Allergies   Allergen Reactions     Sulfamethoxazole Swelling     throat        SOCIAL HISTORY:   Social History     Socioeconomic History     Marital status:      Spouse name: Not on file     Number of children: Not on file     Years of education: Not on file     Highest education level: Not on file   Occupational History     Not on file   Social Needs     Financial resource strain: Not on file     Food insecurity:     Worry: Not on file     Inability: Not on file     Transportation needs:     Medical: Not on file     Non-medical: Not on file   Tobacco Use     Smoking status: Never Smoker     Smokeless tobacco: Never Used   Substance and Sexual Activity     Alcohol use: Yes     Comment: social     Drug use: No     Sexual activity: Not on file   Lifestyle     Physical activity:     Days per week: Not on file     Minutes per session: Not on file     Stress: Not on file   Relationships     Social connections:     Talks on phone: Not on file     Gets together: Not on file     Attends Gnosticist service: Not on file     Active member of club or organization: Not on file     Attends meetings  "of clubs or organizations: Not on file     Relationship status: Not on file     Intimate partner violence:     Fear of current or ex partner: Not on file     Emotionally abused: Not on file     Physically abused: Not on file     Forced sexual activity: Not on file   Other Topics Concern     Parent/sibling w/ CABG, MI or angioplasty before 65F 55M? No   Social History Narrative     Not on file        FAMILY HISTORY: No family history on file.     EXAM:Vitals: BP (!) 191/92   Pulse 71   Ht 1.753 m (5' 9\")   Wt 123.4 kg (272 lb)   BMI 40.17 kg/m     BMI= Body mass index is 40.17 kg/m .  Weight management plan: Patient was referred to their PCP to discuss a diet and exercise plan.    General appearance: Patient is alert and fully cooperative with history & exam.  No sign of distress is noted during the visit.     Psychiatric: Affect is pleasant & appropriate.  Patient appears motivated to improve health.     Respiratory: Breathing is regular & unlabored while sitting.     HEENT: Hearing is intact to spoken word.  Speech is clear.  No gross evidence of visual impairment that would impact ambulation.     Dermatologic:  One notes grade II ulceration located on the plantar aspect of the foot underlying the cuboid on the left.  The ulcer measures out to be approximately 4  cm x 4 cm x 0.5 cm.  One notes negative probing to bone, negative tracking.  The subcutaneous granular base appears beefy red with hyperkeratotic wound borders as well as a slight serous drainage and absence of mal odor.  There is no noted swelling, purulent drainage or ascending erythema.    Vascular: DP & PT pulses are intact & regular bilaterally.  No significant edema or varicosities noted.  CFT and skin temperature is normal to both lower extremities.     Neurologic: Lower extremity sensation is absent to light touch.  No evidence of weakness or contracture in the lower extremities.  Noted evidence of neuropathy.     Musculoskeletal: Patient is " ambulatory without assistive device or brace.  No gross ankle deformity noted.  No foot or ankle joint effusion is noted.    Radiographic evaluation including an AP, lateral and medial oblique evaluation of the left foot reveals no cortical erosions or periosteal elevation.  All joint margins appear stable.  There is no apparent fracture or tumor formation noted.  There is no evidence of foreign body or gas in the tissue.    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    Plan:  I have explained to Juan the condition of the diabetic malperforans ulceration.  After verbal consent, #15 blade was used to debride ulcer down to and including subcutaneous tissue.  Bleeding controlled with light pressure.   No drainage noted.  No anesthesia was used due to neuropathy. Dry dressing applied to foot.  Patient tolerated procedure well.  The patient was sent down for labs including CBC CRP and ESR.  The patient was sent to [facility] for fitting of a DH offloading walker shoe.  The patient was referred to [facility] wound care clinic for further wound care treatment options.  The patient was placed on oral clindamycin 300 mg p.o. 3 times daily for 2 weeks.  The patient will return in 2 weeks for reevaluation.  I explained to him  potential risks infection and loss of tissue, including loss of limb.  The patient will follow up in two weeks for further evaluation.        JESUS Boston, MALINI.P.AJIT., F.A.C.F.A.S.    Again, thank you for allowing me to participate in the care of your patient.        Sincerely,        Joel Boston DPM

## 2019-10-17 NOTE — NURSING NOTE
"Chief Complaint   Patient presents with     Wound Check     left foot       Initial BP (!) 191/92   Pulse 71   Ht 1.753 m (5' 9\")   Wt 123.4 kg (272 lb)   BMI 40.17 kg/m   Estimated body mass index is 40.17 kg/m  as calculated from the following:    Height as of this encounter: 1.753 m (5' 9\").    Weight as of this encounter: 123.4 kg (272 lb).  Medications and allergies reviewed.      Aissatou NAYAK MA    "

## 2019-10-26 NOTE — TELEPHONE ENCOUNTER
Requested Prescriptions   Pending Prescriptions Disp Refills     metFORMIN (GLUCOPHAGE-XR) 500 MG 24 hr tablet [Pharmacy Med Name: METFORMIN HCL  MG TABLET]  Last Written Prescription Date:  09/27/19  Last Fill Quantity: 120,  # refills: 0   Last office visit: 12/27/18 with prescribing provider:  OSITO Pearce   Future Office Visit:   Next 5 appointments (look out 90 days)    Nov 12, 2019  7:00 AM CST  SHORT with Mauricio Pearce MD  [facility] ([facility]  Arrive at: Clinic A [address] [address]  [phone]          120 tablet 0     Sig: TAKE 2 TABLETS (1,000 MG) BY MOUTH 2 TIMES DAILY (WITH MEALS) NEED TO BE SEEN FOR FURTHER REFILLS       Biguanide Agents Failed - 10/26/2019  8:40 AM        Failed - Blood pressure less than 140/90 in past 6 months     BP Readings from Last 3 Encounters:   10/17/19 (!) 191/92   09/23/19 (!) 162/82   07/29/19 (!) 158/86                 Failed - Patient has documented A1c within the specified period of time.     If HgbA1C is 8 or greater, it needs to be on file within the past 3 months.  If less than 8, must be on file within the past 6 months.     Recent Labs   Lab Test 11/29/18  0811   A1C 6.5*             Passed - Patient has documented LDL within the past 12 mos.     Recent Labs   Lab Test 11/29/18  0811   LDL 37             Passed - Patient has had a Microalbumin in the past 15 mos.     Recent Labs   Lab Test 11/29/18  0811   MICROL 9   UMALCR 9.44             Passed - Patient is age 10 or older        Passed - Patient's CR is NOT>1.4 OR Patient's EGFR is NOT<45 within past 12 mos.     Recent Labs   Lab Test 11/29/18  0811   GFRESTIMATED 88   GFRESTBLACK >90       Recent Labs   Lab Test 11/29/18  0811   CR 0.89             Passed - Patient does NOT have a diagnosis of CHF.        Passed - Medication is active on med list        Passed - Recent (6 mo) or future (30 days) visit within the authorizing provider's  "specialty     Patient had office visit in the last 6 months or has a visit in the next 30 days with authorizing provider or within the authorizing provider's specialty.  See \"Patient Info\" tab in inbasket, or \"Choose Columns\" in Meds & Orders section of the refill encounter.              "

## 2019-10-28 RX ORDER — METFORMIN HCL 500 MG
TABLET, EXTENDED RELEASE 24 HR ORAL
Qty: 120 TABLET | Refills: 0 | Status: SHIPPED | OUTPATIENT
Start: 2019-10-28 | End: 2019-11-12

## 2019-10-28 NOTE — TELEPHONE ENCOUNTER
Routing refill request to provider for review/approval because:  Blood pressure not in range.    Liliana Jackson RN

## 2019-10-28 NOTE — TELEPHONE ENCOUNTER
Last 30 day refill. Patient absolutely needs clinic and lab appointment before other refills are given.

## 2019-11-03 NOTE — LETTER
[facility]  [address] [address]  Phone: [phone]       November 5, 2019         Juan Balderrama  [address] [place] [address] [address] [facility] [address]            Dear Juna:    We are concerned about your health care.  We recently provided you with medication refills.  Many medications require routine follow-up with your doctor and routine labs.    At this time, you are due for fasting labs. Please contact the clinic to schedule a Lab Only appointment.     Your prescription(s) have been refilled for 30 days so you may have time for the above noted follow-up. Please call to schedule soon so we can assure you have an appointment before your next refills are needed.    Thank you,      Mauricio Pearce MD / dolly

## 2019-11-04 NOTE — TELEPHONE ENCOUNTER
"Requested Prescriptions   Pending Prescriptions Disp Refills     atorvastatin (LIPITOR) 10 MG tablet [Pharmacy Med Name: ATORVASTATIN 10 MG TABLET] 60 tablet 1     Sig: TAKE 2 TABLETS BY MOUTH EVERY DAY   Last Written Prescription Date:  10/4/19  Last Fill Quantity: 120 tab,  # refills: 0   Last office visit: 9/23/2019 with prescribing provider:  Nurse   Future Office Visit:   Next 5 appointments (look out 90 days)    Nov 12, 2019  7:00 AM CST  SHORT with Mauricio Pearce MD  [facility]  Arrive at: Clinic [facility] [address] [address]  [phone]             Statins Protocol Passed - 11/3/2019 10:59 AM        Passed - LDL on file in past 12 months     Recent Labs   Lab Test 11/29/18  0811   LDL 37             Passed - No abnormal creatine kinase in past 12 months     No lab results found.             Passed - Recent (12 mo) or future (30 days) visit within the authorizing provider's specialty     Patient has had an office visit with the authorizing provider or a provider within the authorizing providers department within the previous 12 mos or has a future within next 30 days. See \"Patient Info\" tab in inbasket, or \"Choose Columns\" in Meds & Orders section of the refill encounter.              Passed - Medication is active on med list        Passed - Patient is age 18 or older          "

## 2019-11-05 RX ORDER — ATORVASTATIN CALCIUM 10 MG/1
TABLET, FILM COATED ORAL
Qty: 60 TABLET | Refills: 0 | Status: SHIPPED | OUTPATIENT
Start: 2019-11-05 | End: 2019-11-12

## 2019-11-07 DIAGNOSIS — I10 UNCONTROLLED HYPERTENSION: ICD-10-CM

## 2019-11-07 DIAGNOSIS — E11.9 CONTROLLED TYPE 2 DIABETES MELLITUS WITHOUT COMPLICATION, WITHOUT LONG-TERM CURRENT USE OF INSULIN (H): ICD-10-CM

## 2019-11-07 NOTE — TELEPHONE ENCOUNTER
"Requested Prescriptions   Pending Prescriptions Disp Refills     losartan (COZAAR) 100 MG tablet [Pharmacy Med Name: LOSARTAN POTASSIUM 100 MG TAB] 90 tablet 3     Sig: TAKE 1 TAB BY MOUTH EVERY DAY       Angiotensin-II Receptors Failed - 11/7/2019  1:02 PM        Failed - Last blood pressure under 140/90 in past 12 months     BP Readings from Last 3 Encounters:   10/17/19 (!) 191/92   09/23/19 (!) 162/82   07/29/19 (!) 158/86                 Passed - Recent (12 mo) or future (30 days) visit within the authorizing provider's specialty     Patient has had an office visit with the authorizing provider or a provider within the authorizing providers department within the previous 12 mos or has a future within next 30 days. See \"Patient Info\" tab in inbasket, or \"Choose Columns\" in Meds & Orders section of the refill encounter.              Passed - Medication is active on med list        Passed - Patient is age 18 or older        Passed - Normal serum creatinine on file in past 12 months     Recent Labs   Lab Test 11/29/18  0811   CR 0.89             Passed - Normal serum potassium on file in past 12 months     Recent Labs   Lab Test 11/29/18  0811   POTASSIUM 5.2                    Last Written Prescription Date:  11/29/18  Last Fill Quantity: 90,  # refills: 3   Last office visit: 9/23/2019 with prescribing provider:  Portia   Future Office Visit:   Next 5 appointments (look out 90 days)    Nov 12, 2019  7:00 AM CST  SHORT with Mauricio Pearce MD  [facility] ([facility]  Arrive at: Clinic A [address] [place] [id number]  [phone]           "

## 2019-11-08 ENCOUNTER — TELEPHONE (OUTPATIENT)
Dept: PODIATRY | Facility: CLINIC | Age: 59
End: 2019-11-08

## 2019-11-08 DIAGNOSIS — E11.9 CONTROLLED TYPE 2 DIABETES MELLITUS WITHOUT COMPLICATION, WITHOUT LONG-TERM CURRENT USE OF INSULIN (H): ICD-10-CM

## 2019-11-08 DIAGNOSIS — E11.621 DIABETIC ULCER OF LEFT MIDFOOT ASSOCIATED WITH TYPE 2 DIABETES MELLITUS, WITH MUSCLE INVOLVEMENT WITHOUT EVIDENCE OF NECROSIS (H): ICD-10-CM

## 2019-11-08 DIAGNOSIS — L97.425 DIABETIC ULCER OF LEFT MIDFOOT ASSOCIATED WITH TYPE 2 DIABETES MELLITUS, WITH MUSCLE INVOLVEMENT WITHOUT EVIDENCE OF NECROSIS (H): ICD-10-CM

## 2019-11-08 LAB
ANION GAP SERPL CALCULATED.3IONS-SCNC: 4 MMOL/L (ref 3–14)
BASOPHILS # BLD AUTO: 0.1 10E9/L (ref 0–0.2)
BASOPHILS NFR BLD AUTO: 0.9 %
BUN SERPL-MCNC: 16 MG/DL (ref 7–30)
CALCIUM SERPL-MCNC: 9 MG/DL (ref 8.5–10.1)
CHLORIDE SERPL-SCNC: 100 MMOL/L (ref 94–109)
CHOLEST SERPL-MCNC: 135 MG/DL
CO2 SERPL-SCNC: 28 MMOL/L (ref 20–32)
CREAT SERPL-MCNC: 0.83 MG/DL (ref 0.66–1.25)
CREAT UR-MCNC: 103 MG/DL
CRP SERPL-MCNC: 22.2 MG/L (ref 0–8)
DIFFERENTIAL METHOD BLD: ABNORMAL
EOSINOPHIL # BLD AUTO: 0.3 10E9/L (ref 0–0.7)
EOSINOPHIL NFR BLD AUTO: 2.3 %
ERYTHROCYTE [DISTWIDTH] IN BLOOD BY AUTOMATED COUNT: 13.8 % (ref 10–15)
ERYTHROCYTE [SEDIMENTATION RATE] IN BLOOD BY WESTERGREN METHOD: 33 MM/H (ref 0–20)
GFR SERPL CREATININE-BSD FRML MDRD: >90 ML/MIN/{1.73_M2}
GLUCOSE SERPL-MCNC: 177 MG/DL (ref 70–99)
HBA1C MFR BLD: 7.8 % (ref 0–5.6)
HCT VFR BLD AUTO: 40 % (ref 40–53)
HDLC SERPL-MCNC: 32 MG/DL
HGB BLD-MCNC: 12.8 G/DL (ref 13.3–17.7)
LDLC SERPL CALC-MCNC: 63 MG/DL
LYMPHOCYTES # BLD AUTO: 3 10E9/L (ref 0.8–5.3)
LYMPHOCYTES NFR BLD AUTO: 26.6 %
MCH RBC QN AUTO: 27.4 PG (ref 26.5–33)
MCHC RBC AUTO-ENTMCNC: 32 G/DL (ref 31.5–36.5)
MCV RBC AUTO: 86 FL (ref 78–100)
MICROALBUMIN UR-MCNC: 26 MG/L
MICROALBUMIN/CREAT UR: 25.24 MG/G CR (ref 0–17)
MONOCYTES # BLD AUTO: 0.7 10E9/L (ref 0–1.3)
MONOCYTES NFR BLD AUTO: 6.6 %
NEUTROPHILS # BLD AUTO: 7 10E9/L (ref 1.6–8.3)
NEUTROPHILS NFR BLD AUTO: 63.6 %
NONHDLC SERPL-MCNC: 103 MG/DL
PLATELET # BLD AUTO: 399 10E9/L (ref 150–450)
POTASSIUM SERPL-SCNC: 4.6 MMOL/L (ref 3.4–5.3)
RBC # BLD AUTO: 4.68 10E12/L (ref 4.4–5.9)
SODIUM SERPL-SCNC: 132 MMOL/L (ref 133–144)
TRIGL SERPL-MCNC: 198 MG/DL
WBC # BLD AUTO: 11.1 10E9/L (ref 4–11)

## 2019-11-08 PROCEDURE — 80061 LIPID PANEL: CPT | Performed by: FAMILY MEDICINE

## 2019-11-08 PROCEDURE — 85025 COMPLETE CBC W/AUTO DIFF WBC: CPT | Performed by: PODIATRIST

## 2019-11-08 PROCEDURE — 85652 RBC SED RATE AUTOMATED: CPT | Performed by: PODIATRIST

## 2019-11-08 PROCEDURE — 82043 UR ALBUMIN QUANTITATIVE: CPT | Performed by: FAMILY MEDICINE

## 2019-11-08 PROCEDURE — 36415 COLL VENOUS BLD VENIPUNCTURE: CPT | Performed by: FAMILY MEDICINE

## 2019-11-08 PROCEDURE — 80048 BASIC METABOLIC PNL TOTAL CA: CPT | Performed by: FAMILY MEDICINE

## 2019-11-08 PROCEDURE — 86140 C-REACTIVE PROTEIN: CPT | Performed by: PODIATRIST

## 2019-11-08 PROCEDURE — 83036 HEMOGLOBIN GLYCOSYLATED A1C: CPT | Performed by: FAMILY MEDICINE

## 2019-11-08 RX ORDER — CLINDAMYCIN HCL 300 MG
300 CAPSULE ORAL 3 TIMES DAILY
Qty: 42 CAPSULE | Refills: 0 | Status: SHIPPED | OUTPATIENT
Start: 2019-11-08 | End: 2019-12-16

## 2019-11-08 NOTE — TELEPHONE ENCOUNTER
Called and spoke with patient.I  informed the patient that his WBC labs were slightly elevated and that Dr. Boston placed a refill for Clindamycin that he will continue to take.  He should follow up in a week for reevaluation of the ulcer. Patient informed me that he was on the road and as soon as he gets back into town he well call and schedule that appointment.    Aissatou Banda MA on 11/8/2019 at 8:21 AM

## 2019-11-12 ENCOUNTER — OFFICE VISIT (OUTPATIENT)
Dept: FAMILY MEDICINE | Facility: CLINIC | Age: 59
End: 2019-11-12
Payer: COMMERCIAL

## 2019-11-12 VITALS
SYSTOLIC BLOOD PRESSURE: 162 MMHG | TEMPERATURE: 98.2 F | HEIGHT: 69 IN | BODY MASS INDEX: 45.11 KG/M2 | DIASTOLIC BLOOD PRESSURE: 94 MMHG | HEART RATE: 70 BPM | OXYGEN SATURATION: 98 % | WEIGHT: 304.6 LBS | RESPIRATION RATE: 14 BRPM

## 2019-11-12 DIAGNOSIS — E11.621 DIABETIC ULCER OF TOE OF LEFT FOOT ASSOCIATED WITH TYPE 2 DIABETES MELLITUS, UNSPECIFIED ULCER STAGE (H): ICD-10-CM

## 2019-11-12 DIAGNOSIS — E11.621 DIABETIC ULCER OF LEFT MIDFOOT ASSOCIATED WITH TYPE 2 DIABETES MELLITUS, UNSPECIFIED ULCER STAGE (H): ICD-10-CM

## 2019-11-12 DIAGNOSIS — L97.529 DIABETIC ULCER OF TOE OF LEFT FOOT ASSOCIATED WITH TYPE 2 DIABETES MELLITUS, UNSPECIFIED ULCER STAGE (H): ICD-10-CM

## 2019-11-12 DIAGNOSIS — I10 UNCONTROLLED HYPERTENSION: ICD-10-CM

## 2019-11-12 DIAGNOSIS — Z12.11 SPECIAL SCREENING FOR MALIGNANT NEOPLASMS, COLON: ICD-10-CM

## 2019-11-12 DIAGNOSIS — Z00.00 ROUTINE GENERAL MEDICAL EXAMINATION AT A HEALTH CARE FACILITY: Primary | ICD-10-CM

## 2019-11-12 DIAGNOSIS — L97.429 DIABETIC ULCER OF LEFT MIDFOOT ASSOCIATED WITH TYPE 2 DIABETES MELLITUS, UNSPECIFIED ULCER STAGE (H): ICD-10-CM

## 2019-11-12 DIAGNOSIS — E66.01 MORBID OBESITY, UNSPECIFIED OBESITY TYPE (H): ICD-10-CM

## 2019-11-12 PROCEDURE — 99207 C FOOT EXAM  NO CHARGE: CPT | Mod: 25 | Performed by: FAMILY MEDICINE

## 2019-11-12 PROCEDURE — 99396 PREV VISIT EST AGE 40-64: CPT | Performed by: FAMILY MEDICINE

## 2019-11-12 PROCEDURE — 99214 OFFICE O/P EST MOD 30 MIN: CPT | Mod: 25 | Performed by: FAMILY MEDICINE

## 2019-11-12 RX ORDER — LOSARTAN POTASSIUM 100 MG/1
100 TABLET ORAL DAILY
Qty: 90 TABLET | Refills: 3 | Status: SHIPPED | OUTPATIENT
Start: 2019-11-12 | End: 2020-02-10

## 2019-11-12 RX ORDER — ATORVASTATIN CALCIUM 20 MG/1
20 TABLET, FILM COATED ORAL DAILY
Qty: 90 TABLET | Refills: 3 | Status: SHIPPED | OUTPATIENT
Start: 2019-11-12 | End: 2020-09-08

## 2019-11-12 RX ORDER — LOSARTAN POTASSIUM 100 MG/1
TABLET ORAL
Qty: 90 TABLET | Refills: 3 | OUTPATIENT
Start: 2019-11-12

## 2019-11-12 RX ORDER — METFORMIN HCL 500 MG
1000 TABLET, EXTENDED RELEASE 24 HR ORAL 2 TIMES DAILY WITH MEALS
Qty: 120 TABLET | Refills: 0 | Status: SHIPPED | OUTPATIENT
Start: 2019-11-12 | End: 2020-06-15

## 2019-11-12 RX ORDER — INSULIN GLARGINE 100 [IU]/ML
INJECTION, SOLUTION SUBCUTANEOUS
Qty: 15 ML | Refills: 3 | Status: SHIPPED | OUTPATIENT
Start: 2019-11-12 | End: 2020-02-07

## 2019-11-12 RX ORDER — METOPROLOL SUCCINATE 200 MG/1
200 TABLET, EXTENDED RELEASE ORAL DAILY
Qty: 90 TABLET | Refills: 3 | Status: SHIPPED | OUTPATIENT
Start: 2019-11-12 | End: 2020-09-08

## 2019-11-12 ASSESSMENT — MIFFLIN-ST. JEOR: SCORE: 2197.04

## 2019-11-12 NOTE — Clinical Note
Javon ReedJuan was in clinic today for wellness and DM check. His A1c went up again to 7.8 after not being seen in clinic for many months. He now has a diabetic foot ulcer. His blood pressure is out of control. He has gained weight. He admits he has not bene adherent to recommended lifestyle changes for diabetics. He was advised of his risks with DM out of control. He was initially reluctant to see you again citing he's busy, etc,, but we were able to convince him to see you again. Hopefully he does make an appointment with DM education as he is a higher risk patient for diabetic complications.

## 2019-11-12 NOTE — TELEPHONE ENCOUNTER
losartan (COZAAR) 100 MG tablet 90 tablet 3 11/12/2019  No   Sig - Route: Take 1 tablet (100 mg) by mouth daily - Oral   Sent to pharmacy as: losartan (COZAAR) 100 MG tablet   Class: E-Prescribe   Order: [id number]   E-Prescribing Status: Receipt confirmed by pharmacy (11/12/2019  7:44 AM CST)     Duplicate.      MARIA A IveyN, RN

## 2019-11-12 NOTE — PROGRESS NOTES
3  SUBJECTIVE:   CC: Juan Balderrama is an 59 year old male who presents for preventive health visit.     Healthy Habits:    Do you get at least three servings of calcium containing foods daily (dairy, green leafy vegetables, etc.)? yes    Amount of exercise or daily activities, outside of work: not currently due to sore on foot    Problems taking medications regularly No    Medication side effects: No    Have you had an eye exam in the past two years? yes    Do you see a dentist twice per year? yes    Do you have sleep apnea, excessive snoring or daytime drowsiness?no    Patient was advised that concern below is separate from preventive exam and may be billed as a separate encounter.  He verbalized understanding and would like to address today.    Diabetes Follow-up    How often are you checking your blood sugar? 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  Before meals  Have you had any blood sugars above 200?  No  Have you had any blood sugars below 70?  No    What symptoms do you notice when your blood sugar is low?  None    What concerns do you have today about your diabetes? None     Do you have any of these symptoms? (Select all that apply)  Redness, sores, or blisters on feet and Weight gain     Have you had a diabetic eye exam in the last 12 months? Yes- Date of last eye exam: 1 yr ago, pt is due  Patient said being on the road, his lifestyle has not been ideal.    Diabetes Management Resources    Hyperlipidemia Follow-Up      Are you having any of the following symptoms? (Select all that apply)  No complaints of shortness of breath, chest pain or pressure.  No increased sweating or nausea with activity.  No left-sided neck or arm pain.  No complaints of pain in calves when walking 1-2 blocks.    Are you regularly taking any medication or supplement to lower your cholesterol?   Yes- atorvastatin    Are you having  muscle aches or other side effects that you think could be caused by your cholesterol lowering medication?  No    Hypertension Follow-up      Do you check your blood pressure regularly outside of the clinic? Yes     Are you following a low salt diet? Yes    Are your blood pressures ever more than 140 on the top number (systolic) OR more   than 90 on the bottom number (diastolic), for example 140/90? No, 132/80    BP Readings from Last 2 Encounters:   11/12/19 (!) 162/94   10/17/19 (!) 191/92     Hemoglobin A1C (%)   Date Value   11/08/2019 7.8 (H)   11/29/2018 6.5 (H)     LDL Cholesterol Calculated (mg/dL)   Date Value   11/08/2019 63   11/29/2018 37         Today's PHQ-2 Score:   PHQ-2 ( 1999 [employer]) 11/12/2019 11/29/2018   Q1: Little interest or pleasure in doing things 0 0   Q2: Feeling down, depressed or hopeless 0 0   PHQ-2 Score 0 0       Abuse: Current or Past(Physical, Sexual or Emotional)- No  Do you feel safe in your environment? Yes        Social History     Tobacco Use     Smoking status: Never Smoker     Smokeless tobacco: Never Used   Substance Use Topics     Alcohol use: Yes     Comment: social     If you drink alcohol do you typically have >3 drinks per day or >7 drinks per week? No                      Last PSA: No results found for: PSA    Reviewed orders with patient. Reviewed health maintenance and updated orders accordingly - Yes  BP Readings from Last 3 Encounters:   11/12/19 (!) 162/94   10/17/19 (!) 191/92   09/23/19 (!) 162/82    Wt Readings from Last 3 Encounters:   11/12/19 138.2 kg (304 lb 9.6 oz)   10/17/19 123.4 kg (272 lb)   12/27/18 135.9 kg (299 lb 9.6 oz)            Patient Active Problem List   Diagnosis     Benign essential hypertension     Former smoker     Left cataract     Morbid obesity, unspecified obesity type (H)     Uncontrolled type 2 diabetes mellitus with complication, with long-term current use of insulin (H)     Hyperlipidemia LDL goal <70     Hypertension      Diabetic ulcer of left midfoot associated with type 2 diabetes mellitus, unspecified ulcer stage (H)     Diabetic ulcer of toe of left foot associated with type 2 diabetes mellitus, unspecified ulcer stage (H)     History reviewed. No pertinent surgical history.    Social History     Tobacco Use     Smoking status: Never Smoker     Smokeless tobacco: Never Used   Substance Use Topics     Alcohol use: Yes     Comment: social     History reviewed. No pertinent family history.      Current Outpatient Medications   Medication Sig Dispense Refill     atorvastatin (LIPITOR) 20 MG tablet Take 1 tablet (20 mg) by mouth daily 90 tablet 3     clindamycin (CLEOCIN) 300 MG capsule Take 1 capsule (300 mg) by mouth 3 times daily for 14 days 42 capsule 0     insulin glargine (BASAGLAR KWIKPEN) 100 UNIT/ML pen INJECT 23-68 UNITS SUBCUTANEOUS EVERY NIGHT AT BEDTIME AS DIRECTED 15 mL 3     losartan (COZAAR) 100 MG tablet Take 1 tablet (100 mg) by mouth daily 90 tablet 3     metFORMIN (GLUCOPHAGE-XR) 500 MG 24 hr tablet Take 2 tablets (1,000 mg) by mouth 2 times daily (with meals) 120 tablet 0     metoprolol succinate ER (TOPROL-XL) 200 MG 24 hr tablet Take 1 tablet (200 mg) by mouth daily Blood pressure check needed for further refills. 90 tablet 3     BD YENNY U/F 32G X 4 MM insulin pen needle USE TO TEST BLOOD SUGARS ONE TIME DAILY OR AS DIRECTED  3     blood glucose (ACCU-CHEK GANGA) test strip Use to test blood sugar 1 times daily or as directed. 100 strip 3     blood glucose monitoring (ACCU-CHEK GANGA PLUS) meter device kit USE TO TEST BLOOD SUGAR 1 TIMES DAILY OR AS DIRECTED.  0     blood glucose monitoring (ACCU-CHEK MULTICLIX) lancets USE TO TEST BLOOD SUGAR 1 TIMES DAILY OR AS DIRECTED.  3     EPINEPHrine (EPIPEN/ADRENACLICK/OR ANY BX GENERIC EQUIV) 0.3 MG/0.3ML injection 2-pack Inject 0.3 mLs (0.3 mg) into the muscle once as needed for anaphylaxis 1 each 1     Allergies   Allergen Reactions     Sulfamethoxazole Swelling      throat     Recent Labs   Lab Test 11/08/19  0650 11/29/18  0811 06/28/17  0946  02/01/17  1122  01/23/17  1000   A1C 7.8* 6.5* 7.3*   < >  --    < >  --    LDL 63 37  --   --  75  --   --    HDL 32* 33*  --   --  32*  --   --    TRIG 198* 340*  --   --  303*  --   --    CR 0.83 0.89 0.66   < >  --   --  0.61*   GFRESTIMATED >90 88 >90  Non  GFR Calc     < >  --   --  >90  Non  GFR Calc     GFRESTBLACK >90 >90 >90  African American GFR Calc     < >  --   --  >90  African American GFR Calc     POTASSIUM 4.6 5.2 4.7   < >  --   --  4.3   TSH  --  3.07  --   --   --   --  1.60    < > = values in this interval not displayed.        Reviewed and updated as needed this visit by clinical staff  Tobacco  Allergies  Meds  Problems  Med Hx  Surg Hx  Fam Hx  Soc Hx          Reviewed and updated as needed this visit by Provider  Tobacco  Allergies  Meds  Problems  Med Hx  Surg Hx  Fam Hx        History reviewed. No pertinent past medical history.   History reviewed. No pertinent surgical history.    ROS:  CONSTITUTIONAL: NEGATIVE for fever, chills, change in weight  INTEGUMENTARY/SKIN: NEGATIVE for worrisome rashes, moles or lesions  EYES: NEGATIVE for vision changes or irritation  ENT: NEGATIVE for ear, mouth and throat problems  RESP: NEGATIVE for significant cough or SOB  CV: NEGATIVE for chest pain, palpitations or peripheral edema  GI: NEGATIVE for nausea, abdominal pain, heartburn, or change in bowel habits   male: negative for dysuria, hematuria, decreased urinary stream, erectile dysfunction, urethral discharge  MUSCULOSKELETAL: NEGATIVE for significant arthralgias or myalgia  NEURO: NEGATIVE for weakness, dizziness or paresthesias  ENDOCRINE: NEGATIVE for temperature intolerance, skin/hair changes  HEME/ALLERGY/IMMUNE: NEGATIVE for bleeding problems  PSYCHIATRIC: NEGATIVE for changes in mood or affect    Patient denies BM or urinary changes.  No fam hx of colon cancer.  No  "fam hx of prostate cancer.    OBJECTIVE:   BP (!) 162/94   Pulse 70   Temp 98.2  F (36.8  C) (Tympanic)   Resp 14   Ht 1.753 m (5' 9\")   Wt 138.2 kg (304 lb 9.6 oz)   SpO2 98%   BMI 44.98 kg/m    EXAM:  GENERAL APPEARANCE: morbidly obese, alert and no distress  EYES: pink conj, no icterus, PERRL, EOMI  HENT: ear canals and TM's normal, nose and mouth without ulcers or lesions, oropharynx clear and oral mucous membranes moist  NECK: no adenopathy, no asymmetry, masses, or scars and thyroid normal to palpation  RESP: lungs clear to auscultation - no rales, rhonchi or wheezes  CV: regular rates and rhythm, normal S1 S2, no S3 or S4, no murmur, click or rub  ABDOMEN: soft, nontender, no hepatosplenomegaly, no masses and bowel sounds normal  RECTAL: normal sphincter tone, no rectal masses, prostate slightly enlarged but smooth, soft and nontender  MS: no musculoskeletal defects are noted and gait is age appropriate without ataxia  SKIN: no suspicious lesions or rashes  NEURO: Normal strength and tone, sensory exam grossly normal, mentation intact and speech normal  FOOT EXAM: round non-draining, non-indurated ulcer on left midfoot; small linear ulcer on the 2nd toe on left with small amt of clear drainage; multiple thickened discolored nails; pedal pulses fair at best bilaterally; monofilament: hypoesthesias bilaterally  Diagnostic Test Results:  No results found for this or any previous visit (from the past 24 hour(s)).  No results found for any visits on 11/12/19.    ASSESSMENT/PLAN:   Juan was seen today for physical.    Diagnoses and all orders for this visit:    Routine general medical examination at a health care facility  Patient was advised on recommended screening and preventive health recommendations.  He verbalized understanding and agreed to the plans below.    Uncontrolled type 2 diabetes mellitus with complication, with long-term current use of insulin (H)  -     atorvastatin (LIPITOR) 20 MG " "tablet; Take 1 tablet (20 mg) by mouth daily  -     metFORMIN (GLUCOPHAGE-XR) 500 MG 24 hr tablet; Take 2 tablets (1,000 mg) by mouth 2 times daily (with meals)  -     AMBULATORY ADULT DIABETES EDUCATOR REFERRAL  -     Hemoglobin A1c; Future  -     Basic metabolic panel; Future  -     FOOT EXAM  -     insulin glargine (BASAGLAR KWIKPEN) 100 UNIT/ML pen; INJECT 23-68 UNITS SUBCUTANEOUS EVERY NIGHT AT BEDTIME AS DIRECTED    Patient was advised of various risks of uncontrolled diabetes. He states he understands it but he admits he has faltered the last several months. He cites being away traveling as a reason for non-adherence to lifestyle changes.  Increased basaglar.  Strongly advised to see DM education again - patient initially reluctant citing he is busy with work, and \"I know what I need to do\". After discussing with him that his uncontrolled conditions do not reflect the outcomes he needs to reduce his risks, he concurred to see DM educators again.  Reinforced carbohydrate control.  Regular exercise reinforced. Regular foot care, annual eye exam.  Flu vaccine every year.  Repeat blood tests in 3 months.    Uncontrolled hypertension  -     losartan (COZAAR) 100 MG tablet; Take 1 tablet (100 mg) by mouth daily  -     metoprolol succinate ER (TOPROL-XL) 200 MG 24 hr tablet; Take 1 tablet (200 mg) by mouth daily Blood pressure check needed for further refills.  Patient was advised BP uncontrolled today.  Reviewed meds with patient.  Adjusted metoprolol dose to optimize management.  Reinforced sodium restriction.  Exercise recommendations reviewed with patient.  Recheck with RN in 2 weeks.     Diabetic ulcer of left midfoot associated with type 2 diabetes mellitus, unspecified ulcer stage (H)  Patient is seeing podiatry.  Patient was advised that with his blood sugars not in good control, he is at risk for progression of the ulcer, infection, and even amputation of the foot.    Diabetic ulcer of toe of left foot " "associated with type 2 diabetes mellitus, unspecified ulcer stage (H)  Patient sees podiatry.    Special screening for malignant neoplasms, colon  -     Fecal colorectal cancer screen (FIT); Future    Controlled type 2 diabetes mellitus without complication, without long-term current use of insulin (H)    Morbid obesity, unspecified obesity type (H)  Discussed risks of obesity: heart disease, stroke, end-organ damage, worsening DM, decreased quality of life  Counselled on diet, exercise and weight loss regimen    In addition to preventive health visit, spent another 25 minutes in counseling and coordination of care as above.    COUNSELING:  Reviewed preventive health counseling, as reflected in patient instructions       Regular exercise       Healthy diet/nutrition       Vision screening       Hearing screening       Aspirin Prophylaxsis       Alcohol Use       Family planning       Safe sex practices/STD prevention       Colon cancer screening       Prostate cancer screening       Osteoporosis Prevention/Bone Health       The 10-year ASCVD risk score (Naren SHOOK Jr., et al., 2013) is: 20.3%    Values used to calculate the score:      Age: 59 years      Sex: Male      Is Non- : No      Diabetic: Yes      Tobacco smoker: No      Systolic Blood Pressure: 162 mmHg      Is BP treated: Yes      HDL Cholesterol: 32 mg/dL      Total Cholesterol: 135 mg/dL    Estimated body mass index is 44.98 kg/m  as calculated from the following:    Height as of this encounter: 1.753 m (5' 9\").    Weight as of this encounter: 138.2 kg (304 lb 9.6 oz).    Weight management plan: Discussed healthy diet and exercise guidelines Pt will also receive further lifestyle reinforcements from DM education     reports that he has never smoked. He has never used smokeless tobacco.      Counseling Resources:  ATP IV Guidelines  Pooled Cohorts Equation Calculator  FRAX Risk Assessment  ICSI Preventive Guidelines  Dietary " Guidelines for Americans, 2010  USDA's MyPlate  ASA Prophylaxis  Lung CA Screening    Mauricio Pearce MD  [facility]

## 2019-11-12 NOTE — PATIENT INSTRUCTIONS
Increase basaglar insulin to 64 units per dose at bedtime.    Increase metoprolol XL to 200 mg daily.    Schedule nurse visit in 2 weeks for blood pressure recheck.  Schedule DIABETES EDUCATOR appointment to optimize diabetes control soon.    Be consistent with low salt, low trans fat and low saturated fat diet.  Eat food rich in omega-3-fatty acids as you tolerate. (salmon, olive oil)  Eat 5 cups of vegetables, fruits and whole grains per day.  Limit starchy food (white rice, white bread, white pasta, white potatoes) to less than a cup per meal.  Minimize sweets, junk food and fastfood. Limit soda beverages to one serving per day; best to avoid it altogether though.    Exercise: moderate intensity sustained for at least 30 mins per episode, goal of 150 mins per week at least  Combine cardiovascular and resistance exercises.  These exercise recommendations are in addition to your daily activity at work or home.  Work on losing weight.      Regular foot care; don't walk barefoot  Annual eye exam.    Flu vaccine every fall (October-November).    Blood tests: schedule lab only appointment in 3 months (February 2020).    Turn in your FIT test at your soonest convenience to screen for colon cancer.    You may get the Shingrix vaccine for shingles if you desire, and after you verify with insurance how they cover the vaccine.    Preventive Health Recommendations  Male Ages 50 - 64    Yearly exam:             See your health care provider every year in order to  o   Review health changes.   o   Discuss preventive care.    o   Review your medicines if your doctor has prescribed any.     Have a cholesterol test every 5 years, or more frequently if you are at risk for high cholesterol/heart disease.     Have a diabetes test (fasting glucose) every three years. If you are at risk for diabetes, you should have this test more often.     Have a colonoscopy at age 50, or have a yearly FIT test (stool test). These exams will check  for colon cancer.      Talk with your health care provider about whether or not a prostate cancer screening test (PSA) is right for you.    You should be tested each year for STDs (sexually transmitted diseases), if you re at risk.     Shots: Get a flu shot each year. Get a tetanus shot every 10 years.     Nutrition:    Eat at least 5 servings of fruits and vegetables daily.     Eat whole-grain bread, whole-wheat pasta and brown rice instead of white grains and rice.     Get adequate Calcium and Vitamin D.     Lifestyle    Exercise for at least 150 minutes a week (30 minutes a day, 5 days a week). This will help you control your weight and prevent disease.     Limit alcohol to one drink per day.     No smoking.     Wear sunscreen to prevent skin cancer.     See your dentist every six months for an exam and cleaning.     See your eye doctor every 1 to 2 years.

## 2019-11-13 ENCOUNTER — TELEPHONE (OUTPATIENT)
Dept: FAMILY MEDICINE | Facility: CLINIC | Age: 59
End: 2019-11-13

## 2019-11-13 DIAGNOSIS — E11.9 CONTROLLED TYPE 2 DIABETES MELLITUS WITHOUT COMPLICATION, WITHOUT LONG-TERM CURRENT USE OF INSULIN (H): ICD-10-CM

## 2019-11-13 NOTE — TELEPHONE ENCOUNTER
Pt has called back and stated he is not interested in this medication for now.    Nichole Lewis  [facility]

## 2019-11-13 NOTE — TELEPHONE ENCOUNTER
Left message for the patient to call the clinic.  Could patient check with his insurance to see what they do pay for and then we can prescribe it.  Many insurances will not pay for it no matter what we prescribe or only so many tablets per month or year. Nichole SMITH RN

## 2019-11-13 NOTE — TELEPHONE ENCOUNTER
"Requested Prescriptions   Pending Prescriptions Disp Refills     insulin glargine (BASAGLAR KWIKPEN) 100 UNIT/ML pen [Pharmacy Med Name: BASAGLAR 100 UNIT/ML KWIKPEN]  1     Sig: INJECT 23-68 UNITS SUBCUTANEOUS EVERY NIGHT AT BEDTIME AS DIRECTED       Long Acting Insulin Protocol Failed - 11/13/2019  9:45 AM        Failed - Blood pressure less than 140/90 in past 6 months     BP Readings from Last 3 Encounters:   11/12/19 (!) 162/94   10/17/19 (!) 191/92   09/23/19 (!) 162/82                 Passed - LDL on file in past 12 months     Recent Labs   Lab Test 11/08/19  0650   LDL 63             Passed - Microalbumin on file in past 12 months     Recent Labs   Lab Test 11/08/19  0650   MICROL 26   UMALCR 25.24*             Passed - Serum creatinine on file in past 12 months     Recent Labs   Lab Test 11/08/19  0650   CR 0.83             Passed - HgbA1C in past 3 or 6 months     If HgbA1C is 8 or greater, it needs to be on file within the past 3 months.  If less than 8, must be on file within the past 6 months.     Recent Labs   Lab Test 11/08/19  0650   A1C 7.8*             Passed - Medication is active on med list        Passed - Patient is age 18 or older        Passed - Recent (6 mo) or future (30 days) visit within the authorizing provider's specialty     Patient had office visit in the last 6 months or has a visit in the next 30 days with authorizing provider or within the authorizing provider's specialty.  See \"Patient Info\" tab in inbasket, or \"Choose Columns\" in Meds & Orders section of the refill encounter.            Last Written Prescription Date:  11/12/19  Last Fill Quantity: 15 mL,  # refills: 3   Last office visit: 11/12/2019 with prescribing provider:  Portia   Future Office Visit:   Next 5 appointments (look out 90 days)    Nov 29, 2019  1:00 PM CST  Nurse Only with AHMET KIM FP/IM RN  [facility]  Arrive at: Clinic [address] " [id number]  [phone]

## 2019-11-13 NOTE — TELEPHONE ENCOUNTER
Diabetes Education Scheduling Outreach #1:    Call to patient to schedule. Left message with phone number to call to schedule.    Plan for 2nd outreach attempt within 1 week.    Joyce Jamison  [facility]  Diabetes and Nutrition Scheduling

## 2019-11-13 NOTE — TELEPHONE ENCOUNTER
Alternative requested : Sildenafil 100mg tablets are not on insurance formulary. Send alternative please. Thanks!

## 2019-11-14 RX ORDER — INSULIN GLARGINE 100 [IU]/ML
INJECTION, SOLUTION SUBCUTANEOUS
Refills: 1 | OUTPATIENT
Start: 2019-11-14

## 2019-11-20 NOTE — TELEPHONE ENCOUNTER
Diabetes Education Scheduling Outreach #2:    Call to patient to schedule. 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 He will call us back to schedule when he is ready.    [facility]  Diabetes and Nutrition Scheduling

## 2019-12-02 NOTE — TELEPHONE ENCOUNTER
"Requested Prescriptions   Pending Prescriptions Disp Refills     atorvastatin (LIPITOR) 10 MG tablet [Pharmacy Med Name: ATORVASTATIN 10 MG TABLET] 60 tablet 0     Sig: TAKE 2 TABLETS BY MOUTH EVERY DAY   Last Written Prescription Date:  11/12/19  Last Fill Quantity: 90 tab,  # refills: 3   Last office visit: 11/12/2019 with prescribing provider:  Mauricio Pearce     Future Office Visit:   Next 5 appointments (look out 90 days)    Dec 09, 2019  1:00 PM CST  Nurse Only with [facility] FP/IM RN  [facility]  Arrive at: Clinic [address] [facility] [facility] [address]  [phone]             Statins Protocol Passed - 11/30/2019  9:35 AM        Passed - LDL on file in past 12 months     Recent Labs   Lab Test 11/08/19  0650   LDL 63             Passed - No abnormal creatine kinase in past 12 months     No lab results found.             Passed - Recent (12 mo) or future (30 days) visit within the authorizing provider's specialty     Patient has had an office visit with the authorizing provider or a provider within the authorizing providers department within the previous 12 mos or has a future within next 30 days. See \"Patient Info\" tab in inbasket, or \"Choose Columns\" in Meds & Orders section of the refill encounter.              Passed - Medication is active on med list        Passed - Patient is age 18 or older          "

## 2019-12-03 RX ORDER — ATORVASTATIN CALCIUM 10 MG/1
TABLET, FILM COATED ORAL
Qty: 60 TABLET | Refills: 0 | OUTPATIENT
Start: 2019-12-03

## 2019-12-03 NOTE — TELEPHONE ENCOUNTER
I called pharmacy, this dose was adjusted at the 11/12/19 visit, and they do have that Rx, will fill from there.   Martina Hill RNC

## 2019-12-04 NOTE — TELEPHONE ENCOUNTER
"Requested Prescriptions   Pending Prescriptions Disp Refills     metFORMIN (GLUCOPHAGE-XR) 500 MG 24 hr tablet [Pharmacy Med Name: METFORMIN HCL  MG TABLET] 120 tablet 0     Sig: TAKE 2 TABLETS (1,000 MG) BY MOUTH 2 TIMES DAILY (WITH MEALS) NEED TO BE SEEN FOR FURTHER REFILLS       Biguanide Agents Failed - 12/4/2019 12:34 PM        Failed - Blood pressure less than 140/90 in past 6 months     BP Readings from Last 3 Encounters:   11/12/19 (!) 162/94   10/17/19 (!) 191/92   09/23/19 (!) 162/82                 Passed - Patient has documented LDL within the past 12 mos.     Recent Labs   Lab Test 11/08/19  0650   LDL 63             Passed - Patient has had a Microalbumin in the past 15 mos.     Recent Labs   Lab Test 11/08/19  0650   MICROL 26   UMALCR 25.24*             Passed - Patient is age 10 or older        Passed - Patient has documented A1c within the specified period of time.     If HgbA1C is 8 or greater, it needs to be on file within the past 3 months.  If less than 8, must be on file within the past 6 months.     Recent Labs   Lab Test 11/08/19  0650   A1C 7.8*             Passed - Patient's CR is NOT>1.4 OR Patient's EGFR is NOT<45 within past 12 mos.     Recent Labs   Lab Test 11/08/19  0650   GFRESTIMATED >90   GFRESTBLACK >90       Recent Labs   Lab Test 11/08/19  0650   CR 0.83             Passed - Patient does NOT have a diagnosis of CHF.        Passed - Medication is active on med list        Passed - Recent (6 mo) or future (30 days) visit within the authorizing provider's specialty     Patient had office visit in the last 6 months or has a visit in the next 30 days with authorizing provider or within the authorizing provider's specialty.  See \"Patient Info\" tab in inbasket, or \"Choose Columns\" in Meds & Orders section of the refill encounter.            Last Written Prescription Date:  11/12/19  Last Fill Quantity: 120,  # refills: 0   Last office visit: 11/12/2019 with prescribing provider: "  Pearce   Future Office Visit:   Next 5 appointments (look out 90 days)    Dec 09, 2019  1:00 PM CST  Nurse Only with [facility] FP/IM RN  [facility]  Arrive at: Clinic [address] [facility] [id number] [phone]

## 2019-12-09 RX ORDER — METFORMIN HCL 500 MG
1000 TABLET, EXTENDED RELEASE 24 HR ORAL 2 TIMES DAILY WITH MEALS
Qty: 120 TABLET | Refills: 3 | Status: SHIPPED | OUTPATIENT
Start: 2019-12-09 | End: 2020-09-08

## 2019-12-09 NOTE — TELEPHONE ENCOUNTER
Routing refill request to provider for review/approval because:  Labs out of range:  See below  Blood pressure not in range.    Liliana Jackson RN

## 2019-12-13 ENCOUNTER — TELEPHONE (OUTPATIENT)
Dept: FAMILY MEDICINE | Facility: CLINIC | Age: 59
End: 2019-12-13

## 2019-12-13 ENCOUNTER — ALLIED HEALTH/NURSE VISIT (OUTPATIENT)
Dept: FAMILY MEDICINE | Facility: CLINIC | Age: 59
End: 2019-12-13
Payer: COMMERCIAL

## 2019-12-13 VITALS — DIASTOLIC BLOOD PRESSURE: 80 MMHG | HEART RATE: 68 BPM | OXYGEN SATURATION: 99 % | SYSTOLIC BLOOD PRESSURE: 170 MMHG

## 2019-12-13 DIAGNOSIS — I10 UNCONTROLLED HYPERTENSION: Primary | ICD-10-CM

## 2019-12-13 PROCEDURE — 99207 ZZC NO CHARGE NURSE ONLY: CPT

## 2019-12-13 RX ORDER — HYDROCHLOROTHIAZIDE 12.5 MG/1
12.5 TABLET ORAL DAILY
Qty: 90 TABLET | Refills: 0 | Status: SHIPPED | OUTPATIENT
Start: 2019-12-13 | End: 2020-03-04
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
## 2019-12-13 NOTE — TELEPHONE ENCOUNTER
Still high BP at home.   maxed out on losartan and metoprolol XL.  BP goal for him is still less than 140/90.    Plan:  Add hydrochlorothiazide 12.5 mg daily.  Patient is advised to be very consistent with sodium restrictions.  Recheck BP in 1 month.  Rx has been sent to pharmacy.

## 2019-12-16 ENCOUNTER — OFFICE VISIT (OUTPATIENT)
Dept: PODIATRY | Facility: CLINIC | Age: 59
End: 2019-12-16
Payer: COMMERCIAL

## 2019-12-16 VITALS
HEART RATE: 65 BPM | SYSTOLIC BLOOD PRESSURE: 192 MMHG | BODY MASS INDEX: 45.03 KG/M2 | WEIGHT: 304 LBS | HEIGHT: 69 IN | DIASTOLIC BLOOD PRESSURE: 94 MMHG

## 2019-12-16 DIAGNOSIS — E11.621 DIABETIC ULCER OF LEFT MIDFOOT ASSOCIATED WITH TYPE 2 DIABETES MELLITUS, WITH MUSCLE INVOLVEMENT WITHOUT EVIDENCE OF NECROSIS (H): Primary | ICD-10-CM

## 2019-12-16 DIAGNOSIS — L97.425 DIABETIC ULCER OF LEFT MIDFOOT ASSOCIATED WITH TYPE 2 DIABETES MELLITUS, WITH MUSCLE INVOLVEMENT WITHOUT EVIDENCE OF NECROSIS (H): Primary | ICD-10-CM

## 2019-12-16 PROCEDURE — 11042 DBRDMT SUBQ TIS 1ST 20SQCM/<: CPT | Performed by: PODIATRIST

## 2019-12-16 PROCEDURE — 99213 OFFICE O/P EST LOW 20 MIN: CPT | Mod: 25 | Performed by: PODIATRIST

## 2019-12-16 RX ORDER — AMOXICILLIN AND CLAVULANATE POTASSIUM 562.5; 437.5; 62.5 MG/1; MG/1; MG/1
2 TABLET, MULTILAYER, EXTENDED RELEASE ORAL 2 TIMES DAILY
Qty: 56 TABLET | Refills: 0 | Status: SHIPPED | OUTPATIENT
Start: 2019-12-16 | End: 2019-12-30

## 2019-12-16 RX ORDER — ATORVASTATIN CALCIUM 10 MG/1
TABLET, FILM COATED ORAL
Refills: 0 | COMMUNITY
Start: 2019-11-05 | End: 2019-12-16

## 2019-12-16 ASSESSMENT — MIFFLIN-ST. JEOR: SCORE: 2194.31

## 2019-12-16 NOTE — PROGRESS NOTES
Juan returns to the office for reevaluation of the left foot.  The patient relates following the instructions given at the last visit with noted improved healing.  The patient relates trying to stay off his left foot as much as possible.  The patient relates that he is busy and work right now.  The patient relates surrounding redness and slight drainage from the ulcer on the left foot.  The patient denies any fevers or chills.    PAST MEDICAL HISTORY: Type 2 diabetes with peripheral neuropathy  BMI= Body mass index is 44.89 kg/m .    Weight management plan: Patient was referred to their PCP to discuss a diet and exercise plan.    Physical Exam:    General: The patient appears to have a pleasant mental affect.    Lower extremity physical exam:  Neurovascular status is intact with palpable pedal pulses and intact epicritic sensations.  Muscular exam is within normal limits to major muscle groups.  Integument is intact.      One notes decreased edema.  One notes a full-thickness ulcer on the plantar aspect of the left foot with surrounding hyperkeratotic skin.  One notes a granular base.  Negative tracking negative probe to bone.  Mild malodor noted.         Assessment:      ICD-10-CM    1. Diabetic ulcer of left midfoot associated with type 2 diabetes mellitus, with muscle involvement without evidence of necrosis (H) E11.621     L97.425        Plan:  I have explained to Juan about the conditions.  At this time, the ulcer was sharply debrided with a #10 blade down to healthy bleeding subcutaneous tissue.  The wound was dressed with an Arthrex jumpstart dressing 4 x 4's and an Ace wrap.  The patient was instructed how to change the dressing weekly.  The patient was prescribed Augmentin 1000 mg twice daily for 2 weeks.  The patient was referred to [facility] for further evaluation and treatment options for the wound.  The patient was strongly encouraged to remain nonweightbearing as much as he can  to promote faster healing.  The patient was made aware of the risks of potential loss of limb due to potential infection of the ulceration on the left foot.    Disclaimer: This note consists of symbols derived from keyboarding, dictation and/or voice recognition software. As a result, there may be errors in the script that have gone undetected. Please consider this when interpreting information found in this chart.       JESUS Boston D.P.M., F.RIRI.C.F.A.S.

## 2019-12-16 NOTE — LETTER
12/16/2019         RE: Juan Balderrama  [address] [place] [address]        Dear Colleague,    Thank you for referring your patient, Juan Balderrama, to the [facility]. Please see a copy of my visit note below.    Juan returns to the office for reevaluation of the left foot.  The patient relates following the instructions given at the last visit with noted improved healing.  The patient relates trying to stay off his left foot as much as possible.  The patient relates that he is busy and work right now.  The patient relates surrounding redness and slight drainage from the ulcer on the left foot.  The patient denies any fevers or chills.    PAST MEDICAL HISTORY: Type 2 diabetes with peripheral neuropathy  BMI= Body mass index is 44.89 kg/m .    Weight management plan: Patient was referred to their PCP to discuss a diet and exercise plan.    Physical Exam:    General: The patient appears to have a pleasant mental affect.    Lower extremity physical exam:  Neurovascular status is intact with palpable pedal pulses and intact epicritic sensations.  Muscular exam is within normal limits to major muscle groups.  Integument is intact.      One notes decreased edema.  One notes a full-thickness ulcer on the plantar aspect of the left foot with surrounding hyperkeratotic skin.  One notes a granular base.  Negative tracking negative probe to bone.  Mild malodor noted.         Assessment:      ICD-10-CM    1. Diabetic ulcer of left midfoot associated with type 2 diabetes mellitus, with muscle involvement without evidence of necrosis (H) E11.621     L97.425        Plan:  I have explained to Juan about the conditions.  At this time, the ulcer was sharply debrided with a #10 blade down to healthy bleeding subcutaneous tissue.  The wound was dressed with an Arthrex jumpstart dressing 4 x 4's and an Ace wrap.  The patient was instructed how to change the dressing weekly.  The  patient was prescribed Augmentin 1000 mg twice daily for 2 weeks.  The patient was referred to [facility] for further evaluation and treatment options for the wound.  The patient was strongly encouraged to remain nonweightbearing as much as he can to promote faster healing.  The patient was made aware of the risks of potential loss of limb due to potential infection of the ulceration on the left foot.    Disclaimer: This note consists of symbols derived from keyboarding, dictation and/or voice recognition software. As a result, there may be errors in the script that have gone undetected. Please consider this when interpreting information found in this chart.       JESUS Boston D.P.M., F.RIRI.C.F.A.S.      Again, thank you for allowing me to participate in the care of your patient.        Sincerely,        Joel Boston DPM

## 2019-12-16 NOTE — NURSING NOTE
"Chief Complaint   Patient presents with     Wound Check     ulcer on the bottom left foot \"better since last visit in October\"       Initial BP (!) 192/94   Pulse 65   Ht 1.753 m (5' 9\")   Wt 137.9 kg (304 lb)   BMI 44.89 kg/m   Estimated body mass index is 44.89 kg/m  as calculated from the following:    Height as of this encounter: 1.753 m (5' 9\").    Weight as of this encounter: 137.9 kg (304 lb).  Medications and allergies reviewed.      Aissatou NAYAK MA    "

## 2020-02-06 NOTE — TELEPHONE ENCOUNTER
"Requested Prescriptions   Pending Prescriptions Disp Refills     insulin glargine (BASAGLAR KWIKPEN) 100 UNIT/ML pen [Pharmacy Med Name: BASAGLAR 100 UNIT/ML KWIKPEN]  Last Written Prescription Date:  11/12/19  Last Fill Quantity: 15,  # refills: 3   Last Office Visit with [facility], P or [facility] prescribing provider:  11/12/19   Future Office Visit:      15 mL 3     Sig: INJECT 23-68 UNITS SUBCUTANEOUS EVERY NIGHT AT BEDTIME AS DIRECTED       Long Acting Insulin Protocol Failed - 2/6/2020 10:26 AM        Failed - Blood pressure less than 140/90 in past 6 months     BP Readings from Last 3 Encounters:   12/16/19 (!) 192/94   12/13/19 (!) 170/80   11/12/19 (!) 162/94                 Passed - LDL on file in past 12 months     Recent Labs   Lab Test 11/08/19  0650   LDL 63             Passed - Microalbumin on file in past 12 months     Recent Labs   Lab Test 11/08/19  0650   MICROL 26   UMALCR 25.24*             Passed - Serum creatinine on file in past 12 months     Recent Labs   Lab Test 11/08/19  0650   CR 0.83             Passed - HgbA1C in past 3 or 6 months     If HgbA1C is 8 or greater, it needs to be on file within the past 3 months.  If less than 8, must be on file within the past 6 months.     Recent Labs   Lab Test 11/08/19  0650   A1C 7.8*             Passed - Medication is active on med list        Passed - Patient is age 18 or older        Passed - Recent (6 mo) or future (30 days) visit within the authorizing provider's specialty     Patient had office visit in the last 6 months or has a visit in the next 30 days with authorizing provider or within the authorizing provider's specialty.  See \"Patient Info\" tab in inbasket, or \"Choose Columns\" in Meds & Orders section of the refill encounter.              "

## 2020-02-07 RX ORDER — INSULIN GLARGINE 100 [IU]/ML
INJECTION, SOLUTION SUBCUTANEOUS
Qty: 15 ML | Refills: 3 | Status: SHIPPED | OUTPATIENT
Start: 2020-02-07 | End: 2020-04-28

## 2020-02-07 NOTE — TELEPHONE ENCOUNTER
Routing refill request to provider for review/approval because:  BP not at goal  Due for labs; last OV 11/12/19: 'Blood tests: schedule lab only appointment in 3 months (February 2020).'    Joan BRANDT RN

## 2020-02-10 ENCOUNTER — TELEPHONE (OUTPATIENT)
Dept: FAMILY MEDICINE | Facility: CLINIC | Age: 60
End: 2020-02-10

## 2020-02-10 DIAGNOSIS — I10 UNCONTROLLED HYPERTENSION: ICD-10-CM

## 2020-02-10 DIAGNOSIS — I10 BENIGN ESSENTIAL HYPERTENSION: Primary | ICD-10-CM

## 2020-02-10 RX ORDER — LOSARTAN POTASSIUM 50 MG/1
100 TABLET ORAL DAILY
Qty: 180 TABLET | Refills: 0 | Status: SHIPPED | OUTPATIENT
Start: 2020-02-10 | End: 2020-05-07

## 2020-02-10 NOTE — TELEPHONE ENCOUNTER
Alternative requested for Losartan Potassium 100 mg tabs. Can we get a new order asap for 2 of the 50 mg tabs as the 100 mg tabs are on back order?

## 2020-03-01 DIAGNOSIS — I10 UNCONTROLLED HYPERTENSION: ICD-10-CM

## 2020-03-01 NOTE — LETTER
[facility]  [address] [address]  Phone: [phone]       March 5, 2020         Juan Balderrama  [address] [place] [address]            Dear Juan:    We are concerned about your health care.  We recently provided you with medication refills.  Many medications require routine follow-up with your doctor.    Your prescription(s) have been refilled for 30 days so you may have time for the above noted follow-up. Please call to schedule soon so we can assure you have an appointment before your next refills are needed.    Thank you,      Mauricio Pearce MD / dolly

## 2020-03-02 NOTE — TELEPHONE ENCOUNTER
"Requested Prescriptions   Pending Prescriptions Disp Refills     hydrochlorothiazide (HYDRODIURIL) 12.5 MG tablet [Pharmacy Med Name: HYDROCHLOROTHIAZIDE 12.5 MG TB]  Last Written Prescription Date:  12/13/19  Last Fill Quantity: 90,  # refills: 0   Last Office Visit with [facility], [facility] or [facility] prescribing provider:  11/12/19   Future Office Visit:      90 tablet 0     Sig: TAKE 1 TABLET BY MOUTH EVERY DAY       Diuretics (Including Combos) Protocol Failed - 3/1/2020  3:39 PM        Failed - Blood pressure under 140/90 in past 12 months     BP Readings from Last 3 Encounters:   12/16/19 (!) 192/94   12/13/19 (!) 170/80   11/12/19 (!) 162/94                 Failed - Normal serum sodium on file in past 12 months     Recent Labs   Lab Test 11/08/19  0650   *              Passed - Recent (12 mo) or future (30 days) visit within the authorizing provider's specialty     Patient has had an office visit with the authorizing provider or a provider within the authorizing providers department within the previous 12 mos or has a future within next 30 days. See \"Patient Info\" tab in inbasket, or \"Choose Columns\" in Meds & Orders section of the refill encounter.              Passed - Medication is active on med list        Passed - Patient is age 18 or older        Passed - Normal serum creatinine on file in past 12 months     Recent Labs   Lab Test 11/08/19  0650   CR 0.83              Passed - Normal serum potassium on file in past 12 months     Recent Labs   Lab Test 11/08/19  0650   POTASSIUM 4.6                      "

## 2020-03-03 NOTE — TELEPHONE ENCOUNTER
Routing refill request to provider for review/approval because:  Labs out of range:  sodium  BP not at goal    Joan BRANDT RN

## 2020-03-04 RX ORDER — HYDROCHLOROTHIAZIDE 12.5 MG/1
TABLET ORAL
Qty: 30 TABLET | Refills: 0 | Status: SHIPPED | OUTPATIENT
Start: 2020-03-04 | End: 2020-03-30

## 2020-03-28 ENCOUNTER — TELEPHONE (OUTPATIENT)
Dept: FAMILY MEDICINE | Facility: CLINIC | Age: 60
End: 2020-03-28

## 2020-03-28 DIAGNOSIS — I10 UNCONTROLLED HYPERTENSION: ICD-10-CM

## 2020-03-30 RX ORDER — HYDROCHLOROTHIAZIDE 12.5 MG/1
TABLET ORAL
Qty: 30 TABLET | Refills: 0 | Status: SHIPPED | OUTPATIENT
Start: 2020-03-30 | End: 2020-04-27

## 2020-03-30 NOTE — TELEPHONE ENCOUNTER
Refilled #30. His last recorded blood pressure is quite high (196/94). Please request patient to schedule nurse visit at his soonest convenience to recheck blood pressure, and necessary medication adjustment can be made. When patient comes in, a BMP will be drawn as well.

## 2020-03-31 NOTE — TELEPHONE ENCOUNTER
Noted that patient declined to present to clinic even for just a blood pressure check.  Will wait for his home measurements.  When patient does call about this, please advise patient of risks of uncontrolled blood pressure, or risks of possibly getting inaccurate readings at home as well.  Further recommendations will be given once patient reports his home readings.    *If patient does not report in 7 days by phone, please follow up with a phone call.

## 2020-03-31 NOTE — TELEPHONE ENCOUNTER
Dr. Pearce,    Patient states he takes his bp readings at home and is not coming in right now.  He will call us back with home readings.  He will come in later to have the lab work also. Nichole SMITH RN

## 2020-04-13 NOTE — TELEPHONE ENCOUNTER
"I reached him, \"I was on it before, and it seems to really help. \"  He says he understands Dr Pearce's concern, and will send us some BP readings on [employer].   He also declines to come in for the BMP now. \"later\"     Martina Hill RNC    "

## 2020-04-14 ENCOUNTER — MYC MEDICAL ADVICE (OUTPATIENT)
Dept: FAMILY MEDICINE | Facility: CLINIC | Age: 60
End: 2020-04-14

## 2020-04-14 NOTE — TELEPHONE ENCOUNTER
Increase hydrochlorothiazide 12.5 mg to 2 tablets daily.  Continue all other medications as prescribed. No medications will be discontinued.  Be consistent with sodium restrictions.  Patient may continue to measure BP at home daily and report measurements in 7-10 days.

## 2020-04-24 ENCOUNTER — TELEPHONE (OUTPATIENT)
Dept: FAMILY MEDICINE | Facility: CLINIC | Age: 60
End: 2020-04-24

## 2020-04-24 DIAGNOSIS — I10 UNCONTROLLED HYPERTENSION: ICD-10-CM

## 2020-04-27 DIAGNOSIS — I10 UNCONTROLLED HYPERTENSION: ICD-10-CM

## 2020-04-27 RX ORDER — HYDROCHLOROTHIAZIDE 12.5 MG/1
TABLET ORAL
Qty: 30 TABLET | Refills: 0 | Status: SHIPPED | OUTPATIENT
Start: 2020-04-27 | End: 2020-04-27

## 2020-04-27 RX ORDER — HYDROCHLOROTHIAZIDE 25 MG/1
25 TABLET ORAL DAILY
Qty: 30 TABLET | Refills: 0 | Status: SHIPPED | OUTPATIENT
Start: 2020-04-27 | End: 2020-05-21

## 2020-04-27 NOTE — TELEPHONE ENCOUNTER
Routing refill request to provider for review/approval because:  Labs not current:    Sodium   Date Value Ref Range Status   11/08/2019 132 (L) 133 - 144 mmol/L Final     BP over goal.     BP Readings from Last 3 Encounters:   12/16/19 (!) 192/94   12/13/19 (!) 170/80   11/12/19 (!) 162/94      MARIA A IveyN, RN

## 2020-04-27 NOTE — TELEPHONE ENCOUNTER
Refilled #30.  Please call patient to have him schedule nurse visit appointment for blood pressure recheck. After the last [employer] message, he has not reported home BPs.   Patient will need at least a clinic BP recheck with RN before any other refill is sent.

## 2020-04-27 NOTE — TELEPHONE ENCOUNTER
"Requested Prescriptions   Pending Prescriptions Disp Refills     hydrochlorothiazide (HYDRODIURIL) 12.5 MG tablet [Pharmacy Med Name: HYDROCHLOROTHIAZIDE 12.5 MG TB] 30 tablet 0     Sig: TAKE 1 TABLET BY MOUTH EVERY DAY       Diuretics (Including Combos) Protocol Failed - 4/24/2020  1:32 PM        Failed - Blood pressure under 140/90 in past 12 months     BP Readings from Last 3 Encounters:   12/16/19 (!) 192/94   12/13/19 (!) 170/80   11/12/19 (!) 162/94                 Failed - Normal serum sodium on file in past 12 months     Recent Labs   Lab Test 11/08/19  0650   *              Passed - Recent (12 mo) or future (30 days) visit within the authorizing provider's specialty     Patient has had an office visit with the authorizing provider or a provider within the authorizing providers department within the previous 12 mos or has a future within next 30 days. See \"Patient Info\" tab in inbasket, or \"Choose Columns\" in Meds & Orders section of the refill encounter.              Passed - Medication is active on med list        Passed - Patient is age 18 or older        Passed - Normal serum creatinine on file in past 12 months     Recent Labs   Lab Test 11/08/19  0650   CR 0.83              Passed - Normal serum potassium on file in past 12 months     Recent Labs   Lab Test 11/08/19  0650   POTASSIUM 4.6                       Last Written Prescription Date:  3/30/2020  Last Fill Quantity: 30,  # refills: 0   Last office visit: 11/12/2019 with prescribing provider:  Portia   Future Office Visit:      "

## 2020-04-28 RX ORDER — INSULIN GLARGINE 100 [IU]/ML
INJECTION, SOLUTION SUBCUTANEOUS
Qty: 15 ML | Refills: 0 | Status: SHIPPED | OUTPATIENT
Start: 2020-04-28 | End: 2020-05-18

## 2020-04-28 RX ORDER — AMLODIPINE BESYLATE 2.5 MG/1
2.5 TABLET ORAL DAILY
Qty: 90 TABLET | Refills: 0 | Status: SHIPPED | OUTPATIENT
Start: 2020-04-28 | End: 2020-07-10

## 2020-04-28 NOTE — TELEPHONE ENCOUNTER
Dr. Pearce,    Patient is contacted and he will not be coming into the clinic for a Bp ck any time soon.  Afraid of COVID.  He states he takes his bp's at home and last sent readings to you on 4/14/20  Last 4 Blood Pressures recorded this week:     156/86  152/85  145/84  149/84     He is also in need of a refill on his insulin.  Pended for you he is taking 60 units at bedtime. Nichole SMITH RN

## 2020-04-28 NOTE — TELEPHONE ENCOUNTER
"This was the recommendation on 4/14/2020:  \"Dr. Pearce has reviewed these blood pressures and has the following recommendations:     Increase hydrochlorothiazide 12.5 mg to 2 tablets daily.  Continue all other medications as prescribed. No medications will be discontinued.  Be consistent with sodium restrictions.  Please continue to measure blood pressure at home daily and report measurements in 7-10 days.     Thank you,      Joan BRANDT RN, BSN for Dr. Pearce\"    So, patient never gave his home BP until today.  Blood pressure is still uncontrolled based on his reported blood pressures.  He is maxed out on his medication doses: metoprolol xl, losartan and hydrochlorothiazide.  Add amlodipine 2.5 mg daily.  Check resting BPs at home daily.  If patient still refuses to report to clinic, schedule a video visit in 1-2 weeks.  Remind patient that if he is measuring BP at home greater than 140/90 consistently, he should be contacting the clinic care team for further management.    He is also overdue for his labs, so he still is advised to schedule his lab appointment. He is strongly advised to do this within the next 7 days.    "

## 2020-04-29 RX ORDER — INSULIN GLARGINE 100 [IU]/ML
INJECTION, SOLUTION SUBCUTANEOUS
Refills: 3 | OUTPATIENT
Start: 2020-04-29

## 2020-04-29 NOTE — TELEPHONE ENCOUNTER
Signed Yesterday (4/28/2020):    insulin glargine (BASAGLAR KWIKPEN) 100 UNIT/ML pen    Sig: INJECT 23-68 UNITS SUBCUTANEOUS EVERY NIGHT AT BEDTIME AS DIRECTED    Disp:  15 mL    Refills:  0    Signed by: Mauricio Pearce MD      Duplicate.      Arcelia CALDWELL BSN, RN

## 2020-04-29 NOTE — TELEPHONE ENCOUNTER
"Requested Prescriptions   Pending Prescriptions Disp Refills     insulin glargine (BASAGLAR KWIKPEN) 100 UNIT/ML pen [Pharmacy Med Name: BASAGLAR 100 UNIT/ML KWIKPEN]  3     Sig: INJECT 23-68 UNITS SUBCUTANEOUS EVERY NIGHT AT BEDTIME AS DIRECTED       Long Acting Insulin Protocol Passed - 4/27/2020  5:26 PM        Passed - Serum creatinine on file in past 12 months     Recent Labs   Lab Test 11/08/19  0650   CR 0.83       Ok to refill medication if creatinine is low          Passed - HgbA1C in past 3 or 6 months     If HgbA1C is 8 or greater, it needs to be on file within the past 3 months.  If less than 8, must be on file within the past 6 months.     Recent Labs   Lab Test 11/08/19  0650   A1C 7.8*             Passed - Medication is active on med list        Passed - Patient is age 18 or older        Passed - Recent (6 mo) or future (30 days) visit within the authorizing provider's specialty     Patient had office visit in the last 6 months or has a visit in the next 30 days with authorizing provider or within the authorizing provider's specialty.  See \"Patient Info\" tab in inbasket, or \"Choose Columns\" in Meds & Orders section of the refill encounter.               Last Written Prescription Date:  4/28/20  Last Fill Quantity: 15,  # refills: 0   Last office visit: 12/13/2019 with prescribing provider:  Portia   Future Office Visit:      "

## 2020-05-04 DIAGNOSIS — I10 UNCONTROLLED HYPERTENSION: ICD-10-CM

## 2020-05-04 NOTE — LETTER
May 7, 2020      Juan Balderrama  [address] [place] [address]        Dear Juan,       We received a refill request for your losartan medication.  This medication has been refilled for 30 days as you are due for an office visit for further refills.  Please call [phone] to schedule an appointment.      Sincerely,        Mauricio Pearce MD

## 2020-05-06 NOTE — TELEPHONE ENCOUNTER
"Requested Prescriptions   Pending Prescriptions Disp Refills     losartan (COZAAR) 50 MG tablet [Pharmacy Med Name: LOSARTAN POTASSIUM 50 MG TAB] 180 tablet 0     Sig: TAKE 2 TABLETS BY MOUTH EVERY DAY       Angiotensin-II Receptors Failed - 5/4/2020 12:31 AM        Failed - Last blood pressure under 140/90 in past 12 months     BP Readings from Last 3 Encounters:   12/16/19 (!) 192/94   12/13/19 (!) 170/80   11/12/19 (!) 162/94                 Passed - Recent (12 mo) or future (30 days) visit within the authorizing provider's specialty     Patient has had an office visit with the authorizing provider or a provider within the authorizing providers department within the previous 12 mos or has a future within next 30 days. See \"Patient Info\" tab in inbasket, or \"Choose Columns\" in Meds & Orders section of the refill encounter.              Passed - Medication is active on med list        Passed - Patient is age 18 or older        Passed - Normal serum creatinine on file in past 12 months     Recent Labs   Lab Test 11/08/19  0650   CR 0.83       Ok to refill medication if creatinine is low          Passed - Normal serum potassium on file in past 12 months     Recent Labs   Lab Test 11/08/19  0650   POTASSIUM 4.6                       Last Written Prescription Date:  2/10/20  Last Fill Quantity: 180,  # refills: 0   Last office visit: 11/12/2019 with prescribing provider:  Portia   Future Office Visit:            "

## 2020-05-07 RX ORDER — LOSARTAN POTASSIUM 50 MG/1
100 TABLET ORAL DAILY
Qty: 60 TABLET | Refills: 0 | Status: SHIPPED | OUTPATIENT
Start: 2020-05-07 | End: 2020-05-28

## 2020-05-07 NOTE — TELEPHONE ENCOUNTER
Routing refill request to provider for review/approval because:  B/P not at goal.    AMANDEEP Berry

## 2020-05-15 ENCOUNTER — TELEPHONE (OUTPATIENT)
Dept: FAMILY MEDICINE | Facility: CLINIC | Age: 60
End: 2020-05-15

## 2020-05-15 NOTE — TELEPHONE ENCOUNTER
"Requested Prescriptions   Pending Prescriptions Disp Refills     insulin glargine (BASAGLAR KWIKPEN) 100 UNIT/ML pen [Pharmacy Med Name: BASAGLAR 100 UNIT/ML KWIKPEN]  Last Written Prescription Date:  4/28/20  Last Fill Quantity: 15,  # refills: 0   Last Office Visit with [facility], [facility] or [facility] prescribing provider:  11/12/19   Future Office Visit:       0     Sig: INJECT 23-68 UNITS SUBCUTANEOUS EVERY NIGHT AT BEDTIME AS DIRECTED       Long Acting Insulin Protocol Failed - 5/15/2020  1:46 AM        Failed - HgbA1C in past 3 or 6 months     If HgbA1C is 8 or greater, it needs to be on file within the past 3 months.  If less than 8, must be on file within the past 6 months.     Recent Labs   Lab Test 11/08/19  0650   A1C 7.8*             Failed - Recent (6 mo) or future (30 days) visit within the authorizing provider's specialty     Patient had office visit in the last 6 months or has a visit in the next 30 days with authorizing provider or within the authorizing provider's specialty.  See \"Patient Info\" tab in inbasket, or \"Choose Columns\" in Meds & Orders section of the refill encounter.            Passed - Serum creatinine on file in past 12 months     Recent Labs   Lab Test 11/08/19  0650   CR 0.83       Ok to refill medication if creatinine is low          Passed - Medication is active on med list        Passed - Patient is age 18 or older             "

## 2020-05-18 RX ORDER — INSULIN GLARGINE 100 [IU]/ML
INJECTION, SOLUTION SUBCUTANEOUS
Qty: 10 ML | Refills: 0 | Status: SHIPPED | OUTPATIENT
Start: 2020-05-18 | End: 2020-05-28

## 2020-05-18 NOTE — TELEPHONE ENCOUNTER
Routing refill request to provider for review/approval because:  Labs not current:  A1C  Patient was to see DE, but do not see that this has been scheduled and no future appointments noted.    AMANDEEP Berry

## 2020-05-18 NOTE — TELEPHONE ENCOUNTER
Refilled 10 ml one last time until patient schedules diabetes educator consult, schedules a lab appointment, then a provider appointment once lab results are in   [place]

## 2020-05-21 ENCOUNTER — TELEPHONE (OUTPATIENT)
Dept: FAMILY MEDICINE | Facility: CLINIC | Age: 60
End: 2020-05-21

## 2020-05-21 DIAGNOSIS — I10 UNCONTROLLED HYPERTENSION: ICD-10-CM

## 2020-05-21 RX ORDER — HYDROCHLOROTHIAZIDE 25 MG/1
TABLET ORAL
Qty: 15 TABLET | Refills: 0 | Status: SHIPPED | OUTPATIENT
Start: 2020-05-21 | End: 2020-05-28

## 2020-05-21 NOTE — TELEPHONE ENCOUNTER
Refilled 15 tablets one more time. Patient has not made any effort to schedule chronic disease management follow up.  He needs to see a provider as well as get labs done within the next two weeks for further refills.   Please call patient about this.

## 2020-05-21 NOTE — TELEPHONE ENCOUNTER
"Requested Prescriptions   Pending Prescriptions Disp Refills     hydrochlorothiazide (HYDRODIURIL) 25 MG tablet [Pharmacy Med Name: HYDROCHLOROTHIAZIDE 25 MG TAB] 30 tablet 0     Sig: TAKE 1 TABLET BY MOUTH EVERY DAY       Diuretics (Including Combos) Protocol Failed - 5/21/2020 11:32 AM        Failed - Blood pressure under 140/90 in past 12 months     BP Readings from Last 3 Encounters:   12/16/19 (!) 192/94   12/13/19 (!) 170/80   11/12/19 (!) 162/94                 Failed - Normal serum sodium on file in past 12 months     Recent Labs   Lab Test 11/08/19  0650   *              Passed - Recent (12 mo) or future (30 days) visit within the authorizing provider's specialty     Patient has had an office visit with the authorizing provider or a provider within the authorizing providers department within the previous 12 mos or has a future within next 30 days. See \"Patient Info\" tab in inbasket, or \"Choose Columns\" in Meds & Orders section of the refill encounter.              Passed - Medication is active on med list        Passed - Patient is age 18 or older        Passed - Normal serum creatinine on file in past 12 months     Recent Labs   Lab Test 11/08/19  0650   CR 0.83              Passed - Normal serum potassium on file in past 12 months     Recent Labs   Lab Test 11/08/19  0650   POTASSIUM 4.6                       Routing refill request to provider for review/approval because:  Labs out of range:  BP, Sodium        "

## 2020-05-22 NOTE — TELEPHONE ENCOUNTER
Message left for patient to return call to clinic.  CSS - ok to deliver message below.    Aline CASTANON RN BSN

## 2020-05-27 ENCOUNTER — MYC MEDICAL ADVICE (OUTPATIENT)
Dept: FAMILY MEDICINE | Facility: CLINIC | Age: 60
End: 2020-05-27

## 2020-05-27 DIAGNOSIS — I10 UNCONTROLLED HYPERTENSION: ICD-10-CM

## 2020-05-27 NOTE — TELEPHONE ENCOUNTER
Patient does not return calls. I have sent a [employer] message.      MARIA A IveyN, RN

## 2020-05-28 ENCOUNTER — TELEPHONE (OUTPATIENT)
Dept: FAMILY MEDICINE | Facility: CLINIC | Age: 60
End: 2020-05-28

## 2020-05-28 RX ORDER — HYDROCHLOROTHIAZIDE 25 MG/1
25 TABLET ORAL DAILY
Qty: 60 TABLET | Refills: 0 | Status: SHIPPED | OUTPATIENT
Start: 2020-05-28 | End: 2020-06-01

## 2020-05-28 RX ORDER — LOSARTAN POTASSIUM 50 MG/1
100 TABLET ORAL DAILY
Qty: 120 TABLET | Refills: 0 | Status: SHIPPED | OUTPATIENT
Start: 2020-05-28 | End: 2020-09-08

## 2020-05-28 RX ORDER — INSULIN GLARGINE 100 [IU]/ML
INJECTION, SOLUTION SUBCUTANEOUS
Qty: 10 ML | Refills: 1 | Status: SHIPPED | OUTPATIENT
Start: 2020-05-28 | End: 2020-08-07

## 2020-05-28 NOTE — TELEPHONE ENCOUNTER
[facility] pharmacy note regarding losartan (COZAAR) 50 MG tablet :  Cannot get any strength of Losartan at all. Please send alternative.

## 2020-05-28 NOTE — TELEPHONE ENCOUNTER
Routed to provider for alternative.  Losartan (Cozaar) 50 mg, two tabs daily, was sent earlier today.    Naila Odom RN

## 2020-05-28 NOTE — TELEPHONE ENCOUNTER
I verified with our pharmacy as well.  [facility] updated that they have plenty 25's and 50's.  It's the combo that is sometime difficult to get.    Pt has been difficult to reach by phone.    I sent him a [employer] message.    Naila Odom RN

## 2020-05-28 NOTE — TELEPHONE ENCOUNTER
Signed 60 days of medication but will not be refilled beyond July unless he schedules follow up appointment as it is not safe to continue to prescribe without follow up/labs

## 2020-05-28 NOTE — TELEPHONE ENCOUNTER
Our pharmacy at [facility] has this available, would recommend he transfer rx?

## 2020-05-28 NOTE — TELEPHONE ENCOUNTER
See patient's response to having only a 15 day supply of medication sent.      MARIA A IveyN, RN

## 2020-05-29 NOTE — TELEPHONE ENCOUNTER
Patient has viewed his mychart msg about the medication.  Will await his response.    Aline CASTANON RN BSN

## 2020-05-30 DIAGNOSIS — I10 UNCONTROLLED HYPERTENSION: ICD-10-CM

## 2020-06-01 ENCOUNTER — TELEPHONE (OUTPATIENT)
Dept: FAMILY MEDICINE | Facility: CLINIC | Age: 60
End: 2020-06-01

## 2020-06-01 RX ORDER — HYDROCHLOROTHIAZIDE 25 MG/1
25 TABLET ORAL DAILY
Qty: 30 TABLET | Refills: 0 | Status: SHIPPED | OUTPATIENT
Start: 2020-06-01 | End: 2020-07-29

## 2020-06-01 RX ORDER — HYDROCHLOROTHIAZIDE 25 MG/1
TABLET ORAL
Qty: 15 TABLET | Refills: 0 | OUTPATIENT
Start: 2020-06-01

## 2020-06-01 NOTE — TELEPHONE ENCOUNTER
Responded to this request in 5/28/2020 telephone encounter, this medication is available at [facility] pharmacy, will await his response if he wants this transferred prior to making medication changes.

## 2020-06-01 NOTE — TELEPHONE ENCOUNTER
[facility] #[id number] note regarding losartan (COZAAR) 50 MG tablet:  Cannot get any strength Losartans at all. Please send alternative

## 2020-06-01 NOTE — TELEPHONE ENCOUNTER
Refilled one more 30-day supply.  Patient will need to come in to the clinic for diabetes, hypertension management visit before any other refill is given.

## 2020-06-02 NOTE — TELEPHONE ENCOUNTER
Patient states he does not need this because he has 90 days worth. His insurance does not cover at [facility]. Does not want to transfer medication at this time.     MARIA A IveyN, RN

## 2020-06-02 NOTE — TELEPHONE ENCOUNTER
Patient states he does not need this because he has 90 days worth. His insurance does not cover at [facility]. Does not want to transfer medication at this time.      MARIA A IveyN, RN

## 2020-06-15 RX ORDER — METFORMIN HCL 500 MG
TABLET, EXTENDED RELEASE 24 HR ORAL
Qty: 120 TABLET | Refills: 0 | Status: SHIPPED | OUTPATIENT
Start: 2020-06-15 | End: 2020-07-14

## 2020-06-15 NOTE — TELEPHONE ENCOUNTER
"Requested Prescriptions   Pending Prescriptions Disp Refills     metFORMIN (GLUCOPHAGE-XR) 500 MG 24 hr tablet [Pharmacy Med Name: METFORMIN HCL  MG TABLET] 120 tablet 0     Sig: TAKE 2 TABLETS BY MOUTH 2 TIMES DAILY WITH MEALS       Biguanide Agents Failed - 6/14/2020  9:30 AM        Failed - Patient has documented A1c within the specified period of time.     If HgbA1C is 8 or greater, it needs to be on file within the past 3 months.  If less than 8, must be on file within the past 6 months.     Recent Labs   Lab Test 11/08/19  0650   A1C 7.8*             Failed - Recent (6 mo) or future (30 days) visit within the authorizing provider's specialty     Patient had office visit in the last 6 months or has a visit in the next 30 days with authorizing provider or within the authorizing provider's specialty.  See \"Patient Info\" tab in inbasket, or \"Choose Columns\" in Meds & Orders section of the refill encounter.            Passed - Patient is age 10 or older        Passed - Patient's CR is NOT>1.4 OR Patient's EGFR is NOT<45 within past 12 mos.     Recent Labs   Lab Test 11/08/19  0650   GFRESTIMATED >90   GFRESTBLACK >90       Recent Labs   Lab Test 11/08/19  0650   CR 0.83             Passed - Patient does NOT have a diagnosis of CHF.        Passed - Medication is active on med list             "

## 2020-06-15 NOTE — TELEPHONE ENCOUNTER
Patient will need provider follow up for diabetes and other chronic disease management in July. He mentioned in his last [employer] message that he will schedule that.

## 2020-07-10 DIAGNOSIS — I10 UNCONTROLLED HYPERTENSION: ICD-10-CM

## 2020-07-10 RX ORDER — AMLODIPINE BESYLATE 2.5 MG/1
2.5 TABLET ORAL DAILY
Qty: 15 TABLET | Refills: 0 | Status: SHIPPED | OUTPATIENT
Start: 2020-07-10 | End: 2020-07-24

## 2020-07-10 NOTE — TELEPHONE ENCOUNTER
"Requested Prescriptions   Pending Prescriptions Disp Refills     amLODIPine (NORVASC) 2.5 MG tablet [Pharmacy Med Name: AMLODIPINE BESYLATE 2.5 MG TAB] 90 tablet 0     Sig: TAKE 1 TABLET BY MOUTH EVERY DAY       Calcium Channel Blockers Protocol  Failed - 7/10/2020 10:04 AM        Failed - Blood pressure under 140/90 in past 12 months     BP Readings from Last 3 Encounters:   12/16/19 (!) 192/94   12/13/19 (!) 170/80   11/12/19 (!) 162/94                 Passed - Recent (12 mo) or future (30 days) visit within the authorizing provider's specialty     Patient has had an office visit with the authorizing provider or a provider within the authorizing providers department within the previous 12 mos or has a future within next 30 days. See \"Patient Info\" tab in inbasket, or \"Choose Columns\" in Meds & Orders section of the refill encounter.              Passed - Medication is active on med list        Passed - Patient is age 18 or older        Passed - Normal serum creatinine on file in past 12 months     Recent Labs   Lab Test 11/08/19  0650   CR 0.83       Ok to refill medication if creatinine is low               "

## 2020-07-10 NOTE — TELEPHONE ENCOUNTER
"Requested Prescriptions   Pending Prescriptions Disp Refills     metFORMIN (GLUCOPHAGE-XR) 500 MG 24 hr tablet [Pharmacy Med Name: METFORMIN HCL  MG TABLET] 120 tablet 0     Sig: TAKE 2 TABLETS BY MOUTH 2 TIMES DAILY WITH MEALS       Biguanide Agents Failed - 7/10/2020  1:35 PM        Failed - Patient has documented A1c within the specified period of time.     If HgbA1C is 8 or greater, it needs to be on file within the past 3 months.  If less than 8, must be on file within the past 6 months.     Recent Labs   Lab Test 11/08/19  0650   A1C 7.8*             Failed - Recent (6 mo) or future (30 days) visit within the authorizing provider's specialty     Patient had office visit in the last 6 months or has a visit in the next 30 days with authorizing provider or within the authorizing provider's specialty.  See \"Patient Info\" tab in inbasket, or \"Choose Columns\" in Meds & Orders section of the refill encounter.            Passed - Patient is age 10 or older        Passed - Patient's CR is NOT>1.4 OR Patient's EGFR is NOT<45 within past 12 mos.     Recent Labs   Lab Test 11/08/19  0650   GFRESTIMATED >90   GFRESTBLACK >90       Recent Labs   Lab Test 11/08/19  0650   CR 0.83             Passed - Patient does NOT have a diagnosis of CHF.        Passed - Medication is active on med list             "

## 2020-07-10 NOTE — TELEPHONE ENCOUNTER
Message left for patient to check the pharmacy.  Advised to call with any questions or concerns.  Needs office visit for further refills. Nichole SMITH RN

## 2020-07-10 NOTE — TELEPHONE ENCOUNTER
Refilled #15.   Patient said last month he will make a provider appointment for July 2020. Please call and remind him of this.  He needs to be seen by a provider before other refills are given.

## 2020-07-10 NOTE — TELEPHONE ENCOUNTER
See below.  Requesting labs.  He has not made an appt.    Routing to provider.  Aline CASTANON RN

## 2020-07-10 NOTE — TELEPHONE ENCOUNTER
Patient called stating that he would like to have labs order before office visit.  Offered to schedule visit patient declined.     He reports his insurance has a high deductible and then travels for his job. He reports he will fly in for labs and appt.       Snageetha MARTINEZ  Station

## 2020-07-13 NOTE — TELEPHONE ENCOUNTER
Orders placed to be done within the next two  Weeks, with plan for patient to be seen in clinic before end of July 2020 as he previously stated he will do.

## 2020-07-14 RX ORDER — METFORMIN HCL 500 MG
TABLET, EXTENDED RELEASE 24 HR ORAL
Qty: 15 TABLET | Refills: 0 | Status: SHIPPED | OUTPATIENT
Start: 2020-07-14 | End: 2020-08-07

## 2020-07-14 NOTE — TELEPHONE ENCOUNTER
Refilled 30 tablets.  Patient is long overdue for refills and hasnot made provider follow up appointment and lab appointment yet as he said he would last month.  Please call patient to remind of this.

## 2020-07-15 NOTE — TELEPHONE ENCOUNTER
Message left for patient to check the pharmacy.  Needs office visit and labs for further refills. Advised to call with any questions or concerns.  Nichole SMITH RN

## 2020-07-21 DIAGNOSIS — I10 UNCONTROLLED HYPERTENSION: ICD-10-CM

## 2020-07-21 LAB
ANION GAP SERPL CALCULATED.3IONS-SCNC: 3 MMOL/L (ref 3–14)
BUN SERPL-MCNC: 16 MG/DL (ref 7–30)
CALCIUM SERPL-MCNC: 9.4 MG/DL (ref 8.5–10.1)
CHLORIDE SERPL-SCNC: 100 MMOL/L (ref 94–109)
CO2 SERPL-SCNC: 31 MMOL/L (ref 20–32)
CREAT SERPL-MCNC: 0.86 MG/DL (ref 0.66–1.25)
GFR SERPL CREATININE-BSD FRML MDRD: >90 ML/MIN/{1.73_M2}
GLUCOSE SERPL-MCNC: 132 MG/DL (ref 70–99)
HBA1C MFR BLD: 9 % (ref 0–5.6)
POTASSIUM SERPL-SCNC: 4.2 MMOL/L (ref 3.4–5.3)
SODIUM SERPL-SCNC: 134 MMOL/L (ref 133–144)

## 2020-07-21 PROCEDURE — 36415 COLL VENOUS BLD VENIPUNCTURE: CPT | Performed by: FAMILY MEDICINE

## 2020-07-21 PROCEDURE — 83036 HEMOGLOBIN GLYCOSYLATED A1C: CPT | Performed by: FAMILY MEDICINE

## 2020-07-21 PROCEDURE — 80048 BASIC METABOLIC PNL TOTAL CA: CPT | Performed by: FAMILY MEDICINE

## 2020-07-24 RX ORDER — AMLODIPINE BESYLATE 2.5 MG/1
2.5 TABLET ORAL DAILY
Qty: 7 TABLET | Refills: 0 | Status: SHIPPED | OUTPATIENT
Start: 2020-07-24 | End: 2020-09-08

## 2020-07-24 NOTE — TELEPHONE ENCOUNTER
"Requested Prescriptions   Pending Prescriptions Disp Refills     amLODIPine (NORVASC) 2.5 MG tablet [Pharmacy Med Name: AMLODIPINE BESYLATE 2.5 MG TAB] 15 tablet 0     Sig: TAKE 1 TABLET BY MOUTH EVERY DAY       Calcium Channel Blockers Protocol  Failed - 7/21/2020 12:30 PM        Failed - Blood pressure under 140/90 in past 12 months     BP Readings from Last 3 Encounters:   12/16/19 (!) 192/94   12/13/19 (!) 170/80   11/12/19 (!) 162/94                 Passed - Recent (12 mo) or future (30 days) visit within the authorizing provider's specialty     Patient has had an office visit with the authorizing provider or a provider within the authorizing providers department within the previous 12 mos or has a future within next 30 days. See \"Patient Info\" tab in inbasket, or \"Choose Columns\" in Meds & Orders section of the refill encounter.              Passed - Medication is active on med list        Passed - Patient is age 18 or older        Passed - Normal serum creatinine on file in past 12 months     Recent Labs   Lab Test 07/21/20  1455   CR 0.86       Ok to refill medication if creatinine is low               "

## 2020-07-24 NOTE — TELEPHONE ENCOUNTER
Refilled #7. Patient has not made appointment in spite of promising to do so this month. Please remind patient about this.

## 2020-07-27 DIAGNOSIS — I10 UNCONTROLLED HYPERTENSION: ICD-10-CM

## 2020-07-27 NOTE — TELEPHONE ENCOUNTER
Message left for patient to check the pharmacy.  Needs visit for further refills. Nichole SMITH RN

## 2020-07-29 RX ORDER — HYDROCHLOROTHIAZIDE 25 MG/1
25 TABLET ORAL DAILY
Qty: 7 TABLET | Refills: 0 | Status: SHIPPED | OUTPATIENT
Start: 2020-07-29 | End: 2020-08-21

## 2020-07-29 NOTE — TELEPHONE ENCOUNTER
Refilled #7. Patient still has not made an appointment for follow up for his chronic diseases unlike what he said he would do earlier this month.  Please call patient to schedule a clinic appointment for chronic disease management. He has future labs as well ordered.

## 2020-07-29 NOTE — TELEPHONE ENCOUNTER
Patient is contacted and [place] but will schedule for a virtual visit. Nichole SMITH RN

## 2020-07-29 NOTE — TELEPHONE ENCOUNTER
Routing refill request to provider for review/approval because:  Last BP over goal.   BP Readings from Last 3 Encounters:   12/16/19 (!) 192/94   12/13/19 (!) 170/80   11/12/19 (!) 162/94     Last office visit: 11/12/19 Dr. Pearce.      Arcelia CALDWELL BSN, RN

## 2020-07-29 NOTE — TELEPHONE ENCOUNTER
"Requested Prescriptions   Pending Prescriptions Disp Refills     hydrochlorothiazide (HYDRODIURIL) 25 MG tablet 30 tablet 0     Sig: Take 1 tablet (25 mg) by mouth daily       Diuretics (Including Combos) Protocol Failed - 7/27/2020 11:20 AM        Failed - Blood pressure under 140/90 in past 12 months     BP Readings from Last 3 Encounters:   12/16/19 (!) 192/94   12/13/19 (!) 170/80   11/12/19 (!) 162/94                 Passed - Recent (12 mo) or future (30 days) visit within the authorizing provider's specialty     Patient has had an office visit with the authorizing provider or a provider within the authorizing providers department within the previous 12 mos or has a future within next 30 days. See \"Patient Info\" tab in inbasket, or \"Choose Columns\" in Meds & Orders section of the refill encounter.              Passed - Medication is active on med list        Passed - Patient is age 18 or older        Passed - Normal serum creatinine on file in past 12 months     Recent Labs   Lab Test 07/21/20  1455   CR 0.86              Passed - Normal serum potassium on file in past 12 months     Recent Labs   Lab Test 07/21/20  1455   POTASSIUM 4.2                    Passed - Normal serum sodium on file in past 12 months     Recent Labs   Lab Test 07/21/20  1455                      "

## 2020-08-03 RX ORDER — PEN NEEDLE, DIABETIC 32GX 5/32"
NEEDLE, DISPOSABLE MISCELLANEOUS
Qty: 100 EACH | Refills: 0 | Status: SHIPPED | OUTPATIENT
Start: 2020-08-03 | End: 2020-11-04

## 2020-08-07 RX ORDER — METFORMIN HCL 500 MG
TABLET, EXTENDED RELEASE 24 HR ORAL
Qty: 15 TABLET | Refills: 0 | Status: SHIPPED | OUTPATIENT
Start: 2020-08-07 | End: 2020-09-08

## 2020-08-07 RX ORDER — INSULIN GLARGINE 100 [IU]/ML
INJECTION, SOLUTION SUBCUTANEOUS
Qty: 15 ML | Refills: 0 | Status: SHIPPED | OUTPATIENT
Start: 2020-08-07 | End: 2020-08-26

## 2020-08-07 NOTE — TELEPHONE ENCOUNTER
"Routing refill request to provider for review/approval because:  Patient needs to be seen because:  Due for DM recheck.  Requesting today please.      Requested Prescriptions   Pending Prescriptions Disp Refills     metFORMIN (GLUCOPHAGE-XR) 500 MG 24 hr tablet [Pharmacy Med Name: METFORMIN HCL  MG TABLET] 15 tablet 0     Sig: TAKE 2 TABLETS BY MOUTH TWICE A DAY WITH MEALS       Biguanide Agents Failed - 8/7/2020 12:26 PM        Failed - Recent (6 mo) or future (30 days) visit within the authorizing provider's specialty     Patient had office visit in the last 6 months or has a visit in the next 30 days with authorizing provider or within the authorizing provider's specialty.  See \"Patient Info\" tab in inbasket, or \"Choose Columns\" in Meds & Orders section of the refill encounter.            Passed - Patient is age 10 or older        Passed - Patient has documented A1c within the specified period of time.     If HgbA1C is 8 or greater, it needs to be on file within the past 3 months.  If less than 8, must be on file within the past 6 months.     Recent Labs   Lab Test 07/21/20  1455   A1C 9.0*             Passed - Patient's CR is NOT>1.4 OR Patient's EGFR is NOT<45 within past 12 mos.     Recent Labs   Lab Test 07/21/20  1455   GFRESTIMATED >90   GFRESTBLACK >90       Recent Labs   Lab Test 07/21/20  1455   CR 0.86             Passed - Patient does NOT have a diagnosis of CHF.        Passed - Medication is active on med list           insulin glargine (BASAGLAR KWIKPEN) 100 UNIT/ML pen [Pharmacy Med Name: BASAGLAR 100 UNIT/ML KWIKPEN]  2     Sig: INJECT 23-68 UNITS SUBCUTANEOUS EVERY NIGHT AS DIRECTED.       Long Acting Insulin Protocol Failed - 8/7/2020 12:26 PM        Failed - Recent (6 mo) or future (30 days) visit within the authorizing provider's specialty     Patient had office visit in the last 6 months or has a visit in the next 30 days with authorizing provider or within the authorizing provider's " "specialty.  See \"Patient Info\" tab in inbasket, or \"Choose Columns\" in Meds & Orders section of the refill encounter.            Passed - Serum creatinine on file in past 12 months     Recent Labs   Lab Test 07/21/20  1455   CR 0.86       Ok to refill medication if creatinine is low          Passed - HgbA1C in past 3 or 6 months     If HgbA1C is 8 or greater, it needs to be on file within the past 3 months.  If less than 8, must be on file within the past 6 months.     Recent Labs   Lab Test 07/21/20  1455   A1C 9.0*             Passed - Medication is active on med list        Passed - Patient is age 18 or older             "

## 2020-08-07 NOTE — TELEPHONE ENCOUNTER
Refilled 5 pens and limited metformin until seen in clinic tomorrow.  Kristie Oliver NP on 8/7/2020 at 12:30 PM

## 2020-08-07 NOTE — TELEPHONE ENCOUNTER
Patient notified.  He is upset about this.  RN advised him to move up his appt before he runs out.  Aline CASTANON RN BSN

## 2020-08-07 NOTE — TELEPHONE ENCOUNTER
"Requested Prescriptions   Pending Prescriptions Disp Refills     metFORMIN (GLUCOPHAGE-XR) 500 MG 24 hr tablet [Pharmacy Med Name: METFORMIN HCL  MG TABLET] 15 tablet 0     Sig: TAKE 2 TABLETS BY MOUTH TWICE A DAY WITH MEALS       Biguanide Agents Failed - 8/6/2020  6:52 PM        Failed - Recent (6 mo) or future (30 days) visit within the authorizing provider's specialty     Patient had office visit in the last 6 months or has a visit in the next 30 days with authorizing provider or within the authorizing provider's specialty.  See \"Patient Info\" tab in inbasket, or \"Choose Columns\" in Meds & Orders section of the refill encounter.            Passed - Patient is age 10 or older        Passed - Patient has documented A1c within the specified period of time.     If HgbA1C is 8 or greater, it needs to be on file within the past 3 months.  If less than 8, must be on file within the past 6 months.     Recent Labs   Lab Test 07/21/20  1455   A1C 9.0*             Passed - Patient's CR is NOT>1.4 OR Patient's EGFR is NOT<45 within past 12 mos.     Recent Labs   Lab Test 07/21/20  1455   GFRESTIMATED >90   GFRESTBLACK >90       Recent Labs   Lab Test 07/21/20  1455   CR 0.86             Passed - Patient does NOT have a diagnosis of CHF.        Passed - Medication is active on med list           insulin glargine (BASAGLAR KWIKPEN) 100 UNIT/ML pen [Pharmacy Med Name: BASAGLAR 100 UNIT/ML KWIKPEN]  2     Sig: INJECT 23-68 UNITS SUBCUTANEOUS EVERY NIGHT AS DIRECTED.       Long Acting Insulin Protocol Failed - 8/6/2020  6:52 PM        Failed - Recent (6 mo) or future (30 days) visit within the authorizing provider's specialty     Patient had office visit in the last 6 months or has a visit in the next 30 days with authorizing provider or within the authorizing provider's specialty.  See \"Patient Info\" tab in inbasket, or \"Choose Columns\" in Meds & Orders section of the refill encounter.            Passed - Serum creatinine " on file in past 12 months     Recent Labs   Lab Test 07/21/20  1455   CR 0.86       Ok to refill medication if creatinine is low          Passed - HgbA1C in past 3 or 6 months     If HgbA1C is 8 or greater, it needs to be on file within the past 3 months.  If less than 8, must be on file within the past 6 months.     Recent Labs   Lab Test 07/21/20  1455   A1C 9.0*             Passed - Medication is active on med list        Passed - Patient is age 18 or older

## 2020-08-07 NOTE — TELEPHONE ENCOUNTER
Patients needs these today. Wife leaves on a plane today to go to him.   Next 5 appointments (look out 90 days)    Sep 08, 2020 10:00 AM CDT  [facility]ce [facility]  [facility]  Arrive at: [facility] [address] [address]  [phone]        Tatiana Faye on 8/7/2020 at 12:25 PM

## 2020-08-20 DIAGNOSIS — I10 UNCONTROLLED HYPERTENSION: ICD-10-CM

## 2020-08-21 RX ORDER — HYDROCHLOROTHIAZIDE 25 MG/1
TABLET ORAL
Qty: 30 TABLET | Refills: 0 | Status: SHIPPED | OUTPATIENT
Start: 2020-08-21 | End: 2020-09-08

## 2020-08-26 RX ORDER — INSULIN GLARGINE 100 [IU]/ML
INJECTION, SOLUTION SUBCUTANEOUS
Qty: 15 ML | Refills: 0 | Status: SHIPPED | OUTPATIENT
Start: 2020-08-26 | End: 2020-09-08

## 2020-08-26 NOTE — TELEPHONE ENCOUNTER
"Requested Prescriptions   Pending Prescriptions Disp Refills     insulin glargine (BASAGLAR KWIKPEN) 100 UNIT/ML pen 15 mL 0       Long Acting Insulin Protocol Passed - 8/26/2020 11:40 AM        Passed - Serum creatinine on file in past 12 months     Recent Labs   Lab Test 07/21/20  1455   CR 0.86       Ok to refill medication if creatinine is low          Passed - HgbA1C in past 3 or 6 months     If HgbA1C is 8 or greater, it needs to be on file within the past 3 months.  If less than 8, must be on file within the past 6 months.     Recent Labs   Lab Test 07/21/20  1455   A1C 9.0*             Passed - Medication is active on med list        Passed - Patient is age 18 or older        Passed - Recent (6 mo) or future (30 days) visit within the authorizing provider's specialty     Patient had office visit in the last 6 months or has a visit in the next 30 days with authorizing provider or within the authorizing provider's specialty.  See \"Patient Info\" tab in inbasket, or \"Choose Columns\" in Meds & Orders section of the refill encounter.                 "

## 2020-09-08 ENCOUNTER — OFFICE VISIT (OUTPATIENT)
Dept: FAMILY MEDICINE | Facility: CLINIC | Age: 60
End: 2020-09-08
Payer: COMMERCIAL

## 2020-09-08 VITALS
RESPIRATION RATE: 16 BRPM | SYSTOLIC BLOOD PRESSURE: 138 MMHG | TEMPERATURE: 98.3 F | BODY MASS INDEX: 46.59 KG/M2 | WEIGHT: 307.4 LBS | HEIGHT: 68 IN | DIASTOLIC BLOOD PRESSURE: 82 MMHG | OXYGEN SATURATION: 97 % | HEART RATE: 67 BPM

## 2020-09-08 DIAGNOSIS — I10 BENIGN HYPERTENSION: ICD-10-CM

## 2020-09-08 DIAGNOSIS — Z23 NEED FOR PROPHYLACTIC VACCINATION AND INOCULATION AGAINST INFLUENZA: Primary | ICD-10-CM

## 2020-09-08 PROCEDURE — 90471 IMMUNIZATION ADMIN: CPT | Performed by: FAMILY MEDICINE

## 2020-09-08 PROCEDURE — 90682 RIV4 VACC RECOMBINANT DNA IM: CPT | Performed by: FAMILY MEDICINE

## 2020-09-08 PROCEDURE — 99214 OFFICE O/P EST MOD 30 MIN: CPT | Mod: 25 | Performed by: FAMILY MEDICINE

## 2020-09-08 RX ORDER — AMLODIPINE BESYLATE 2.5 MG/1
2.5 TABLET ORAL DAILY
Qty: 90 TABLET | Refills: 3 | Status: SHIPPED | OUTPATIENT
Start: 2020-09-08 | End: 2021-08-09

## 2020-09-08 RX ORDER — METFORMIN HCL 500 MG
TABLET, EXTENDED RELEASE 24 HR ORAL
Qty: 360 TABLET | Refills: 3 | Status: SHIPPED | OUTPATIENT
Start: 2020-09-08 | End: 2021-08-09

## 2020-09-08 RX ORDER — METOPROLOL SUCCINATE 200 MG/1
200 TABLET, EXTENDED RELEASE ORAL DAILY
Qty: 90 TABLET | Refills: 3 | Status: SHIPPED | OUTPATIENT
Start: 2020-09-08 | End: 2021-08-09

## 2020-09-08 RX ORDER — LOSARTAN POTASSIUM 100 MG/1
100 TABLET ORAL DAILY
Qty: 90 TABLET | Refills: 3 | Status: SHIPPED | OUTPATIENT
Start: 2020-09-08 | End: 2021-08-09

## 2020-09-08 RX ORDER — INSULIN GLARGINE 100 [IU]/ML
INJECTION, SOLUTION SUBCUTANEOUS
Qty: 45 ML | Refills: 3 | Status: SHIPPED | OUTPATIENT
Start: 2020-09-08 | End: 2021-05-28

## 2020-09-08 RX ORDER — ATORVASTATIN CALCIUM 20 MG/1
20 TABLET, FILM COATED ORAL DAILY
Qty: 90 TABLET | Refills: 3 | Status: SHIPPED | OUTPATIENT
Start: 2020-09-08 | End: 2021-08-09

## 2020-09-08 RX ORDER — HYDROCHLOROTHIAZIDE 25 MG/1
25 TABLET ORAL DAILY
Qty: 90 TABLET | Refills: 3 | Status: SHIPPED | OUTPATIENT
Start: 2020-09-08 | End: 2021-08-09

## 2020-09-08 ASSESSMENT — MIFFLIN-ST. JEOR: SCORE: 2193.86

## 2020-09-08 NOTE — PATIENT INSTRUCTIONS
You may get the Shingrix vaccine for shingles if you desire, and after you verify with insurance how they cover the vaccine.    Be consistent with low salt, low trans fat and low saturated fat diet.  Eat food rich in omega-3-fatty acids as you tolerate. (salmon, olive oil)  Eat 5 cups of vegetables, fruits and whole grains per day.  Limit starchy food (white rice, white bread, white pasta, white potatoes) to less than a cup per meal.  Minimize sweets, junk food and fastfood. Limit soda beverages to one serving per day; best to avoid it altogether though.    Exercise: moderate intensity sustained for at least 30 mins per episode, goal of 150 mins per week at least  Combine cardiovascular and resistance exercises.  These exercise recommendations are in addition to your daily activity at work or home.  Work on losing weight.    Regular foot care; don't walk barefoot  Annual eye exam.  Please request your eye doctor to furnish a copy of the eye exam report to the clinic to be placed in your record.  Flu vaccine by the end of October yearly.  Be sure to update any other recommended vaccinations for diabetics.    Blood tests: you preferred to do it in November 2020 when you will be in town.    Check resting blood pressure at home - if often greater than 140/90. Schedule a clinic appointment promptly.

## 2020-09-08 NOTE — PROGRESS NOTES
Subjective     Juan Balderrama is a 59 year old male who presents to clinic today for the following health issues:  Chief Complaint   Patient presents with     Diabetes     f/u and renew meds, has been out of some.  Labs done 7/21/20.     Flu Shot       HPI       Diabetes Follow-up    How often are you checking your blood sugar? A few times a month  What time of day are you checking your blood sugars (select all that apply)?  Before meals  Have you had any blood sugars above 200?  No  Have you had any blood sugars below 70?  No    What symptoms do you notice when your blood sugar is low?  None    What concerns do you have today about your diabetes? None     Do you have any of these symptoms? (Select all that apply)  No numbness or tingling in feet.  No redness, sores or blisters on feet.  No complaints of excessive thirst.  No reports of blurry vision.  No significant changes to weight.    Have you had a diabetic eye exam in the last 12 months? No, has appt set up for October    Hyperlipidemia Follow-Up      Are you regularly taking any medication or supplement to lower your cholesterol?   Yes- atorvastatin    Are you having muscle aches or other side effects that you think could be caused by your cholesterol lowering medication?  No    Hypertension Follow-up      Do you check your blood pressure regularly outside of the clinic? Yes     Are you following a low salt diet? Yes    Are your blood pressures ever more than 140 on the top number (systolic) OR more   than 90 on the bottom number (diastolic), for example 140/90? Yes 152/80    BP Readings from Last 2 Encounters:   09/08/20 (!) 164/90   12/16/19 (!) 192/94     Hemoglobin A1C (%)   Date Value   07/21/2020 9.0 (H)   11/08/2019 7.8 (H)     LDL Cholesterol Calculated (mg/dL)   Date Value   11/08/2019 63   11/29/2018 37       How many servings of fruits and vegetables do you eat daily?  2-3    On average, how many sweetened beverages do you drink each day (Examples:  soda, juice, sweet tea, etc.  Do NOT count diet or artificially sweetened beverages)?   0    How many days per week do you exercise enough to make your heart beat faster? Walking at work,     How many minutes a day do you exercise enough to make your heart beat faster? See above  How many days per week do you miss taking your medication? 0    What makes it hard for you to take your medications?  pt has been out of metformin and amlodopoine for the past 1-2 months.  Also ran out of insulin for a ashort time.    Patient Active Problem List   Diagnosis     Benign essential hypertension     Former smoker     Left cataract     Morbid obesity, unspecified obesity type (H)     Uncontrolled type 2 diabetes mellitus with complication, with long-term current use of insulin (H)     Hyperlipidemia LDL goal <70     Hypertension     Diabetic ulcer of left midfoot associated with type 2 diabetes mellitus, unspecified ulcer stage (H)     Diabetic ulcer of toe of left foot associated with type 2 diabetes mellitus, unspecified ulcer stage (H)     Past Surgical History:   Procedure Laterality Date     EYE SURGERY  4-3-2017    Cataract       Social History     Tobacco Use     Smoking status: Never Smoker     Smokeless tobacco: Never Used   Substance Use Topics     Alcohol use: Yes     Comment: social     Family History   Problem Relation Age of Onset     Hypertension Mother      Hypertension Father          Current Outpatient Medications   Medication Sig Dispense Refill     atorvastatin (LIPITOR) 20 MG tablet Take 1 tablet (20 mg) by mouth daily 90 tablet 3     hydrochlorothiazide (HYDRODIURIL) 25 MG tablet TAKE 1 TABLET BY MOUTH EVERY DAY 30 tablet 0     insulin glargine (BASAGLAR KWIKPEN) 100 UNIT/ML pen 23-68 units every night as directed 15 mL 0     losartan (COZAAR) 50 MG tablet Take 2 tablets (100 mg) by mouth daily Needs provider visit before further refills 120 tablet 0     metoprolol succinate ER (TOPROL-XL) 200  "MG 24 hr tablet Take 1 tablet (200 mg) by mouth daily Blood pressure check needed for further refills. 90 tablet 3     amLODIPine (NORVASC) 2.5 MG tablet Take 1 tablet (2.5 mg) by mouth daily No other refills until seen in clinic - he has not made effort to make appointment for months. (Patient not taking: Reported on 9/8/2020) 7 tablet 0     BD YENNY U/F 32G X 4 MM insulin pen needle USE TO TEST BLOOD SUGARS ONE TIME DAILY OR AS DIRECTED 100 each 0     blood glucose (ACCU-CHEK GANGA) test strip Use to test blood sugar 1 times daily or as directed. 100 strip 3     blood glucose monitoring (ACCU-CHEK GANGA PLUS) meter device kit USE TO TEST BLOOD SUGAR 1 TIMES DAILY OR AS DIRECTED.  0     blood glucose monitoring (ACCU-CHEK MULTICLIX) lancets USE TO TEST BLOOD SUGAR 1 TIMES DAILY OR AS DIRECTED.  3     EPINEPHrine (EPIPEN/ADRENACLICK/OR ANY BX GENERIC EQUIV) 0.3 MG/0.3ML injection 2-pack Inject 0.3 mLs (0.3 mg) into the muscle once as needed for anaphylaxis 1 each 1     metFORMIN (GLUCOPHAGE-XR) 500 MG 24 hr tablet TAKE 2 TABLETS BY MOUTH TWICE A DAY WITH MEALS (Patient not taking: Reported on 9/8/2020) 15 tablet 0     Allergies   Allergen Reactions     Sulfamethoxazole Swelling     throat     Review of Systems   Constitutional, HEENT, cardiovascular, pulmonary, GI, , musculoskeletal, neuro, skin, endocrine and psych systems are negative, except as otherwise noted.      Objective    BP (!) 164/90   Pulse 67   Temp 98.3  F (36.8  C) (Tympanic)   Resp 16   Ht 1.727 m (5' 8\")   Wt 139.4 kg (307 lb 6.4 oz)   SpO2 97%   BMI 46.74 kg/m    Body mass index is 46.74 kg/m .  Physical Exam   GENERAL:  alert and no distress, ambulatory w/o assist  EYES: no icterus; pink conjunctivae.  NECK: no tenderness, no adenopathy,  Thyroid not enlarged  RESP: lungs clear to auscultation - no rales, no rhonchi, no wheezes  CV: regular rates and rhythm, no murmur  MS: no edema  SKIN: no suspicious lesions, no rashes  NEURO: strength " and tone- normal, sensory exam- grossly normal, mentation- intact, speech- normal, reflexes- symmetric  ABD:  nontender  FOOT EXAM: no ulcer/erythema; bilateral flat medial arches; pedal pulses strong bilaterally; monofilament: no deficit     No results found for this or any previous visit (from the past 24 hour(s)).        Assessment & Plan     Juan was seen today for diabetes, flu shot and imm/inj.    Diagnoses and all orders for this visit:    Need for prophylactic vaccination and inoculation against influenza  -     INFLUENZA QUAD, RECOMBINANT, P-FREE (RIV4) (FLUBLOCK) [54911]  -     Vaccine Administration, Initial [30835]    Uncontrolled type 2 diabetes mellitus with complication, with long-term current use of insulin (H)  -     metFORMIN (GLUCOPHAGE-XR) 500 MG 24 hr tablet; TAKE 2 TABLETS BY MOUTH TWICE A DAY WITH MEALS  -     insulin glargine (BASAGLAR KWIKPEN) 100 UNIT/ML pen; 23-68 units every night as directed  -     atorvastatin (LIPITOR) 20 MG tablet; Take 1 tablet (20 mg) by mouth daily  -     Albumin Random Urine Quantitative with Creat Ratio; Future  -     **Comprehensive metabolic panel FUTURE anytime; Future  -     Lipid panel reflex to direct LDL Fasting; Future  -     **A1C FUTURE anytime; Future    Benign hypertension  -     amLODIPine (NORVASC) 2.5 MG tablet; Take 1 tablet (2.5 mg) by mouth daily  -     hydrochlorothiazide (HYDRODIURIL) 25 MG tablet; Take 1 tablet (25 mg) by mouth daily  -     losartan (COZAAR) 100 MG tablet; Take 1 tablet (100 mg) by mouth daily  -     metoprolol succinate ER (TOPROL-XL) 200 MG 24 hr tablet; Take 1 tablet (200 mg) by mouth daily Blood pressure check needed for further refills.      Patient was again advised of his uncontrolled conditions.  Reviewed with him any challenges regarding follow ups, and any difficulty regarding managing his conditions.  He cited this time that he is out of town a lot for business. When asked how often he comes back home, he  "initially said he travels a lot, is never home. When the question was rephrased to how many days a month he comes home, he said \"maybe one weekend\".  Patient was advised in detail that his health is a concern. It is a concern that he can develop complications of the above due to inability to monitor his conditions more closwely.  Discussed ways to improve follow ups - patient agreed to schedule ahead of time so he can plan to be in town during those days.    Refilled meds today.  Reinforced carbohydrate control.  Regular exercise reinforced.  Regular foot care, annual eye exam - patient was reminded to schedule this soon.  Flu vaccine today.    DM blood and urine tests in 2 months.       BMI:   Estimated body mass index is 46.74 kg/m  as calculated from the following:    Height as of this encounter: 1.727 m (5' 8\").    Weight as of this encounter: 139.4 kg (307 lb 6.4 oz).   Weight management plan: Discussed healthy diet and exercise guidelines        Patient Instructions   You may get the Shingrix vaccine for shingles if you desire, and after you verify with insurance how they cover the vaccine.    Be consistent with low salt, low trans fat and low saturated fat diet.  Eat food rich in omega-3-fatty acids as you tolerate. (salmon, olive oil)  Eat 5 cups of vegetables, fruits and whole grains per day.  Limit starchy food (white rice, white bread, white pasta, white potatoes) to less than a cup per meal.  Minimize sweets, junk food and fastfood. Limit soda beverages to one serving per day; best to avoid it altogether though.    Exercise: moderate intensity sustained for at least 30 mins per episode, goal of 150 mins per week at least  Combine cardiovascular and resistance exercises.  These exercise recommendations are in addition to your daily activity at work or home.  Work on losing weight.    Regular foot care; don't walk barefoot  Annual eye exam.  Please request your eye doctor to furnish a copy of the eye exam " report to the clinic to be placed in your record.  Flu vaccine by the end of October yearly.  Be sure to update any other recommended vaccinations for diabetics.    Blood tests: you preferred to do it in November 2020 when you will be in town.    Check resting blood pressure at home - if often greater than 140/90. Schedule a clinic appointment promptly.      Return in about 2 months (around 11/8/2020) for Lab Test.    Mauricio Pearce MD  [facility]

## 2020-11-02 NOTE — TELEPHONE ENCOUNTER
"Requested Prescriptions   Pending Prescriptions Disp Refills     BD YENNY U/F 32G X 4 MM insulin pen needle [Pharmacy Med Name: BD UF YENNY PEN NEEDLE [id number]]  0     Sig: USE TO TEST BLOOD SUGARS ONE TIME DAILY OR AS DIRECTED       Diabetic Supplies Protocol Passed - 10/31/2020 10:11 AM        Passed - Medication is active on med list        Passed - Patient is 18 years of age or older        Passed - Recent (6 mo) or future (30 days) visit within the authorizing provider's specialty     Patient had office visit in the last 6 months or has a visit in the next 30 days with authorizing provider.  See \"Patient Info\" tab in inbasket, or \"Choose Columns\" in Meds & Orders section of the refill encounter.                 "

## 2020-11-04 ENCOUNTER — TELEPHONE (OUTPATIENT)
Dept: FAMILY MEDICINE | Facility: CLINIC | Age: 60
End: 2020-11-04

## 2020-11-04 RX ORDER — PEN NEEDLE, DIABETIC 32GX 5/32"
NEEDLE, DISPOSABLE MISCELLANEOUS
Qty: 90 EACH | Refills: 1 | Status: SHIPPED | OUTPATIENT
Start: 2020-11-04 | End: 2020-11-04

## 2020-11-04 NOTE — TELEPHONE ENCOUNTER
Script Clarification for BD UF YENNY PEN NEEDLE: Yenny Needles are used to add onto the top of Insulin Pens.. Not to test blood sugars. What product are you looking for??   Thanks!

## 2020-12-04 LAB
ALBUMIN SERPL-MCNC: 3.4 G/DL (ref 3.4–5)
ALP SERPL-CCNC: 131 U/L (ref 40–150)
ALT SERPL W P-5'-P-CCNC: 29 U/L (ref 0–70)
ANION GAP SERPL CALCULATED.3IONS-SCNC: 7 MMOL/L (ref 3–14)
AST SERPL W P-5'-P-CCNC: 11 U/L (ref 0–45)
BILIRUB SERPL-MCNC: 0.3 MG/DL (ref 0.2–1.3)
BUN SERPL-MCNC: 25 MG/DL (ref 7–30)
CALCIUM SERPL-MCNC: 8.8 MG/DL (ref 8.5–10.1)
CHLORIDE SERPL-SCNC: 103 MMOL/L (ref 94–109)
CHOLEST SERPL-MCNC: 156 MG/DL
CO2 SERPL-SCNC: 28 MMOL/L (ref 20–32)
CREAT SERPL-MCNC: 0.92 MG/DL (ref 0.66–1.25)
GFR SERPL CREATININE-BSD FRML MDRD: >90 ML/MIN/{1.73_M2}
GLUCOSE SERPL-MCNC: 269 MG/DL (ref 70–99)
HBA1C MFR BLD: 9.6 % (ref 0–5.6)
HDLC SERPL-MCNC: 34 MG/DL
LDLC SERPL CALC-MCNC: ABNORMAL MG/DL
LDLC SERPL DIRECT ASSAY-MCNC: 78 MG/DL
NONHDLC SERPL-MCNC: 122 MG/DL
POTASSIUM SERPL-SCNC: 4.5 MMOL/L (ref 3.4–5.3)
PROT SERPL-MCNC: 7.1 G/DL (ref 6.8–8.8)
SODIUM SERPL-SCNC: 138 MMOL/L (ref 133–144)
TRIGL SERPL-MCNC: 427 MG/DL

## 2020-12-04 PROCEDURE — 83036 HEMOGLOBIN GLYCOSYLATED A1C: CPT | Performed by: FAMILY MEDICINE

## 2020-12-04 PROCEDURE — 80061 LIPID PANEL: CPT | Performed by: FAMILY MEDICINE

## 2020-12-04 PROCEDURE — 80053 COMPREHEN METABOLIC PANEL: CPT | Performed by: FAMILY MEDICINE

## 2020-12-04 PROCEDURE — 83721 ASSAY OF BLOOD LIPOPROTEIN: CPT | Performed by: FAMILY MEDICINE

## 2020-12-04 PROCEDURE — 36415 COLL VENOUS BLD VENIPUNCTURE: CPT | Performed by: FAMILY MEDICINE

## 2020-12-20 ENCOUNTER — HEALTH MAINTENANCE LETTER (OUTPATIENT)
Age: 60
End: 2020-12-20

## 2021-04-21 RX ORDER — PEN NEEDLE, DIABETIC 32GX 5/32"
NEEDLE, DISPOSABLE MISCELLANEOUS
Qty: 100 EACH | Refills: 3 | Status: SHIPPED | OUTPATIENT
Start: 2021-04-21 | End: 2022-05-04

## 2021-04-21 NOTE — TELEPHONE ENCOUNTER
Routing refill request to provider for review/approval because:  Labs out of range: Last A1C was 9.6 on 12/4/20.  Last seen 9/8/20.  Advise.  Marito

## 2021-05-18 ENCOUNTER — TELEPHONE (OUTPATIENT)
Dept: FAMILY MEDICINE | Facility: CLINIC | Age: 61
End: 2021-05-18

## 2021-05-18 NOTE — LETTER
May 18, 2021      Juan Balderrama  [address]  [place] [address]        At this time you are due for a Colon Cancer Screening. Here is some information regarding this testing.     Recommended every 5-10 years, depending on your history, in order to prevent and detect colon cancer at its earliest stages.  Colon cancer is now the second leading cause of death in the [place] for both men and women and there are over 130,000 new cases and 50,000 deaths per year from colon cancer.  Colonoscopies can prevent 90-95% of these deaths.  Problem lesions can be removed before they ever become cancer. This test is not only looking for cancer, but also getting rid of precancerious lesions. You are usually given some sedation which makes the test very comfortable for most people.      If you do not wish to do a colonoscopy or cannot afford to do one, at this time, there is another option. It is called a FIT test or Fecal Immunochemical Occult Blood Test (take home stool sample kit).  It does not replace the colonoscopy for colorectal cancer screening, but it can detect hidden bleeding in the lower colon.  It does need to be repeated every year and if a positive result is obtained, you would be referred for a colonoscopy. The FIT test is really easy to do and does not require any  diet or medication restrictions and involves only one collection sample.      If you have completed either one of these tests or had a flexible sigmoidoscopy in the past five years at another facility, please have the records sent to our clinic so that we can best coordinate your care and update your chart.  Please call us if you have questions or would like arrange either to do a colonoscopy or obtain the necessary test kit for the Fecal Occult Test.    Sincerely,        Mauricio Pearce MD

## 2021-05-18 NOTE — TELEPHONE ENCOUNTER
Panel Management Review    Summary:    Patient is due/failing the following:   COLONOSCOPY    Action needed:   Patient needs referral/order: colonoscopy    Type of outreach:    Sent letter.    Questions for provider review:    None                                                                                                                                    Chasity Villegas CMA

## 2021-05-27 NOTE — TELEPHONE ENCOUNTER
"Requested Prescriptions   Pending Prescriptions Disp Refills     insulin glargine (BASAGLAR KWIKPEN) 100 UNIT/ML pen [Pharmacy Med Name: BASAGLAR 100 UNIT/ML KWIKPEN]  11     Si-68 UNITS EVERY NIGHT AS DIRECTED       Long Acting Insulin Protocol Failed - 2021 12:15 PM        Failed - HgbA1C in past 3 or 6 months     If HgbA1C is 8 or greater, it needs to be on file within the past 3 months.  If less than 8, must be on file within the past 6 months.     Recent Labs   Lab Test 20  0659   A1C 9.6*             Failed - Recent (6 mo) or future (30 days) visit within the authorizing provider's specialty     Patient had office visit in the last 6 months or has a visit in the next 30 days with authorizing provider or within the authorizing provider's specialty.  See \"Patient Info\" tab in inbasket, or \"Choose Columns\" in Meds & Orders section of the refill encounter.            Passed - Serum creatinine on file in past 12 months     Recent Labs   Lab Test 20  0659   CR 0.92       Ok to refill medication if creatinine is low          Passed - Medication is active on med list        Passed - Patient is age 18 or older             "

## 2021-05-28 RX ORDER — INSULIN GLARGINE 100 [IU]/ML
INJECTION, SOLUTION SUBCUTANEOUS
Qty: 6 ML | Refills: 0 | Status: SHIPPED | OUTPATIENT
Start: 2021-05-28 | End: 2021-06-18

## 2021-06-13 ENCOUNTER — HEALTH MAINTENANCE LETTER (OUTPATIENT)
Age: 61
End: 2021-06-13

## 2021-06-15 ENCOUNTER — TELEPHONE (OUTPATIENT)
Dept: FAMILY MEDICINE | Facility: CLINIC | Age: 61
End: 2021-06-15

## 2021-06-16 NOTE — TELEPHONE ENCOUNTER
Refilled med on 5/28/2021. Patient was advised to schedule follow up appointment. Needs to see a provider before other refills if he still has insulin at home.    Please call patient.

## 2021-06-16 NOTE — TELEPHONE ENCOUNTER
"Requested Prescriptions   Pending Prescriptions Disp Refills     insulin glargine (BASAGLAR KWIKPEN) 100 UNIT/ML pen [Pharmacy Med Name: BASAGLAR 100 UNIT/ML KWIKPEN]       Si-68 UNITS EVERY NIGHT AS DIRECTED-NEEDS LABS AND CLINIC APPOINTMENT FOR FURTHER REFILLS       Long Acting Insulin Protocol Failed - 6/15/2021  4:50 PM        Failed - HgbA1C in past 3 or 6 months     If HgbA1C is 8 or greater, it needs to be on file within the past 3 months.  If less than 8, must be on file within the past 6 months.     Recent Labs   Lab Test 20  0659   A1C 9.6*             Failed - Recent (6 mo) or future (30 days) visit within the authorizing provider's specialty     Patient had office visit in the last 6 months or has a visit in the next 30 days with authorizing provider or within the authorizing provider's specialty.  See \"Patient Info\" tab in inbasket, or \"Choose Columns\" in Meds & Orders section of the refill encounter.            Passed - Serum creatinine on file in past 12 months     Recent Labs   Lab Test 20  0659   CR 0.92       Ok to refill medication if creatinine is low          Passed - Medication is active on med list        Passed - Patient is age 18 or older             "

## 2021-06-17 NOTE — TELEPHONE ENCOUNTER
Patient is on the road and wont come home for about 2 months He said that this happened last year he said he will call when he gets back or knows when he will get back. Said he did not get a call last month either.     Tatiana Su CSS on 6/17/2021 at 10:01 AM

## 2021-06-17 NOTE — TELEPHONE ENCOUNTER
Dr. Pearce,    Please see note below, looks like this happened last August 2020, would you be able to supply 60 day supply till patient can schedule? Or ok to have patient schedule telephone/virtual visit?    AMANDEEP Berry

## 2021-06-18 RX ORDER — INSULIN GLARGINE 100 [IU]/ML
INJECTION, SOLUTION SUBCUTANEOUS
Qty: 30 ML | Refills: 0 | Status: SHIPPED | OUTPATIENT
Start: 2021-06-18 | End: 2021-08-02

## 2021-06-18 NOTE — TELEPHONE ENCOUNTER
Patient should be advised that it is absolutely necessary that he sees a provider IN CLINIC when he gets back in town.  He agreed on his visit in September 2020 that it would be better that he schedules his appointments in advance, but when that was done, the appointment in November 2020 was canceled and rescheduled. He never had another scheduled appointment since.   Patient is advised that for his condition to be safely and appropriately managed, he needs to see a provider every 3 months.

## 2021-06-18 NOTE — TELEPHONE ENCOUNTER
Tatiana Faye contacted Juan on 06/18/21 and left a message. If patient calls back please schedule appointment as soon as possible GIVE MESSAGE BELOW FROM PROVIDER TO PATIENT.

## 2021-06-18 NOTE — TELEPHONE ENCOUNTER
Patient requesting lab orders so the results will be in before he has an office visit with Portia. Patient will call back to schedule.    Thank you,    Jenny Cleveland, JHONATAN Mayfield

## 2021-07-23 ENCOUNTER — LAB (OUTPATIENT)
Dept: LAB | Facility: CLINIC | Age: 61
End: 2021-07-23
Payer: COMMERCIAL

## 2021-07-23 LAB
ALBUMIN SERPL-MCNC: 4.1 G/DL (ref 3.5–5)
ALP SERPL-CCNC: 80 U/L (ref 45–120)
ALT SERPL W P-5'-P-CCNC: 19 U/L (ref 0–45)
ANION GAP SERPL CALCULATED.3IONS-SCNC: 12 MMOL/L (ref 5–18)
AST SERPL W P-5'-P-CCNC: 13 U/L (ref 0–40)
BILIRUB SERPL-MCNC: 0.6 MG/DL (ref 0–1)
BUN SERPL-MCNC: 18 MG/DL (ref 8–22)
CALCIUM SERPL-MCNC: 9.8 MG/DL (ref 8.5–10.5)
CHLORIDE BLD-SCNC: 101 MMOL/L (ref 98–107)
CHOLEST SERPL-MCNC: 145 MG/DL
CO2 SERPL-SCNC: 26 MMOL/L (ref 22–31)
CREAT SERPL-MCNC: 1.03 MG/DL (ref 0.7–1.3)
CREAT UR-MCNC: 169 MG/DL
FASTING STATUS PATIENT QL REPORTED: YES
GFR SERPL CREATININE-BSD FRML MDRD: 80 ML/MIN/1.73M2
GLUCOSE BLD-MCNC: 137 MG/DL (ref 70–125)
HBA1C MFR BLD: 7.3 % (ref 0–5.6)
HDLC SERPL-MCNC: 28 MG/DL
LDLC SERPL CALC-MCNC: 88 MG/DL
MICROALBUMIN UR-MCNC: 3.64 MG/DL (ref 0–1.99)
MICROALBUMIN/CREAT UR: 21.5 MG/G CR
POTASSIUM BLD-SCNC: 5.3 MMOL/L (ref 3.5–5)
PROT SERPL-MCNC: 7 G/DL (ref 6–8)
SODIUM SERPL-SCNC: 139 MMOL/L (ref 136–145)
TRIGL SERPL-MCNC: 147 MG/DL

## 2021-07-23 PROCEDURE — 80053 COMPREHEN METABOLIC PANEL: CPT

## 2021-07-23 PROCEDURE — 80061 LIPID PANEL: CPT

## 2021-07-23 PROCEDURE — 82043 UR ALBUMIN QUANTITATIVE: CPT

## 2021-07-23 PROCEDURE — 83036 HEMOGLOBIN GLYCOSYLATED A1C: CPT

## 2021-07-23 PROCEDURE — 36415 COLL VENOUS BLD VENIPUNCTURE: CPT

## 2021-08-02 RX ORDER — INSULIN GLARGINE 100 [IU]/ML
INJECTION, SOLUTION SUBCUTANEOUS
Qty: 3 ML | Refills: 0 | Status: SHIPPED | OUTPATIENT
Start: 2021-08-02 | End: 2021-08-09

## 2021-08-02 NOTE — TELEPHONE ENCOUNTER
"Prescription approved per [facility] Refill Protocol.    Next 5 appointments (look out 90 days)    Aug 09, 2021  8:00 AM  Noe Kline with Mauricio Pearce MD  [facility] ([facility] )  Arrive at: Clinic [address] [address]  [phone]         Requested Prescriptions   Pending Prescriptions Disp Refills     insulin glargine (BASAGLAR KWIKPEN) 100 UNIT/ML pen [Pharmacy Med Name: BASAGLAR 100 UNIT/ML KWIKPEN]  1     Si-68 UNITS EVERY NIGHT AS DIRECTED-NEEDS LABS AND CLINIC APPOINTMENT FOR FURTHER REFILLS       Long Acting Insulin Protocol Passed - 2021 10:44 AM        Passed - Serum creatinine on file in past 12 months     Recent Labs   Lab Test 21  0851   CR 1.03       Ok to refill medication if creatinine is low          Passed - HgbA1C in past 3 or 6 months     If HgbA1C is 8 or greater, it needs to be on file within the past 3 months.  If less than 8, must be on file within the past 6 months.     Recent Labs   Lab Test 21  0851   A1C 7.3*             Passed - Medication is active on med list        Passed - Patient is age 18 or older        Passed - Recent (6 mo) or future (30 days) visit within the authorizing provider's specialty     Patient had office visit in the last 6 months or has a visit in the next 30 days with authorizing provider or within the authorizing provider's specialty.  See \"Patient Info\" tab in inbasket, or \"Choose Columns\" in Meds & Orders section of the refill encounter.                       "

## 2021-08-06 DIAGNOSIS — I10 BENIGN HYPERTENSION: ICD-10-CM

## 2021-08-09 ENCOUNTER — OFFICE VISIT (OUTPATIENT)
Dept: FAMILY MEDICINE | Facility: CLINIC | Age: 61
End: 2021-08-09
Payer: COMMERCIAL

## 2021-08-09 VITALS
BODY MASS INDEX: 45.62 KG/M2 | WEIGHT: 301 LBS | DIASTOLIC BLOOD PRESSURE: 82 MMHG | TEMPERATURE: 98.4 F | OXYGEN SATURATION: 97 % | RESPIRATION RATE: 14 BRPM | HEART RATE: 62 BPM | HEIGHT: 68 IN | SYSTOLIC BLOOD PRESSURE: 138 MMHG

## 2021-08-09 DIAGNOSIS — Z12.11 SPECIAL SCREENING FOR MALIGNANT NEOPLASMS, COLON: ICD-10-CM

## 2021-08-09 DIAGNOSIS — I10 BENIGN HYPERTENSION: ICD-10-CM

## 2021-08-09 DIAGNOSIS — Z79.4 CONTROLLED TYPE 2 DIABETES MELLITUS WITH COMPLICATION, WITH LONG-TERM CURRENT USE OF INSULIN (H): Primary | ICD-10-CM

## 2021-08-09 DIAGNOSIS — E11.8 CONTROLLED TYPE 2 DIABETES MELLITUS WITH COMPLICATION, WITH LONG-TERM CURRENT USE OF INSULIN (H): Primary | ICD-10-CM

## 2021-08-09 DIAGNOSIS — E87.5 HYPERKALEMIA: ICD-10-CM

## 2021-08-09 LAB — POTASSIUM BLD-SCNC: 4.9 MMOL/L (ref 3.4–5.3)

## 2021-08-09 PROCEDURE — 99207 PR FOOT EXAM NO CHARGE: CPT | Performed by: FAMILY MEDICINE

## 2021-08-09 PROCEDURE — 84132 ASSAY OF SERUM POTASSIUM: CPT | Performed by: FAMILY MEDICINE

## 2021-08-09 PROCEDURE — 99214 OFFICE O/P EST MOD 30 MIN: CPT | Performed by: FAMILY MEDICINE

## 2021-08-09 PROCEDURE — 36415 COLL VENOUS BLD VENIPUNCTURE: CPT | Performed by: FAMILY MEDICINE

## 2021-08-09 RX ORDER — METOPROLOL SUCCINATE 200 MG/1
200 TABLET, EXTENDED RELEASE ORAL DAILY
Qty: 90 TABLET | Refills: 3 | Status: SHIPPED | OUTPATIENT
Start: 2021-08-09 | End: 2022-09-13

## 2021-08-09 RX ORDER — INSULIN GLARGINE 100 [IU]/ML
INJECTION, SOLUTION SUBCUTANEOUS
Qty: 30 ML | Refills: 3 | Status: SHIPPED | OUTPATIENT
Start: 2021-08-09 | End: 2022-01-26

## 2021-08-09 RX ORDER — HYDROCHLOROTHIAZIDE 25 MG/1
25 TABLET ORAL DAILY
Qty: 90 TABLET | Refills: 3 | Status: SHIPPED | OUTPATIENT
Start: 2021-08-09 | End: 2022-07-18

## 2021-08-09 RX ORDER — METFORMIN HCL 500 MG
TABLET, EXTENDED RELEASE 24 HR ORAL
Qty: 360 TABLET | Refills: 3 | Status: SHIPPED | OUTPATIENT
Start: 2021-08-09 | End: 2022-07-18

## 2021-08-09 RX ORDER — LOSARTAN POTASSIUM 100 MG/1
100 TABLET ORAL DAILY
Qty: 90 TABLET | Refills: 3 | Status: SHIPPED | OUTPATIENT
Start: 2021-08-09 | End: 2022-07-18

## 2021-08-09 RX ORDER — ATORVASTATIN CALCIUM 20 MG/1
20 TABLET, FILM COATED ORAL DAILY
Qty: 90 TABLET | Refills: 3 | Status: SHIPPED | OUTPATIENT
Start: 2021-08-09 | End: 2022-09-01

## 2021-08-09 RX ORDER — AMLODIPINE BESYLATE 2.5 MG/1
2.5 TABLET ORAL DAILY
Qty: 90 TABLET | Refills: 3 | Status: SHIPPED | OUTPATIENT
Start: 2021-08-09 | End: 2022-07-18

## 2021-08-09 ASSESSMENT — MIFFLIN-ST. JEOR: SCORE: 2159.83

## 2021-08-09 NOTE — PROGRESS NOTES
"    Assessment & Plan     Controlled type 2 diabetes mellitus with complication, with long-term current use of insulin (H)  Reinforced carbohydrate control.  Regular exercise reinforced. Patient said he will do his best to increase exercise.  Regular foot care, annual eye exam.  Flu vaccine every year.  A1c very much improved from before and is very close to goal of less than 7%.   Reviewed his preventive measures.  - atorvastatin (LIPITOR) 20 MG tablet  Dispense: 90 tablet; Refill: 3  - insulin glargine (BASAGLAR KWIKPEN) 100 UNIT/ML pen  Dispense: 30 mL; Refill: 3  - metFORMIN (GLUCOPHAGE-XR) 500 MG 24 hr tablet  Dispense: 360 tablet; Refill: 3  - FOOT EXAM    Benign hypertension  BP is within goal. Patient may continue current med dose.  Reinforce sodium restriction.  - amLODIPine (NORVASC) 2.5 MG tablet  Dispense: 90 tablet; Refill: 3  - hydrochlorothiazide (HYDRODIURIL) 25 MG tablet  Dispense: 90 tablet; Refill: 3  - losartan (COZAAR) 100 MG tablet  Dispense: 90 tablet; Refill: 3  - metoprolol succinate ER (TOPROL-XL) 200 MG 24 hr tablet  Dispense: 90 tablet; Refill: 3    Special screening for malignant neoplasms, colon  - Fecal colorectal cancer screen (FIT)    Hyperkalemia  Repeat level today.   Advised oral hydration.  If increasing, will need to look for possible causes.  - Potassium  - Potassium       BMI:   Estimated body mass index is 45.77 kg/m  as calculated from the following:    Height as of this encounter: 1.727 m (5' 8\").    Weight as of this encounter: 136.5 kg (301 lb).   Weight management plan: Discussed healthy diet and exercise guidelines    Patient Instructions   If potassium level is still off, you rody lbe advised on plans for managing it.    Be consistent with low salt, low trans fat and low saturated fat diet.  Eat food rich in omega-3-fatty acids as you tolerate. (salmon, olive oil)  Eat 5 cups of vegetables, fruits and whole grains per day.  Limit starchy food (white rice, white bread, " white pasta, white potatoes) to less than a cup per meal.  Minimize sweets, junk food and fastfood. Limit soda beverages to one serving per day; best to avoid it altogether though.    Exercise: moderate intensity sustained for at least 30 mins per episode, goal of 150 mins per week at least  Combine cardiovascular and resistance exercises.  These exercise recommendations are in addition to your daily activity at work or home.  Work on losing weight.    Be sure to hydrate yourself well with at least 6-8 glasses of water a day.    Regular foot care; don't walk barefoot  Annual eye exam.  Please request your eye doctor to furnish a copy of the eye exam report to the clinic to be placed in your record.  Flu vaccine by the end of October yearly.  Be sure to update any other recommended vaccinations for diabetics.    Blood tests: schedule repeat lab appointment in 6 months.    Turn in your FIT test at your soonest convenience to screen for colon cancer.    You may get the Shingrix vaccine for shingles if you desire, and after you verify with insurance how they cover the vaccine.    Obtain your vaccination records and send it through [employer] or a copy in the mail.      Return in about 6 months (around 2/9/2022) for Laboratory apppointment and possible virtual or in-clinic visit..    Mauricio Pearce MD  [facility]ICHOL[facility] Lombardo is a 60 year old who presents for the following health issue:  Chief Complaint   Patient presents with     Diabetes     Pt here for f/u on diabetes, b/p, meds.  Labs done 2 wks ago.     Health Maintenance     fit test ordered         HPI     Diabetes Follow-up    How often are you checking your blood sugar? One time daily  What time of day are you checking your blood sugars (select all that apply)?  Before meals  Have you had any blood sugars above 200?  No  Have you had any blood sugars below 70?  No    What symptoms do you notice when your blood sugar is low?   None    What concerns do you have today about your diabetes? None     Do you have any of these symptoms? (Select all that apply)  No numbness or tingling in feet.  No redness, sores or blisters on feet.  No complaints of excessive thirst.  No reports of blurry vision.  No significant changes to weight.    Have you had a diabetic eye exam in the last 12 months? No          Hyperlipidemia Follow-Up      Are you regularly taking any medication or supplement to lower your cholesterol?   Yes- atorvastatin    Are you having muscle aches or other side effects that you think could be caused by your cholesterol lowering medication?  No    Hypertension Follow-up      Do you check your blood pressure regularly outside of the clinic? Yes     Are you following a low salt diet? Yes    Are your blood pressures ever more than 140 on the top number (systolic) OR more   than 90 on the bottom number (diastolic), for example 140/90? No    BP Readings from Last 2 Encounters:   08/09/21 138/82   09/08/20 138/82     Hemoglobin A1C (%)   Date Value   07/23/2021 7.3 (H)   12/04/2020 9.6 (H)   07/21/2020 9.0 (H)     LDL Cholesterol Calculated (mg/dL)   Date Value   07/23/2021 88   12/04/2020     Cannot estimate LDL when triglyceride exceeds 400 mg/dL   11/08/2019 63     LDL Cholesterol Direct (mg/dL)   Date Value   12/04/2020 78         How many servings of fruits and vegetables do you eat daily?  2-3    On average, how many sweetened beverages do you drink each day (Examples: soda, juice, sweet tea, etc.  Do NOT count diet or artificially sweetened beverages)?   0    How many days per week do you exercise enough to make your heart beat faster? 5    How many minutes a day do you exercise enough to make your heart beat faster? Active at work, rides bike    How many days per week do you miss taking your medication? 0    Patient Active Problem List   Diagnosis     Benign essential hypertension     Former smoker     Left cataract     Morbid  obesity, unspecified obesity type (H)     Uncontrolled type 2 diabetes mellitus with complication, with long-term current use of insulin (H)     Hyperlipidemia LDL goal <70     Hypertension     Diabetic ulcer of left midfoot associated with type 2 diabetes mellitus, unspecified ulcer stage (H)     Diabetic ulcer of toe of left foot associated with type 2 diabetes mellitus, unspecified ulcer stage (H)     Past Surgical History:   Procedure Laterality Date     EYE SURGERY  4-3-2017    Cataract       Social History     Tobacco Use     Smoking status: Never Smoker     Smokeless tobacco: Never Used   Substance Use Topics     Alcohol use: Yes     Comment: social     Family History   Problem Relation Age of Onset     Hypertension Mother      Hypertension Father          Current Outpatient Medications   Medication Sig Dispense Refill     amLODIPine (NORVASC) 2.5 MG tablet Take 1 tablet (2.5 mg) by mouth daily 90 tablet 3     atorvastatin (LIPITOR) 20 MG tablet Take 1 tablet (20 mg) by mouth daily 90 tablet 3     hydrochlorothiazide (HYDRODIURIL) 25 MG tablet Take 1 tablet (25 mg) by mouth daily 90 tablet 3     insulin glargine (BASAGLAR KWIKPEN) 100 UNIT/ML pen 64 units subcutaneously at night. Max of 70 units per day. 30 mL 3     losartan (COZAAR) 100 MG tablet Take 1 tablet (100 mg) by mouth daily 90 tablet 3     metFORMIN (GLUCOPHAGE-XR) 500 MG 24 hr tablet TAKE 2 TABLETS BY MOUTH TWICE A DAY WITH MEALS 360 tablet 3     metoprolol succinate ER (TOPROL-XL) 200 MG 24 hr tablet Take 1 tablet (200 mg) by mouth daily Blood pressure check needed for further refills. 90 tablet 3     blood glucose (ACCU-CHEK GANGA) test strip Use to test blood sugar 1 times daily or as directed. 100 strip 3     blood glucose monitoring (ACCU-CHEK GANGA PLUS) meter device kit USE TO TEST BLOOD SUGAR 1 TIMES DAILY OR AS DIRECTED.  0     blood glucose monitoring (ACCU-CHEK MULTICLIX) lancets USE TO TEST BLOOD SUGAR 1 TIMES DAILY OR AS DIRECTED.  3  "    EPINEPHrine (EPIPEN/ADRENACLICK/OR ANY BX GENERIC EQUIV) 0.3 MG/0.3ML injection 2-pack Inject 0.3 mLs (0.3 mg) into the muscle once as needed for anaphylaxis 1 each 1     insulin pen needle (BD YENNY U/F) 32G X 4 MM miscellaneous USE 1 PEN NEEDLES DAILY OR AS DIRECTED. 100 each 3     Allergies   Allergen Reactions     Sulfamethoxazole Swelling     throat       Review of Systems   Constitutional, HEENT, cardiovascular, pulmonary, GI, , musculoskeletal, neuro, skin, endocrine and psych systems are negative, except as otherwise noted.      Objective    /82   Pulse 62   Temp 98.4  F (36.9  C) (Tympanic)   Resp 14   Ht 1.727 m (5' 8\")   Wt 136.5 kg (301 lb)   SpO2 97%   BMI 45.77 kg/m    Body mass index is 45.77 kg/m .  Physical Exam   GENERAL: morbidly obese,  alert and no distress, ambulatory w/o assist  EYES: no icterus; pink conjunctivae.  NECK: no tenderness, no adenopathy,  Thyroid not enlarged  RESP: lungs clear to auscultation - no rales, no rhonchi, no wheezes  CV: regular rates and rhythm, no murmur  MS: no edema  SKIN: no suspicious lesions, no rashes  NEURO: strength and tone- normal, sensory exam- grossly normal, mentation- intact, speech- normal, reflexes- symmetric  ABD:  nontender  FOOT EXAM: no ulcer/erythema; pedal pulses strong bilaterally; monofilament: no deficit either foot    Lab on 07/23/2021   Component Date Value Ref Range Status     Cholesterol 07/23/2021 145  <=199 mg/dL Final    Last Lab Result: LDL Cholesterol Calculated (mg/dL     Triglycerides 07/23/2021 147  <=149 mg/dL Final    Last Lab Result: LDL Cholesterol Calculated (mg/dL     Direct Measure HDL 07/23/2021 28* >=40 mg/dL Final    Last Lab Result: LDL Cholesterol Calculated (mg/dL  HDL Cholesterol Reference Range:     0-2 years:   No reference ranges established for patients under 2 years old  at [facility] for lipid analytes.    2-8 years:  Greater than 45 mg/dL     18 years and older:   Female: " Greater than or equal to 50 mg/dL   Male:   Greater than or equal to 40 mg/dL     LDL Cholesterol Calculated 07/23/2021 88  <=129 mg/dL Final     Patient Fasting > 8hrs? 07/23/2021 Yes   Final     Hemoglobin A1C 07/23/2021 7.3* 0.0 - 5.6 % Final    Normal <5.7%   Prediabetes 5.7-6.4%    Diabetes 6.5% or higher     Note: Adopted from ADA consensus guidelines.     Sodium 07/23/2021 139  136 - 145 mmol/L Final    Last Lab Result: LDL Cholesterol Calculated (mg/dL     Potassium 07/23/2021 5.3* 3.5 - 5.0 mmol/L Final    Last Lab Result: LDL Cholesterol Calculated (mg/dL     Chloride 07/23/2021 101  98 - 107 mmol/L Final    Last Lab Result: LDL Cholesterol Calculated (mg/dL     Carbon Dioxide (CO2) 07/23/2021 26  22 - 31 mmol/L Final    Last Lab Result: LDL Cholesterol Calculated (mg/dL     Anion Gap 07/23/2021 12  5 - 18 mmol/L Final     Urea Nitrogen 07/23/2021 18  8 - 22 mg/dL Final    Last Lab Result: LDL Cholesterol Calculated (mg/dL     Creatinine 07/23/2021 1.03  0.70 - 1.30 mg/dL Final    Last Lab Result: LDL Cholesterol Calculated (mg/dL     Calcium 07/23/2021 9.8  8.5 - 10.5 mg/dL Final    Last Lab Result: LDL Cholesterol Calculated (mg/dL     Glucose 07/23/2021 137* 70 - 125 mg/dL Final    Last Lab Result: LDL Cholesterol Calculated (mg/dL     Alkaline Phosphatase 07/23/2021 80  45 - 120 U/L Final    Last Lab Result: LDL Cholesterol Calculated (mg/dL     AST 07/23/2021 13  0 - 40 U/L Final    Last Lab Result: LDL Cholesterol Calculated (mg/dL     ALT 07/23/2021 19  0 - 45 U/L Final    Last Lab Result: LDL Cholesterol Calculated (mg/dL     Protein Total 07/23/2021 7.0  6.0 - 8.0 g/dL Final    Last Lab Result: LDL Cholesterol Calculated (mg/dL     Albumin 07/23/2021 4.1  3.5 - 5.0 g/dL Final    Last Lab Result: LDL Cholesterol Calculated (mg/dL     Bilirubin Total 07/23/2021 0.6  0.0 - 1.0 mg/dL Final    Last Lab Result: LDL Cholesterol Calculated (mg/dL     GFR Estimate 07/23/2021 80  >60 mL/min/1.73m2 Final     As of July 11, 2021, eGFR is calculated by the CKD-EPI creatinine equation, without race adjustment. eGFR can be influenced by muscle mass, exercise, and diet. The reported eGFR is an estimation only and is only applicable if the renal function is stable.     Microalbumin Urine mg/dL 07/23/2021 3.64* 0.00 - 1.99 mg/dL Final    This test includes a Creatinine Ratio.  Do not ord     Creatinine Urine mg/dL 07/23/2021 169  mg/dL Final    This test includes a Creatinine Ratio.  Do not ord     Microalbumin Urine mg/g Cr 07/23/2021 21.5* <=19.9 mg/g Cr Final

## 2021-08-09 NOTE — PATIENT INSTRUCTIONS
If potassium level is still off, you rody lbe advised on plans for managing it.    Be consistent with low salt, low trans fat and low saturated fat diet.  Eat food rich in omega-3-fatty acids as you tolerate. (salmon, olive oil)  Eat 5 cups of vegetables, fruits and whole grains per day.  Limit starchy food (white rice, white bread, white pasta, white potatoes) to less than a cup per meal.  Minimize sweets, junk food and fastfood. Limit soda beverages to one serving per day; best to avoid it altogether though.    Exercise: moderate intensity sustained for at least 30 mins per episode, goal of 150 mins per week at least  Combine cardiovascular and resistance exercises.  These exercise recommendations are in addition to your daily activity at work or home.  Work on losing weight.    Be sure to hydrate yourself well with at least 6-8 glasses of water a day.    Regular foot care; don't walk barefoot  Annual eye exam.  Please request your eye doctor to furnish a copy of the eye exam report to the clinic to be placed in your record.  Flu vaccine by the end of October yearly.  Be sure to update any other recommended vaccinations for diabetics.    Blood tests: schedule repeat lab appointment in 6 months.    Turn in your FIT test at your soonest convenience to screen for colon cancer.    You may get the Shingrix vaccine for shingles if you desire, and after you verify with insurance how they cover the vaccine.    Obtain your vaccination records and send it through [employer] or a copy in the mail.

## 2021-08-10 RX ORDER — METFORMIN HCL 500 MG
TABLET, EXTENDED RELEASE 24 HR ORAL
Qty: 360 TABLET | Refills: 3 | OUTPATIENT
Start: 2021-08-10

## 2021-08-10 RX ORDER — HYDROCHLOROTHIAZIDE 25 MG/1
TABLET ORAL
Qty: 90 TABLET | Refills: 3 | OUTPATIENT
Start: 2021-08-10

## 2021-08-10 RX ORDER — LOSARTAN POTASSIUM 100 MG/1
TABLET ORAL
Qty: 90 TABLET | Refills: 3 | OUTPATIENT
Start: 2021-08-10

## 2021-08-10 RX ORDER — AMLODIPINE BESYLATE 2.5 MG/1
TABLET ORAL
Qty: 90 TABLET | Refills: 3 | OUTPATIENT
Start: 2021-08-10

## 2022-01-23 ENCOUNTER — HEALTH MAINTENANCE LETTER (OUTPATIENT)
Age: 62
End: 2022-01-23

## 2022-01-25 DIAGNOSIS — Z79.4 CONTROLLED TYPE 2 DIABETES MELLITUS WITH COMPLICATION, WITH LONG-TERM CURRENT USE OF INSULIN (H): ICD-10-CM

## 2022-01-25 DIAGNOSIS — E11.8 CONTROLLED TYPE 2 DIABETES MELLITUS WITH COMPLICATION, WITH LONG-TERM CURRENT USE OF INSULIN (H): ICD-10-CM

## 2022-01-25 NOTE — TELEPHONE ENCOUNTER
"Requested Prescriptions   Pending Prescriptions Disp Refills     insulin glargine (BASAGLAR KWIKPEN) 100 UNIT/ML pen [Pharmacy Med Name: BASAGLAR 100 UNIT/ML KWIKPEN] 15 mL 7     Si UNITS SUBCUTANEOUSLY AT NIGHT. MAX OF 70 UNITS PER DAY.       Long Acting Insulin Protocol Failed - 2022  9:41 AM        Failed - HgbA1C in past 3 or 6 months     If HgbA1C is 8 or greater, it needs to be on file within the past 3 months.  If less than 8, must be on file within the past 6 months.     Recent Labs   Lab Test 21  0851   A1C 7.3*             Passed - Serum creatinine on file in past 12 months     Recent Labs   Lab Test 21  0851   CR 1.03       Ok to refill medication if creatinine is low          Passed - Medication is active on med list        Passed - Patient is age 18 or older        Passed - Recent (6 mo) or future (30 days) visit within the authorizing provider's specialty     Patient had office visit in the last 6 months or has a visit in the next 30 days with authorizing provider or within the authorizing provider's specialty.  See \"Patient Info\" tab in inbasket, or \"Choose Columns\" in Meds & Orders section of the refill encounter.                 "

## 2022-01-26 RX ORDER — INSULIN GLARGINE 100 [IU]/ML
INJECTION, SOLUTION SUBCUTANEOUS
Qty: 15 ML | Refills: 0 | Status: SHIPPED | OUTPATIENT
Start: 2022-01-26 | End: 2022-02-12

## 2022-01-26 NOTE — TELEPHONE ENCOUNTER
Refilled, I recommend patient to follow up with PCP for diabetes recheck, he is due    GT Hardwick CNP, covering for Dr. Pearce

## 2022-02-17 PROBLEM — E11.65 TYPE 2 DIABETES MELLITUS WITH HYPERGLYCEMIA (H): Status: RESOLVED | Noted: 2017-01-23 | Resolved: 2017-03-28

## 2022-02-28 NOTE — LETTER
May 9, 2019      Juan Balderrama  [address] [place] [address]          At this time you are due for a Colon Cancer Screening. Here is some information regarding this testing.     Recommended every 5-10 years, depending on your history, in order to prevent and detect colon cancer at its earliest stages.  Colon cancer is now the second leading cause of death in the [place] for both men and women and there are over 130,000 new cases and 50,000 deaths per year from colon cancer.  Colonoscopies can prevent 90-95% of these deaths.  Problem lesions can be removed before they ever become cancer. This test is not only looking for cancer, but also getting rid of precancerious lesions. You are usually given some sedation which makes the test very comfortable for most people.      If you do not wish to do a colonoscopy or cannot afford to do one, at this time, there is another option. It is called a FIT test or Fecal Immunochemical Occult Blood Test (take home stool sample kit).  It does not replace the colonoscopy for colorectal cancer screening, but it can detect hidden bleeding in the lower colon.  It does need to be repeated every year and if a positive result is obtained, you would be referred for a colonoscopy. The FIT test is really easy to do and does not require any  diet or medication restrictions and involves only one collection sample.      If you have completed either one of these tests or had a flexible sigmoidoscopy in the past five years at another facility, please have the records sent to our clinic so that we can best coordinate your care and update your chart.  Please call us if you have questions or would like arrange either to do a colonoscopy or obtain the necessary test kit for the Fecal Occult Test.    Sincerely,        Mauricio Pearce MD          
n/a

## 2022-03-20 ENCOUNTER — HEALTH MAINTENANCE LETTER (OUTPATIENT)
Age: 62
End: 2022-03-20

## 2022-03-24 ENCOUNTER — TELEPHONE (OUTPATIENT)
Dept: LAB | Facility: CLINIC | Age: 62
End: 2022-03-24
Payer: COMMERCIAL

## 2022-03-24 DIAGNOSIS — L97.429 DIABETIC ULCER OF LEFT MIDFOOT ASSOCIATED WITH TYPE 2 DIABETES MELLITUS, UNSPECIFIED ULCER STAGE (H): ICD-10-CM

## 2022-03-24 DIAGNOSIS — E11.621 DIABETIC ULCER OF LEFT MIDFOOT ASSOCIATED WITH TYPE 2 DIABETES MELLITUS, UNSPECIFIED ULCER STAGE (H): ICD-10-CM

## 2022-03-25 ENCOUNTER — MYC MEDICAL ADVICE (OUTPATIENT)
Dept: FAMILY MEDICINE | Facility: CLINIC | Age: 62
End: 2022-03-25

## 2022-03-25 ENCOUNTER — LAB (OUTPATIENT)
Dept: LAB | Facility: CLINIC | Age: 62
End: 2022-03-25
Payer: COMMERCIAL

## 2022-03-25 ENCOUNTER — MYC REFILL (OUTPATIENT)
Dept: FAMILY MEDICINE | Facility: CLINIC | Age: 62
End: 2022-03-25

## 2022-03-25 DIAGNOSIS — E11.8 CONTROLLED TYPE 2 DIABETES MELLITUS WITH COMPLICATION, WITH LONG-TERM CURRENT USE OF INSULIN (H): ICD-10-CM

## 2022-03-25 DIAGNOSIS — Z79.4 CONTROLLED TYPE 2 DIABETES MELLITUS WITH COMPLICATION, WITH LONG-TERM CURRENT USE OF INSULIN (H): ICD-10-CM

## 2022-03-25 LAB — HBA1C MFR BLD: 7.5 % (ref 0–5.6)

## 2022-03-25 PROCEDURE — 83036 HEMOGLOBIN GLYCOSYLATED A1C: CPT

## 2022-03-25 PROCEDURE — 36415 COLL VENOUS BLD VENIPUNCTURE: CPT

## 2022-03-25 RX ORDER — INSULIN GLARGINE 100 [IU]/ML
INJECTION, SOLUTION SUBCUTANEOUS
Qty: 15 ML | Refills: 0 | Status: CANCELLED | OUTPATIENT
Start: 2022-03-25

## 2022-03-28 RX ORDER — INSULIN GLARGINE 100 [IU]/ML
INJECTION, SOLUTION SUBCUTANEOUS
Qty: 30 ML | Refills: 1 | Status: SHIPPED | OUTPATIENT
Start: 2022-03-28 | End: 2022-06-20

## 2022-03-28 NOTE — TELEPHONE ENCOUNTER
Forwarding MyChart update re: insulin dosing to PCP.  Patient has requested refills of his Basaglar, so order may need to be changed to reflect what he's currently taking.   MyChart sent.     Maryanne Lutz RN  [facility]

## 2022-05-04 RX ORDER — PEN NEEDLE, DIABETIC 32GX 5/32"
NEEDLE, DISPOSABLE MISCELLANEOUS
Qty: 100 EACH | Refills: 2 | Status: SHIPPED | OUTPATIENT
Start: 2022-05-04

## 2022-06-20 ENCOUNTER — MYC REFILL (OUTPATIENT)
Dept: FAMILY MEDICINE | Facility: CLINIC | Age: 62
End: 2022-06-20
Payer: COMMERCIAL

## 2022-06-20 RX ORDER — INSULIN GLARGINE 100 [IU]/ML
INJECTION, SOLUTION SUBCUTANEOUS
Qty: 30 ML | Refills: 0 | Status: CANCELLED | OUTPATIENT
Start: 2022-06-20

## 2022-07-18 ENCOUNTER — MYC MEDICAL ADVICE (OUTPATIENT)
Dept: FAMILY MEDICINE | Facility: CLINIC | Age: 62
End: 2022-07-18

## 2022-07-29 NOTE — TELEPHONE ENCOUNTER
"Requested Prescriptions   Pending Prescriptions Disp Refills    insulin glargine (BASAGLAR KWIKPEN) 100 UNIT/ML pen [Pharmacy Med Name: BASAGLAR 100 UNIT/ML KWIKPEN]  1     Sig: INJECT 64 UNITS SUBCUTANEOUSLY AT NIGHT. MAX OF 70 UNITS PER DAY.        Long Acting Insulin Protocol Failed - 7/29/2022 11:13 AM        Failed - Serum creatinine on file in past 12 months     Recent Labs   Lab Test 07/23/21  0851   CR 1.03       Ok to refill medication if creatinine is low          Failed - Recent (6 mo) or future (30 days) visit within the authorizing provider's specialty     Patient had office visit in the last 6 months or has a visit in the next 30 days with authorizing provider or within the authorizing provider's specialty.  See \"Patient Info\" tab in inbasket, or \"Choose Columns\" in Meds & Orders section of the refill encounter.            Passed - HgbA1C in past 3 or 6 months     If HgbA1C is 8 or greater, it needs to be on file within the past 3 months.  If less than 8, must be on file within the past 6 months.     Recent Labs   Lab Test 03/25/22  0808   A1C 7.5*             Passed - Medication is active on med list        Passed - Patient is age 18 or older              "

## 2022-08-01 ENCOUNTER — LAB (OUTPATIENT)
Dept: LAB | Facility: CLINIC | Age: 62
End: 2022-08-01
Payer: COMMERCIAL

## 2022-08-01 LAB
ANION GAP SERPL CALCULATED.3IONS-SCNC: 11 MMOL/L (ref 7–15)
BUN SERPL-MCNC: 18.7 MG/DL (ref 8–23)
CALCIUM SERPL-MCNC: 9.4 MG/DL (ref 8.6–10)
CHLORIDE SERPL-SCNC: 100 MMOL/L (ref 98–107)
CHOLEST SERPL-MCNC: 128 MG/DL
CREAT SERPL-MCNC: 0.87 MG/DL (ref 0.67–1.17)
CREAT UR-MCNC: 37.1 MG/DL
DEPRECATED HCO3 PLAS-SCNC: 26 MMOL/L (ref 22–29)
GFR SERPL CREATININE-BSD FRML MDRD: >90 ML/MIN/1.73M2
GLUCOSE SERPL-MCNC: 88 MG/DL (ref 70–99)
HBA1C MFR BLD: 6.6 % (ref 0–5.6)
HDLC SERPL-MCNC: 29 MG/DL
LDLC SERPL CALC-MCNC: 57 MG/DL
MICROALBUMIN UR-MCNC: <12 MG/L
MICROALBUMIN/CREAT UR: NORMAL MG/G{CREAT}
NONHDLC SERPL-MCNC: 99 MG/DL
POTASSIUM SERPL-SCNC: 4.7 MMOL/L (ref 3.4–5.3)
SODIUM SERPL-SCNC: 137 MMOL/L (ref 136–145)
TRIGL SERPL-MCNC: 208 MG/DL

## 2022-08-01 PROCEDURE — 80048 BASIC METABOLIC PNL TOTAL CA: CPT

## 2022-08-01 PROCEDURE — 82043 UR ALBUMIN QUANTITATIVE: CPT

## 2022-08-01 PROCEDURE — 36415 COLL VENOUS BLD VENIPUNCTURE: CPT

## 2022-08-01 PROCEDURE — 80061 LIPID PANEL: CPT

## 2022-08-01 PROCEDURE — 83036 HEMOGLOBIN GLYCOSYLATED A1C: CPT

## 2022-08-01 RX ORDER — INSULIN GLARGINE 100 [IU]/ML
INJECTION, SOLUTION SUBCUTANEOUS
Qty: 30 ML | Refills: 3 | Status: SHIPPED | OUTPATIENT
Start: 2022-08-01 | End: 2023-01-17

## 2022-08-22 ENCOUNTER — TELEPHONE (OUTPATIENT)
Dept: FAMILY MEDICINE | Facility: CLINIC | Age: 62
End: 2022-08-22

## 2022-08-22 NOTE — LETTER
August 22, 2022      Juan Balderrama  [address]  [place] [address]      Your healthcare team cares about your health. To provide you with the best care,   we have reviewed your chart and based on our findings, we see that you are due to:     - COLON CANCER SCREENING:  Call or mychart the clinic to schedule your colonoscopy or schedule/ your FIT Test, or Cologuard test    If you have already completed these items, please contact the clinic via phone or   Mychart so your care team can review and update your records. Thank you for   choosing [facility] for your healthcare needs. For any questions,   concerns, or to schedule an appointment please contact the clinic.       Healthy Regards,      Your [facility] Care Team

## 2022-08-22 NOTE — TELEPHONE ENCOUNTER
Patient Quality Outreach    Patient is due for the following:   Colon Cancer Screening    Next Steps:   Schedule a Adult Preventative    Type of outreach:    Sent letter.      Questions for provider review:    None     Chasity Villegas, CMA

## 2022-09-01 DIAGNOSIS — Z79.4 CONTROLLED TYPE 2 DIABETES MELLITUS WITH COMPLICATION, WITH LONG-TERM CURRENT USE OF INSULIN (H): ICD-10-CM

## 2022-09-01 DIAGNOSIS — E11.8 CONTROLLED TYPE 2 DIABETES MELLITUS WITH COMPLICATION, WITH LONG-TERM CURRENT USE OF INSULIN (H): ICD-10-CM

## 2022-09-01 RX ORDER — ATORVASTATIN CALCIUM 20 MG/1
TABLET, FILM COATED ORAL
Qty: 90 TABLET | Refills: 0 | Status: SHIPPED | OUTPATIENT
Start: 2022-09-01 | End: 2022-10-10

## 2022-09-01 NOTE — TELEPHONE ENCOUNTER
"Requested Prescriptions   Pending Prescriptions Disp Refills    atorvastatin (LIPITOR) 20 MG tablet [Pharmacy Med Name: ATORVASTATIN 20 MG TABLET] 90 tablet 3     Sig: TAKE 1 TABLET BY MOUTH EVERY DAY        Statins Protocol Failed - 9/1/2022 12:36 AM        Failed - Recent (12 mo) or future (30 days) visit within the authorizing provider's specialty     Patient has had an office visit with the authorizing provider or a provider within the authorizing providers department within the previous 12 mos or has a future within next 30 days. See \"Patient Info\" tab in inbasket, or \"Choose Columns\" in Meds & Orders section of the refill encounter.              Passed - LDL on file in past 12 months     Recent Labs   Lab Test 08/01/22  0737   LDL 57               Passed - No abnormal creatine kinase in past 12 months     No lab results found.             Passed - Medication is active on med list        Passed - Patient is age 18 or older              "

## 2022-09-01 NOTE — TELEPHONE ENCOUNTER
Please call patient to schedule an in-clinic visit at the next available regular appointment slot.  He is overdue for diabetes check and wellness exam.  Be fasting on appointment.

## 2022-09-10 ENCOUNTER — HEALTH MAINTENANCE LETTER (OUTPATIENT)
Age: 62
End: 2022-09-10

## 2022-09-12 DIAGNOSIS — I10 BENIGN HYPERTENSION: ICD-10-CM

## 2022-09-13 RX ORDER — METOPROLOL SUCCINATE 200 MG/1
TABLET, EXTENDED RELEASE ORAL
Qty: 90 TABLET | Refills: 0 | Status: SHIPPED | OUTPATIENT
Start: 2022-09-13 | End: 2022-10-10

## 2022-09-13 NOTE — TELEPHONE ENCOUNTER
Pending Prescriptions:                       Disp   Refills    metoprolol succinate ER (TOPROL XL) 200 M*90 tab*3            Sig: TAKE 1 TABLET (200 MG) BY MOUTH DAILY    Routing refill request to provider for review/approval because:  Patient needs to be seen because it has been more than 1 year since last office visit and BP update.  Last seen 8/9/21.  Pt has pending appt 10/10/22.    Naila Odom RN

## 2022-10-03 DIAGNOSIS — I10 BENIGN HYPERTENSION: ICD-10-CM

## 2022-10-03 DIAGNOSIS — Z79.4 CONTROLLED TYPE 2 DIABETES MELLITUS WITH COMPLICATION, WITH LONG-TERM CURRENT USE OF INSULIN (H): ICD-10-CM

## 2022-10-03 DIAGNOSIS — E11.8 CONTROLLED TYPE 2 DIABETES MELLITUS WITH COMPLICATION, WITH LONG-TERM CURRENT USE OF INSULIN (H): ICD-10-CM

## 2022-10-03 RX ORDER — AMLODIPINE BESYLATE 2.5 MG/1
TABLET ORAL
Qty: 90 TABLET | Refills: 0 | OUTPATIENT
Start: 2022-10-03

## 2022-10-03 RX ORDER — LOSARTAN POTASSIUM 100 MG/1
TABLET ORAL
Qty: 90 TABLET | Refills: 0 | OUTPATIENT
Start: 2022-10-03

## 2022-10-03 RX ORDER — HYDROCHLOROTHIAZIDE 25 MG/1
TABLET ORAL
Qty: 90 TABLET | Refills: 0 | OUTPATIENT
Start: 2022-10-03

## 2022-10-03 RX ORDER — METFORMIN HCL 500 MG
TABLET, EXTENDED RELEASE 24 HR ORAL
Qty: 360 TABLET | Refills: 0 | OUTPATIENT
Start: 2022-10-03

## 2022-10-10 ENCOUNTER — VIRTUAL VISIT (OUTPATIENT)
Dept: FAMILY MEDICINE | Facility: CLINIC | Age: 62
End: 2022-10-10
Payer: COMMERCIAL

## 2022-10-10 DIAGNOSIS — E11.621 DIABETIC ULCER OF LEFT MIDFOOT ASSOCIATED WITH TYPE 2 DIABETES MELLITUS, UNSPECIFIED ULCER STAGE (H): ICD-10-CM

## 2022-10-10 DIAGNOSIS — Z12.11 SCREEN FOR COLON CANCER: ICD-10-CM

## 2022-10-10 DIAGNOSIS — E11.8 CONTROLLED TYPE 2 DIABETES MELLITUS WITH COMPLICATION, WITH LONG-TERM CURRENT USE OF INSULIN (H): ICD-10-CM

## 2022-10-10 DIAGNOSIS — Z79.4 CONTROLLED TYPE 2 DIABETES MELLITUS WITH COMPLICATION, WITH LONG-TERM CURRENT USE OF INSULIN (H): ICD-10-CM

## 2022-10-10 DIAGNOSIS — I10 BENIGN HYPERTENSION: ICD-10-CM

## 2022-10-10 DIAGNOSIS — L97.429 DIABETIC ULCER OF LEFT MIDFOOT ASSOCIATED WITH TYPE 2 DIABETES MELLITUS, UNSPECIFIED ULCER STAGE (H): ICD-10-CM

## 2022-10-10 PROCEDURE — 99214 OFFICE O/P EST MOD 30 MIN: CPT | Mod: 95 | Performed by: FAMILY MEDICINE

## 2022-10-10 RX ORDER — AMLODIPINE BESYLATE 2.5 MG/1
2.5 TABLET ORAL DAILY
Qty: 90 TABLET | Refills: 0 | Status: SHIPPED | OUTPATIENT
Start: 2022-10-10 | End: 2023-01-04

## 2022-10-10 RX ORDER — METOPROLOL SUCCINATE 200 MG/1
200 TABLET, EXTENDED RELEASE ORAL DAILY
Qty: 90 TABLET | Refills: 0 | Status: SHIPPED | OUTPATIENT
Start: 2022-10-10 | End: 2023-03-01

## 2022-10-10 RX ORDER — HYDROCHLOROTHIAZIDE 25 MG/1
25 TABLET ORAL DAILY
Qty: 90 TABLET | Refills: 0 | Status: SHIPPED | OUTPATIENT
Start: 2022-10-10 | End: 2023-01-04

## 2022-10-10 RX ORDER — ATORVASTATIN CALCIUM 20 MG/1
20 TABLET, FILM COATED ORAL DAILY
Qty: 90 TABLET | Refills: 0 | Status: SHIPPED | OUTPATIENT
Start: 2022-10-10 | End: 2023-02-21

## 2022-10-10 RX ORDER — METFORMIN HCL 500 MG
TABLET, EXTENDED RELEASE 24 HR ORAL
Qty: 360 TABLET | Refills: 0 | Status: SHIPPED | OUTPATIENT
Start: 2022-10-10 | End: 2023-01-04

## 2022-10-10 RX ORDER — LOSARTAN POTASSIUM 100 MG/1
100 TABLET ORAL DAILY
Qty: 90 TABLET | Refills: 0 | Status: SHIPPED | OUTPATIENT
Start: 2022-10-10 | End: 2023-01-04

## 2022-10-10 ASSESSMENT — PAIN SCALES - GENERAL: PAINLEVEL: NO PAIN (0)

## 2022-10-10 NOTE — PROGRESS NOTES
Grupo is a 62 year old who is being evaluated via a billable telephone visit.      What phone number would you like to be contacted at? [phone]  How would you like to obtain your AVS? MyChart    Assessment & Plan     Benign hypertension  Controlled.  Low salt, low fat diet.   Exercise as tolerated.  Take meds as prescribed; call if with side effects.   - amLODIPine (NORVASC) 2.5 MG tablet  Dispense: 90 tablet; Refill: 0  - hydrochlorothiazide (HYDRODIURIL) 25 MG tablet  Dispense: 90 tablet; Refill: 0  - losartan (COZAAR) 100 MG tablet  Dispense: 90 tablet; Refill: 0  - metoprolol succinate ER (TOPROL XL) 200 MG 24 hr tablet  Dispense: 90 tablet; Refill: 0    Controlled type 2 diabetes mellitus with complication, with long-term current use of insulin (H)  Reinforced carbohydrate control.  Regular exercise 30 mins per day, 3 times a week.  Regular foot care, annual eye exam.  Flu vaccine every year.  - atorvastatin (LIPITOR) 20 MG tablet  Dispense: 90 tablet; Refill: 0  - metFORMIN (GLUCOPHAGE XR) 500 MG 24 hr tablet  Dispense: 360 tablet; Refill: 0        Screen for colon cancer  Patient said he has FIT kit at home from several months ago.  [facility] if .       There are no Patient Instructions on file for this visit.    No follow-ups on file.    Mauricio Pearce MD  [facility]    Subjective   Grupo is a 62 year old  presenting for the following health issues:  Diabetes (Pt being seen for follow up on diabetes and refills on meds.)  Pt had labs done in August, was home for 3 wks and tried to get in for appt but none available.  He is out of town again working.    History of Present Illness       Diabetes:   He presents for follow up of diabetes.  He is checking home blood glucose one time daily. He checks blood glucose before meals.  Blood glucose is never over 200 and never under 70. When his blood glucose is low, the patient is  asymptomatic for confusion, blurred vision, lethargy and reports not feeling dizzy, shaky, or weak.  He has no concerns regarding his diabetes at this time.  He is not experiencing numbness or burning in feet, excessive thirst, blurry vision, weight changes or redness, sores or blisters on feet. The patient has not had a diabetic eye exam in the last 12 months.         Hypertension: He presents for follow up of hypertension.  He does check blood pressure  regularly outside of the clinic. Outpatient blood pressures have not been over 140/90. He follows a low salt diet.       Diabetes Follow-up    How often are you checking your blood sugar? One time daily  What time of day are you checking your blood sugars (select all that apply)?  Before meals  Have you had any blood sugars above 200?  No  Have you had any blood sugars below 70?  No    What symptoms do you notice when your blood sugar is low?  None    What concerns do you have today about your diabetes? None     Do you have any of these symptoms? (Select all that apply)  No numbness or tingling in feet.  No redness, sores or blisters on feet.  No complaints of excessive thirst.  No reports of blurry vision.  No significant changes to weight.    Have you had a diabetic eye exam in the last 12 months? No      Hyperlipidemia Follow-Up      Are you regularly taking any medication or supplement to lower your cholesterol?   Yes- atorvastatin    Are you having muscle aches or other side effects that you think could be caused by your cholesterol lowering medication?  No    Hypertension Follow-up      Do you check your blood pressure regularly outside of the clinic? Yes     Are you following a low salt diet? Yes    Are your blood pressures ever more than 140 on the top number (systolic) OR more   than 90 on the bottom number (diastolic), for example 140/90? No    BP Readings from Last 2 Encounters:   08/09/21 138/82   09/08/20 138/82     Hemoglobin A1C (%)   Date Value    08/01/2022 6.6 (H)   03/25/2022 7.5 (H)   12/04/2020 9.6 (H)   07/21/2020 9.0 (H)     LDL Cholesterol Calculated (mg/dL)   Date Value   08/01/2022 57   07/23/2021 88   12/04/2020     Cannot estimate LDL when triglyceride exceeds 400 mg/dL   11/08/2019 63     LDL Cholesterol Direct (mg/dL)   Date Value   12/04/2020 78     Denies concern with any of his meds.    Review of Systems   Constitutional, HEENT, cardiovascular, pulmonary, GI, , musculoskeletal, neuro, skin, endocrine and psych systems are negative, except as otherwise noted.      Objective           Vitals:  No vitals were obtained today due to virtual visit.    Physical Exam   alert and no distress  PSYCH: Alert and oriented times 3; coherent speech, normal   rate and volume, able to articulate logical thoughts, able   to abstract reason, no tangential thoughts, no hallucinations   or delusions  His affect is normal  RESP: No cough, no audible wheezing, able to talk in full sentences  Remainder of exam unable to be completed due to telephone visits    Lab on 08/01/2022   Component Date Value Ref Range Status     Hemoglobin A1C 08/01/2022 6.6 (H)  0.0 - 5.6 % Final    Normal <5.7%   Prediabetes 5.7-6.4%    Diabetes 6.5% or higher     Note: Adopted from ADA consensus guidelines.     Creatinine 08/01/2022 0.87  0.67 - 1.17 mg/dL Final     Sodium 08/01/2022 137  136 - 145 mmol/L Final     Potassium 08/01/2022 4.7  3.4 - 5.3 mmol/L Final     Urea Nitrogen 08/01/2022 18.7  8.0 - 23.0 mg/dL Final     Chloride 08/01/2022 100  98 - 107 mmol/L Final     Carbon Dioxide (CO2) 08/01/2022 26  22 - 29 mmol/L Final     Anion Gap 08/01/2022 11  7 - 15 mmol/L Final     Glucose 08/01/2022 88  70 - 99 mg/dL Final     GFR Estimate 08/01/2022 >90  >60 mL/min/1.73m2 Final    Effective December 21, 2021 eGFRcr in adults is calculated using the 2021 CKD-EPI creatinine equation which includes age and gender ( et al., NEJM, DOI: 10.1056/XHXAjt0887927)     Calcium 08/01/2022  9.4  8.6 - 10.0 mg/dL Final     Cholesterol 08/01/2022 128  <200 mg/dL Final     Triglycerides 08/01/2022 208 (H)  <150 mg/dL Final     Direct Measure HDL 08/01/2022 29 (L)  >=40 mg/dL Final     LDL Cholesterol Calculated 08/01/2022 57  <=100 mg/dL Final     Non HDL Cholesterol 08/01/2022 99  <130 mg/dL Final     Albumin Urine mg/L 08/01/2022 <12.0  mg/L Final    The reference ranges have not been established in urine albumin. The results should be integrated into the clinical context for interpretation.     Albumin Urine mg/g Cr 08/01/2022    Final    Unable to calculate, urine albumin and/or urine creatinine is outside detectable limits.  Microalbuminuria is defined as an albumin:creatinine ratio of 17 to 299 for males and 25 to 299 for females. A ratio of albumin:creatinine of 300 or higher is indicative of overt proteinuria.  Due to biologic variability, positive results should be confirmed by a second, first-morning random or 24-hour timed urine specimen. If there is discrepancy, a third specimen is recommended. When 2 out of 3 results are in the microalbuminuria range, this is evidence for incipient nephropathy and warrants increased efforts at glucose control, blood pressure control, and institution of therapy with an angiotensin-converting-enzyme (ACE) inhibitor (if the patient can tolerate it).       Creatinine Urine mg/dL 08/01/2022 37.1  mg/dL Final    The reference ranges have not been established in urine creatinine. The results should be integrated into the clinical context for interpretation.     No results found for any visits on 10/10/22.            Phone call duration: 6 minutes

## 2022-10-10 NOTE — PATIENT INSTRUCTIONS
Medications have been refilled for 90 days.  Need for an in-person physical exam to continue prescribing and for therapeutic safety.  Schedule one within the next 90 days. Call within the next few days to schedule as appointments book out weeks in advance.    You may get the Shingrix vaccine for shingles if you desire, and after you verify with insurance how they cover the vaccine.     Be consistent with low salt, low trans fat and low saturated fat diet.  Eat food rich in omega-3-fatty acids as you tolerate. (salmon, olive oil)  Eat 5 cups of vegetables, fruits and whole grains per day.  Limit starchy food (white rice, white bread, white pasta, white potatoes) to less than a cup per meal.  Minimize sweets, junk food and fastfood. Limit soda beverages to one serving per day; best to avoid it altogether though.    Exercise: moderate intensity sustained for at least 30 mins per episode, goal of 150 mins per week at least  Combine cardiovascular and resistance exercises.  These exercise recommendations are in addition to your daily activity at work or home.  Work on losing weight.    Regular foot care; don't walk barefoot  Annual eye exam.  Please request your eye doctor to furnish a copy of the eye exam report to the clinic to be placed in your record.  Flu vaccine by the end of October yearly.  Be sure to update any other recommended vaccinations for diabetics.

## 2022-10-11 DIAGNOSIS — I10 BENIGN HYPERTENSION: ICD-10-CM

## 2022-10-12 RX ORDER — AMLODIPINE BESYLATE 2.5 MG/1
TABLET ORAL
Qty: 90 TABLET | Refills: 0 | OUTPATIENT
Start: 2022-10-12

## 2023-01-02 DIAGNOSIS — E11.8 CONTROLLED TYPE 2 DIABETES MELLITUS WITH COMPLICATION, WITH LONG-TERM CURRENT USE OF INSULIN (H): ICD-10-CM

## 2023-01-02 DIAGNOSIS — Z79.4 CONTROLLED TYPE 2 DIABETES MELLITUS WITH COMPLICATION, WITH LONG-TERM CURRENT USE OF INSULIN (H): ICD-10-CM

## 2023-01-02 DIAGNOSIS — I10 BENIGN HYPERTENSION: ICD-10-CM

## 2023-01-03 NOTE — TELEPHONE ENCOUNTER
"Requested Prescriptions   Pending Prescriptions Disp Refills    losartan (COZAAR) 100 MG tablet [Pharmacy Med Name: LOSARTAN POTASSIUM 100 MG TAB] 90 tablet 0     Sig: TAKE 1 TABLET BY MOUTH EVERY DAY       Angiotensin-II Receptors Failed - 1/2/2023  2:34 PM        Failed - Last blood pressure under 140/90 in past 12 months     BP Readings from Last 3 Encounters:   08/09/21 138/82   09/08/20 138/82   12/16/19 (!) 192/94                 Passed - Recent (12 mo) or future (30 days) visit within the authorizing provider's specialty     Patient has had an office visit with the authorizing provider or a provider within the authorizing providers department within the previous 12 mos or has a future within next 30 days. See \"Patient Info\" tab in inbasket, or \"Choose Columns\" in Meds & Orders section of the refill encounter.              Passed - Medication is active on med list        Passed - Patient is age 18 or older        Passed - Normal serum creatinine on file in past 12 months     Recent Labs   Lab Test 08/01/22  0737   CR 0.87       Ok to refill medication if creatinine is low          Passed - Normal serum potassium on file in past 12 months     Recent Labs   Lab Test 08/01/22  0737   POTASSIUM 4.7                      metFORMIN (GLUCOPHAGE XR) 500 MG 24 hr tablet [Pharmacy Med Name: METFORMIN HCL  MG TABLET] 360 tablet 0     Sig: TAKE 2 TABLETS BY MOUTH TWICE A DAY WITH MEALS. SCHEDULE IN-CLINIC VISIT BEFORE END OF October 2022       Biguanide Agents Passed - 1/2/2023  2:34 PM        Passed - Patient is age 10 or older        Passed - Patient has documented A1c within the specified period of time.     If HgbA1C is 8 or greater, it needs to be on file within the past 3 months.  If less than 8, must be on file within the past 6 months.     Recent Labs   Lab Test 08/01/22  0737   A1C 6.6*             Passed - Patient's CR is NOT>1.4 OR Patient's EGFR is NOT<45 within past 12 mos.     Recent Labs   Lab Test " "08/01/22  0737 07/23/21  0851 12/04/20  0659   GFRESTIMATED >90   < > >90   GFRESTBLACK  --   --  >90    < > = values in this interval not displayed.       Recent Labs   Lab Test 08/01/22  0737   CR 0.87             Passed - Patient does NOT have a diagnosis of CHF.        Passed - Medication is active on med list        Passed - Recent (6 mo) or future (30 days) visit within the authorizing provider's specialty     Patient had office visit in the last 6 months or has a visit in the next 30 days with authorizing provider or within the authorizing provider's specialty.  See \"Patient Info\" tab in inbasket, or \"Choose Columns\" in Meds & Orders section of the refill encounter.              hydrochlorothiazide (HYDRODIURIL) 25 MG tablet [Pharmacy Med Name: HYDROCHLOROTHIAZIDE 25 MG TAB] 90 tablet 0     Sig: TAKE 1 TABLET BY MOUTH EVERY DAY       Diuretics (Including Combos) Protocol Failed - 1/2/2023  2:34 PM        Failed - Blood pressure under 140/90 in past 12 months     BP Readings from Last 3 Encounters:   08/09/21 138/82   09/08/20 138/82   12/16/19 (!) 192/94                 Passed - Recent (12 mo) or future (30 days) visit within the authorizing provider's specialty     Patient has had an office visit with the authorizing provider or a provider within the authorizing providers department within the previous 12 mos or has a future within next 30 days. See \"Patient Info\" tab in inTranservsket, or \"Choose Columns\" in Meds & Orders section of the refill encounter.              Passed - Medication is active on med list        Passed - Patient is age 18 or older        Passed - Normal serum creatinine on file in past 12 months     Recent Labs   Lab Test 08/01/22  0737   CR 0.87              Passed - Normal serum potassium on file in past 12 months     Recent Labs   Lab Test 08/01/22  0737   POTASSIUM 4.7                    Passed - Normal serum sodium on file in past 12 months     Recent Labs   Lab Test 08/01/22  0737   NA " "137                amLODIPine (NORVASC) 2.5 MG tablet [Pharmacy Med Name: AMLODIPINE BESYLATE 2.5 MG TAB] 90 tablet 0     Sig: TAKE 1 TABLET BY MOUTH EVERY DAY       Calcium Channel Blockers Protocol  Failed - 1/2/2023  2:34 PM        Failed - Blood pressure under 140/90 in past 12 months     BP Readings from Last 3 Encounters:   08/09/21 138/82   09/08/20 138/82   12/16/19 (!) 192/94                 Passed - Recent (12 mo) or future (30 days) visit within the authorizing provider's specialty     Patient has had an office visit with the authorizing provider or a provider within the authorizing providers department within the previous 12 mos or has a future within next 30 days. See \"Patient Info\" tab in inbasket, or \"Choose Columns\" in Meds & Orders section of the refill encounter.              Passed - Medication is active on med list        Passed - Patient is age 18 or older        Passed - Normal serum creatinine on file in past 12 months     Recent Labs   Lab Test 08/01/22  0737   CR 0.87       Ok to refill medication if creatinine is low               "

## 2023-01-04 RX ORDER — LOSARTAN POTASSIUM 100 MG/1
TABLET ORAL
Qty: 90 TABLET | Refills: 0 | Status: SHIPPED | OUTPATIENT
Start: 2023-01-04 | End: 2023-03-14

## 2023-01-04 RX ORDER — METFORMIN HCL 500 MG
TABLET, EXTENDED RELEASE 24 HR ORAL
Qty: 360 TABLET | Refills: 0 | Status: SHIPPED | OUTPATIENT
Start: 2023-01-04 | End: 2023-03-14

## 2023-01-04 RX ORDER — AMLODIPINE BESYLATE 2.5 MG/1
TABLET ORAL
Qty: 90 TABLET | Refills: 0 | Status: SHIPPED | OUTPATIENT
Start: 2023-01-04 | End: 2023-03-14

## 2023-01-04 RX ORDER — HYDROCHLOROTHIAZIDE 25 MG/1
TABLET ORAL
Qty: 90 TABLET | Refills: 0 | Status: SHIPPED | OUTPATIENT
Start: 2023-01-04 | End: 2023-03-14

## 2023-01-04 NOTE — TELEPHONE ENCOUNTER
Please call patient to assist in scheduling in-clinic visit within the next 30 days for chronic disease management.  He has never scheduled it in spite of being advised in previous visit and his previous prescriptions.

## 2023-01-04 NOTE — TELEPHONE ENCOUNTER
Message given to patient with good understanding.  Patient will call back in two weeks to schedule appointment with Dr. Pearce. Sara Brown on 1/4/2023 at 1:23 PM

## 2023-02-07 DIAGNOSIS — E11.8 TYPE 2 DIABETES MELLITUS WITH UNSPECIFIED COMPLICATIONS (H): ICD-10-CM

## 2023-02-10 RX ORDER — INSULIN GLARGINE 100 [IU]/ML
INJECTION, SOLUTION SUBCUTANEOUS
Qty: 15 ML | Refills: 0 | Status: SHIPPED | OUTPATIENT
Start: 2023-02-10 | End: 2023-03-08

## 2023-02-20 DIAGNOSIS — Z79.4 CONTROLLED TYPE 2 DIABETES MELLITUS WITH COMPLICATION, WITH LONG-TERM CURRENT USE OF INSULIN (H): ICD-10-CM

## 2023-02-20 DIAGNOSIS — E11.8 CONTROLLED TYPE 2 DIABETES MELLITUS WITH COMPLICATION, WITH LONG-TERM CURRENT USE OF INSULIN (H): ICD-10-CM

## 2023-02-21 RX ORDER — ATORVASTATIN CALCIUM 20 MG/1
TABLET, FILM COATED ORAL
Qty: 90 TABLET | Refills: 1 | Status: SHIPPED | OUTPATIENT
Start: 2023-02-21 | End: 2023-06-09

## 2023-02-26 DIAGNOSIS — I10 BENIGN HYPERTENSION: ICD-10-CM

## 2023-03-01 RX ORDER — METOPROLOL SUCCINATE 200 MG/1
200 TABLET, EXTENDED RELEASE ORAL DAILY
Qty: 90 TABLET | Refills: 0 | Status: SHIPPED | OUTPATIENT
Start: 2023-03-01 | End: 2023-03-14

## 2023-03-01 NOTE — TELEPHONE ENCOUNTER
Pending Prescriptions:                       Disp   Refills    metoprolol succinate ER (TOPROL XL) 200 MG*90 tab*0        Sig: TAKE 1 TABLET BY MOUTH EVERY DAY    Routing refill request to provider for review/approval because:  Needs BP check, has an appt w/ PCP on 3/14/23. Ok for refill?    BP Readings from Last 3 Encounters:   08/09/21 138/82   09/08/20 138/82   12/16/19 (!) 192/94     Pilar Vásquez RN  [facility]

## 2023-03-01 NOTE — TELEPHONE ENCOUNTER
Please call patient to arrange an in-clinic appointment as he is way overdue for an in-clinic follow up for his chronic conditions.

## 2023-03-01 NOTE — TELEPHONE ENCOUNTER
Patient has appt for 3/14/23. Message given to patient with good understanding.  Sara Brown on 3/1/2023 at 1:02 PM

## 2023-03-07 ASSESSMENT — ENCOUNTER SYMPTOMS
NAUSEA: 0
FREQUENCY: 0
FEVER: 0
DYSURIA: 0
SORE THROAT: 0
DIARRHEA: 0
HEMATURIA: 0
HEMATOCHEZIA: 0
CHILLS: 0
ARTHRALGIAS: 0
PARESTHESIAS: 0
EYE PAIN: 0
JOINT SWELLING: 0
HEARTBURN: 0
ABDOMINAL PAIN: 0
DIZZINESS: 0
MYALGIAS: 0
WEAKNESS: 0
COUGH: 0
HEADACHES: 0
SHORTNESS OF BREATH: 0
CONSTIPATION: 0
NERVOUS/ANXIOUS: 0
PALPITATIONS: 0

## 2023-03-14 ENCOUNTER — OFFICE VISIT (OUTPATIENT)
Dept: FAMILY MEDICINE | Facility: CLINIC | Age: 63
End: 2023-03-14
Payer: COMMERCIAL

## 2023-03-14 VITALS
HEART RATE: 70 BPM | OXYGEN SATURATION: 99 % | BODY MASS INDEX: 45.68 KG/M2 | TEMPERATURE: 97.2 F | WEIGHT: 301.4 LBS | RESPIRATION RATE: 24 BRPM | DIASTOLIC BLOOD PRESSURE: 73 MMHG | SYSTOLIC BLOOD PRESSURE: 163 MMHG | HEIGHT: 68 IN

## 2023-03-14 DIAGNOSIS — E66.01 CLASS 3 SEVERE OBESITY DUE TO EXCESS CALORIES WITH SERIOUS COMORBIDITY AND BODY MASS INDEX (BMI) OF 45.0 TO 49.9 IN ADULT (H): ICD-10-CM

## 2023-03-14 DIAGNOSIS — E66.813 CLASS 3 SEVERE OBESITY DUE TO EXCESS CALORIES WITH SERIOUS COMORBIDITY AND BODY MASS INDEX (BMI) OF 45.0 TO 49.9 IN ADULT (H): ICD-10-CM

## 2023-03-14 DIAGNOSIS — I10 BENIGN HYPERTENSION: ICD-10-CM

## 2023-03-14 DIAGNOSIS — Z12.11 SCREEN FOR COLON CANCER: ICD-10-CM

## 2023-03-14 DIAGNOSIS — E11.8 CONTROLLED TYPE 2 DIABETES MELLITUS WITH COMPLICATION, WITH LONG-TERM CURRENT USE OF INSULIN (H): ICD-10-CM

## 2023-03-14 DIAGNOSIS — Z00.00 ROUTINE GENERAL MEDICAL EXAMINATION AT A HEALTH CARE FACILITY: Primary | ICD-10-CM

## 2023-03-14 DIAGNOSIS — Z79.4 CONTROLLED TYPE 2 DIABETES MELLITUS WITH COMPLICATION, WITH LONG-TERM CURRENT USE OF INSULIN (H): ICD-10-CM

## 2023-03-14 PROBLEM — E11.621 DIABETIC ULCER OF TOE OF LEFT FOOT ASSOCIATED WITH TYPE 2 DIABETES MELLITUS, UNSPECIFIED ULCER STAGE (H): Status: RESOLVED | Noted: 2019-11-12 | Resolved: 2023-03-14

## 2023-03-14 PROBLEM — L97.529 DIABETIC ULCER OF TOE OF LEFT FOOT ASSOCIATED WITH TYPE 2 DIABETES MELLITUS, UNSPECIFIED ULCER STAGE (H): Status: RESOLVED | Noted: 2019-11-12 | Resolved: 2023-03-14

## 2023-03-14 PROBLEM — E11.621 DIABETIC ULCER OF LEFT MIDFOOT ASSOCIATED WITH TYPE 2 DIABETES MELLITUS, UNSPECIFIED ULCER STAGE (H): Status: RESOLVED | Noted: 2019-11-12 | Resolved: 2023-03-14

## 2023-03-14 PROBLEM — L97.429 DIABETIC ULCER OF LEFT MIDFOOT ASSOCIATED WITH TYPE 2 DIABETES MELLITUS, UNSPECIFIED ULCER STAGE (H): Status: RESOLVED | Noted: 2019-11-12 | Resolved: 2023-03-14

## 2023-03-14 PROCEDURE — 90471 IMMUNIZATION ADMIN: CPT | Performed by: FAMILY MEDICINE

## 2023-03-14 PROCEDURE — 90677 PCV20 VACCINE IM: CPT | Performed by: FAMILY MEDICINE

## 2023-03-14 PROCEDURE — 99207 PR FOOT EXAM NO CHARGE: CPT | Mod: 25 | Performed by: FAMILY MEDICINE

## 2023-03-14 PROCEDURE — 99396 PREV VISIT EST AGE 40-64: CPT | Mod: 25 | Performed by: FAMILY MEDICINE

## 2023-03-14 PROCEDURE — 99214 OFFICE O/P EST MOD 30 MIN: CPT | Mod: 25 | Performed by: FAMILY MEDICINE

## 2023-03-14 RX ORDER — LOSARTAN POTASSIUM 100 MG/1
100 TABLET ORAL DAILY
Qty: 90 TABLET | Refills: 3 | Status: SHIPPED | OUTPATIENT
Start: 2023-03-14 | End: 2023-10-30

## 2023-03-14 RX ORDER — AMLODIPINE BESYLATE 2.5 MG/1
2.5 TABLET ORAL DAILY
Qty: 90 TABLET | Refills: 3 | Status: SHIPPED | OUTPATIENT
Start: 2023-03-14 | End: 2023-10-30

## 2023-03-14 RX ORDER — METFORMIN HCL 500 MG
TABLET, EXTENDED RELEASE 24 HR ORAL
Qty: 360 TABLET | Refills: 3 | Status: SHIPPED | OUTPATIENT
Start: 2023-03-14 | End: 2023-10-30

## 2023-03-14 RX ORDER — METOPROLOL SUCCINATE 200 MG/1
200 TABLET, EXTENDED RELEASE ORAL DAILY
Qty: 90 TABLET | Refills: 0 | Status: SHIPPED | OUTPATIENT
Start: 2023-03-14 | End: 2023-08-23

## 2023-03-14 RX ORDER — INSULIN GLARGINE 100 [IU]/ML
64 INJECTION, SOLUTION SUBCUTANEOUS AT BEDTIME
Qty: 30 ML | Refills: 3 | Status: SHIPPED | OUTPATIENT
Start: 2023-03-14 | End: 2023-09-18

## 2023-03-14 RX ORDER — HYDROCHLOROTHIAZIDE 25 MG/1
25 TABLET ORAL DAILY
Qty: 90 TABLET | Refills: 3 | Status: SHIPPED | OUTPATIENT
Start: 2023-03-14 | End: 2023-10-30

## 2023-03-14 ASSESSMENT — ENCOUNTER SYMPTOMS
JOINT SWELLING: 0
CHILLS: 0
HEARTBURN: 0
HEADACHES: 0
ARTHRALGIAS: 0
NAUSEA: 0
HEMATURIA: 0
FEVER: 0
PARESTHESIAS: 0
FREQUENCY: 0
PALPITATIONS: 0
DIZZINESS: 0
HEMATOCHEZIA: 0
CONSTIPATION: 0
SORE THROAT: 0
NERVOUS/ANXIOUS: 0
COUGH: 0
ABDOMINAL PAIN: 0
MYALGIAS: 0
DIARRHEA: 0
EYE PAIN: 0
DYSURIA: 0
SHORTNESS OF BREATH: 0
WEAKNESS: 0

## 2023-03-14 ASSESSMENT — PAIN SCALES - GENERAL: PAINLEVEL: NO PAIN (0)

## 2023-03-14 NOTE — PATIENT INSTRUCTIONS
You may get the Shingrix vaccine for shingles if you desire, and after you verify with insurance how they cover the vaccine.     Colonoscopy  will call you in the next 3-5 business days to set up appointment for the procedure. Referral information is in your visit summary also.   Do this within this year since you you have deferred this for a while and are way overdue to get colon cancer screening done.    You are highly advised to consider consultation with the weight management clinic to look for sustainable ways to lose and maintain a lower weight to reduce serious illness associated with obesity.    Be consistent with low salt, low trans fat and low saturated fat diet.  Eat food rich in omega-3-fatty acids as you tolerate. (salmon, olive oil)  Eat 5 cups of vegetables, fruits and whole grains per day.  Limit starchy food (white rice, white bread, white pasta, white potatoes) to less than a cup per meal.  Minimize sweets, junk food and fastfood. Limit soda beverages to one serving per day; best to avoid it altogether though.    Exercise: moderate intensity sustained for at least 30 mins per episode, goal of 150 mins per week at least. Gradually increase duration or intensity of exercise as you are able to.  Combine cardiovascular and resistance exercises.  These exercise recommendations are in addition to your daily activity at work or home.  Work on losing weight.      Regular foot care; don't walk barefoot  Annual eye exam.  Please request your eye doctor to furnish a copy of the eye exam report to the clinic to be placed in your record.  Flu vaccine by the end of October yearly.  Be sure to update any other recommended vaccinations for diabetics.    Blood tests: You will be contacted in 1-2 days for results of your lab tests.    Preventative Care Visits include: Yearly physicals, Well-child visits, Welcome to Medicare visits, & Medicare yearly wellness exams.    The purpose of these visits is to  discuss your medical history and prevent health problems before you are sick.  You may need to pay a copay, coinsurance or deductible if your visit today includes testing or treating for a new or existing condition.    Additional charges may be incurred for today's visit. If you have questions about what your insurance plan covers, please contact your Insurance Company's member service department.  If you have questions specific to a bill you have already received from [employer], please contact the [employer] Billing Office at [phone].       Preventive Health Recommendations  Male Ages 50 - 64    Yearly exam:             See your health care provider every year in order to  o   Review health changes.   o   Discuss preventive care.    o   Review your medicines if your doctor has prescribed any.   Have a cholesterol test every 5 years, or more frequently if you are at risk for high cholesterol/heart disease.   Have a diabetes test (fasting glucose) every three years. If you are at risk for diabetes, you should have this test more often.   Have a colonoscopy at age 50, or have a yearly FIT test (stool test). These exams will check for colon cancer.    Talk with your health care provider about whether or not a prostate cancer screening test (PSA) is right for you.  You should be tested each year for STDs (sexually transmitted diseases), if you re at risk.     Shots: Get a flu shot each year. Get a tetanus shot every 10 years.     Nutrition:  Eat at least 5 servings of fruits and vegetables daily.   Eat whole-grain bread, whole-wheat pasta and brown rice instead of white grains and rice.   Get adequate Calcium and Vitamin D.     Lifestyle  Exercise for at least 150 minutes a week (30 minutes a day, 5 days a week). This will help you control your weight and prevent disease.   Limit alcohol to one drink per day.   No smoking.   Wear sunscreen to prevent skin cancer.   See your dentist every six months for an exam and  cleaning.   See your eye doctor every 1 to 2 years.

## 2023-03-27 ENCOUNTER — TELEPHONE (OUTPATIENT)
Dept: FAMILY MEDICINE | Facility: CLINIC | Age: 63
End: 2023-03-27
Payer: COMMERCIAL

## 2023-03-27 NOTE — TELEPHONE ENCOUNTER
Patient Quality Outreach    Patient is due for the following:   Colon Cancer Screening    Next Steps:   No follow up needed at this time.    Type of outreach:    Sent letter.    Next Steps:  Reach out within 90 days via [employer].    Max number of attempts reached: No. Will try again in 90 days if patient still on fail list.    Questions for provider review:    None     JESUS Thompson LPN

## 2023-03-27 NOTE — LETTER
March 27, 2023      Juan Balderrama  [address]  [place] [address]    Your team at [facility] cares about your health. We have reviewed your chart and based on our findings; we are making the following recommendations to better manage your health.     You are in particular need of attention regarding the following:     Call or MyChart message your clinic to schedule a colonoscopy, schedule/ a FIT Test, or order a Cologuard test. If you are unsure what type of test you need, please call your clinic and speak to clinic staff.   Colon cancer is now the second leading cause of cancer-related deaths in the [facility] for both men and women and there are over 130,000 new cases and 50,000 deaths per year from colon cancer. Colonoscopies can prevent 90-95% of these deaths. Problem lesions can be removed before they ever become cancer. This test is not only looking for cancer, but also getting rid of precancerous lesions.   If you are under/uninsured, we recommend you contact the [employer] Program.[employer] is a free colorectal cancer screening program that provides colonoscopies for eligible under/uninsured [place] men and women. If you are interested in receiving a free colonoscopy, please call [employer] at t [phone] (mention code [employer]) to see if you're eligible. Please have them send us the results.     If you have already completed these items, please contact the clinic via phone or   [employer] so your care team can review and update your records. Thank you for   choosing [facility] for your healthcare needs. For any questions,   concerns, or to schedule an appointment please contact our clinic at [phone].    Healthy Regards,      Your [facility] Care Team

## 2023-04-30 ENCOUNTER — HEALTH MAINTENANCE LETTER (OUTPATIENT)
Age: 63
End: 2023-04-30

## 2023-05-19 NOTE — LETTER
[facility]  [address] [address]  Phone: [phone]       Vicky [birth year] 2020         Juan Balderrama  [address] [place] [address]            Dear Juan:    We are concerned about your health care.  We recently provided you with medication refills.  Many medications require routine follow-up with your doctor.    Your prescription(s) have been refilled for 30 days so you may have time for the above noted follow-up. Please call to schedule soon so we can assure you have an appointment before your next refills are needed.    Thank you,      Mauricio Pearce MD / Nichole SMITH RN       [Patient reported mammogram was normal] : Patient reported mammogram was normal [Patient reported PAP Smear was abnormal] : Patient reported PAP Smear was abnormal [Post-Menopause, No Sxs] : post-menopausal, currently without symptoms [No] : Patient does not have concerns regarding sex [Currently Active] : currently active [Men] : men [FreeTextEntry1] : Ms. Fernandez, July 71 yo female presents for annual GYN exams.\par Pt complaints of a rash in her right buttock. PT admits its has been there for 1 week.  States she saw derm and it was biopsied and she was told it was a herpetic lesion.  She would like a second opinion.  Ms. Fernandez reports that she has also developed a generalized rash that is believed to be associated with her rheumatoid arthritis that has been treatment with light therapy and she started Humira. [Mammogramdate] : 09/01/2022 [PapSmeardate] : 05/18/2022 [BoneDensityDate] : 01/17/2023

## 2023-05-28 ENCOUNTER — HOSPITAL ENCOUNTER (EMERGENCY)
Facility: CLINIC | Age: 63
Discharge: HOME | End: 2023-05-28
Attending: PHYSICIAN ASSISTANT | Admitting: PHYSICIAN ASSISTANT
Payer: COMMERCIAL

## 2023-05-28 VITALS
RESPIRATION RATE: 20 BRPM | OXYGEN SATURATION: 96 % | DIASTOLIC BLOOD PRESSURE: 73 MMHG | TEMPERATURE: 99.3 F | HEART RATE: 79 BPM | SYSTOLIC BLOOD PRESSURE: 157 MMHG

## 2023-05-28 DIAGNOSIS — L03.90 CELLULITIS: ICD-10-CM

## 2023-05-28 PROCEDURE — 99214 OFFICE O/P EST MOD 30 MIN: CPT | Performed by: PHYSICIAN ASSISTANT

## 2023-05-28 PROCEDURE — G0463 HOSPITAL OUTPT CLINIC VISIT: HCPCS | Performed by: PHYSICIAN ASSISTANT

## 2023-05-28 NOTE — ED TRIAGE NOTES
Pt reports left foot wound with redness, discharge, pain with temperatures ranging . Onset yesterday.   Advil was taken 0700 per pt

## 2023-05-28 NOTE — ED PROVIDER NOTES
History     Chief Complaint   Patient presents with     Foot Problems     HPI  Juan Balderrama is a 62 year old male past medical history significant for type 2 diabetes mellitus, morbid obesity, hypertension, hyperlipidemia who presents to urgent care with concern over left foot infection which began within the last 2 days. No injury or trauma to the area. Patient complains of erythema, swelling, tenderness palpation of the dorsal aspect of his left foot.  He also notes that he had a fever at home up to 101 last night with chills, myalgias and swelling of left lower leg.  He denies any numbness, paresthesias, cough, chest pains, dyspnea. He states sugars have been slightly increased up to 160 upon waking.  He reports history of similar symptoms several years ago which were treated with antibiotics by podiatry, no surgical interventions needed at that time.      Allergies:  Allergies   Allergen Reactions     Sulfamethoxazole Swelling     throat       Problem List:    Patient Active Problem List    Diagnosis Date Noted     Hyperlipidemia LDL goal <70 12/06/2017     Priority: Medium     Uncontrolled type 2 diabetes mellitus with complication, with long-term current use of insulin 03/28/2017     Priority: Medium     Left cataract 01/25/2017     Priority: Medium     Morbid obesity, unspecified obesity type (H) 01/25/2017     Priority: Medium     Benign essential hypertension 01/23/2017     Priority: Medium     Former smoker 01/23/2017     Priority: Medium     Hypertension 08/08/2015     Priority: Medium        Past Medical History:    Past Medical History:   Diagnosis Date     Diabetes (H) 1-2017     Hypertension 1-2017       Past Surgical History:    Past Surgical History:   Procedure Laterality Date     EYE SURGERY  4-3-2017    Cataract       Family History:    Family History   Problem Relation Age of Onset     Hypertension Mother      Hypertension Father        Social History:  Marital Status:   [2]  Social  History     Tobacco Use     Smoking status: Never     Passive exposure: Never     Smokeless tobacco: Never   Vaping Use     Vaping status: Never Used   Substance Use Topics     Alcohol use: Yes     Comment: social     Drug use: No        Medications:    amoxicillin-clavulanate (AUGMENTIN) 875-125 MG tablet  amLODIPine (NORVASC) 2.5 MG tablet  atorvastatin (LIPITOR) 20 MG tablet  BD PEN NEEDLE YENNY 2ND GEN 32G X 4 MM miscellaneous  blood glucose (ACCU-CHEK GANGA) test strip  blood glucose monitoring (ACCU-CHEK GANGA PLUS) meter device kit  blood glucose monitoring (ACCU-CHEK MULTICLIX) lancets  EPINEPHrine (EPIPEN/ADRENACLICK/OR ANY BX GENERIC EQUIV) 0.3 MG/0.3ML injection 2-pack  hydrochlorothiazide (HYDRODIURIL) 25 MG tablet  insulin glargine (BASAGLAR KWIKPEN) 100 UNIT/ML pen  losartan (COZAAR) 100 MG tablet  metFORMIN (GLUCOPHAGE XR) 500 MG 24 hr tablet  metoprolol succinate ER (TOPROL XL) 200 MG 24 hr tablet      Review of Systems  CONSTITUTIONAL:POSITIVE for fever, chills, myalgias   INTEGUMENTARY/SKIN: POSITIVE for wound, discoloration of left foot   RESP:NEGATIVE for significant cough or SOB  MUSCULOSKELETAL: POSITIVE  for left foot pain, swelling  NEURO: NEGATIVE for numbness, paresthesias, hx of peripheral neuropathy   Physical Exam   BP: (!) 157/73  Pulse: 79  Temp: 99.3  F (37.4  C)  Resp: 20  SpO2: 96 %  Physical Exam  Constitutional:       General: He is not in acute distress.     Appearance: He is not ill-appearing or toxic-appearing.   HENT:      Head: Normocephalic and atraumatic.   Cardiovascular:      Pulses:           Dorsalis pedis pulses are 2+ on the left side.        Posterior tibial pulses are 2+ on the left side.   Musculoskeletal:      Left lower leg: Swelling present. No deformity, lacerations, tenderness or bony tenderness. Edema present.      Left ankle: Swelling present. No deformity or ecchymosis. Tenderness present. Normal range of motion.      Left foot: Decreased range of motion.  Normal capillary refill. Swelling and tenderness present. No deformity or crepitus. Normal pulse.   Skin:     General: Skin is warm and dry.      Findings: Ecchymosis, erythema and wound present. No laceration.   Neurological:      Mental Status: He is alert.      Comments: Sensation to light touch of left foot is grossly intact               ED Course           Procedures       Critical Care time:  none        No results found for this or any previous visit (from the past 24 hour(s)).  Medications - No data to display    Assessments & Plan (with Medical Decision Making)     I have reviewed the nursing notes.  I have reviewed the findings, diagnosis, plan and need for follow up with the patient.       New Prescriptions    AMOXICILLIN-CLAVULANATE (AUGMENTIN) 875-125 MG TABLET    Take 1 tablet by mouth 2 times daily for 10 days     Final diagnoses:   Cellulitis     62-year-old male with past medical history significant for obesity, type 2 diabetes mellitus, hypertension, hyperlipidemia presents to urgent care with concern over 2-day history of left foot pain, swelling, erythema which he states is similar to infection and he was treated for several years ago.  He did have a fever up to 101 last night with chills, myalgias.  He had elevated blood pressure upon arrival was afebrile however did have antipyretics on board.  Physical exam findings were significant for wound to the dorsal surface of his left foot with associated swelling, erythema, tenderness palpation and edema extending up to the left lower leg.  Symptoms appear consistent with infection/cellulitis.  I discussed with patient that given presence of fever at home sign that his body is fighting something off and he may be sicker than he actually feels.  We discussed risk/benefits of transfer to the emergency department for further evaluation including potential for blood work, imaging and patient elected to defer.  Do not suspect DVT at this time and think  that this is reasonable as he has not failed outpatient therapy yet.  He was discharged home stable with prescription for Augmentin which was prescribed for prior similar complaint.  Follow up with podiatry if no improvement in 48-72 hours or return to ER sooner if worsening symptoms or fever persists.      Disclaimer: This note consists of symbols derived from keyboarding, dictation, and/or voice recognition software. As a result, there may be errors in the script that have gone undetected.  Please consider this when interpreting information found in the chart.      5/28/2023   [facility] EMERGENCY DEPT     Pilar Elmore PA-C  05/28/23 1447

## 2023-06-09 DIAGNOSIS — E11.8 CONTROLLED TYPE 2 DIABETES MELLITUS WITH COMPLICATION, WITH LONG-TERM CURRENT USE OF INSULIN (H): ICD-10-CM

## 2023-06-09 DIAGNOSIS — Z79.4 CONTROLLED TYPE 2 DIABETES MELLITUS WITH COMPLICATION, WITH LONG-TERM CURRENT USE OF INSULIN (H): ICD-10-CM

## 2023-06-09 RX ORDER — ATORVASTATIN CALCIUM 20 MG/1
TABLET, FILM COATED ORAL
Qty: 90 TABLET | Refills: 2 | Status: SHIPPED | OUTPATIENT
Start: 2023-06-09 | End: 2023-10-30

## 2023-06-12 ENCOUNTER — OFFICE VISIT (OUTPATIENT)
Dept: PODIATRY | Facility: CLINIC | Age: 63
End: 2023-06-12
Payer: COMMERCIAL

## 2023-06-12 VITALS
WEIGHT: 301 LBS | BODY MASS INDEX: 46.45 KG/M2 | HEART RATE: 65 BPM | DIASTOLIC BLOOD PRESSURE: 83 MMHG | SYSTOLIC BLOOD PRESSURE: 168 MMHG

## 2023-06-12 DIAGNOSIS — L03.115 CELLULITIS OF RIGHT FOOT: Primary | ICD-10-CM

## 2023-06-12 PROCEDURE — 99203 OFFICE O/P NEW LOW 30 MIN: CPT | Performed by: PODIATRIST

## 2023-06-12 NOTE — LETTER
6/12/2023         RE: Juan Balderrama  [id number]  [place] [id number]        Dear Colleague,    Thank you for referring your patient, Juan Balderrama, to the [facility]. Please see a copy of my visit note below.    Juan returns to the office for reevaluation of the left foot.  The patient relates following the instructions given at the last visit with noted overall less pain and more improvement in function of the left foot.   The patient relates no other problems. States this is a new issue compared to being seen in 2019.     Vitals: BP (!) 168/83   Pulse 65   Wt 136.5 kg (301 lb)   BMI 46.45 kg/m    BMI= Body mass index is 46.45 kg/m .    Lower Extremity Physical Exam:      Neurovascular status remains unchanged.  Muscular exam is within normal limits to major muscle groups.  Integument is intact.      Noted mild to moderate edema and erythema around the wound on the right foot.           Assessment:     ICD-10-CM    1. Cellulitis of right foot  L03.115 amoxicillin-clavulanate (AUGMENTIN) 875-125 MG tablet          Plan:    I have explained to Juan about the progress of the conditions.  At this time, the patient was prescribed a ten day course of oral Augmentin 875 mg PO BID.  The patient will continue to offload the right foot.  The patient was instructed to return in two weeks for reevaluation.    Juan verbalized agreement with and understanding of the rational for the diagnosis and treatment plan.  All questions were answered to best of my ability and the patient's satisfaction. The patient was advised to contact the clinic with any questions that may arise after the clinic visit.      Disclaimer: This note consists of symbols derived from keyboarding, dictation and/or voice recognition software. As a result, there may be errors in the script that have gone undetected. Please consider this when interpreting information found in this chart.       JESUS  Moisés Boston D.P.M., F.A.C.F.A.S.        Again, thank you for allowing me to participate in the care of your patient.        Sincerely,        Joel Boston DPM

## 2023-06-12 NOTE — PROGRESS NOTES
Juan returns to the office for reevaluation of the left foot.  The patient relates following the instructions given at the last visit with noted overall less pain and more improvement in function of the left foot.   The patient relates no other problems. States this is a new issue compared to being seen in 2019.     Vitals: BP (!) 168/83   Pulse 65   Wt 136.5 kg (301 lb)   BMI 46.45 kg/m    BMI= Body mass index is 46.45 kg/m .    Lower Extremity Physical Exam:      Neurovascular status remains unchanged.  Muscular exam is within normal limits to major muscle groups.  Integument is intact.      Noted mild to moderate edema and erythema around the wound on the right foot.           Assessment:     ICD-10-CM    1. Cellulitis of right foot  L03.115 amoxicillin-clavulanate (AUGMENTIN) 875-125 MG tablet          Plan:    I have explained to Juan about the progress of the conditions.  At this time, the patient was prescribed a ten day course of oral Augmentin 875 mg PO BID.  The patient will continue to offload the right foot.  The patient was instructed to return in two weeks for reevaluation.    Juan verbalized agreement with and understanding of the rational for the diagnosis and treatment plan.  All questions were answered to best of my ability and the patient's satisfaction. The patient was advised to contact the clinic with any questions that may arise after the clinic visit.      Disclaimer: This note consists of symbols derived from keyboarding, dictation and/or voice recognition software. As a result, there may be errors in the script that have gone undetected. Please consider this when interpreting information found in this chart.       JESUS Boston D.P.M., [email or web address].

## 2023-07-03 ENCOUNTER — TELEPHONE (OUTPATIENT)
Dept: PODIATRY | Facility: CLINIC | Age: 63
End: 2023-07-03
Payer: COMMERCIAL

## 2023-07-03 DIAGNOSIS — L03.115 CELLULITIS OF RIGHT FOOT: ICD-10-CM

## 2023-08-22 DIAGNOSIS — I10 BENIGN HYPERTENSION: ICD-10-CM

## 2023-08-23 RX ORDER — METOPROLOL SUCCINATE 200 MG/1
200 TABLET, EXTENDED RELEASE ORAL DAILY
Qty: 90 TABLET | Refills: 0 | Status: SHIPPED | OUTPATIENT
Start: 2023-08-23 | End: 2023-10-30

## 2023-08-23 NOTE — TELEPHONE ENCOUNTER
"Routing refill request to provider for review/approval because:  Blood pressure    Pending Prescriptions:                       Disp   Refills    metoprolol succinate ER (TOPROL XL) 200 MG*90 tab*0        Sig: Take 1 tablet (200 mg) by mouth daily NEEDS TO SEE A           PROVIDER BEFORE OTHER REFILLS ARE GIVEN.      Requested Prescriptions   Pending Prescriptions Disp Refills    metoprolol succinate ER (TOPROL XL) 200 MG 24 hr tablet [Pharmacy Med Name: METOPROLOL SUCC  MG TAB] 90 tablet 0     Sig: Take 1 tablet (200 mg) by mouth daily NEEDS TO SEE A PROVIDER BEFORE OTHER REFILLS ARE GIVEN.       Beta-Blockers Protocol Failed - 8/22/2023  5:28 PM        Failed - Blood pressure under 140/90 in past 12 months     BP Readings from Last 3 Encounters:   06/12/23 (!) 168/83   05/28/23 (!) 157/73   03/14/23 (P) 132/70                 Passed - Patient is age 6 or older        Passed - Recent (12 mo) or future (30 days) visit within the authorizing provider's specialty     Patient has had an office visit with the authorizing provider or a provider within the authorizing providers department within the previous 12 mos or has a future within next 30 days. See \"Patient Info\" tab in inbasket, or \"Choose Columns\" in Meds & Orders section of the refill encounter.              Passed - Medication is active on med list           Rochelle Pittman RN on 8/23/2023 at 12:26 PM    "

## 2023-09-15 DIAGNOSIS — Z79.4 CONTROLLED TYPE 2 DIABETES MELLITUS WITH COMPLICATION, WITH LONG-TERM CURRENT USE OF INSULIN (H): ICD-10-CM

## 2023-09-15 DIAGNOSIS — E11.8 CONTROLLED TYPE 2 DIABETES MELLITUS WITH COMPLICATION, WITH LONG-TERM CURRENT USE OF INSULIN (H): ICD-10-CM

## 2023-09-18 RX ORDER — INSULIN GLARGINE 100 [IU]/ML
64 INJECTION, SOLUTION SUBCUTANEOUS AT BEDTIME
Qty: 30 ML | Refills: 0 | Status: SHIPPED | OUTPATIENT
Start: 2023-09-18 | End: 2023-10-30

## 2023-09-25 ENCOUNTER — ANCILLARY PROCEDURE (OUTPATIENT)
Dept: GENERAL RADIOLOGY | Facility: CLINIC | Age: 63
End: 2023-09-25
Attending: PODIATRIST
Payer: COMMERCIAL

## 2023-09-25 ENCOUNTER — APPOINTMENT (OUTPATIENT)
Dept: MRI IMAGING | Facility: CLINIC | Age: 63
DRG: 617 | End: 2023-09-25
Attending: EMERGENCY MEDICINE
Payer: COMMERCIAL

## 2023-09-25 ENCOUNTER — HOSPITAL ENCOUNTER (INPATIENT)
Facility: CLINIC | Age: 63
LOS: 7 days | Discharge: HOME HEALTH | DRG: 617 | End: 2023-10-02
Attending: EMERGENCY MEDICINE | Admitting: INTERNAL MEDICINE
Payer: COMMERCIAL

## 2023-09-25 ENCOUNTER — OFFICE VISIT (OUTPATIENT)
Dept: PODIATRY | Facility: CLINIC | Age: 63
End: 2023-09-25
Payer: COMMERCIAL

## 2023-09-25 VITALS
WEIGHT: 301 LBS | SYSTOLIC BLOOD PRESSURE: 156 MMHG | DIASTOLIC BLOOD PRESSURE: 82 MMHG | BODY MASS INDEX: 46.45 KG/M2 | HEART RATE: 69 BPM

## 2023-09-25 DIAGNOSIS — L08.9 TYPE 2 DIABETES MELLITUS WITH RIGHT DIABETIC FOOT INFECTION (H): Primary | ICD-10-CM

## 2023-09-25 DIAGNOSIS — M86.171 OTHER ACUTE OSTEOMYELITIS OF RIGHT FOOT (H): ICD-10-CM

## 2023-09-25 DIAGNOSIS — M14.671 CHARCOT'S JOINT OF RIGHT FOOT: ICD-10-CM

## 2023-09-25 DIAGNOSIS — L03.115 CELLULITIS OF RIGHT FOOT: ICD-10-CM

## 2023-09-25 DIAGNOSIS — Z89.512 S/P BELOW KNEE AMPUTATION, LEFT (H): Primary | ICD-10-CM

## 2023-09-25 DIAGNOSIS — E11.628 TYPE 2 DIABETES MELLITUS WITH RIGHT DIABETIC FOOT INFECTION (H): Primary | ICD-10-CM

## 2023-09-25 DIAGNOSIS — M86.172 OTHER ACUTE OSTEOMYELITIS OF LEFT FOOT (H): ICD-10-CM

## 2023-09-25 LAB
ALBUMIN SERPL BCG-MCNC: 3.2 G/DL (ref 3.5–5.2)
ALP SERPL-CCNC: 92 U/L (ref 40–129)
ALT SERPL W P-5'-P-CCNC: 24 U/L (ref 0–70)
ANION GAP SERPL CALCULATED.3IONS-SCNC: 16 MMOL/L (ref 7–15)
AST SERPL W P-5'-P-CCNC: 23 U/L (ref 0–45)
BASOPHILS # BLD AUTO: 0.1 10E3/UL (ref 0–0.2)
BASOPHILS NFR BLD AUTO: 1 %
BILIRUB SERPL-MCNC: 0.4 MG/DL
BUN SERPL-MCNC: 26.3 MG/DL (ref 8–23)
CALCIUM SERPL-MCNC: 9.8 MG/DL (ref 8.8–10.2)
CHLORIDE SERPL-SCNC: 96 MMOL/L (ref 98–107)
CREAT SERPL-MCNC: 1.03 MG/DL (ref 0.67–1.17)
CRP SERPL-MCNC: 387.74 MG/L
DEPRECATED HCO3 PLAS-SCNC: 22 MMOL/L (ref 22–29)
EGFRCR SERPLBLD CKD-EPI 2021: 83 ML/MIN/1.73M2
EOSINOPHIL # BLD AUTO: 0.3 10E3/UL (ref 0–0.7)
EOSINOPHIL NFR BLD AUTO: 2 %
ERYTHROCYTE [DISTWIDTH] IN BLOOD BY AUTOMATED COUNT: 15.2 % (ref 10–15)
ERYTHROCYTE [SEDIMENTATION RATE] IN BLOOD BY WESTERGREN METHOD: 81 MM/HR (ref 0–20)
GLUCOSE BLDC GLUCOMTR-MCNC: 156 MG/DL (ref 70–99)
GLUCOSE SERPL-MCNC: 108 MG/DL (ref 70–99)
HBA1C MFR BLD: 6.7 %
HCT VFR BLD AUTO: 35.2 % (ref 40–53)
HGB BLD-MCNC: 11.3 G/DL (ref 13.3–17.7)
HOLD SPECIMEN: NORMAL
IMM GRANULOCYTES # BLD: 0.2 10E3/UL
IMM GRANULOCYTES NFR BLD: 1 %
INR PPP: 1.25 (ref 0.85–1.15)
LACTATE SERPL-SCNC: 0.4 MMOL/L (ref 0.7–2)
LYMPHOCYTES # BLD AUTO: 1.6 10E3/UL (ref 0.8–5.3)
LYMPHOCYTES NFR BLD AUTO: 10 %
MCH RBC QN AUTO: 27.8 PG (ref 26.5–33)
MCHC RBC AUTO-ENTMCNC: 32.1 G/DL (ref 31.5–36.5)
MCV RBC AUTO: 87 FL (ref 78–100)
MONOCYTES # BLD AUTO: 1.3 10E3/UL (ref 0–1.3)
MONOCYTES NFR BLD AUTO: 8 %
NEUTROPHILS # BLD AUTO: 13.1 10E3/UL (ref 1.6–8.3)
NEUTROPHILS NFR BLD AUTO: 78 %
NRBC # BLD AUTO: 0 10E3/UL
NRBC BLD AUTO-RTO: 0 /100
PLATELET # BLD AUTO: 606 10E3/UL (ref 150–450)
POTASSIUM SERPL-SCNC: 3.8 MMOL/L (ref 3.4–5.3)
PROT SERPL-MCNC: 7.6 G/DL (ref 6.4–8.3)
RBC # BLD AUTO: 4.07 10E6/UL (ref 4.4–5.9)
SODIUM SERPL-SCNC: 134 MMOL/L (ref 136–145)
WBC # BLD AUTO: 16.6 10E3/UL (ref 4–11)

## 2023-09-25 PROCEDURE — 36415 COLL VENOUS BLD VENIPUNCTURE: CPT | Performed by: EMERGENCY MEDICINE

## 2023-09-25 PROCEDURE — 83605 ASSAY OF LACTIC ACID: CPT | Performed by: PHYSICIAN ASSISTANT

## 2023-09-25 PROCEDURE — 250N000011 HC RX IP 250 OP 636: Mod: JZ | Performed by: PHYSICIAN ASSISTANT

## 2023-09-25 PROCEDURE — A9585 GADOBUTROL INJECTION: HCPCS | Performed by: EMERGENCY MEDICINE

## 2023-09-25 PROCEDURE — 99283 EMERGENCY DEPT VISIT LOW MDM: CPT | Performed by: EMERGENCY MEDICINE

## 2023-09-25 PROCEDURE — 250N000012 HC RX MED GY IP 250 OP 636 PS 637: Performed by: PHYSICIAN ASSISTANT

## 2023-09-25 PROCEDURE — 80053 COMPREHEN METABOLIC PANEL: CPT | Performed by: EMERGENCY MEDICINE

## 2023-09-25 PROCEDURE — 250N000013 HC RX MED GY IP 250 OP 250 PS 637: Performed by: PHYSICIAN ASSISTANT

## 2023-09-25 PROCEDURE — 258N000003 HC RX IP 258 OP 636: Performed by: PHYSICIAN ASSISTANT

## 2023-09-25 PROCEDURE — 83036 HEMOGLOBIN GLYCOSYLATED A1C: CPT | Performed by: PHYSICIAN ASSISTANT

## 2023-09-25 PROCEDURE — 85610 PROTHROMBIN TIME: CPT | Performed by: EMERGENCY MEDICINE

## 2023-09-25 PROCEDURE — 73630 X-RAY EXAM OF FOOT: CPT | Mod: TC | Performed by: RADIOLOGY

## 2023-09-25 PROCEDURE — 99285 EMERGENCY DEPT VISIT HI MDM: CPT | Mod: 25 | Performed by: EMERGENCY MEDICINE

## 2023-09-25 PROCEDURE — 99214 OFFICE O/P EST MOD 30 MIN: CPT | Performed by: PODIATRIST

## 2023-09-25 PROCEDURE — 73720 MRI LWR EXTREMITY W/O&W/DYE: CPT | Mod: 26 | Performed by: RADIOLOGY

## 2023-09-25 PROCEDURE — 86140 C-REACTIVE PROTEIN: CPT | Performed by: EMERGENCY MEDICINE

## 2023-09-25 PROCEDURE — 120N000001 HC R&B MED SURG/OB

## 2023-09-25 PROCEDURE — 85014 HEMATOCRIT: CPT | Performed by: EMERGENCY MEDICINE

## 2023-09-25 PROCEDURE — 73610 X-RAY EXAM OF ANKLE: CPT | Mod: TC | Performed by: RADIOLOGY

## 2023-09-25 PROCEDURE — 99223 1ST HOSP IP/OBS HIGH 75: CPT | Performed by: PHYSICIAN ASSISTANT

## 2023-09-25 PROCEDURE — 258N000003 HC RX IP 258 OP 636: Performed by: EMERGENCY MEDICINE

## 2023-09-25 PROCEDURE — 96365 THER/PROPH/DIAG IV INF INIT: CPT | Mod: 59 | Performed by: EMERGENCY MEDICINE

## 2023-09-25 PROCEDURE — 255N000002 HC RX 255 OP 636: Performed by: EMERGENCY MEDICINE

## 2023-09-25 PROCEDURE — 73720 MRI LWR EXTREMITY W/O&W/DYE: CPT | Mod: LT

## 2023-09-25 PROCEDURE — 96367 TX/PROPH/DG ADDL SEQ IV INF: CPT | Mod: 59 | Performed by: EMERGENCY MEDICINE

## 2023-09-25 PROCEDURE — 36415 COLL VENOUS BLD VENIPUNCTURE: CPT | Performed by: PHYSICIAN ASSISTANT

## 2023-09-25 PROCEDURE — 85652 RBC SED RATE AUTOMATED: CPT | Performed by: EMERGENCY MEDICINE

## 2023-09-25 PROCEDURE — 250N000011 HC RX IP 250 OP 636: Performed by: EMERGENCY MEDICINE

## 2023-09-25 RX ORDER — HYDROMORPHONE HCL IN WATER/PF 6 MG/30 ML
0.4 PATIENT CONTROLLED ANALGESIA SYRINGE INTRAVENOUS
Status: DISCONTINUED | OUTPATIENT
Start: 2023-09-25 | End: 2023-09-29

## 2023-09-25 RX ORDER — NALOXONE HYDROCHLORIDE 0.4 MG/ML
0.2 INJECTION, SOLUTION INTRAMUSCULAR; INTRAVENOUS; SUBCUTANEOUS
Status: DISCONTINUED | OUTPATIENT
Start: 2023-09-25 | End: 2023-10-02 | Stop reason: HOSPADM

## 2023-09-25 RX ORDER — PIPERACILLIN SODIUM, TAZOBACTAM SODIUM 3; .375 G/15ML; G/15ML
3.38 INJECTION, POWDER, LYOPHILIZED, FOR SOLUTION INTRAVENOUS EVERY 8 HOURS
Status: DISCONTINUED | OUTPATIENT
Start: 2023-09-25 | End: 2023-09-26

## 2023-09-25 RX ORDER — NALOXONE HYDROCHLORIDE 0.4 MG/ML
0.4 INJECTION, SOLUTION INTRAMUSCULAR; INTRAVENOUS; SUBCUTANEOUS
Status: DISCONTINUED | OUTPATIENT
Start: 2023-09-25 | End: 2023-10-02 | Stop reason: HOSPADM

## 2023-09-25 RX ORDER — ACETAMINOPHEN 325 MG/1
650 TABLET ORAL EVERY 6 HOURS PRN
Status: DISCONTINUED | OUTPATIENT
Start: 2023-09-25 | End: 2023-10-02 | Stop reason: HOSPADM

## 2023-09-25 RX ORDER — ONDANSETRON 4 MG/1
4 TABLET, ORALLY DISINTEGRATING ORAL EVERY 6 HOURS PRN
Status: DISCONTINUED | OUTPATIENT
Start: 2023-09-25 | End: 2023-10-02 | Stop reason: HOSPADM

## 2023-09-25 RX ORDER — SODIUM CHLORIDE, SODIUM LACTATE, POTASSIUM CHLORIDE, CALCIUM CHLORIDE 600; 310; 30; 20 MG/100ML; MG/100ML; MG/100ML; MG/100ML
INJECTION, SOLUTION INTRAVENOUS CONTINUOUS
Status: DISCONTINUED | OUTPATIENT
Start: 2023-09-25 | End: 2023-09-29

## 2023-09-25 RX ORDER — METOPROLOL SUCCINATE 100 MG/1
200 TABLET, EXTENDED RELEASE ORAL DAILY
Status: DISCONTINUED | OUTPATIENT
Start: 2023-09-26 | End: 2023-10-02 | Stop reason: HOSPADM

## 2023-09-25 RX ORDER — ONDANSETRON 2 MG/ML
4 INJECTION INTRAMUSCULAR; INTRAVENOUS EVERY 6 HOURS PRN
Status: DISCONTINUED | OUTPATIENT
Start: 2023-09-25 | End: 2023-10-02 | Stop reason: HOSPADM

## 2023-09-25 RX ORDER — AMLODIPINE BESYLATE 2.5 MG/1
2.5 TABLET ORAL DAILY
Status: DISCONTINUED | OUTPATIENT
Start: 2023-09-26 | End: 2023-10-02 | Stop reason: HOSPADM

## 2023-09-25 RX ORDER — PROCHLORPERAZINE 25 MG
25 SUPPOSITORY, RECTAL RECTAL EVERY 12 HOURS PRN
Status: DISCONTINUED | OUTPATIENT
Start: 2023-09-25 | End: 2023-10-02 | Stop reason: HOSPADM

## 2023-09-25 RX ORDER — HYDROMORPHONE HCL IN WATER/PF 6 MG/30 ML
0.2 PATIENT CONTROLLED ANALGESIA SYRINGE INTRAVENOUS
Status: DISCONTINUED | OUTPATIENT
Start: 2023-09-25 | End: 2023-09-29

## 2023-09-25 RX ORDER — GADOBUTROL 604.72 MG/ML
12.5 INJECTION INTRAVENOUS ONCE
Status: COMPLETED | OUTPATIENT
Start: 2023-09-25 | End: 2023-09-25

## 2023-09-25 RX ORDER — METFORMIN HCL 500 MG
1000 TABLET, EXTENDED RELEASE 24 HR ORAL 2 TIMES DAILY WITH MEALS
Status: DISCONTINUED | OUTPATIENT
Start: 2023-09-26 | End: 2023-10-02 | Stop reason: HOSPADM

## 2023-09-25 RX ORDER — ATORVASTATIN CALCIUM 20 MG/1
20 TABLET, FILM COATED ORAL EVERY EVENING
Status: DISCONTINUED | OUTPATIENT
Start: 2023-09-25 | End: 2023-10-02 | Stop reason: HOSPADM

## 2023-09-25 RX ORDER — DEXTROSE MONOHYDRATE 25 G/50ML
25-50 INJECTION, SOLUTION INTRAVENOUS
Status: DISCONTINUED | OUTPATIENT
Start: 2023-09-25 | End: 2023-10-02 | Stop reason: HOSPADM

## 2023-09-25 RX ORDER — HYDROCHLOROTHIAZIDE 25 MG/1
25 TABLET ORAL DAILY
Status: DISCONTINUED | OUTPATIENT
Start: 2023-09-26 | End: 2023-10-02 | Stop reason: HOSPADM

## 2023-09-25 RX ORDER — NICOTINE POLACRILEX 4 MG
15-30 LOZENGE BUCCAL
Status: DISCONTINUED | OUTPATIENT
Start: 2023-09-25 | End: 2023-10-02 | Stop reason: HOSPADM

## 2023-09-25 RX ORDER — AMOXICILLIN 250 MG
1 CAPSULE ORAL 2 TIMES DAILY PRN
Status: DISCONTINUED | OUTPATIENT
Start: 2023-09-25 | End: 2023-10-02 | Stop reason: HOSPADM

## 2023-09-25 RX ORDER — GADOBUTROL 604.72 MG/ML
12 INJECTION INTRAVENOUS ONCE
Status: DISCONTINUED | OUTPATIENT
Start: 2023-09-25 | End: 2023-09-25

## 2023-09-25 RX ORDER — LOSARTAN POTASSIUM 50 MG/1
100 TABLET ORAL DAILY
Status: DISCONTINUED | OUTPATIENT
Start: 2023-09-26 | End: 2023-10-02 | Stop reason: HOSPADM

## 2023-09-25 RX ORDER — PIPERACILLIN SODIUM, TAZOBACTAM SODIUM 3; .375 G/15ML; G/15ML
3.38 INJECTION, POWDER, LYOPHILIZED, FOR SOLUTION INTRAVENOUS ONCE
Status: COMPLETED | OUTPATIENT
Start: 2023-09-25 | End: 2023-09-25

## 2023-09-25 RX ORDER — OXYCODONE HYDROCHLORIDE 5 MG/1
5 TABLET ORAL EVERY 4 HOURS PRN
Status: DISCONTINUED | OUTPATIENT
Start: 2023-09-25 | End: 2023-09-29

## 2023-09-25 RX ORDER — PROCHLORPERAZINE MALEATE 5 MG
10 TABLET ORAL EVERY 6 HOURS PRN
Status: DISCONTINUED | OUTPATIENT
Start: 2023-09-25 | End: 2023-10-02 | Stop reason: HOSPADM

## 2023-09-25 RX ORDER — AMOXICILLIN 250 MG
2 CAPSULE ORAL 2 TIMES DAILY PRN
Status: DISCONTINUED | OUTPATIENT
Start: 2023-09-25 | End: 2023-10-02 | Stop reason: HOSPADM

## 2023-09-25 RX ADMIN — SODIUM CHLORIDE, POTASSIUM CHLORIDE, SODIUM LACTATE AND CALCIUM CHLORIDE: 600; 310; 30; 20 INJECTION, SOLUTION INTRAVENOUS at 21:49

## 2023-09-25 RX ADMIN — ATORVASTATIN CALCIUM 20 MG: 20 TABLET, FILM COATED ORAL at 21:59

## 2023-09-25 RX ADMIN — VANCOMYCIN HYDROCHLORIDE 1500 MG: 5 INJECTION, POWDER, LYOPHILIZED, FOR SOLUTION INTRAVENOUS at 16:08

## 2023-09-25 RX ADMIN — GADOBUTROL 12.5 ML: 604.72 INJECTION INTRAVENOUS at 14:21

## 2023-09-25 RX ADMIN — PIPERACILLIN AND TAZOBACTAM 3.38 G: 3; .375 INJECTION, POWDER, FOR SOLUTION INTRAVENOUS at 21:49

## 2023-09-25 RX ADMIN — INSULIN GLARGINE 64 UNITS: 100 INJECTION, SOLUTION SUBCUTANEOUS at 22:27

## 2023-09-25 RX ADMIN — PIPERACILLIN AND TAZOBACTAM 3.38 G: 3; .375 INJECTION, POWDER, FOR SOLUTION INTRAVENOUS at 12:57

## 2023-09-25 RX ADMIN — ACETAMINOPHEN 650 MG: 325 TABLET, FILM COATED ORAL at 21:59

## 2023-09-25 ASSESSMENT — ACTIVITIES OF DAILY LIVING (ADL)
FALL_HISTORY_WITHIN_LAST_SIX_MONTHS: NO
DIFFICULTY_EATING/SWALLOWING: NO
ADLS_ACUITY_SCORE: 20
ADLS_ACUITY_SCORE: 35
ADLS_ACUITY_SCORE: 35
CONCENTRATING,_REMEMBERING_OR_MAKING_DECISIONS_DIFFICULTY: NO
DRESSING/BATHING_DIFFICULTY: NO
CHANGE_IN_FUNCTIONAL_STATUS_SINCE_ONSET_OF_CURRENT_ILLNESS/INJURY: NO
ADLS_ACUITY_SCORE: 20
DOING_ERRANDS_INDEPENDENTLY_DIFFICULTY: NO
WEAR_GLASSES_OR_BLIND: NO
TOILETING_ISSUES: NO
WALKING_OR_CLIMBING_STAIRS_DIFFICULTY: NO
ADLS_ACUITY_SCORE: 35
ADLS_ACUITY_SCORE: 18

## 2023-09-25 NOTE — ED PROVIDER NOTES
History     Chief Complaint   Patient presents with    Foot Pain     From Podiatry - states here to be admitted and IV antibiotics - upset that he has to be seen in ED     HPI  Juan Balderrama is a 62 year old male who  has a past medical history of Diabetes (H) (1-2017) and Hypertension (1-2017).  He presents to the emergency department with left foot swelling, erythema and blistering.  He reports this has been going on for the past couple weeks.  He was seen today by his podiatrist who recommended coming to the emergency department for further evaluation and IV antibiotics and admission.  They did do an x-ray and I spoke with the podiatrist, Dr. Boston, who reports that there were bony erosions of the midfoot noted and that Charcot joint and osteomyelitis were considerations.  Patient denies any fevers and denies pain.  Foot is significantly swollen compared to baseline.      Allergies:  Allergies   Allergen Reactions    Bees     Sulfamethoxazole Swelling     throat       Problem List:    Patient Active Problem List    Diagnosis Date Noted    Other acute osteomyelitis of left foot (H) 09/25/2023     Priority: Medium    Hyperlipidemia LDL goal <70 12/06/2017     Priority: Medium    Uncontrolled type 2 diabetes mellitus with complication, with long-term current use of insulin 03/28/2017     Priority: Medium    Left cataract 01/25/2017     Priority: Medium    Morbid obesity, unspecified obesity type (H) 01/25/2017     Priority: Medium    Benign essential hypertension 01/23/2017     Priority: Medium    Former smoker 01/23/2017     Priority: Medium    Hypertension 08/08/2015     Priority: Medium        Past Medical History:    Past Medical History:   Diagnosis Date    Diabetes (H) 1-2017    Hypertension 1-2017       Past Surgical History:    Past Surgical History:   Procedure Laterality Date    EYE SURGERY  4-3-2017    Cataract       Family History:    Family History   Problem Relation Age of Onset    Hypertension  "Mother     Hypertension Father        Social History:  Marital Status:   [2]  Social History     Tobacco Use    Smoking status: Never     Passive exposure: Never    Smokeless tobacco: Never   Vaping Use    Vaping Use: Never used   Substance Use Topics    Alcohol use: Yes     Comment: social    Drug use: No        Medications:    amLODIPine (NORVASC) 2.5 MG tablet  amoxicillin-clavulanate (AUGMENTIN) 875-125 MG tablet  atorvastatin (LIPITOR) 20 MG tablet  BD PEN NEEDLE YENNY 2ND GEN 32G X 4 MM miscellaneous  blood glucose (ACCU-CHEK GANGA) test strip  blood glucose monitoring (ACCU-CHEK GANGA PLUS) meter device kit  blood glucose monitoring (ACCU-CHEK MULTICLIX) lancets  EPINEPHrine (EPIPEN/ADRENACLICK/OR ANY BX GENERIC EQUIV) 0.3 MG/0.3ML injection 2-pack  hydrochlorothiazide (HYDRODIURIL) 25 MG tablet  insulin glargine (BASAGLAR KWIKPEN) 100 UNIT/ML pen  losartan (COZAAR) 100 MG tablet  metFORMIN (GLUCOPHAGE XR) 500 MG 24 hr tablet  metoprolol succinate ER (TOPROL XL) 200 MG 24 hr tablet          Review of Systems   All other systems reviewed and are negative.      Physical Exam   BP: (!) 163/73  Pulse: 70  Temp: 97.8  F (36.6  C)  Resp: 12  Height: 154.9 cm (5' 1\")  Weight: 127 kg (280 lb)  SpO2: 99 %      Physical Exam  Vitals and nursing note reviewed.   Constitutional:       General: He is not in acute distress.     Appearance: He is well-developed. He is not ill-appearing, toxic-appearing or diaphoretic.   HENT:      Head: Normocephalic and atraumatic.      Mouth/Throat:      Lips: Pink.      Mouth: Mucous membranes are moist.      Pharynx: Oropharynx is clear. No oropharyngeal exudate.   Eyes:      General: Lids are normal. No scleral icterus.     Extraocular Movements: Extraocular movements intact.      Right eye: No nystagmus.      Left eye: No nystagmus.      Conjunctiva/sclera: Conjunctivae normal.      Pupils: Pupils are equal, round, and reactive to light.   Neck:      Thyroid: No thyromegaly.     "  Vascular: No JVD.      Trachea: No tracheal deviation.   Cardiovascular:      Rate and Rhythm: Normal rate and regular rhythm.      Pulses: Normal pulses.      Heart sounds: Normal heart sounds. No murmur heard.     No friction rub. No gallop.   Pulmonary:      Effort: Pulmonary effort is normal. No respiratory distress.      Breath sounds: Normal breath sounds.   Abdominal:      General: Bowel sounds are normal. There is no distension.      Palpations: Abdomen is soft. There is no mass.      Tenderness: There is no abdominal tenderness. There is no guarding or rebound.   Musculoskeletal:         General: Swelling present. No tenderness. Normal range of motion.      Cervical back: Normal range of motion and neck supple. No erythema or rigidity.      Right lower leg: No edema.      Left lower leg: No edema.      Comments: Left foot and ankle swelling with erythema noted.  There is blistering of the skin and some of these blistered areas have become unroofed with serous drainage noted.  Other areas of skin appear macerated.  Due to swelling, unable to palpate a pulse but toes are warm distally with capillary refill less than 2 seconds.  No palpable crepitus or significant tenderness to palpation.   Lymphadenopathy:      Cervical: No cervical adenopathy.   Skin:     General: Skin is warm and dry.      Capillary Refill: Capillary refill takes less than 2 seconds.      Coloration: Skin is not pale.      Findings: No erythema or rash.   Neurological:      Mental Status: He is alert and oriented to person, place, and time.      Cranial Nerves: No cranial nerve deficit.      Sensory: No sensory deficit.      Motor: Motor function is intact.   Psychiatric:         Mood and Affect: Mood and affect normal.         Speech: Speech normal.         Behavior: Behavior normal.         ED Course                 Procedures                  Results for orders placed or performed during the hospital encounter of 09/25/23 (from the past  24 hour(s))   [facility] Draw    Narrative    The following orders were created for panel order [facility] Draw.  Procedure                               Abnormality         Status                     ---------                               -----------         ------                     Extra Blue Top Tube[[id number]]                              Final result               Extra Red Top Tube[[id number]]                               Final result               Extra Green Top (Lithium...[[id number]]                      Final result               Extra Purple Top Tube[[id number]]                            Final result               Extra Green Top (Lithium...[[id number]]                      Final result                 Please view results for these tests on the individual orders.   Extra Blue Top Tube   Result Value Ref Range    Hold Specimen JIC    Extra Red Top Tube   Result Value Ref Range    Hold Specimen JIC    Extra Green Top (Lithium Heparin) Tube   Result Value Ref Range    Hold Specimen JIC    Extra Purple Top Tube   Result Value Ref Range    Hold Specimen JIC    Extra Green Top (Lithium Heparin) ON ICE   Result Value Ref Range    Hold Specimen jic    CBC with Platelets & Differential    Narrative    The following orders were created for panel order CBC with Platelets & Differential.  Procedure                               Abnormality         Status                     ---------                               -----------         ------                     CBC with platelets and d...[[id number]]  Abnormal            Final result                 Please view results for these tests on the individual orders.   Comprehensive metabolic panel   Result Value Ref Range    Sodium 134 (L) 136 - 145 mmol/L    Potassium 3.8 3.4 - 5.3 mmol/L    Carbon Dioxide (CO2) 22 22 - 29 mmol/L    Anion Gap 16 (H) 7 - 15 mmol/L    Urea Nitrogen 26.3 (H) 8.0 - 23.0 mg/dL    Creatinine 1.03 0.67 - 1.17 mg/dL    GFR Estimate 83 >60 mL/min/1.73m2     Calcium 9.8 8.8 - 10.2 mg/dL    Chloride 96 (L) 98 - 107 mmol/L    Glucose 108 (H) 70 - 99 mg/dL    Alkaline Phosphatase 92 40 - 129 U/L    AST 23 0 - 45 U/L    ALT 24 0 - 70 U/L    Protein Total 7.6 6.4 - 8.3 g/dL    Albumin 3.2 (L) 3.5 - 5.2 g/dL    Bilirubin Total 0.4 <=1.2 mg/dL   CBC with platelets and differential   Result Value Ref Range    WBC Count 16.6 (H) 4.0 - 11.0 10e3/uL    RBC Count 4.07 (L) 4.40 - 5.90 10e6/uL    Hemoglobin 11.3 (L) 13.3 - 17.7 g/dL    Hematocrit 35.2 (L) 40.0 - 53.0 %    MCV 87 78 - 100 fL    MCH 27.8 26.5 - 33.0 pg    MCHC 32.1 31.5 - 36.5 g/dL    RDW 15.2 (H) 10.0 - 15.0 %    Platelet Count 606 (H) 150 - 450 10e3/uL    % Neutrophils 78 %    % Lymphocytes 10 %    % Monocytes 8 %    % Eosinophils 2 %    % Basophils 1 %    % Immature Granulocytes 1 %    NRBCs per 100 WBC 0 <1 /100    Absolute Neutrophils 13.1 (H) 1.6 - 8.3 10e3/uL    Absolute Lymphocytes 1.6 0.8 - 5.3 10e3/uL    Absolute Monocytes 1.3 0.0 - 1.3 10e3/uL    Absolute Eosinophils 0.3 0.0 - 0.7 10e3/uL    Absolute Basophils 0.1 0.0 - 0.2 10e3/uL    Absolute Immature Granulocytes 0.2 <=0.4 10e3/uL    Absolute NRBCs 0.0 10e3/uL   INR   Result Value Ref Range    INR 1.25 (H) 0.85 - 1.15   CRP inflammation   Result Value Ref Range    CRP Inflammation 387.74 (H) <5.00 mg/L   Erythrocyte sedimentation rate auto   Result Value Ref Range    Erythrocyte Sedimentation Rate 81 (H) 0 - 20 mm/hr   MR Foot Left w/o & w Contrast    Narrative    MR left foot without and with contrast 9/25/2023 3:00 PM    History: Left foot infection, evaluate for abscess and osteomyelitis    Techniques: Multiplanar multisequence imaging of the left foot was  obtained before and after administration of intravenous contrast.    Contrast: 12.5 ML GADAVIST    Comparison: Same date ankle and foot radiographs.    Findings:    Motion and large field of view acquisition partially compromising  detail assessment.    Extensive subcutaneous edema and enhancement  of the visualized soft  tissues with likely a sinus tract dorsally at the level of medial  column of tarsometatarsal joint.     Extensive irregular peripheral soft tissue enhancement around centered  around tarsal bones, tarsometatarsal joint are most consistent with  area of abscess with wider area of phlegmon though exact size is  difficult to measure due to irregular shape and marked internal T2  heterogeneity with wider area of non-enhancement. Plantar subcutaneous  non-enhancement appear to extend close to skin surface in mid and  forefoot, likely devitalized/devascularized soft tissue.    Bones    Extensive destruction/disorganization with fragmentation involving the  midfoot, metatarsal bases with dorsal subluxation alignment and  fragmentation/fractured bones. Associated extensive T1 hypointensity  with enhancement proximally of all tarsal bones, proximally to talar  neck and mid-calcaneus, and distally involving all five metatarsal  proxima/mid shafts. Wider area of edemalike marrow signal intensity  are present. Overall findings consistent with osteomyelitis in the  setting of Charcot arthropathy.    Absent distal proximal phalanx, and proximal middle phalanx of the  fourth toe. Given the large field-of-view acquisition and motion  evaluation is compromised but residual bone appears to show T1  hypointensity and enhancement along with radiographical indistinct  margin, highly concerning for active osteomyelitis/septic arthritis.    Edema like marrow signal intensity of the posterior aspect of the  tibial plafond with mild T1 hypointensity, concerning for  osteomyelitis involvement with associated septic arthritis, maybe  extension through septic tenosynovitis vs. anterior joint.    Cystlike changes at the critical angle of Gissane, nonspecific.  Chronic appearing periostitis/bone proliferation of the second  metatarsal shaft.    Joints and periarticular soft tissue    Joint effusion: Small tibiotalar joint  effusion.    Plantar plates: Not well assessed due to motion.    Intermetatarsal spaces: No interdigital neuroma.  Moderate to large first and third, small to moderate for the and trace  second intermetatarsal bursitis.    Ligaments and Tendons    Lisfranc interosseous ligament: Intact.    Tendons: Peroneal tenosynovitis with likely complete tear at medial  cuneiform/first metatarsal attachment and separately moderate to high  grade intrasubstance tear just distal to the fibular tip.     Tenosynovitis of the anterior extensor tendons with possible high  grade tear anterior tibialis at distal attachment. Proximal extent of  the tibialis anterior tenosynovitis not fully including the field of  view.    Tenosynovitis of medial flexor tendons, proximal extent along  posterior tibialis not fully included in the field of view.     These areas communicate with midfoot destruction/non-enhancement i.e.  presumed abscess/phlegmon; therefore, most consistent with septic  tenosynovitis extension until proven otherwise.    Muscles    Diffuse muscle edema and enhancement, likely related to combination of  microangiopathy/polyneuropathy and superimposed myositis in the  setting of infection.    ANCILLARY FINDINGS    Complete effacement of tarsal sinus.    Ankle ligaments are not well assessed owing to true lateral view and  acquisition plane.      Impression    Impression:  Motion and large field of view acquisition partially compromising  assessment.  1. Overall findings consistent with extensive osteomyelitis/septic  joint at midfoot/tarsometatarsal joint on a background of presumed  Charcot arthropathy:   a. Proximally, presumed septic tenosynovitis extension along peroneal  tendon, anterior extensor and medial flexor tendon sheath. Full  proximal extent of tenosynovitis not included in the field of view.   b. Finding concerning for tibiotalar joint septic arthritis extension  with distal tibial osteomyelitis, maybe extending  from posterior  tenosynovitis.   c. Extensive destruction/fragmentation tarsal bones, metatarsal bases  with proximal marrow abnormality compatible with osteomyelitis  involving talus and calcaneus.  2. Finding concerning for active osteomyelitis/septic arthritis of  forth PIP joint.    ELAINE SCHULTEAHASHI         SYSTEM ID:  [id number]       Medications   vancomycin (VANCOCIN) 1,500 mg in sodium chloride 0.9 % 250 mL intermittent infusion (1,500 mg Intravenous $New Bag 9/25/23 1608)   piperacillin-tazobactam (ZOSYN) 3.375 g vial to attach to  mL bag (0 g Intravenous Stopped 9/25/23 1424)   gadobutrol (GADAVIST) injection 12.5 mL (12.5 mLs Intravenous $Given 9/25/23 1421)       Assessments & Plan (with Medical Decision Making)     I have reviewed the nursing notes.    I have reviewed the findings, diagnosis, plan and need for follow up with the patient.  This patient presented to the emergency department for evaluation of left foot swelling and erythema.  Patient is afebrile, but has significant elevation of inflammatory markers including a leukocytosis.  MRI was obtained and demonstrated findings consistent with septic joint and osteomyelitis.  Patient had been started on IV Zosyn and vancomycin was ordered also.  I did speak with podiatry as well as orthopedic surgery who reported that they were comfortable helping the hospitalist comanage the patient.  I spoke with the on-call hospitalist and patient will be admitted here for further treatment and evaluation.  Patient will most likely need an amputation, possibly a below the knee amputation but will need antibiotic treatment in the meanwhile.  Currently, he does not appear acutely septic.        New Prescriptions    No medications on file       Final diagnoses:   Other acute osteomyelitis of left foot (H)       9/25/2023   [facility] EMERGENCY DEPT       Tuan Heart MD  09/25/23 [id number]

## 2023-09-25 NOTE — PROGRESS NOTES
PATIENT HISTORY:  Juan Balderrama is a 62 year old male who presents to clinic with his wife with concern of an infection of his left foot.  The patient relates developing significant swelling and redness over the past 2 weeks.  The patient relates developing blisters with significant drainage over the past few days.  The patient denies any fever or chills, nausea or vomiting.  The patient was prescribed Augmentin on September 19 but was unable to fill it due to lack of supply at the pharmacy.    REVIEW OF SYSTEMS:  Constitutional, HEENT, cardiovascular, pulmonary, GI, , musculoskeletal, neuro, skin, endocrine and psych systems are negative, except as otherwise noted.     PAST MEDICAL HISTORY:   Past Medical History:   Diagnosis Date    Diabetes (H) 1-2017    Hypertension 1-2017        PAST SURGICAL HISTORY:   Past Surgical History:   Procedure Laterality Date    EYE SURGERY  4-3-2017    Cataract        MEDICATIONS:   Current Outpatient Medications:     amLODIPine (NORVASC) 2.5 MG tablet, Take 1 tablet (2.5 mg) by mouth daily, Disp: 90 tablet, Rfl: 3    amoxicillin-clavulanate (AUGMENTIN) 875-125 MG tablet, Take 1 tablet by mouth 2 times daily for 10 days, Disp: 20 tablet, Rfl: 0    amoxicillin-clavulanate (AUGMENTIN) 875-125 MG tablet, Take 1 tablet by mouth 2 times daily, Disp: 20 tablet, Rfl: 0    atorvastatin (LIPITOR) 20 MG tablet, TAKE 1 TABLET BY MOUTH EVERY DAY, Disp: 90 tablet, Rfl: 2    BD PEN NEEDLE YENNY 2ND GEN 32G X 4 MM miscellaneous, USE 1 PEN NEEDLES DAILY OR AS DIRECTED., Disp: 100 each, Rfl: 2    blood glucose (ACCU-CHEK GANGA) test strip, Use to test blood sugar 1 times daily or as directed., Disp: 100 strip, Rfl: 3    blood glucose monitoring (ACCU-CHEK GANGA PLUS) meter device kit, USE TO TEST BLOOD SUGAR 1 TIMES DAILY OR AS DIRECTED., Disp: , Rfl: 0    blood glucose monitoring (ACCU-CHEK MULTICLIX) lancets, USE TO TEST BLOOD SUGAR 1 TIMES DAILY OR AS DIRECTED., Disp: , Rfl: 3    EPINEPHrine  (EPIPEN/ADRENACLICK/OR ANY BX GENERIC EQUIV) 0.3 MG/0.3ML injection 2-pack, Inject 0.3 mLs (0.3 mg) into the muscle once as needed for anaphylaxis, Disp: 1 each, Rfl: 1    hydrochlorothiazide (HYDRODIURIL) 25 MG tablet, Take 1 tablet (25 mg) by mouth daily, Disp: 90 tablet, Rfl: 3    insulin glargine (BASAGLAR KWIKPEN) 100 UNIT/ML pen, Inject 64 Units Subcutaneous At Bedtime SCHEDULE FASTING LAB APPOINTMENT WITHIN THE NEXT 30 DAYS., Disp: 30 mL, Rfl: 0    losartan (COZAAR) 100 MG tablet, Take 1 tablet (100 mg) by mouth daily, Disp: 90 tablet, Rfl: 3    metFORMIN (GLUCOPHAGE XR) 500 MG 24 hr tablet, TAKE 2 TABLETS BY MOUTH TWICE A DAY WITH MEALS., Disp: 360 tablet, Rfl: 3    metoprolol succinate ER (TOPROL XL) 200 MG 24 hr tablet, Take 1 tablet (200 mg) by mouth daily SCHEDULE NURSE BLOOD PRESSURE CHECK BEFORE OTHER REFILLS ARE GIVEN, Disp: 90 tablet, Rfl: 0     ALLERGIES:    Allergies   Allergen Reactions    Sulfamethoxazole Swelling     throat        SOCIAL HISTORY:   Social History     Socioeconomic History    Marital status:      Spouse name: Not on file    Number of children: Not on file    Years of education: Not on file    Highest education level: Not on file   Occupational History    Not on file   Tobacco Use    Smoking status: Never     Passive exposure: Never    Smokeless tobacco: Never   Vaping Use    Vaping Use: Never used   Substance and Sexual Activity    Alcohol use: Yes     Comment: social    Drug use: No    Sexual activity: Yes     Partners: Female     Birth control/protection: Post-menopausal   Other Topics Concern    Parent/sibling w/ CABG, MI or angioplasty before 65F 55M? No   Social History Narrative    Not on file     Social Determinants of Health     Financial Resource Strain: Not on file   Food Insecurity: Not on file   Transportation Needs: Not on file   Physical Activity: Not on file   Stress: Not on file   Social Connections: Not on file   Interpersonal Safety: Not on file   Housing  Stability: Not on file        FAMILY HISTORY:   Family History   Problem Relation Age of Onset    Hypertension Mother     Hypertension Father         Vitals: BP (!) 156/82   Pulse 69   Wt 136.5 kg (301 lb)   BMI 46.45 kg/m         Lower Extremity Evaluation:     Dermatologic: Noted significant edema and erythema with extensive blistering over the midfoot on the left foot.  Significant serous drainage is noted.    Vascular: Vascular status intact on the left..        Neurologic:   No evidence of weakness in the lower extremities.  Noted evidence of neuropathy with diminished sensation on the left.       Musculoskeletal: Patient is ambulatory without assistive device or brace.  Again noted significant edema and erythema encompassing the entire foot and ankle on the left.  Noted collapse of medial longitudinal arch.     Diagnostics:  Radiograph evaluation including 3 views of the left foot and ankle reveal extensive erosion of the midtarsal bones with collapse of the medial longitudinal arch.  No gas noted in the tissue.  Calcified vessels noted.  The ankle joint appears stable and symmetrical.    Assessment:        ICD-10-CM    1. Type 2 diabetes mellitus with right diabetic foot infection (H)  E11.628 XR Foot Left G/E 3 Views    L08.9 XR Ankle Left G/E 3 Views              Plan:  I have explained to the patient and his wife the underlying condition affecting the left foot.  At this point I am concerned about osteomyelitis involving the majority of the midfoot on the left.  The patient was directed to report to the ER for further evaluation and IV antibiotic treatment.  The patient was instructed to remain strictly nonweightbearing on the left with use of a knee scooter.  The patient was prescribed a knee walker. The patient is unsteady utilizing crutches, quad walker or a cane.  The knee walker will allow the patient to ambulate safely without the use of the operative foot and reduce risk of falls.  We also had a  arie discussion about the real possibility of ending up with a below-knee amputation if further evaluation needs to confirmation of osteomyelitis of much of the midfoot.  This would present a possible life-threatening situation.      Juan verbalized agreement with and understanding of the rational for the diagnosis and treatment plan.  All questions were answered to best of my ability and the patient's satisfaction. The patient was advised to contact the clinic with any questions that may arise after the clinic visit.      Disclaimer: This note consists of symbols derived from keyboarding, dictation and/or voice recognition software. As a result, there may be errors in the script that have gone undetected. Please consider this when interpreting information found in this chart.        JESUS Boston D.P.M., F.A.C.F.A.S.

## 2023-09-25 NOTE — PROGRESS NOTES
Skin affirmation note    Admitting nurse completed full skin assessment, Walt score and Walt interventions. This writer agrees with the initial skin assessment findings    Kyler Escobar RN  09/25/23  6:30 PM

## 2023-09-25 NOTE — H&P
"[facility]    History and Physical - Hospitalist Service       Date of Admission:  9/25/2023    Assessment & Plan      Juan Balderrama is a 62 year old male admitted on 9/25/2023. He presented to the emergency department for evaluation of non-healing left foot wound despite outpatient antibiotic use and was found to have left foot osteomyelitis / septic joint and tenosynovitis for which he is being admitted for further evaluation and treatment.    Chronic left foot wound - osteomyelitis of left foot, tenosynovitis of foot, septic arthritis of left foot   Has been following podiatry Dr. Boston as outpatient, recently on Augmentin without improvement. Initially afebrile, but fever up to 101.4 shortly after admission, with leukocytosis (WBC 16.6), but normal lactic acid. CRP elevated (387.74).     MRI left foot demonstrates - \"1. Overall findings consistent with extensive osteomyelitis/septic joint at midfoot/tarsometatarsal joint on a background of presumed Charcot arthropathy: a. Proximally, presumed septic tenosynovitis extension along peroneal tendon, anterior extensor and medial flexor tendon sheath. Full  proximal extent of tenosynovitis not included in the field of view.  b. Finding concerning for tibiotalar joint septic arthritis extension with distal tibial osteomyelitis, maybe extending from posterior tenosynovitis.  c. Extensive destruction/fragmentation tarsal bones, metatarsal bases with proximal marrow abnormality compatible with osteomyelitis involving talus and calcaneus. 2. Finding concerning for active osteomyelitis/septic arthritis of  forth PIP joint.\"     Podiatry Dr. Boston is aware of MRI findings, likely needs ortho involvement given extensive spread of infection and needed amputation.   Case discussed between emergency department and on-call ortho, who agreed to consult on patient.  Was started on zosyn and vancomycin in the emergency department.  - Ortho consult  - " Will make patient NPO at midnight in case of possible surgery (although ortho did not anticipate surgery would occur until after 9/26 per emergency department discussion)  - Continue Zosyn and vancomycin  - Repeat CBC and CRP in am   - See emergency department note from 9/25/23 for pictures of foot / wound    Thrombocytosis  Admit platelets 606. Unclear cause, possibly related to infection.    - CBC in am     Hyponatremia   Admit Na = 134. Minimally low, not clinically relevant.   - BMP in am     Elevated anion gap   Admit anion gap 16. Serum bicarb WNL. No blood gas to determine if acidotic. Lactic acid WNL.   - BMP in am     Uncontrolled type 2 diabetes mellitus with complication, with long-term current use of insulin  Most recent HgbA1c 6.7. Managed prior to admission with metformin 1000 mg bid and Basaglar 64 U qhs.   - Continue home Basaglar  - Hold metformin due to recent IV contrast  - Medium sliding scale insulin  - Hyper/hypoglycemia protocols  - Consistent carbohydrate diet    Benign essential hypertension  Blood pressure slightly elevated at time of admission. Managed prior to admission with hydrochlorothiazide 25 mg daily, losartan 100 mg daily, amlodipine 2.5 mg daily, and metoprolol  mg daily.   - Hold hydrochlorothiazide and losartan prior to amputation, resume post-op as able  - Continue metoprolol and amlodipine with holding parameters    Hyperlipidemia LDL goal <70  Managed prior to admission with atorvastatin 20 mg daily, continue.          Diet: NPO per Anesthesia Guidelines for Procedure/Surgery Except for: Meds    DVT Prophylaxis: Pneumatic Compression Devices  Elkins Catheter: Not present  Lines: None     Cardiac Monitoring: None  Code Status: Full Code - discussed at time of admission     Clinically Significant Risk Factors Present on Admission           # Hypercalcemia: corrected calcium is >10.1, will monitor as appropriate    # Hypoalbuminemia: Lowest albumin = 3.2 g/dL at 9/25/2023  "12:10 PM, will monitor as appropriate    # Coagulation Defect: INR = 1.25 (Ref range: 0.85 - 1.15) and/or PTT = N/A, will monitor for bleeding      # Hypertension: Noted on problem list     # DMII: A1C = 6.7 % (Ref range: <5.7 %) within past 6 months    # Severe Obesity: Estimated body mass index is 52.91 kg/m  as calculated from the following:    Height as of this encounter: 1.549 m (5' 1\").    Weight as of this encounter: 127 kg (280 lb).              Disposition Plan      Expected Discharge Date: 09/27/2023                The patient's care was discussed with the Attending Physician, Dr. Valente Olvera, Patient, and Patient's Family.    Debo Candelaria PA-C  Hospitalist Service  [facility]  Securely message with [employer] (more info)  Text page via [employer] Paging/Directory     ______________________________________________________________________    Chief Complaint   \"I could tell my foot was getting worse, and Dr. Boston told me this morning at my appointment to go to the emergency department.\"    History is obtained from the patient and his wife, review of EMR, and emergency department sign out from Dr. Ishan Heart.     History of Present Illness   Juan Balderrama is a 62 year old male who presented to the emergency department for evaluation of worsening foot wounds.    Patient has a chronic wound on his left foot for which he has been seeing podiatry Dr. Boston.  8 days ago his foot worsened and he noticed some blistering, so he called Dr. Boston's office and was prescribed some Augmentin.  The pharmacy was initially out of Augmentin and he had to wait 3 days to start it; his first dose was on 9/20.  Since starting the Augmentin he is noticed a slight dry cough, but otherwise feels his breathing is normal.  Despite starting the antibiotic his foot continued to worsen, so he saw Dr. Boston again this morning in clinic and was advised to go to the emergency department for evaluation due " to the worsening of his foot.  Work-up in the emergency department revealed multiple abnormalities of the left foot including osteomyelitis, a septic joint, and tenosynovitis.    Denies any fevers at home (but did feel chilled), but did spike a fever this evening after admission.  The foot is minimally painful, which is chronic and unchanged from baseline.  He has a recent decrease in appetite.  He had a small episode of emesis 2 days ago, but no ongoing symptoms.  The remainder review of systems is negative.      Past Medical History    Past Medical History:   Diagnosis Date    Diabetes (H) 1-2017    Hypertension 1-2017    Septic arthritis of right foot (H) 9/26/2023       Past Surgical History   Past Surgical History:   Procedure Laterality Date    EYE SURGERY  4-3-2017    Cataract       Prior to Admission Medications   Prior to Admission Medications   Prescriptions Last Dose Informant Patient Reported? Taking?   BD PEN NEEDLE YENNY 2ND GEN 32G X 4 MM miscellaneous 9/24/2023 at hs Self No Yes   Sig: USE 1 PEN NEEDLES DAILY OR AS DIRECTED.   EPINEPHrine (EPIPEN/ADRENACLICK/OR ANY BX GENERIC EQUIV) 0.3 MG/0.3ML injection 2-pack never used at on hand if needed Self No Yes   Sig: Inject 0.3 mLs (0.3 mg) into the muscle once as needed for anaphylaxis   amLODIPine (NORVASC) 2.5 MG tablet 9/25/2023 at am Self No Yes   Sig: Take 1 tablet (2.5 mg) by mouth daily   amoxicillin-clavulanate (AUGMENTIN) 875-125 MG tablet 9/25/2023 at am Self No Yes   Sig: Take 1 tablet by mouth 2 times daily for 10 days   atorvastatin (LIPITOR) 20 MG tablet 9/24/2023 at pm Self No Yes   Sig: TAKE 1 TABLET BY MOUTH EVERY DAY   Patient taking differently: Take 20 mg by mouth every evening   blood glucose (ACCU-CHEK GANGA) test strip Past Week at Unknown Self No Yes   Sig: Use to test blood sugar 1 times daily or as directed.   blood glucose monitoring (ACCU-CHEK GANGA PLUS) meter device kit Past Week at Unknown Self Yes Yes   Sig: USE TO TEST  BLOOD SUGAR 1 TIMES DAILY OR AS DIRECTED.   blood glucose monitoring (ACCU-CHEK MULTICLIX) lancets Past Week at Unknown Self Yes Yes   Sig: USE TO TEST BLOOD SUGAR 1 TIMES DAILY OR AS DIRECTED.   hydrochlorothiazide (HYDRODIURIL) 25 MG tablet 9/25/2023 at am Self No Yes   Sig: Take 1 tablet (25 mg) by mouth daily   insulin glargine (BASAGLAR KWIKPEN) 100 UNIT/ML pen 9/24/2023 at hs Self No Yes   Sig: Inject 64 Units Subcutaneous At Bedtime SCHEDULE FASTING LAB APPOINTMENT WITHIN THE NEXT 30 DAYS.   losartan (COZAAR) 100 MG tablet 9/25/2023 at am Self No Yes   Sig: Take 1 tablet (100 mg) by mouth daily   metFORMIN (GLUCOPHAGE XR) 500 MG 24 hr tablet 9/25/2023 at am Self No Yes   Sig: TAKE 2 TABLETS BY MOUTH TWICE A DAY WITH MEALS.   metoprolol succinate ER (TOPROL XL) 200 MG 24 hr tablet 9/25/2023 at am Self No Yes   Sig: Take 1 tablet (200 mg) by mouth daily SCHEDULE NURSE BLOOD PRESSURE CHECK BEFORE OTHER REFILLS ARE GIVEN      Facility-Administered Medications: None        Review of Systems    The 10 point Review of Systems is negative other than noted in the HPI or here.     Social History   I have reviewed this patient's social history and updated it with pertinent information if needed.  Social History     Tobacco Use    Smoking status: Never     Passive exposure: Never    Smokeless tobacco: Never   Vaping Use    Vaping Use: Never used   Substance Use Topics    Alcohol use: Yes     Comment: social    Drug use: No         Family History   I have reviewed this patient's family history and updated it with pertinent information if needed.  Family History   Problem Relation Age of Onset    Hypertension Mother     Hypertension Father         Physical Exam   Vital Signs: Temp: (!) 101.1  F (38.4  C) Temp src: Oral BP: 123/52 Pulse: 79   Resp: 18 SpO2: 93 % O2 Device: None (Room air)    Weight: 280 lbs 0 oz    Constitutional: Alert, oriented, cooperative. No apparent distress, appears nontoxic. Speaking in full  coherent sentences.     Eyes: Eyes are clear, pupils are reactive. No scleral icterus.     HEENT: Oropharynx is clear and moist, no lesions. Normocephalic, no evidence of cranial trauma.      Cardiovascular: Regular rhythm and rate, normal S1 and S2. No murmur, rubs, or gallops. Peripheral pulses intact bilaterally. No lower extremity edema.    Respiratory: Lung sounds are clear to auscultation bilaterally without wheezes, rhonchi, or crackles.    GI: Soft, non-distended. Non-tender, no rebound or guarding. No hepatosplenomegaly or masses appreciated. Normal bowel sounds.     Musculoskeletal: Left foot wrapped in ACE bandage. Otherwise without obvious deformity, normal range of motion. Distal CMS intact on the right.    Skin: Warm and dry, no ecchymoses. No mottling of skin. There is erythema and warmth to palpation of the right anterior lower extremity. Left third toe appears necrotic.      Neurologic: Patient moves all extremities. Gross strength and sensation are equal bilaterally.    Genitourinary: Deferred      Medical Decision Making       75 MINUTES SPENT BY ME on the date of service doing chart review, history, exam, documentation & further activities per the note.  MANAGEMENT DISCUSSED with the following over the past 24 hours: Dr. Valente Olvera   NOTE(S)/MEDICAL RECORDS REVIEWED over the past 24 hours: Note from Dr. Boston 9/25/23  Tests ORDERED & REVIEWED in the past 24 hours:  - CMP  - CBC  - Coags/INR  - CRP      Data     I have personally reviewed the following data over the past 24 hrs:    16.6 (H)  \   11.3 (L)   / 606 (H)     134 (L) 96 (L) 26.3 (H) /  156 (H)   3.8 22 1.03 \     ALT: 24 AST: 23 AP: 92 TBILI: 0.4   ALB: 3.2 (L) TOT PROTEIN: 7.6 LIPASE: N/A     TSH: N/A T4: N/A A1C: 6.7 (H)     Procal: N/A CRP: 387.74 (H) Lactic Acid: 0.4 (L)       INR:  1.25 (H) PTT:  N/A   D-dimer:  N/A Fibrinogen:  N/A       Imaging results reviewed over the past 24 hrs:   Recent Results (from the past 24 hour(s))    XR Ankle Left G/E 3 Views    Narrative    LEFT FOOT THREE OR MORE VIEWS;   LEFT ANKLE THREE OR MORE VIEWS  9/25/2023 8:23 AM     HISTORY: Type 2 diabetes mellitus with right diabetic foot infection  (H).    COMPARISON: None.       Impression    IMPRESSION:  Destructive change with bony fragmentation, fracturing  and malalignment of the midtarsal and midfoot region in keeping with  severe Charcot arthropathy. There is extensive soft tissue swelling.  Superimposed infection is difficult to exclude by imaging. Ankle  mortise is intact.    ANGEL RIBERA MD         SYSTEM ID:  [id number]   XR Foot Left G/E 3 Views    Narrative    LEFT FOOT THREE OR MORE VIEWS;   LEFT ANKLE THREE OR MORE VIEWS  9/25/2023 8:23 AM     HISTORY: Type 2 diabetes mellitus with right diabetic foot infection  (H).    COMPARISON: None.       Impression    IMPRESSION:  Destructive change with bony fragmentation, fracturing  and malalignment of the midtarsal and midfoot region in keeping with  severe Charcot arthropathy. There is extensive soft tissue swelling.  Superimposed infection is difficult to exclude by imaging. Ankle  mortise is intact.    ANGEL RIBERA MD         SYSTEM ID:  [id number]   MR Foot Left w/o & w Contrast    Narrative    MR left foot without and with contrast 9/25/2023 3:00 PM    History: Left foot infection, evaluate for abscess and osteomyelitis    Techniques: Multiplanar multisequence imaging of the left foot was  obtained before and after administration of intravenous contrast.    Contrast: 12.5 ML GADAVIST    Comparison: Same date ankle and foot radiographs.    Findings:    Motion and large field of view acquisition partially compromising  detail assessment.    Extensive subcutaneous edema and enhancement of the visualized soft  tissues with likely a sinus tract dorsally at the level of medial  column of tarsometatarsal joint.     Extensive irregular peripheral soft tissue enhancement around centered  around tarsal  bones, tarsometatarsal joint are most consistent with  area of abscess with wider area of phlegmon though exact size is  difficult to measure due to irregular shape and marked internal T2  heterogeneity with wider area of non-enhancement. Plantar subcutaneous  non-enhancement appear to extend close to skin surface in mid and  forefoot, likely devitalized/devascularized soft tissue.    Bones    Extensive destruction/disorganization with fragmentation involving the  midfoot, metatarsal bases with dorsal subluxation alignment and  fragmentation/fractured bones. Associated extensive T1 hypointensity  with enhancement proximally of all tarsal bones, proximally to talar  neck and mid-calcaneus, and distally involving all five metatarsal  proxima/mid shafts. Wider area of edemalike marrow signal intensity  are present. Overall findings consistent with osteomyelitis in the  setting of Charcot arthropathy.    Absent distal proximal phalanx, and proximal middle phalanx of the  fourth toe. Given the large field-of-view acquisition and motion  evaluation is compromised but residual bone appears to show T1  hypointensity and enhancement along with radiographical indistinct  margin, highly concerning for active osteomyelitis/septic arthritis.    Edema like marrow signal intensity of the posterior aspect of the  tibial plafond with mild T1 hypointensity, concerning for  osteomyelitis involvement with associated septic arthritis, maybe  extension through septic tenosynovitis vs. anterior joint.    Cystlike changes at the critical angle of Gissane, nonspecific.  Chronic appearing periostitis/bone proliferation of the second  metatarsal shaft.    Joints and periarticular soft tissue    Joint effusion: Small tibiotalar joint effusion.    Plantar plates: Not well assessed due to motion.    Intermetatarsal spaces: No interdigital neuroma.  Moderate to large first and third, small to moderate for the and trace  second intermetatarsal  bursitis.    Ligaments and Tendons    Lisfranc interosseous ligament: Intact.    Tendons: Peroneal tenosynovitis with likely complete tear at medial  cuneiform/first metatarsal attachment and separately moderate to high  grade intrasubstance tear just distal to the fibular tip.     Tenosynovitis of the anterior extensor tendons with possible high  grade tear anterior tibialis at distal attachment. Proximal extent of  the tibialis anterior tenosynovitis not fully including the field of  view.    Tenosynovitis of medial flexor tendons, proximal extent along  posterior tibialis not fully included in the field of view.     These areas communicate with midfoot destruction/non-enhancement i.e.  presumed abscess/phlegmon; therefore, most consistent with septic  tenosynovitis extension until proven otherwise.    Muscles    Diffuse muscle edema and enhancement, likely related to combination of  microangiopathy/polyneuropathy and superimposed myositis in the  setting of infection.    ANCILLARY FINDINGS    Complete effacement of tarsal sinus.    Ankle ligaments are not well assessed owing to true lateral view and  acquisition plane.      Impression    Impression:  Motion and large field of view acquisition partially compromising  assessment.  1. Overall findings consistent with extensive osteomyelitis/septic  joint at midfoot/tarsometatarsal joint on a background of presumed  Charcot arthropathy:   a. Proximally, presumed septic tenosynovitis extension along peroneal  tendon, anterior extensor and medial flexor tendon sheath. Full  proximal extent of tenosynovitis not included in the field of view.   b. Finding concerning for tibiotalar joint septic arthritis extension  with distal tibial osteomyelitis, maybe extending from posterior  tenosynovitis.   c. Extensive destruction/fragmentation tarsal bones, metatarsal bases  with proximal marrow abnormality compatible with osteomyelitis  involving talus and calcaneus.  2. Finding  concerning for active osteomyelitis/septic arthritis of  forth PIP joint.    ELAINE HEATHER         SYSTEM ID:  [id number]

## 2023-09-25 NOTE — ED NOTES
"[facility]   Admission Handoff    The patient is Juan Balderrama, 62 year old who arrived in the ED by WHEELCHAIR from home with a complaint of Foot Pain (From Podiatry - states here to be admitted and IV antibiotics - upset that he has to be seen in ED)  . The patient's current symptoms are new and during this time the symptoms have increased. In the ED, patient was diagnosed with   Final diagnoses:   Other acute osteomyelitis of left foot (H)         Needed?: No    Allergies:    Allergies   Allergen Reactions    Bees     Sulfamethoxazole Swelling     throat       Past Medical Hx:   Past Medical History:   Diagnosis Date    Diabetes (H) 1-2017    Hypertension 1-2017       Initial vitals were: BP: (!) 163/73  Pulse: 70  Temp: 97.8  F (36.6  C)  Resp: 12  Height: 154.9 cm (5' 1\")  Weight: 127 kg (280 lb)  SpO2: 99 %   Recent vital Signs: BP (!) 163/73   Pulse 70   Temp 97.8  F (36.6  C) (Oral)   Resp 12   Ht 1.549 m (5' 1\")   Wt 127 kg (280 lb)   SpO2 99%   BMI 52.91 kg/m      Elimination Status: Continent: Yes     Activity Level: SBA    Fall Status: Reason for falls risk:  Mobility  arm band in place, patient and family education, and activity supervised    Baseline Mental status: WDL  Current Mental Status changes: at basesline    Infection present or suspected this encounter: yes skin/wound/contact  Sepsis suspected: No    Isolation type: none    Bariatric equipment needed?: No    In the ED these meds were given:   Medications   vancomycin (VANCOCIN) 1,500 mg in sodium chloride 0.9 % 250 mL intermittent infusion (1,500 mg Intravenous $New Bag 9/25/23 1608)   piperacillin-tazobactam (ZOSYN) 3.375 g vial to attach to  mL bag (0 g Intravenous Stopped 9/25/23 1424)   gadobutrol (GADAVIST) injection 12.5 mL (12.5 mLs Intravenous $Given 9/25/23 1421)       Drips running?  No    Home pump  No    Current LDAs: Peripheral IV: Site R AC; Gauge 20  none     Results: "   Labs/Imaging  Ordered and Resulted from Time of ED Arrival Up to the Time of Departure from the ED  Results for orders placed or performed during the hospital encounter of 09/25/23 (from the past 24 hour(s))   [place] Draw    Narrative    The following orders were created for panel order [place] Draw.  Procedure                               Abnormality         Status                     ---------                               -----------         ------                     Extra Blue Top Tube[[id number]]                              Final result               Extra Red Top Tube[[id number]]                               Final result               Extra Green Top (Lithium...[[id number]]                      Final result               Extra Purple Top Tube[[id number]]                            Final result               Extra Green Top (Lithium...[[id number]]                      Final result                 Please view results for these tests on the individual orders.   Extra Blue Top Tube   Result Value Ref Range    Hold Specimen JIC    Extra Red Top Tube   Result Value Ref Range    Hold Specimen JIC    Extra Green Top (Lithium Heparin) Tube   Result Value Ref Range    Hold Specimen JIC    Extra Purple Top Tube   Result Value Ref Range    Hold Specimen JIC    Extra Green Top (Lithium Heparin) ON ICE   Result Value Ref Range    Hold Specimen jic    CBC with Platelets & Differential    Narrative    The following orders were created for panel order CBC with Platelets & Differential.  Procedure                               Abnormality         Status                     ---------                               -----------         ------                     CBC with platelets and d...[[id number]]  Abnormal            Final result                 Please view results for these tests on the individual orders.   Comprehensive metabolic panel   Result Value Ref Range    Sodium 134 (L) 136 - 145 mmol/L    Potassium 3.8 3.4 - 5.3  mmol/L    Carbon Dioxide (CO2) 22 22 - 29 mmol/L    Anion Gap 16 (H) 7 - 15 mmol/L    Urea Nitrogen 26.3 (H) 8.0 - 23.0 mg/dL    Creatinine 1.03 0.67 - 1.17 mg/dL    GFR Estimate 83 >60 mL/min/1.73m2    Calcium 9.8 8.8 - 10.2 mg/dL    Chloride 96 (L) 98 - 107 mmol/L    Glucose 108 (H) 70 - 99 mg/dL    Alkaline Phosphatase 92 40 - 129 U/L    AST 23 0 - 45 U/L    ALT 24 0 - 70 U/L    Protein Total 7.6 6.4 - 8.3 g/dL    Albumin 3.2 (L) 3.5 - 5.2 g/dL    Bilirubin Total 0.4 <=1.2 mg/dL   CBC with platelets and differential   Result Value Ref Range    WBC Count 16.6 (H) 4.0 - 11.0 10e3/uL    RBC Count 4.07 (L) 4.40 - 5.90 10e6/uL    Hemoglobin 11.3 (L) 13.3 - 17.7 g/dL    Hematocrit 35.2 (L) 40.0 - 53.0 %    MCV 87 78 - 100 fL    MCH 27.8 26.5 - 33.0 pg    MCHC 32.1 31.5 - 36.5 g/dL    RDW 15.2 (H) 10.0 - 15.0 %    Platelet Count 606 (H) 150 - 450 10e3/uL    % Neutrophils 78 %    % Lymphocytes 10 %    % Monocytes 8 %    % Eosinophils 2 %    % Basophils 1 %    % Immature Granulocytes 1 %    NRBCs per 100 WBC 0 <1 /100    Absolute Neutrophils 13.1 (H) 1.6 - 8.3 10e3/uL    Absolute Lymphocytes 1.6 0.8 - 5.3 10e3/uL    Absolute Monocytes 1.3 0.0 - 1.3 10e3/uL    Absolute Eosinophils 0.3 0.0 - 0.7 10e3/uL    Absolute Basophils 0.1 0.0 - 0.2 10e3/uL    Absolute Immature Granulocytes 0.2 <=0.4 10e3/uL    Absolute NRBCs 0.0 10e3/uL   INR   Result Value Ref Range    INR 1.25 (H) 0.85 - 1.15   CRP inflammation   Result Value Ref Range    CRP Inflammation 387.74 (H) <5.00 mg/L   Erythrocyte sedimentation rate auto   Result Value Ref Range    Erythrocyte Sedimentation Rate 81 (H) 0 - 20 mm/hr   MR Foot Left w/o & w Contrast    Narrative    MR left foot without and with contrast 9/25/2023 3:00 PM    History: Left foot infection, evaluate for abscess and osteomyelitis    Techniques: Multiplanar multisequence imaging of the left foot was  obtained before and after administration of intravenous contrast.    Contrast: 12.5 ML  GADAVIST    Comparison: Same date ankle and foot radiographs.    Findings:    Motion and large field of view acquisition partially compromising  detail assessment.    Extensive subcutaneous edema and enhancement of the visualized soft  tissues with likely a sinus tract dorsally at the level of medial  column of tarsometatarsal joint.     Extensive irregular peripheral soft tissue enhancement around centered  around tarsal bones, tarsometatarsal joint are most consistent with  area of abscess with wider area of phlegmon though exact size is  difficult to measure due to irregular shape and marked internal T2  heterogeneity with wider area of non-enhancement. Plantar subcutaneous  non-enhancement appear to extend close to skin surface in mid and  forefoot, likely devitalized/devascularized soft tissue.    Bones    Extensive destruction/disorganization with fragmentation involving the  midfoot, metatarsal bases with dorsal subluxation alignment and  fragmentation/fractured bones. Associated extensive T1 hypointensity  with enhancement proximally of all tarsal bones, proximally to talar  neck and mid-calcaneus, and distally involving all five metatarsal  proxima/mid shafts. Wider area of edemalike marrow signal intensity  are present. Overall findings consistent with osteomyelitis in the  setting of Charcot arthropathy.    Absent distal proximal phalanx, and proximal middle phalanx of the  fourth toe. Given the large field-of-view acquisition and motion  evaluation is compromised but residual bone appears to show T1  hypointensity and enhancement along with radiographical indistinct  margin, highly concerning for active osteomyelitis/septic arthritis.    Edema like marrow signal intensity of the posterior aspect of the  tibial plafond with mild T1 hypointensity, concerning for  osteomyelitis involvement with associated septic arthritis, maybe  extension through septic tenosynovitis vs. anterior joint.    Cystlike changes  at the critical angle of G[place], nonspecific.  Chronic appearing periostitis/bone proliferation of the second  metatarsal shaft.    Joints and periarticular soft tissue    Joint effusion: Small tibiotalar joint effusion.    Plantar plates: Not well assessed due to motion.    Intermetatarsal spaces: No interdigital neuroma.  Moderate to large first and third, small to moderate for the and trace  second intermetatarsal bursitis.    Ligaments and Tendons    Lisfranc interosseous ligament: Intact.    Tendons: Peroneal tenosynovitis with likely complete tear at medial  cuneiform/first metatarsal attachment and separately moderate to high  grade intrasubstance tear just distal to the fibular tip.     Tenosynovitis of the anterior extensor tendons with possible high  grade tear anterior tibialis at distal attachment. Proximal extent of  the tibialis anterior tenosynovitis not fully including the field of  view.    Tenosynovitis of medial flexor tendons, proximal extent along  posterior tibialis not fully included in the field of view.     These areas communicate with midfoot destruction/non-enhancement i.e.  presumed abscess/phlegmon; therefore, most consistent with septic  tenosynovitis extension until proven otherwise.    Muscles    Diffuse muscle edema and enhancement, likely related to combination of  microangiopathy/polyneuropathy and superimposed myositis in the  setting of infection.    ANCILLARY FINDINGS    Complete effacement of tarsal sinus.    Ankle ligaments are not well assessed owing to true lateral view and  acquisition plane.      Impression    Impression:  Motion and large field of view acquisition partially compromising  assessment.  1. Overall findings consistent with extensive osteomyelitis/septic  joint at midfoot/tarsometatarsal joint on a background of presumed  Charcot arthropathy:   a. Proximally, presumed septic tenosynovitis extension along peroneal  tendon, anterior extensor and medial flexor  tendon sheath. Full  proximal extent of tenosynovitis not included in the field of view.   b. Finding concerning for tibiotalar joint septic arthritis extension  with distal tibial osteomyelitis, maybe extending from posterior  tenosynovitis.   c. Extensive destruction/fragmentation tarsal bones, metatarsal bases  with proximal marrow abnormality compatible with osteomyelitis  involving talus and calcaneus.  2. Finding concerning for active osteomyelitis/septic arthritis of  forth PIP joint.    [employer]         SYSTEM ID:  [id number]       For the majority of the shift this patient's behavior was Green     Cardiac Rhythm: Normal Sinus  Pt needs tele? No  Skin/wound Issues: None    Code Status: Full Code    Pain control: good    Nausea control: pt had none    Abnormal labs/tests/findings requiring intervention: osteomyelitis of left foot requiring IV abx    Patient tested for COVID 19 prior to admission: NO     OBS brochure/video discussed/provided to patient/family: N/A     Family present during ED course? Yes     Family Comments/Social Situation comments: wife at bedside    Tasks needing completion: None    Mavis Rothman RN

## 2023-09-25 NOTE — MEDICATION SCRIBE - ADMISSION MEDICATION HISTORY
Medication Scribe Admission Medication History    Admission medication history is complete. The information provided in this note is only as accurate as the sources available at the time of the update.    Medication reconciliation/reorder completed by provider prior to medication history? No    Information Source(s): Patient, Clinic records, and CareEverywhere/SureScripts via  in room with patient.    Pertinent Information: none    Changes made to PTA medication list:  Added: None  Deleted: Augmentin from 7/3/23.  Changed: None    Medication Affordability:  Not including over the counter (OTC) medications, was there a time in the past 3 months when you did not take your medications as prescribed because of cost?: No      Allergies reviewed with patient and updates made in EHR: yes, added Bees.    Medication History Completed By: Yolanda Morel 9/25/2023 4:19 PM    Prior to Admission medications    Medication Sig Last Dose Taking? Auth Provider Long Term End Date   amLODIPine (NORVASC) 2.5 MG tablet Take 1 tablet (2.5 mg) by mouth daily 9/25/2023 at am Yes Mauricio Pearce MD Yes    amoxicillin-clavulanate (AUGMENTIN) 875-125 MG tablet Take 1 tablet by mouth 2 times daily for 10 days 9/25/2023 at am Yes Joel Boston DPM  9/29/23   atorvastatin (LIPITOR) 20 MG tablet TAKE 1 TABLET BY MOUTH EVERY DAY  Patient taking differently: Take 20 mg by mouth every evening 9/24/2023 at pm Yes Mauricio Pearce MD Yes    BD PEN NEEDLE YENNY 2ND GEN 32G X 4 MM miscellaneous USE 1 PEN NEEDLES DAILY OR AS DIRECTED. 9/24/2023 at hs Yes Mauricio Pearce MD     blood glucose (ACCU-CHEK GANGA) test strip Use to test blood sugar 1 times daily or as directed. Past Week at Unknown Yes Mauricio Pearce MD     blood glucose monitoring (ACCU-CHEK GANGA PLUS) meter device kit USE TO TEST BLOOD SUGAR 1 TIMES DAILY OR AS DIRECTED. Past Week at Unknown Yes Reported, Patient     blood glucose monitoring  (ACCU-CHEK MULTICLIX) lancets USE TO TEST BLOOD SUGAR 1 TIMES DAILY OR AS DIRECTED. Past Week at Unknown Yes Reported, Patient     EPINEPHrine (EPIPEN/ADRENACLICK/OR ANY BX GENERIC EQUIV) 0.3 MG/0.3ML injection 2-pack Inject 0.3 mLs (0.3 mg) into the muscle once as needed for anaphylaxis never used at on hand if needed Yes Mauricio Pearce MD     hydrochlorothiazide (HYDRODIURIL) 25 MG tablet Take 1 tablet (25 mg) by mouth daily 9/25/2023 at am Yes Mauricio Pearce MD Yes    insulin glargine (BASAGLAR KWIKPEN) 100 UNIT/ML pen Inject 64 Units Subcutaneous At Bedtime SCHEDULE FASTING LAB APPOINTMENT WITHIN THE NEXT 30 DAYS. 9/24/2023 at hs Yes Mauricio Pearce MD Yes    losartan (COZAAR) 100 MG tablet Take 1 tablet (100 mg) by mouth daily 9/25/2023 at am Yes Mauricio Pearce MD Yes    metFORMIN (GLUCOPHAGE XR) 500 MG 24 hr tablet TAKE 2 TABLETS BY MOUTH TWICE A DAY WITH MEALS. 9/25/2023 at am Yes Mauricio Pearce MD Yes    metoprolol succinate ER (TOPROL XL) 200 MG 24 hr tablet Take 1 tablet (200 mg) by mouth daily SCHEDULE NURSE BLOOD PRESSURE CHECK BEFORE OTHER REFILLS ARE GIVEN 9/25/2023 at am Yes Mauricio Pearce MD Yes

## 2023-09-25 NOTE — ED TRIAGE NOTES
Triage Assessment       Row Name 09/25/23 1125       Triage Assessment (Adult)    Airway WDL WDL       Respiratory WDL    Respiratory WDL WDL       Skin Circulation/Temperature WDL    Skin Circulation/Temperature WDL WDL       Cardiac WDL    Cardiac WDL WDL       Peripheral/Neurovascular WDL    Peripheral Neurovascular WDL WDL       Cognitive/Neuro/Behavioral WDL    Cognitive/Neuro/Behavioral WDL WDL

## 2023-09-25 NOTE — LETTER
9/25/2023         RE: Juan Balderrama  [id number] [place]  [place] [address]        Dear Colleague,    Thank you for referring your patient, Juan Balderrama, to the [facility]. Please see a copy of my visit note below.    PATIENT HISTORY:  Juan Balderrama is a 62 year old male who presents to clinic with his wife with concern of an infection of his left foot.  The patient relates developing significant swelling and redness over the past 2 weeks.  The patient relates developing blisters with significant drainage over the past few days.  The patient denies any fever or chills, nausea or vomiting.  The patient was prescribed Augmentin on September 19 but was unable to fill it due to lack of supply at the pharmacy.    REVIEW OF SYSTEMS:  Constitutional, HEENT, cardiovascular, pulmonary, GI, , musculoskeletal, neuro, skin, endocrine and psych systems are negative, except as otherwise noted.     PAST MEDICAL HISTORY:   Past Medical History:   Diagnosis Date     Diabetes (H) 1-2017     Hypertension 1-2017        PAST SURGICAL HISTORY:   Past Surgical History:   Procedure Laterality Date     EYE SURGERY  4-3-2017    Cataract        MEDICATIONS:   Current Outpatient Medications:      amLODIPine (NORVASC) 2.5 MG tablet, Take 1 tablet (2.5 mg) by mouth daily, Disp: 90 tablet, Rfl: 3     amoxicillin-clavulanate (AUGMENTIN) 875-125 MG tablet, Take 1 tablet by mouth 2 times daily for 10 days, Disp: 20 tablet, Rfl: 0     amoxicillin-clavulanate (AUGMENTIN) 875-125 MG tablet, Take 1 tablet by mouth 2 times daily, Disp: 20 tablet, Rfl: 0     atorvastatin (LIPITOR) 20 MG tablet, TAKE 1 TABLET BY MOUTH EVERY DAY, Disp: 90 tablet, Rfl: 2     BD PEN NEEDLE YENNY 2ND GEN 32G X 4 MM miscellaneous, USE 1 PEN NEEDLES DAILY OR AS DIRECTED., Disp: 100 each, Rfl: 2     blood glucose (ACCU-CHEK GANGA) test strip, Use to test blood sugar 1 times daily or as directed., Disp: 100 strip, Rfl: 3     blood  glucose monitoring (ACCU-CHEK GANGA PLUS) meter device kit, USE TO TEST BLOOD SUGAR 1 TIMES DAILY OR AS DIRECTED., Disp: , Rfl: 0     blood glucose monitoring (ACCU-CHEK MULTICLIX) lancets, USE TO TEST BLOOD SUGAR 1 TIMES DAILY OR AS DIRECTED., Disp: , Rfl: 3     EPINEPHrine (EPIPEN/ADRENACLICK/OR ANY BX GENERIC EQUIV) 0.3 MG/0.3ML injection 2-pack, Inject 0.3 mLs (0.3 mg) into the muscle once as needed for anaphylaxis, Disp: 1 each, Rfl: 1     hydrochlorothiazide (HYDRODIURIL) 25 MG tablet, Take 1 tablet (25 mg) by mouth daily, Disp: 90 tablet, Rfl: 3     insulin glargine (BASAGLAR KWIKPEN) 100 UNIT/ML pen, Inject 64 Units Subcutaneous At Bedtime SCHEDULE FASTING LAB APPOINTMENT WITHIN THE NEXT 30 DAYS., Disp: 30 mL, Rfl: 0     losartan (COZAAR) 100 MG tablet, Take 1 tablet (100 mg) by mouth daily, Disp: 90 tablet, Rfl: 3     metFORMIN (GLUCOPHAGE XR) 500 MG 24 hr tablet, TAKE 2 TABLETS BY MOUTH TWICE A DAY WITH MEALS., Disp: 360 tablet, Rfl: 3     metoprolol succinate ER (TOPROL XL) 200 MG 24 hr tablet, Take 1 tablet (200 mg) by mouth daily SCHEDULE NURSE BLOOD PRESSURE CHECK BEFORE OTHER REFILLS ARE GIVEN, Disp: 90 tablet, Rfl: 0     ALLERGIES:    Allergies   Allergen Reactions     Sulfamethoxazole Swelling     throat        SOCIAL HISTORY:   Social History     Socioeconomic History     Marital status:      Spouse name: Not on file     Number of children: Not on file     Years of education: Not on file     Highest education level: Not on file   Occupational History     Not on file   Tobacco Use     Smoking status: Never     Passive exposure: Never     Smokeless tobacco: Never   Vaping Use     Vaping Use: Never used   Substance and Sexual Activity     Alcohol use: Yes     Comment: social     Drug use: No     Sexual activity: Yes     Partners: Female     Birth control/protection: Post-menopausal   Other Topics Concern     Parent/sibling w/ CABG, MI or angioplasty before 65F 55M? No   Social History Narrative      Not on file     Social Determinants of Health     Financial Resource Strain: Not on file   Food Insecurity: Not on file   Transportation Needs: Not on file   Physical Activity: Not on file   Stress: Not on file   Social Connections: Not on file   Interpersonal Safety: Not on file   Housing Stability: Not on file        FAMILY HISTORY:   Family History   Problem Relation Age of Onset     Hypertension Mother      Hypertension Father         Vitals: BP (!) 156/82   Pulse 69   Wt 136.5 kg (301 lb)   BMI 46.45 kg/m         Lower Extremity Evaluation:     Dermatologic: Noted significant edema and erythema with extensive blistering over the midfoot on the left foot.  Significant serous drainage is noted.    Vascular: Vascular status intact on the left..        Neurologic:   No evidence of weakness in the lower extremities.  Noted evidence of neuropathy with diminished sensation on the left.       Musculoskeletal: Patient is ambulatory without assistive device or brace.  Again noted significant edema and erythema encompassing the entire foot and ankle on the left.  Noted collapse of medial longitudinal arch.     Diagnostics:  Radiograph evaluation including 3 views of the left foot and ankle reveal extensive erosion of the midtarsal bones with collapse of the medial longitudinal arch.  No gas noted in the tissue.  Calcified vessels noted.  The ankle joint appears stable and symmetrical.    Assessment:        ICD-10-CM    1. Type 2 diabetes mellitus with right diabetic foot infection (H)  E11.628 XR Foot Left G/E 3 Views    L08.9 XR Ankle Left G/E 3 Views              Plan:  I have explained to the patient and his wife the underlying condition affecting the left foot.  At this point I am concerned about osteomyelitis involving the majority of the midfoot on the left.  The patient was directed to report to the ER for further evaluation and IV antibiotic treatment.  The patient was instructed to remain strictly  nonweightbearing on the left with use of a knee scooter.  The patient was prescribed a knee walker. The patient is unsteady utilizing crutches, quad walker or a cane.  The knee walker will allow the patient to ambulate safely without the use of the operative foot and reduce risk of falls.  We also had a arie discussion about the real possibility of ending up with a below-knee amputation if further evaluation needs to confirmation of osteomyelitis of much of the midfoot.  This would present a possible life-threatening situation.      Juan verbalized agreement with and understanding of the rational for the diagnosis and treatment plan.  All questions were answered to best of my ability and the patient's satisfaction. The patient was advised to contact the clinic with any questions that may arise after the clinic visit.      Disclaimer: This note consists of symbols derived from keyboarding, dictation and/or voice recognition software. As a result, there may be errors in the script that have gone undetected. Please consider this when interpreting information found in this chart.        JESUS Boston D.P.M., F.A.C.F.A.S.          Again, thank you for allowing me to participate in the care of your patient.        Sincerely,        Joel Boston DPM

## 2023-09-25 NOTE — PHARMACY-VANCOMYCIN DOSING SERVICE
Pharmacy Vancomycin Initial Note  Date of Service 2023  Patient's  1960  62 year old, male    Indication: Skin and Soft Tissue Infection    Current estimated CrCl = Estimated Creatinine Clearance: 88.7 mL/min (based on SCr of 1.03 mg/dL).    Creatinine for last 3 days  2023: 12:10 PM Creatinine 1.03 mg/dL    Recent Vancomycin Level(s) for last 3 days  No results found for requested labs within last 3 days.      Vancomycin IV Administrations (past 72 hours)        No vancomycin orders with administrations in past 72 hours.                    Nephrotoxins and other renal medications (From now, onward)      Start     Dose/Rate Route Frequency Ordered Stop    23 1600  vancomycin (VANCOCIN) 1,500 mg in sodium chloride 0.9 % 250 mL intermittent infusion         1,500 mg  over 90 Minutes Intravenous EVERY 18 HOURS 23 1554              Contrast Orders - past 72 hours (72h ago, onward)      Start     Dose/Rate Route Frequency Stop    23 1350  gadobutrol (GADAVIST) injection 12 mL  Status:  Discontinued         12 mL Intravenous ONCE 23 1348    23 1350  gadobutrol (GADAVIST) injection 12.5 mL         12.5 mL Intravenous ONCE 23 1421            [employer] Prediction of Planned Initial Vancomycin Regimen  Loading dose: N/A  Regimen: 1500 mg IV every 18 hours.  Start time: 15:54 on 2023  Exposure target: AUC24 (range)400-600 mg/L.hr   AUC24,ss: 593 mg/L.hr  Probability of AUC24 > 400: 79 %  Ctrough,ss: 15.5 mg/L  Probability of Ctrough,ss > 20: 37 %  Probability of nephrotoxicity (Lodise LUMA ): 11 %          Plan:  Start vancomycin  1500 mg IV q18h.   Vancomycin monitoring method: AUC  Vancomycin therapeutic monitoring goal: 400-600 mg*h/L  Pharmacy will check vancomycin levels as appropriate in 1-3 Days.    Serum creatinine levels will be ordered daily for the first week of therapy and at least twice weekly for subsequent weeks.      Carla Lozada,  RPH

## 2023-09-26 PROBLEM — M65.979 TENOSYNOVITIS OF FOOT: Status: ACTIVE | Noted: 2023-09-26

## 2023-09-26 PROBLEM — E11.8 TYPE 2 DIABETES MELLITUS WITH UNSPECIFIED COMPLICATIONS (H): Status: ACTIVE | Noted: 2017-03-28

## 2023-09-26 PROBLEM — M00.9: Status: ACTIVE | Noted: 2023-09-26

## 2023-09-26 PROBLEM — E78.5 HYPERLIPIDEMIA LDL GOAL <70: Status: ACTIVE | Noted: 2017-12-06

## 2023-09-26 PROBLEM — D75.839 THROMBOCYTOSIS: Status: ACTIVE | Noted: 2023-09-26

## 2023-09-26 LAB
ANION GAP SERPL CALCULATED.3IONS-SCNC: 14 MMOL/L (ref 7–15)
BASOPHILS # BLD AUTO: 0.1 10E3/UL (ref 0–0.2)
BASOPHILS NFR BLD AUTO: 1 %
BUN SERPL-MCNC: 28.1 MG/DL (ref 8–23)
CALCIUM SERPL-MCNC: 8.8 MG/DL (ref 8.8–10.2)
CHLORIDE SERPL-SCNC: 99 MMOL/L (ref 98–107)
CREAT SERPL-MCNC: 1.11 MG/DL (ref 0.67–1.17)
CREAT SERPL-MCNC: 1.18 MG/DL (ref 0.67–1.17)
CRP SERPL-MCNC: 343.27 MG/L
DEPRECATED HCO3 PLAS-SCNC: 24 MMOL/L (ref 22–29)
EGFRCR SERPLBLD CKD-EPI 2021: 71 ML/MIN/1.73M2
EGFRCR SERPLBLD CKD-EPI 2021: 76 ML/MIN/1.73M2
EOSINOPHIL # BLD AUTO: 0.4 10E3/UL (ref 0–0.7)
EOSINOPHIL NFR BLD AUTO: 3 %
ERYTHROCYTE [DISTWIDTH] IN BLOOD BY AUTOMATED COUNT: 15.3 % (ref 10–15)
GLUCOSE BLDC GLUCOMTR-MCNC: 177 MG/DL (ref 70–99)
GLUCOSE BLDC GLUCOMTR-MCNC: 182 MG/DL (ref 70–99)
GLUCOSE BLDC GLUCOMTR-MCNC: 187 MG/DL (ref 70–99)
GLUCOSE BLDC GLUCOMTR-MCNC: 189 MG/DL (ref 70–99)
GLUCOSE BLDC GLUCOMTR-MCNC: 189 MG/DL (ref 70–99)
GLUCOSE SERPL-MCNC: 176 MG/DL (ref 70–99)
HCT VFR BLD AUTO: 30.9 % (ref 40–53)
HGB BLD-MCNC: 10 G/DL (ref 13.3–17.7)
IMM GRANULOCYTES # BLD: 0.2 10E3/UL
IMM GRANULOCYTES NFR BLD: 1 %
LACTATE SERPL-SCNC: 1.3 MMOL/L (ref 0.7–2)
LYMPHOCYTES # BLD AUTO: 1.9 10E3/UL (ref 0.8–5.3)
LYMPHOCYTES NFR BLD AUTO: 14 %
MCH RBC QN AUTO: 27.9 PG (ref 26.5–33)
MCHC RBC AUTO-ENTMCNC: 32.4 G/DL (ref 31.5–36.5)
MCV RBC AUTO: 86 FL (ref 78–100)
MONOCYTES # BLD AUTO: 1.2 10E3/UL (ref 0–1.3)
MONOCYTES NFR BLD AUTO: 9 %
NEUTROPHILS # BLD AUTO: 10.1 10E3/UL (ref 1.6–8.3)
NEUTROPHILS NFR BLD AUTO: 72 %
NRBC # BLD AUTO: 0 10E3/UL
NRBC BLD AUTO-RTO: 0 /100
PLATELET # BLD AUTO: 572 10E3/UL (ref 150–450)
POTASSIUM SERPL-SCNC: 3.9 MMOL/L (ref 3.4–5.3)
RBC # BLD AUTO: 3.58 10E6/UL (ref 4.4–5.9)
SODIUM SERPL-SCNC: 137 MMOL/L (ref 136–145)
WBC # BLD AUTO: 13.9 10E3/UL (ref 4–11)

## 2023-09-26 PROCEDURE — 86140 C-REACTIVE PROTEIN: CPT | Performed by: PHYSICIAN ASSISTANT

## 2023-09-26 PROCEDURE — 250N000012 HC RX MED GY IP 250 OP 636 PS 637: Performed by: PHYSICIAN ASSISTANT

## 2023-09-26 PROCEDURE — 258N000003 HC RX IP 258 OP 636: Performed by: STUDENT IN AN ORGANIZED HEALTH CARE EDUCATION/TRAINING PROGRAM

## 2023-09-26 PROCEDURE — 83605 ASSAY OF LACTIC ACID: CPT | Performed by: STUDENT IN AN ORGANIZED HEALTH CARE EDUCATION/TRAINING PROGRAM

## 2023-09-26 PROCEDURE — 80048 BASIC METABOLIC PNL TOTAL CA: CPT | Performed by: PHYSICIAN ASSISTANT

## 2023-09-26 PROCEDURE — 120N000001 HC R&B MED SURG/OB

## 2023-09-26 PROCEDURE — 258N000003 HC RX IP 258 OP 636: Performed by: PHYSICIAN ASSISTANT

## 2023-09-26 PROCEDURE — 36415 COLL VENOUS BLD VENIPUNCTURE: CPT | Performed by: STUDENT IN AN ORGANIZED HEALTH CARE EDUCATION/TRAINING PROGRAM

## 2023-09-26 PROCEDURE — 250N000011 HC RX IP 250 OP 636: Mod: JZ | Performed by: PHYSICIAN ASSISTANT

## 2023-09-26 PROCEDURE — 250N000013 HC RX MED GY IP 250 OP 250 PS 637: Performed by: PHYSICIAN ASSISTANT

## 2023-09-26 PROCEDURE — 99232 SBSQ HOSP IP/OBS MODERATE 35: CPT | Performed by: STUDENT IN AN ORGANIZED HEALTH CARE EDUCATION/TRAINING PROGRAM

## 2023-09-26 PROCEDURE — 250N000011 HC RX IP 250 OP 636: Mod: JZ | Performed by: STUDENT IN AN ORGANIZED HEALTH CARE EDUCATION/TRAINING PROGRAM

## 2023-09-26 PROCEDURE — 85025 COMPLETE CBC W/AUTO DIFF WBC: CPT | Performed by: PHYSICIAN ASSISTANT

## 2023-09-26 PROCEDURE — 82565 ASSAY OF CREATININE: CPT | Performed by: STUDENT IN AN ORGANIZED HEALTH CARE EDUCATION/TRAINING PROGRAM

## 2023-09-26 PROCEDURE — 36415 COLL VENOUS BLD VENIPUNCTURE: CPT | Performed by: PHYSICIAN ASSISTANT

## 2023-09-26 RX ORDER — ENOXAPARIN SODIUM 100 MG/ML
40 INJECTION SUBCUTANEOUS EVERY 12 HOURS
Status: DISCONTINUED | OUTPATIENT
Start: 2023-09-26 | End: 2023-09-27

## 2023-09-26 RX ORDER — CEFTRIAXONE 2 G/1
2 INJECTION, POWDER, FOR SOLUTION INTRAMUSCULAR; INTRAVENOUS EVERY 24 HOURS
Status: DISCONTINUED | OUTPATIENT
Start: 2023-09-26 | End: 2023-09-29

## 2023-09-26 RX ADMIN — ATORVASTATIN CALCIUM 20 MG: 20 TABLET, FILM COATED ORAL at 17:22

## 2023-09-26 RX ADMIN — INSULIN ASPART 1 UNITS: 100 INJECTION, SOLUTION INTRAVENOUS; SUBCUTANEOUS at 12:26

## 2023-09-26 RX ADMIN — VANCOMYCIN HYDROCHLORIDE 1500 MG: 5 INJECTION, POWDER, LYOPHILIZED, FOR SOLUTION INTRAVENOUS at 15:36

## 2023-09-26 RX ADMIN — METOPROLOL SUCCINATE 200 MG: 100 TABLET, EXTENDED RELEASE ORAL at 08:48

## 2023-09-26 RX ADMIN — SODIUM CHLORIDE, POTASSIUM CHLORIDE, SODIUM LACTATE AND CALCIUM CHLORIDE: 600; 310; 30; 20 INJECTION, SOLUTION INTRAVENOUS at 09:23

## 2023-09-26 RX ADMIN — INSULIN GLARGINE 64 UNITS: 100 INJECTION, SOLUTION SUBCUTANEOUS at 22:25

## 2023-09-26 RX ADMIN — CEFTRIAXONE SODIUM 2 G: 2 INJECTION, POWDER, FOR SOLUTION INTRAMUSCULAR; INTRAVENOUS at 14:19

## 2023-09-26 RX ADMIN — PIPERACILLIN AND TAZOBACTAM 3.38 G: 3; .375 INJECTION, POWDER, FOR SOLUTION INTRAVENOUS at 06:05

## 2023-09-26 RX ADMIN — INSULIN ASPART 1 UNITS: 100 INJECTION, SOLUTION INTRAVENOUS; SUBCUTANEOUS at 17:22

## 2023-09-26 RX ADMIN — AMLODIPINE BESYLATE 2.5 MG: 2.5 TABLET ORAL at 08:48

## 2023-09-26 RX ADMIN — ENOXAPARIN SODIUM 40 MG: 100 INJECTION SUBCUTANEOUS at 20:29

## 2023-09-26 ASSESSMENT — ACTIVITIES OF DAILY LIVING (ADL)
ADLS_ACUITY_SCORE: 20

## 2023-09-26 NOTE — PLAN OF CARE
"[facility] [facility] ADMISSION NOTE    Patient admitted to room 2402 at approximately 6:00pm via wheel chair from emergency room. Patient was accompanied by transport tech.     Verbal SBAR report received from AMANDEEP Gamble prior to patient arrival.     Patient ambulated to bed independently. Patient alert and oriented X 3. The patient is not having any pain.  . Admission vital signs: Blood pressure (!) 163/73, pulse 70, temperature 97.8  F (36.6  C), temperature source Oral, resp. rate 12, height 1.549 m (5' 1\"), weight 127 kg (280 lb), SpO2 99 %. Patient was oriented to plan of care, call light, bed controls, tv, telephone, bathroom, and visiting hours.     Risk Assessment    The following safety risks were identified during admission: fall and skin. Yellow risk band applied: YES.     Skin Initial Assessment    This writer admitted this patient and completed a full skin assessment and Walt score in the Adult PCS flowsheet. Appropriate interventions initiated as needed.     Secondary skin check completed by AMANDEEP Chávez.         Education    Patient has a [place] to Observation order: No  Observation education completed and documented: N/A      Radha Andrea RN      "

## 2023-09-26 NOTE — PROGRESS NOTES
Reason for Admission: Osteomyelitis      Neuro: A&Ox 4, anxious     Cardiac: WNL    Respiratory: All Lung fields clear to auscultation    GI/: Continent of bowel/bladder    Diet/appetite: Combo diet    Activity: Independent    Pain: Denies    Skin: Left foot osteomyelitis. See flowsheet.     LDA's: RHEA SL

## 2023-09-26 NOTE — PLAN OF CARE
Goal Outcome Evaluation:      Plan of Care Reviewed With: patient  Pt triggered sepsis protocol x1.  Wound had been seeping through sheets and the ace wrap.  This writer redressed.  Pt has no complaints of pain and declined taking anything.  Grupo is tolerating food and fluids.  Pt ambulates independently in room and calls for help if needed.

## 2023-09-26 NOTE — PROGRESS NOTES
"[facility]    Medicine Progress Note - Hospitalist Service    Date of Admission:  9/25/2023    Assessment & Plan      Juan Balderrama is a 62 year old male admitted on 9/25/2023. He presented to the emergency department for evaluation of non-healing left foot wound despite outpatient antibiotic use and was found to have left foot osteomyelitis / septic joint and tenosynovitis for which he is being admitted for further evaluation and treatment.    Chronic left foot wound - osteomyelitis of left foot, tenosynovitis of foot, septic arthritis of left foot   Has been following podiatry Dr. Boston as outpatient, recently on Augmentin without improvement. Initially afebrile, but fever up to 101.4 shortly after admission, with leukocytosis (WBC 16.6), but normal lactic acid. CRP elevated (387.74). MRI left foot demonstrates - \"1. Overall findings consistent with extensive osteomyelitis/septic joint at idfoot/tarsometatarsal joint on a background of presumed Charcot arthropathy: a. Proximally, presumed septic tenosynovitis extension along peroneal tendon, anterior extensor and medial flexor tendon sheath. Full proximal extent of tenosynovitis not included in the field of view.  b. Finding concerning for tibiotalar joint septic arthritis extension with distal tibial osteomyelitis, maybe extending from posterior tenosynovitis.  c. Extensive destruction/fragmentation tarsal bones, metatarsal bases with proximal marrow abnormality compatible with osteomyelitis involving talus and calcaneus. 2. Finding concerning for active osteomyelitis/septic arthritis of  forth PIP joint.\"     - Switch IV antibiotics from Zosyn to Rocephin 2 g IV every 24 hours  - Continue to be vancomycin   - Ortho consulted, will have Ortho evaluate likely tomorrow for potential BKA  - Carb restricted (60 g CHO) diet   - Wound care per RN  - Repeat CBC in a.m.  - See media tab for pictures of patient's foot    Uncontrolled type 2 " diabetes mellitus with complication, with long-term current use of insulin  Most recent HgbA1c 6.7%. Managed prior to admission with metformin 1000 mg bid and Basaglar 64 U qhs.   - Continue home Basaglar   - Hold metformin due to recent IV contrast  - Medium sliding scale insulin  - Hyper/hypoglycemia protocols  - Consistent carbohydrate diet    Thrombocytosis  Admit platelets 606. Unclear cause, possibly related to infection.    - CBC in am     Hyponatremia   Admit Na = 134. Minimally low, not clinically relevant.   - BMP in am     Elevated anion gap, resolved  Admit anion gap 16. Serum bicarb WNL. No blood gas to determine if acidotic. Lactic acid WNL.   - BMP in am     Benign essential hypertension  Blood pressure slightly elevated at time of admission. Managed prior to admission with hydrochlorothiazide 25 mg daily, losartan 100 mg daily, amlodipine 2.5 mg daily, and metoprolol  mg daily.   - Hold hydrochlorothiazide prior to amputation, resume post-op as able  - Continue Losartan, may need to hold day of operation  - Continue metoprolol and amlodipine with holding parameters    Hyperlipidemia LDL goal <70  Managed prior to admission with atorvastatin 20 mg daily, continue.        Diet: Consistent Carbohydrate Diet Moderate Consistent Carb (60 g CHO per Meal) Diet    DVT Prophylaxis: Enoxaparin (Lovenox) SQ  Elkins Catheter: Not present  Lines: None     Cardiac Monitoring: None  Code Status: Full Code      Clinically Significant Risk Factors Present on Admission           # Hypercalcemia: corrected calcium is >10.1, will monitor as appropriate    # Hypoalbuminemia: Lowest albumin = 3.2 g/dL at 9/25/2023 12:10 PM, will monitor as appropriate  # Coagulation Defect: INR = 1.25 (Ref range: 0.85 - 1.15) and/or PTT = N/A, will monitor for bleeding    # Hypertension: Noted on problem list     # DMII: A1C = 6.7 % (Ref range: <5.7 %) within past 6 months    # Severe Obesity: Estimated body mass index is 57.28 kg/m   "as calculated from the following:    Height as of this encounter: 1.549 m (5' 1\").    Weight as of this encounter: 137.5 kg (303 lb 2.1 oz).              Disposition Plan     Expected Discharge Date: 09/27/2023                  Rio Jamison DO  Hospitalist Service  [facility]  Securely message with [employer] (more info)  Text page via [facility] Paging/Directory   ______________________________________________________________________    Interval History   No acute events overnight. Patient with persistent weeping from his left foot. No fever, chills, nausea, or vomiting. Discussed having ortho come back to discuss operation with him tomorrow. Continuing antibiotics for now and continuing to assess for worsening infection.     Physical Exam   Vital Signs: Temp: 99.5  F (37.5  C) Temp src: Oral BP: 130/61 Pulse: 74   Resp: 18 SpO2: 100 % O2 Device: None (Room air)    Weight: 303 lbs 2.12 oz    Constitutional: awake, alert, cooperative, no apparent distress, and appears stated age  Respiratory: No increased work of breathing, good air exchange, clear to auscultation bilaterally, no crackles or wheezing  Cardiovascular: Normal apical impulse, regular rate and rhythm, normal S1 and S2, no S3 or S4, and no murmur noted, 2-3+ edema L>R  GI: No scars, normal bowel sounds, soft, non-distended, non-tender, no masses palpated, no hepatosplenomegally  Skin: Foot wrapped, yellow drainage present on dave pads, erythema receding from demarcation line    Medical Decision Making       40 MINUTES SPENT BY ME on the date of service doing chart review, history, exam, documentation & further activities per the note.      Data     I have personally reviewed the following data over the past 24 hrs:    13.9 (H)  \   10.0 (L)   / 572 (H)     137 99 28.1 (H) /  187 (H)   3.9 24 1.18 (H) \     TSH: N/A T4: N/A A1C: N/A     Procal: N/A CRP: 343.27 (H) Lactic Acid: 1.3         Imaging results reviewed over the past 24 hrs: "   No results found for this or any previous visit (from the past 24 hour(s)).

## 2023-09-27 LAB
ANION GAP SERPL CALCULATED.3IONS-SCNC: 12 MMOL/L (ref 7–15)
BUN SERPL-MCNC: 21.1 MG/DL (ref 8–23)
CALCIUM SERPL-MCNC: 8.6 MG/DL (ref 8.8–10.2)
CHLORIDE SERPL-SCNC: 101 MMOL/L (ref 98–107)
CREAT SERPL-MCNC: 0.96 MG/DL (ref 0.67–1.17)
DEPRECATED HCO3 PLAS-SCNC: 22 MMOL/L (ref 22–29)
EGFRCR SERPLBLD CKD-EPI 2021: 90 ML/MIN/1.73M2
ERYTHROCYTE [DISTWIDTH] IN BLOOD BY AUTOMATED COUNT: 15.3 % (ref 10–15)
GLUCOSE BLDC GLUCOMTR-MCNC: 119 MG/DL (ref 70–99)
GLUCOSE BLDC GLUCOMTR-MCNC: 119 MG/DL (ref 70–99)
GLUCOSE BLDC GLUCOMTR-MCNC: 129 MG/DL (ref 70–99)
GLUCOSE BLDC GLUCOMTR-MCNC: 172 MG/DL (ref 70–99)
GLUCOSE BLDC GLUCOMTR-MCNC: 218 MG/DL (ref 70–99)
GLUCOSE SERPL-MCNC: 116 MG/DL (ref 70–99)
HCT VFR BLD AUTO: 28.7 % (ref 40–53)
HGB BLD-MCNC: 9.2 G/DL (ref 13.3–17.7)
MAGNESIUM SERPL-MCNC: 1.6 MG/DL (ref 1.7–2.3)
MCH RBC QN AUTO: 27.6 PG (ref 26.5–33)
MCHC RBC AUTO-ENTMCNC: 32.1 G/DL (ref 31.5–36.5)
MCV RBC AUTO: 86 FL (ref 78–100)
PLATELET # BLD AUTO: 542 10E3/UL (ref 150–450)
POTASSIUM SERPL-SCNC: 4 MMOL/L (ref 3.4–5.3)
RBC # BLD AUTO: 3.33 10E6/UL (ref 4.4–5.9)
SODIUM SERPL-SCNC: 135 MMOL/L (ref 135–145)
WBC # BLD AUTO: 12.6 10E3/UL (ref 4–11)

## 2023-09-27 PROCEDURE — 80048 BASIC METABOLIC PNL TOTAL CA: CPT | Performed by: STUDENT IN AN ORGANIZED HEALTH CARE EDUCATION/TRAINING PROGRAM

## 2023-09-27 PROCEDURE — 258N000003 HC RX IP 258 OP 636: Performed by: PHYSICIAN ASSISTANT

## 2023-09-27 PROCEDURE — 250N000011 HC RX IP 250 OP 636: Performed by: INTERNAL MEDICINE

## 2023-09-27 PROCEDURE — 250N000013 HC RX MED GY IP 250 OP 250 PS 637: Performed by: PHYSICIAN ASSISTANT

## 2023-09-27 PROCEDURE — 85027 COMPLETE CBC AUTOMATED: CPT | Performed by: STUDENT IN AN ORGANIZED HEALTH CARE EDUCATION/TRAINING PROGRAM

## 2023-09-27 PROCEDURE — 83735 ASSAY OF MAGNESIUM: CPT | Performed by: STUDENT IN AN ORGANIZED HEALTH CARE EDUCATION/TRAINING PROGRAM

## 2023-09-27 PROCEDURE — 258N000003 HC RX IP 258 OP 636: Performed by: INTERNAL MEDICINE

## 2023-09-27 PROCEDURE — 250N000011 HC RX IP 250 OP 636: Mod: JZ | Performed by: STUDENT IN AN ORGANIZED HEALTH CARE EDUCATION/TRAINING PROGRAM

## 2023-09-27 PROCEDURE — 36415 COLL VENOUS BLD VENIPUNCTURE: CPT | Performed by: STUDENT IN AN ORGANIZED HEALTH CARE EDUCATION/TRAINING PROGRAM

## 2023-09-27 PROCEDURE — 99233 SBSQ HOSP IP/OBS HIGH 50: CPT | Performed by: INTERNAL MEDICINE

## 2023-09-27 PROCEDURE — 120N000001 HC R&B MED SURG/OB

## 2023-09-27 RX ORDER — ENOXAPARIN SODIUM 100 MG/ML
40 INJECTION SUBCUTANEOUS
Status: DISCONTINUED | OUTPATIENT
Start: 2023-09-27 | End: 2023-09-28

## 2023-09-27 RX ADMIN — ATORVASTATIN CALCIUM 20 MG: 20 TABLET, FILM COATED ORAL at 17:43

## 2023-09-27 RX ADMIN — ENOXAPARIN SODIUM 40 MG: 100 INJECTION SUBCUTANEOUS at 09:03

## 2023-09-27 RX ADMIN — CEFTRIAXONE SODIUM 2 G: 2 INJECTION, POWDER, FOR SOLUTION INTRAMUSCULAR; INTRAVENOUS at 14:43

## 2023-09-27 RX ADMIN — METOPROLOL SUCCINATE 200 MG: 100 TABLET, EXTENDED RELEASE ORAL at 09:03

## 2023-09-27 RX ADMIN — VANCOMYCIN HYDROCHLORIDE 1500 MG: 5 INJECTION, POWDER, LYOPHILIZED, FOR SOLUTION INTRAVENOUS at 11:36

## 2023-09-27 RX ADMIN — LOSARTAN POTASSIUM 100 MG: 50 TABLET, FILM COATED ORAL at 09:03

## 2023-09-27 RX ADMIN — AMLODIPINE BESYLATE 2.5 MG: 2.5 TABLET ORAL at 09:04

## 2023-09-27 RX ADMIN — SODIUM CHLORIDE, POTASSIUM CHLORIDE, SODIUM LACTATE AND CALCIUM CHLORIDE: 600; 310; 30; 20 INJECTION, SOLUTION INTRAVENOUS at 00:45

## 2023-09-27 RX ADMIN — SODIUM CHLORIDE, POTASSIUM CHLORIDE, SODIUM LACTATE AND CALCIUM CHLORIDE: 600; 310; 30; 20 INJECTION, SOLUTION INTRAVENOUS at 09:03

## 2023-09-27 RX ADMIN — INSULIN GLARGINE 64 UNITS: 100 INJECTION, SOLUTION SUBCUTANEOUS at 21:56

## 2023-09-27 RX ADMIN — SODIUM CHLORIDE, POTASSIUM CHLORIDE, SODIUM LACTATE AND CALCIUM CHLORIDE: 600; 310; 30; 20 INJECTION, SOLUTION INTRAVENOUS at 20:02

## 2023-09-27 ASSESSMENT — ACTIVITIES OF DAILY LIVING (ADL)
ADLS_ACUITY_SCORE: 20

## 2023-09-27 NOTE — CONSULTS
[facility] Consultation    Consultation - [facility]  Level of consult: Consult, follow and place orders    Juan Balderrama,  1960, MRN [id number]     Admitting Dx: Other acute osteomyelitis of left foot (H) [M86.172]     PCP: Mauricio Pearce, [phone]     Code status:  Full Code     Extended Emergency Contact Information  Primary Emergency Contact: CHRISTOPHERAGAPITO  Address: [address] [place] [address] [address] [place] [address] [facility]  Home Phone: [phone]  Mobile Phone: [phone]  Relation: Spouse     Assessment:  Left foot and ankle osteomyelitis, septic arthritis in the setting of presumed Charcot arthropathy and chronic left foot wounds    Plan:  This patient was discussed with Dr.Erik Rueda, orthopedic surgeon and Dr.Brian Lonnie Schuster, orthopedic surgeon, for [facility] and they are in agreement with the following plan.   Recommend a left below-knee amputation due to extensive changes on MRI, ongoing infection extending to the ankle joint, non-healing chronic wounds.   Discussed the above with the pt who had also previously discussed this with . He expresses understanding and is in agreement.   Plan is for surgery tomorrow afternoon with .   Continue current cares, IV abx per hospitalist.   Will order NPO at midnight.     Thank you for including [facility] in the care of Juan Balderrama. It has been a pleasure participating in his care.    Riky Blackman PA-C  [facility]    Principal Problem:    Other acute osteomyelitis of left foot (H)  Active Problems:    Benign essential hypertension    Uncontrolled type 2 diabetes mellitus with complication, with long-term current use of insulin    Hyperlipidemia LDL goal <70    Hypertension    Tenosynovitis of foot    Septic arthritis of left foot (H)    Thrombocytosis       Chief Complaint  Left foot and ankle swelling, infection      HPI  The patient  is seen in orthopedic consultation at the request of Dr.Gregory Heart.  The patient is a 62 year old male with severe  swelling, redness and pain of the left  ankle and foot. The patient has a history of HTN, DM type 2 previously uncontrolled with improvement recently in A1C and chronic left foot wounds. Per the chart, he has been following with  since May of 2023 for a chronic wound on the left foot, initially noted to have developed with cellulitis in May of 2023. He was treated with ongoing independent wound cares and oral amoxicillin with improvement. He started developing increasing swelling and redness several weeks ago, then developed blisters with drainage. He was seen by  in the AM on 9/25 and due to the worsening appearance of the foot and concern for osteomyelitis he was sent to the [facility] ED for further evaluation. A MRI was performed that showed extensive changes of osteomyelitis, septic arthritis and Charcot foot with erosion and collapse of the bony structures. He was started on IV antibiotics and admitted for further treatment, with likely plan for a BKA. Currently he is comfortable and notes ongoing drainage from the left foot dressings.      History is obtained from the patient and electronic health record     Past Medical History  Past Medical History:   Diagnosis Date    Diabetes (H) 1-2017    Hypertension 1-2017    Septic arthritis of right foot (H) 9/26/2023       Surgical History  Past Surgical History:   Procedure Laterality Date    EYE SURGERY  4-3-2017    Cataract        Social History  Social History     Socioeconomic History    Marital status:      Spouse name: Not on file    Number of children: Not on file    Years of education: Not on file    Highest education level: Not on file   Occupational History    Not on file   Tobacco Use    Smoking status: Never     Passive exposure: Never    Smokeless tobacco: Never   Vaping Use    Vaping Use: Never used    Substance and Sexual Activity    Alcohol use: Yes     Comment: social    Drug use: No    Sexual activity: Yes     Partners: Female     Birth control/protection: Post-menopausal   Other Topics Concern    Parent/sibling w/ CABG, MI or angioplasty before 65F 55M? No   Social History Narrative    Not on file     Social Determinants of Health     Financial Resource Strain: Not on file   Food Insecurity: Not on file   Transportation Needs: Not on file   Physical Activity: Not on file   Stress: Not on file   Social Connections: Not on file   Interpersonal Safety: Not on file   Housing Stability: Not on file       Family History  Family History   Problem Relation Age of Onset    Hypertension Mother     Hypertension Father         Allergies:  Bees and Sulfamethoxazole      Current Medications:  Current Facility-Administered Medications   Medication    acetaminophen (TYLENOL) tablet 650 mg    Or    acetaminophen (TYLENOL) Suppository 650 mg    amLODIPine (NORVASC) tablet 2.5 mg    atorvastatin (LIPITOR) tablet 20 mg    cefTRIAXone (ROCEPHIN) 2 g vial to attach to  ml bag for ADULTS or NS 50 ml bag for PEDS    glucose gel 15-30 g    Or    dextrose 50 % injection 25-50 mL    Or    glucagon injection 1 mg    enoxaparin ANTICOAGULANT (LOVENOX) injection 40 mg    [Held by provider] hydrochlorothiazide (HYDRODIURIL) tablet 25 mg    HYDROmorphone (DILAUDID) injection 0.2 mg    HYDROmorphone (DILAUDID) injection 0.4 mg    insulin aspart (NovoLOG) injection (RAPID ACTING)    insulin aspart (NovoLOG) injection (RAPID ACTING)    insulin glargine (LANTUS PEN) injection 64 Units    lactated ringers infusion    losartan (COZAAR) tablet 100 mg    melatonin tablet 1 mg    [Held by provider] metFORMIN (GLUCOPHAGE XR) 24 hr tablet 1,000 mg    metoprolol succinate ER (TOPROL XL) 24 hr tablet 200 mg    naloxone (NARCAN) injection 0.2 mg    Or    naloxone (NARCAN) injection 0.4 mg    Or    naloxone (NARCAN) injection 0.2 mg    Or     naloxone (NARCAN) injection 0.4 mg    ondansetron (ZOFRAN ODT) ODT tab 4 mg    Or    ondansetron (ZOFRAN) injection 4 mg    oxyCODONE (ROXICODONE) tablet 5 mg    oxyCODONE IR (ROXICODONE) half-tab 2.5 mg    prochlorperazine (COMPAZINE) injection 10 mg    Or    prochlorperazine (COMPAZINE) tablet 10 mg    Or    prochlorperazine (COMPAZINE) suppository 25 mg    senna-docusate (SENOKOT-S/PERICOLACE) 8.6-50 MG per tablet 1 tablet    Or    senna-docusate (SENOKOT-S/PERICOLACE) 8.6-50 MG per tablet 2 tablet    vancomycin (VANCOCIN) 1,500 mg in sodium chloride 0.9 % 250 mL intermittent infusion       Review of Systems:  See H&P     Physical Exam:  Temp:  [98.7  F (37.1  C)-100  F (37.8  C)] 99.9  F (37.7  C)  Pulse:  [65-76] 65  Resp:  [16-18] 16  BP: (128-156)/(46-69) 156/62  SpO2:  [92 %-99 %] 96 %    General: On examination, the patient is resting comfortably, NAD, awake, and alert and oriented to person, place, time, and, and general circumstances. Seated in the recliner.   SKIN: Left LE with Ace wraps over dressings in place, extending to the knee. Serous drainage noted on Chux pad under the foot. Further exam deferred, see ED notes for wound appearance.  Contralateral side= Full range of motion, Negative joint instability findings, 5/5 motor groups about the joint, Non-tender.     Pertinent Labs  Lab Results: personally reviewed.  Lab Results   Component Value Date    WBC 12.6 (H) 09/27/2023    HGB 9.2 (L) 09/27/2023    HCT 28.7 (L) 09/27/2023    MCV 86 09/27/2023     (H) 09/27/2023     Recent Labs   Lab 09/25/23  1210   INR 1.25*       Pertinent Radiology  Radiology Results: images and radiology report reviewed  MR left foot without and with contrast 9/25/2023 3:00 PM     History: Left foot infection, evaluate for abscess and osteomyelitis     Techniques: Multiplanar multisequence imaging of the left foot was  obtained before and after administration of intravenous contrast.     Contrast: 12.5 ML GADAVIST      Comparison: Same date ankle and foot radiographs.                                                  Impression:  Motion and large field of view acquisition partially compromising  assessment.  1. Overall findings consistent with extensive osteomyelitis/septic  joint at midfoot/tarsometatarsal joint on a background of presumed  Charcot arthropathy:   a. Proximally, presumed septic tenosynovitis extension along peroneal  tendon, anterior extensor and medial flexor tendon sheath. Full  proximal extent of tenosynovitis not included in the field of view.   b. Finding concerning for tibiotalar joint septic arthritis extension  with distal tibial osteomyelitis, maybe extending from posterior  tenosynovitis.   c. Extensive destruction/fragmentation tarsal bones, metatarsal bases  with proximal marrow abnormality compatible with osteomyelitis  involving talus and calcaneus.  2. Finding concerning for active osteomyelitis/septic arthritis of  forth PIP joint.    Attestation:  I have reviewed today's vital signs, notes, medications, labs and imaging.     Riky Blackman PA-C

## 2023-09-27 NOTE — H&P (VIEW-ONLY)
[facility] Consultation    Consultation - [facility]  Level of consult: Consult, follow and place orders    Juan Balderrama,  1960, MRN [id number]     Admitting Dx: Other acute osteomyelitis of left foot (H) [M86.172]     PCP: Mauricio Pearce, [phone]     Code status:  Full Code     Extended Emergency Contact Information  Primary Emergency Contact: CHRISTOPHERAGAPITO  Address: [address] [place] [address] [place] [address] [facility]  Home Phone: [phone]  Mobile Phone: [phone]  Relation: Spouse     Assessment:  Left foot and ankle osteomyelitis, septic arthritis in the setting of presumed Charcot arthropathy and chronic left foot wounds    Plan:  This patient was discussed with Dr.Erik Rueda, orthopedic surgeon and Dr.Brian Lonnie Schuster, orthopedic surgeon, for [facility] and they are in agreement with the following plan.   Recommend a left below-knee amputation due to extensive changes on MRI, ongoing infection extending to the ankle joint, non-healing chronic wounds.   Discussed the above with the pt who had also previously discussed this with . He expresses understanding and is in agreement.   Plan is for surgery tomorrow afternoon with .   Continue current cares, IV abx per hospitalist.   Will order NPO at midnight.     Thank you for including [facility] in the care of Juan Balderrama. It has been a pleasure participating in his care.    Riky Blackman PA-C  [facility]    Principal Problem:    Other acute osteomyelitis of left foot (H)  Active Problems:    Benign essential hypertension    Uncontrolled type 2 diabetes mellitus with complication, with long-term current use of insulin    Hyperlipidemia LDL goal <70    Hypertension    Tenosynovitis of foot    Septic arthritis of left foot (H)    Thrombocytosis       Chief Complaint  Left foot and ankle swelling, infection      HPI  The patient  is seen in orthopedic consultation at the request of Dr.Gregory Heart.  The patient is a 62 year old male with severe  swelling, redness and pain of the left  ankle and foot. The patient has a history of HTN, DM type 2 previously uncontrolled with improvement recently in A1C and chronic left foot wounds. Per the chart, he has been following with  since May of 2023 for a chronic wound on the left foot, initially noted to have developed with cellulitis in May of 2023. He was treated with ongoing independent wound cares and oral amoxicillin with improvement. He started developing increasing swelling and redness several weeks ago, then developed blisters with drainage. He was seen by  in the AM on 9/25 and due to the worsening appearance of the foot and concern for osteomyelitis he was sent to the [facility] ED for further evaluation. A MRI was performed that showed extensive changes of osteomyelitis, septic arthritis and Charcot foot with erosion and collapse of the bony structures. He was started on IV antibiotics and admitted for further treatment, with likely plan for a BKA. Currently he is comfortable and notes ongoing drainage from the left foot dressings.      History is obtained from the patient and electronic health record     Past Medical History  Past Medical History:   Diagnosis Date    Diabetes (H) 1-2017    Hypertension 1-2017    Septic arthritis of right foot (H) 9/26/2023       Surgical History  Past Surgical History:   Procedure Laterality Date    EYE SURGERY  4-3-2017    Cataract        Social History  Social History     Socioeconomic History    Marital status:      Spouse name: Not on file    Number of children: Not on file    Years of education: Not on file    Highest education level: Not on file   Occupational History    Not on file   Tobacco Use    Smoking status: Never     Passive exposure: Never    Smokeless tobacco: Never   Vaping Use    Vaping Use: Never used    Substance and Sexual Activity    Alcohol use: Yes     Comment: social    Drug use: No    Sexual activity: Yes     Partners: Female     Birth control/protection: Post-menopausal   Other Topics Concern    Parent/sibling w/ CABG, MI or angioplasty before 65F 55M? No   Social History Narrative    Not on file     Social Determinants of Health     Financial Resource Strain: Not on file   Food Insecurity: Not on file   Transportation Needs: Not on file   Physical Activity: Not on file   Stress: Not on file   Social Connections: Not on file   Interpersonal Safety: Not on file   Housing Stability: Not on file       Family History  Family History   Problem Relation Age of Onset    Hypertension Mother     Hypertension Father         Allergies:  Bees and Sulfamethoxazole      Current Medications:  Current Facility-Administered Medications   Medication    acetaminophen (TYLENOL) tablet 650 mg    Or    acetaminophen (TYLENOL) Suppository 650 mg    amLODIPine (NORVASC) tablet 2.5 mg    atorvastatin (LIPITOR) tablet 20 mg    cefTRIAXone (ROCEPHIN) 2 g vial to attach to  ml bag for ADULTS or NS 50 ml bag for PEDS    glucose gel 15-30 g    Or    dextrose 50 % injection 25-50 mL    Or    glucagon injection 1 mg    enoxaparin ANTICOAGULANT (LOVENOX) injection 40 mg    [Held by provider] hydrochlorothiazide (HYDRODIURIL) tablet 25 mg    HYDROmorphone (DILAUDID) injection 0.2 mg    HYDROmorphone (DILAUDID) injection 0.4 mg    insulin aspart (NovoLOG) injection (RAPID ACTING)    insulin aspart (NovoLOG) injection (RAPID ACTING)    insulin glargine (LANTUS PEN) injection 64 Units    lactated ringers infusion    losartan (COZAAR) tablet 100 mg    melatonin tablet 1 mg    [Held by provider] metFORMIN (GLUCOPHAGE XR) 24 hr tablet 1,000 mg    metoprolol succinate ER (TOPROL XL) 24 hr tablet 200 mg    naloxone (NARCAN) injection 0.2 mg    Or    naloxone (NARCAN) injection 0.4 mg    Or    naloxone (NARCAN) injection 0.2 mg    Or     naloxone (NARCAN) injection 0.4 mg    ondansetron (ZOFRAN ODT) ODT tab 4 mg    Or    ondansetron (ZOFRAN) injection 4 mg    oxyCODONE (ROXICODONE) tablet 5 mg    oxyCODONE IR (ROXICODONE) half-tab 2.5 mg    prochlorperazine (COMPAZINE) injection 10 mg    Or    prochlorperazine (COMPAZINE) tablet 10 mg    Or    prochlorperazine (COMPAZINE) suppository 25 mg    senna-docusate (SENOKOT-S/PERICOLACE) 8.6-50 MG per tablet 1 tablet    Or    senna-docusate (SENOKOT-S/PERICOLACE) 8.6-50 MG per tablet 2 tablet    vancomycin (VANCOCIN) 1,500 mg in sodium chloride 0.9 % 250 mL intermittent infusion       Review of Systems:  See H&P     Physical Exam:  Temp:  [98.7  F (37.1  C)-100  F (37.8  C)] 99.9  F (37.7  C)  Pulse:  [65-76] 65  Resp:  [16-18] 16  BP: (128-156)/(46-69) 156/62  SpO2:  [92 %-99 %] 96 %    General: On examination, the patient is resting comfortably, NAD, awake, and alert and oriented to person, place, time, and, and general circumstances. Seated in the recliner.   SKIN: Left LE with Ace wraps over dressings in place, extending to the knee. Serous drainage noted on Chux pad under the foot. Further exam deferred, see ED notes for wound appearance.  Contralateral side= Full range of motion, Negative joint instability findings, 5/5 motor groups about the joint, Non-tender.     Pertinent Labs  Lab Results: personally reviewed.  Lab Results   Component Value Date    WBC 12.6 (H) 09/27/2023    HGB 9.2 (L) 09/27/2023    HCT 28.7 (L) 09/27/2023    MCV 86 09/27/2023     (H) 09/27/2023     Recent Labs   Lab 09/25/23  1210   INR 1.25*       Pertinent Radiology  Radiology Results: images and radiology report reviewed  MR left foot without and with contrast 9/25/2023 3:00 PM     History: Left foot infection, evaluate for abscess and osteomyelitis     Techniques: Multiplanar multisequence imaging of the left foot was  obtained before and after administration of intravenous contrast.     Contrast: 12.5 ML GADAVIST      Comparison: Same date ankle and foot radiographs.                                                  Impression:  Motion and large field of view acquisition partially compromising  assessment.  1. Overall findings consistent with extensive osteomyelitis/septic  joint at midfoot/tarsometatarsal joint on a background of presumed  Charcot arthropathy:   a. Proximally, presumed septic tenosynovitis extension along peroneal  tendon, anterior extensor and medial flexor tendon sheath. Full  proximal extent of tenosynovitis not included in the field of view.   b. Finding concerning for tibiotalar joint septic arthritis extension  with distal tibial osteomyelitis, maybe extending from posterior  tenosynovitis.   c. Extensive destruction/fragmentation tarsal bones, metatarsal bases  with proximal marrow abnormality compatible with osteomyelitis  involving talus and calcaneus.  2. Finding concerning for active osteomyelitis/septic arthritis of  forth PIP joint.    Attestation:  I have reviewed today's vital signs, notes, medications, labs and imaging.     Riky Blackman PA-C

## 2023-09-27 NOTE — PROGRESS NOTES
"[facility] Medicine Progress Note  Date of Service (when I saw the patient): 09/27/2023    REASON FOR ADMISSION / INTERVAL HISTORY:  Juan Balderrama is a 62 year old male admitted on 9/25/2023. He presented to the emergency department for evaluation of non-healing left foot wound despite outpatient antibiotic use and was found to have left foot osteomyelitis / septic joint and tenosynovitis     MRI foot- MRI left foot demonstrates - \"1. Overall findings consistent with extensive osteomyelitis/septic joint at idfoot/tarsometatarsal joint on a background of presumed Charcot arthropathy: a. Proximally, presumed septic tenosynovitis extension along peroneal tendon, anterior extensor and medial flexor tendon sheath. Full proximal extent of tenosynovitis not included in the field of view.  b. Finding concerning for tibiotalar joint septic arthritis extension with distal tibial osteomyelitis, maybe extending from posterior tenosynovitis.  c. Extensive destruction/fragmentation tarsal bones, metatarsal bases with proximal marrow abnormality compatible with osteomyelitis involving talus and calcaneus. 2. Finding concerning for active osteomyelitis/septic arthritis of  forth PIP joint.\"       Assessment/ Plan     Chronic left foot wound - osteomyelitis of left foot, tenosynovitis of foot, septic arthritis of left foot   Has been following podiatry Dr. Boston as outpatient, recently on Augmentin without improvement. Initially afebrile, but fever up to 101.4 shortly after admission, with leukocytosis (WBC 16.6), but normal lactic acid. CRP elevated (387.74).   Started on vanco/  Rocephin 2 g IV every 24 hours  Ortho seen. To have BKA in AM.     Uncontrolled type 2 diabetes mellitus with complication, with long-term current use of insulin  Most recent HgbA1c 6.7%. Managed prior to admission with metformin 1000 mg bid and Basaglar 64 U qhs.   BS <150  - Continue home Basaglar   - continue to " "Hold metformin for surgery  - Medium sliding scale insulin  - Hyper/hypoglycemia protocols  - Consistent carbohydrate diet     Thrombocytosis  Admit platelets 606. Unclear cause, possibly related to infection.    Stable.      Hyponatremia   Admit Na = 134. Minimally low, not clinically relevant.   Resolved      Elevated anion gap, resolved  Admit anion gap 16. Serum bicarb WNL. No blood gas to determine if acidotic. Lactic acid WNL.   Resolved      Benign essential hypertension  Blood pressure slightly elevated at time of admission. Managed prior to admission with hydrochlorothiazide 25 mg daily, losartan 100 mg daily, amlodipine 2.5 mg daily, and metoprolol  mg daily.   BS stable  - Hold hydrochlorothiazide prior to amputation, resume post-op as able  - Continue Losartan, may need to hold day of operation  - Continue metoprolol and amlodipine with holding parameters     Hyperlipidemia LDL goal <70  Managed prior to admission with atorvastatin 20 mg daily, continue.        Diet: Consistent Carbohydrate Diet Moderate Consistent Carb (60 g CHO per Meal) Diet    DVT Prophylaxis: Enoxaparin (Lovenox) SQ  Elkins Catheter: Not present  Code Status: Full Code        SAROJ DILLON MD   Pg [phone]        ROS:  As described in A/P and Exam.  Otherwise ALL are  negative.    PHYSICAL EXAM:  All vitals have been reviewed    Blood pressure (!) 144/63, pulse 71, temperature 99.5  F (37.5  C), temperature source Oral, resp. rate 16, height 1.549 m (5' 1\"), weight 140.1 kg (308 lb 13.8 oz), SpO2 94 %.    I/O this shift:  In: 240 [P.O.:240]  Out: -     GENERAL APPEARANCE: healthy, alert and no distress  EYES: conjunctiva clear, eyes grossly normal  HENT: external ears and nose normal   RESP: lungs clear to auscultation - no rales, rhonchi or wheezes  CV: regular rate and rhythm, normal S1 S2, no S3 or S4 and no murmur, click or rub   ABDOMEN: soft, nontender, no HSM or masses and bowel sounds normal  MS: no clubbing, " cyanosis; no edema-L leg in dressing  SKIN: clear without significant rashes or lesions  NEURO: -non-focal moves all 4 extr    ROUTINE  LABS (Last four results)  CMP  Recent Labs   Lab 09/27/23  0740 09/27/23  0540 09/26/23 2221 09/26/23  1714 09/26/23  1453 09/26/23  0751 09/26/23  0536 09/25/23  2226 09/25/23  1210   NA  --  135  --   --   --   --  137  --  134*   POTASSIUM  --  4.0  --   --   --   --  3.9  --  3.8   CHLORIDE  --  101  --   --   --   --  99  --  96*   CO2  --  22  --   --   --   --  24  --  22   ANIONGAP  --  12  --   --   --   --  14  --  16*   * 116* 182* 189*  --    < > 176*   < > 108*   BUN  --  21.1  --   --   --   --  28.1*  --  26.3*   CR  --  0.96  --   --  1.11  --  1.18*  --  1.03   GFRESTIMATED  --  90  --   --  76  --  71  --  83   PHONG  --  8.6*  --   --   --   --  8.8  --  9.8   MAG  --  1.6*  --   --   --   --   --   --   --    PROTTOTAL  --   --   --   --   --   --   --   --  7.6   ALBUMIN  --   --   --   --   --   --   --   --  3.2*   BILITOTAL  --   --   --   --   --   --   --   --  0.4   ALKPHOS  --   --   --   --   --   --   --   --  92   AST  --   --   --   --   --   --   --   --  23   ALT  --   --   --   --   --   --   --   --  24    < > = values in this interval not displayed.     CBC  Recent Labs   Lab 09/27/23 0540 09/26/23  0536 09/25/23  1210   WBC 12.6* 13.9* 16.6*   RBC 3.33* 3.58* 4.07*   HGB 9.2* 10.0* 11.3*   HCT 28.7* 30.9* 35.2*   MCV 86 86 87   MCH 27.6 27.9 27.8   MCHC 32.1 32.4 32.1   RDW 15.3* 15.3* 15.2*   * 572* 606*     INR  Recent Labs   Lab 09/25/23  1210   INR 1.25*     Arterial Blood GasNo lab results found in last 7 days.    No results found for this or any previous visit (from the past 24 hour(s)).

## 2023-09-27 NOTE — PLAN OF CARE
Problem: Plan of Care - These are the overarching goals to be used throughout the patient stay.    Goal: Optimal Comfort and Wellbeing  Outcome: Progressing   PT is alert and oriented x4. He denies pain. He has reports feeling worried about how high up his leg will need to be amputated and is hoping to preserve as much of his limb as possible. He has denied pain in his foot.    Dressing changed x2 with oozing serous/yellow drainage. The foot appears to be moist and multiple blisters are noted. Fluids running per order, and antibiotics given per the MAR. Pt calls appropriately and is independent in the room.

## 2023-09-27 NOTE — PROGRESS NOTES
Denies pain.  Up in room independently.  Ace wrap to left foot/lower leg changed PRN secondary to oozing wounds.    Anxiously waiting for surgeon to see and get updated on plan.

## 2023-09-28 ENCOUNTER — ANESTHESIA EVENT (OUTPATIENT)
Dept: SURGERY | Facility: CLINIC | Age: 63
DRG: 617 | End: 2023-09-28
Payer: COMMERCIAL

## 2023-09-28 ENCOUNTER — ANESTHESIA (OUTPATIENT)
Dept: SURGERY | Facility: CLINIC | Age: 63
DRG: 617 | End: 2023-09-28
Payer: COMMERCIAL

## 2023-09-28 LAB
ABO/RH(D): NORMAL
ANION GAP SERPL CALCULATED.3IONS-SCNC: 12 MMOL/L (ref 7–15)
ANTIBODY SCREEN: NEGATIVE
BUN SERPL-MCNC: 16.2 MG/DL (ref 8–23)
CALCIUM SERPL-MCNC: 8.7 MG/DL (ref 8.8–10.2)
CHLORIDE SERPL-SCNC: 100 MMOL/L (ref 98–107)
CREAT SERPL-MCNC: 0.9 MG/DL (ref 0.67–1.17)
EGFRCR SERPLBLD CKD-EPI 2021: >90 ML/MIN/1.73M2
ERYTHROCYTE [DISTWIDTH] IN BLOOD BY AUTOMATED COUNT: 15.4 % (ref 10–15)
GLUCOSE BLDC GLUCOMTR-MCNC: 100 MG/DL (ref 70–99)
GLUCOSE BLDC GLUCOMTR-MCNC: 115 MG/DL (ref 70–99)
GLUCOSE BLDC GLUCOMTR-MCNC: 136 MG/DL (ref 70–99)
GLUCOSE BLDC GLUCOMTR-MCNC: 221 MG/DL (ref 70–99)
GLUCOSE BLDC GLUCOMTR-MCNC: 73 MG/DL (ref 70–99)
GLUCOSE BLDC GLUCOMTR-MCNC: 86 MG/DL (ref 70–99)
GLUCOSE SERPL-MCNC: 112 MG/DL (ref 70–99)
HCO3 SERPL-SCNC: 22 MMOL/L (ref 22–29)
HCT VFR BLD AUTO: 29.4 % (ref 40–53)
HGB BLD-MCNC: 9.4 G/DL (ref 13.3–17.7)
LACTATE SERPL-SCNC: 0.9 MMOL/L (ref 0.7–2)
MAGNESIUM SERPL-MCNC: 1.5 MG/DL (ref 1.7–2.3)
MCH RBC QN AUTO: 27.8 PG (ref 26.5–33)
MCHC RBC AUTO-ENTMCNC: 32 G/DL (ref 31.5–36.5)
MCV RBC AUTO: 87 FL (ref 78–100)
PLATELET # BLD AUTO: 531 10E3/UL (ref 150–450)
POTASSIUM SERPL-SCNC: 4.3 MMOL/L (ref 3.4–5.3)
RBC # BLD AUTO: 3.38 10E6/UL (ref 4.4–5.9)
SODIUM SERPL-SCNC: 134 MMOL/L (ref 135–145)
SPECIMEN EXPIRATION DATE: NORMAL
VANCOMYCIN SERPL-MCNC: 6 UG/ML
WBC # BLD AUTO: 13.3 10E3/UL (ref 4–11)

## 2023-09-28 PROCEDURE — 272N000001 HC OR GENERAL SUPPLY STERILE: Performed by: ORTHOPAEDIC SURGERY

## 2023-09-28 PROCEDURE — 250N000013 HC RX MED GY IP 250 OP 250 PS 637: Performed by: ORTHOPAEDIC SURGERY

## 2023-09-28 PROCEDURE — 83605 ASSAY OF LACTIC ACID: CPT | Performed by: INTERNAL MEDICINE

## 2023-09-28 PROCEDURE — 710N000009 HC RECOVERY PHASE 1, LEVEL 1, PER MIN: Performed by: ORTHOPAEDIC SURGERY

## 2023-09-28 PROCEDURE — 250N000011 HC RX IP 250 OP 636: Mod: JZ

## 2023-09-28 PROCEDURE — 85027 COMPLETE CBC AUTOMATED: CPT | Performed by: STUDENT IN AN ORGANIZED HEALTH CARE EDUCATION/TRAINING PROGRAM

## 2023-09-28 PROCEDURE — 258N000001 HC RX 258

## 2023-09-28 PROCEDURE — 250N000011 HC RX IP 250 OP 636: Performed by: INTERNAL MEDICINE

## 2023-09-28 PROCEDURE — 250N000011 HC RX IP 250 OP 636

## 2023-09-28 PROCEDURE — 360N000076 HC SURGERY LEVEL 3, PER MIN: Performed by: ORTHOPAEDIC SURGERY

## 2023-09-28 PROCEDURE — 250N000011 HC RX IP 250 OP 636: Mod: JZ | Performed by: STUDENT IN AN ORGANIZED HEALTH CARE EDUCATION/TRAINING PROGRAM

## 2023-09-28 PROCEDURE — 250N000011 HC RX IP 250 OP 636: Mod: JZ | Performed by: ORTHOPAEDIC SURGERY

## 2023-09-28 PROCEDURE — 36415 COLL VENOUS BLD VENIPUNCTURE: CPT | Performed by: INTERNAL MEDICINE

## 2023-09-28 PROCEDURE — 80202 ASSAY OF VANCOMYCIN: CPT | Performed by: ORTHOPAEDIC SURGERY

## 2023-09-28 PROCEDURE — 86850 RBC ANTIBODY SCREEN: CPT | Performed by: INTERNAL MEDICINE

## 2023-09-28 PROCEDURE — 258N000003 HC RX IP 258 OP 636: Performed by: PHYSICIAN ASSISTANT

## 2023-09-28 PROCEDURE — 80048 BASIC METABOLIC PNL TOTAL CA: CPT | Performed by: STUDENT IN AN ORGANIZED HEALTH CARE EDUCATION/TRAINING PROGRAM

## 2023-09-28 PROCEDURE — 99233 SBSQ HOSP IP/OBS HIGH 50: CPT | Performed by: INTERNAL MEDICINE

## 2023-09-28 PROCEDURE — 250N000025 HC SEVOFLURANE, PER MIN: Performed by: ORTHOPAEDIC SURGERY

## 2023-09-28 PROCEDURE — 36415 COLL VENOUS BLD VENIPUNCTURE: CPT | Performed by: ORTHOPAEDIC SURGERY

## 2023-09-28 PROCEDURE — 36415 COLL VENOUS BLD VENIPUNCTURE: CPT | Performed by: STUDENT IN AN ORGANIZED HEALTH CARE EDUCATION/TRAINING PROGRAM

## 2023-09-28 PROCEDURE — 258N000003 HC RX IP 258 OP 636: Performed by: INTERNAL MEDICINE

## 2023-09-28 PROCEDURE — 86901 BLOOD TYPING SEROLOGIC RH(D): CPT | Performed by: INTERNAL MEDICINE

## 2023-09-28 PROCEDURE — 0Y6J0Z1 DETACHMENT AT LEFT LOWER LEG, HIGH, OPEN APPROACH: ICD-10-PCS | Performed by: ORTHOPAEDIC SURGERY

## 2023-09-28 PROCEDURE — 120N000001 HC R&B MED SURG/OB

## 2023-09-28 PROCEDURE — 258N000003 HC RX IP 258 OP 636

## 2023-09-28 PROCEDURE — 250N000011 HC RX IP 250 OP 636: Mod: JZ | Performed by: NURSE ANESTHETIST, CERTIFIED REGISTERED

## 2023-09-28 PROCEDURE — 250N000009 HC RX 250

## 2023-09-28 PROCEDURE — 370N000017 HC ANESTHESIA TECHNICAL FEE, PER MIN: Performed by: ORTHOPAEDIC SURGERY

## 2023-09-28 PROCEDURE — 999N000141 HC STATISTIC PRE-PROCEDURE NURSING ASSESSMENT: Performed by: ORTHOPAEDIC SURGERY

## 2023-09-28 PROCEDURE — 83735 ASSAY OF MAGNESIUM: CPT | Performed by: STUDENT IN AN ORGANIZED HEALTH CARE EDUCATION/TRAINING PROGRAM

## 2023-09-28 PROCEDURE — 258N000003 HC RX IP 258 OP 636: Performed by: ORTHOPAEDIC SURGERY

## 2023-09-28 PROCEDURE — 250N000013 HC RX MED GY IP 250 OP 250 PS 637: Performed by: NURSE ANESTHETIST, CERTIFIED REGISTERED

## 2023-09-28 PROCEDURE — 88307 TISSUE EXAM BY PATHOLOGIST: CPT | Mod: TC | Performed by: ORTHOPAEDIC SURGERY

## 2023-09-28 PROCEDURE — 250N000013 HC RX MED GY IP 250 OP 250 PS 637: Performed by: PHYSICIAN ASSISTANT

## 2023-09-28 RX ORDER — GABAPENTIN 300 MG/1
300 CAPSULE ORAL
Status: COMPLETED | OUTPATIENT
Start: 2023-09-28 | End: 2023-09-28

## 2023-09-28 RX ORDER — HYDROMORPHONE HCL IN WATER/PF 6 MG/30 ML
0.2 PATIENT CONTROLLED ANALGESIA SYRINGE INTRAVENOUS EVERY 5 MIN PRN
Status: DISCONTINUED | OUTPATIENT
Start: 2023-09-28 | End: 2023-09-28 | Stop reason: HOSPADM

## 2023-09-28 RX ORDER — KETAMINE HYDROCHLORIDE 10 MG/ML
INJECTION INTRAMUSCULAR; INTRAVENOUS PRN
Status: DISCONTINUED | OUTPATIENT
Start: 2023-09-28 | End: 2023-09-28

## 2023-09-28 RX ORDER — LIDOCAINE 40 MG/G
CREAM TOPICAL
Status: DISCONTINUED | OUTPATIENT
Start: 2023-09-28 | End: 2023-10-02 | Stop reason: HOSPADM

## 2023-09-28 RX ORDER — SODIUM CHLORIDE, SODIUM LACTATE, POTASSIUM CHLORIDE, CALCIUM CHLORIDE 600; 310; 30; 20 MG/100ML; MG/100ML; MG/100ML; MG/100ML
INJECTION, SOLUTION INTRAVENOUS CONTINUOUS
Status: DISCONTINUED | OUTPATIENT
Start: 2023-09-28 | End: 2023-09-28 | Stop reason: HOSPADM

## 2023-09-28 RX ORDER — PROPOFOL 10 MG/ML
INJECTION, EMULSION INTRAVENOUS PRN
Status: DISCONTINUED | OUTPATIENT
Start: 2023-09-28 | End: 2023-09-28

## 2023-09-28 RX ORDER — TRANEXAMIC ACID 650 MG/1
1950 TABLET ORAL ONCE
Status: DISCONTINUED | OUTPATIENT
Start: 2023-09-28 | End: 2023-09-28 | Stop reason: HOSPADM

## 2023-09-28 RX ORDER — LIDOCAINE 40 MG/G
CREAM TOPICAL
Status: DISCONTINUED | OUTPATIENT
Start: 2023-09-28 | End: 2023-09-28 | Stop reason: HOSPADM

## 2023-09-28 RX ORDER — FENTANYL CITRATE 50 UG/ML
50 INJECTION, SOLUTION INTRAMUSCULAR; INTRAVENOUS EVERY 5 MIN PRN
Status: DISCONTINUED | OUTPATIENT
Start: 2023-09-28 | End: 2023-09-28 | Stop reason: HOSPADM

## 2023-09-28 RX ORDER — MAGNESIUM SULFATE HEPTAHYDRATE 40 MG/ML
2 INJECTION, SOLUTION INTRAVENOUS ONCE
Status: COMPLETED | OUTPATIENT
Start: 2023-09-28 | End: 2023-09-28

## 2023-09-28 RX ORDER — FENTANYL CITRATE 50 UG/ML
25 INJECTION, SOLUTION INTRAMUSCULAR; INTRAVENOUS EVERY 5 MIN PRN
Status: DISCONTINUED | OUTPATIENT
Start: 2023-09-28 | End: 2023-09-28 | Stop reason: HOSPADM

## 2023-09-28 RX ORDER — SODIUM CHLORIDE, SODIUM LACTATE, POTASSIUM CHLORIDE, CALCIUM CHLORIDE 600; 310; 30; 20 MG/100ML; MG/100ML; MG/100ML; MG/100ML
INJECTION, SOLUTION INTRAVENOUS CONTINUOUS
Status: DISCONTINUED | OUTPATIENT
Start: 2023-09-28 | End: 2023-09-29

## 2023-09-28 RX ORDER — AMOXICILLIN 250 MG
1 CAPSULE ORAL 2 TIMES DAILY
Status: DISCONTINUED | OUTPATIENT
Start: 2023-09-28 | End: 2023-09-29

## 2023-09-28 RX ORDER — CEFAZOLIN SODIUM 2 G/100ML
2 INJECTION, SOLUTION INTRAVENOUS EVERY 8 HOURS
Status: COMPLETED | OUTPATIENT
Start: 2023-09-28 | End: 2023-09-29

## 2023-09-28 RX ORDER — DEXTROSE MONOHYDRATE 25 G/50ML
25 INJECTION, SOLUTION INTRAVENOUS ONCE
Status: COMPLETED | OUTPATIENT
Start: 2023-09-28 | End: 2023-09-28

## 2023-09-28 RX ORDER — OXYCODONE HYDROCHLORIDE 5 MG/1
10 TABLET ORAL EVERY 4 HOURS PRN
Status: DISCONTINUED | OUTPATIENT
Start: 2023-09-28 | End: 2023-10-02 | Stop reason: HOSPADM

## 2023-09-28 RX ORDER — DEXAMETHASONE SODIUM PHOSPHATE 4 MG/ML
INJECTION, SOLUTION INTRA-ARTICULAR; INTRALESIONAL; INTRAMUSCULAR; INTRAVENOUS; SOFT TISSUE PRN
Status: DISCONTINUED | OUTPATIENT
Start: 2023-09-28 | End: 2023-09-28

## 2023-09-28 RX ORDER — POLYETHYLENE GLYCOL 3350 17 G/17G
17 POWDER, FOR SOLUTION ORAL DAILY
Status: DISCONTINUED | OUTPATIENT
Start: 2023-09-29 | End: 2023-10-02 | Stop reason: HOSPADM

## 2023-09-28 RX ORDER — KETOROLAC TROMETHAMINE 15 MG/ML
15 INJECTION, SOLUTION INTRAMUSCULAR; INTRAVENOUS EVERY 6 HOURS
Status: COMPLETED | OUTPATIENT
Start: 2023-09-28 | End: 2023-09-29

## 2023-09-28 RX ORDER — HYDROMORPHONE HCL IN WATER/PF 6 MG/30 ML
0.4 PATIENT CONTROLLED ANALGESIA SYRINGE INTRAVENOUS EVERY 5 MIN PRN
Status: DISCONTINUED | OUTPATIENT
Start: 2023-09-28 | End: 2023-09-28 | Stop reason: HOSPADM

## 2023-09-28 RX ORDER — ONDANSETRON 4 MG/1
4 TABLET, ORALLY DISINTEGRATING ORAL EVERY 30 MIN PRN
Status: DISCONTINUED | OUTPATIENT
Start: 2023-09-28 | End: 2023-09-28 | Stop reason: HOSPADM

## 2023-09-28 RX ORDER — OXYCODONE HYDROCHLORIDE 5 MG/1
5 TABLET ORAL EVERY 4 HOURS PRN
Status: DISCONTINUED | OUTPATIENT
Start: 2023-09-28 | End: 2023-10-02 | Stop reason: HOSPADM

## 2023-09-28 RX ORDER — FENTANYL CITRATE 50 UG/ML
INJECTION, SOLUTION INTRAMUSCULAR; INTRAVENOUS PRN
Status: DISCONTINUED | OUTPATIENT
Start: 2023-09-28 | End: 2023-09-28

## 2023-09-28 RX ORDER — ACETAMINOPHEN 325 MG/1
975 TABLET ORAL ONCE
Status: DISCONTINUED | OUTPATIENT
Start: 2023-09-28 | End: 2023-09-28 | Stop reason: HOSPADM

## 2023-09-28 RX ORDER — PROCHLORPERAZINE MALEATE 5 MG
10 TABLET ORAL EVERY 6 HOURS PRN
Status: DISCONTINUED | OUTPATIENT
Start: 2023-09-28 | End: 2023-09-29

## 2023-09-28 RX ORDER — BISACODYL 10 MG
10 SUPPOSITORY, RECTAL RECTAL DAILY PRN
Status: DISCONTINUED | OUTPATIENT
Start: 2023-09-28 | End: 2023-10-02 | Stop reason: HOSPADM

## 2023-09-28 RX ORDER — HYDROMORPHONE HCL IN WATER/PF 6 MG/30 ML
0.2 PATIENT CONTROLLED ANALGESIA SYRINGE INTRAVENOUS
Status: DISCONTINUED | OUTPATIENT
Start: 2023-09-28 | End: 2023-10-02 | Stop reason: HOSPADM

## 2023-09-28 RX ORDER — HYDROMORPHONE HCL IN WATER/PF 6 MG/30 ML
0.4 PATIENT CONTROLLED ANALGESIA SYRINGE INTRAVENOUS
Status: DISCONTINUED | OUTPATIENT
Start: 2023-09-28 | End: 2023-10-02 | Stop reason: HOSPADM

## 2023-09-28 RX ORDER — ONDANSETRON 2 MG/ML
4 INJECTION INTRAMUSCULAR; INTRAVENOUS EVERY 6 HOURS PRN
Status: DISCONTINUED | OUTPATIENT
Start: 2023-09-28 | End: 2023-09-29

## 2023-09-28 RX ORDER — ONDANSETRON 2 MG/ML
4 INJECTION INTRAMUSCULAR; INTRAVENOUS EVERY 30 MIN PRN
Status: DISCONTINUED | OUTPATIENT
Start: 2023-09-28 | End: 2023-09-28 | Stop reason: HOSPADM

## 2023-09-28 RX ORDER — ONDANSETRON 4 MG/1
4 TABLET, ORALLY DISINTEGRATING ORAL EVERY 6 HOURS PRN
Status: DISCONTINUED | OUTPATIENT
Start: 2023-09-28 | End: 2023-09-29

## 2023-09-28 RX ADMIN — FENTANYL CITRATE 50 MCG: 50 INJECTION, SOLUTION INTRAMUSCULAR; INTRAVENOUS at 18:18

## 2023-09-28 RX ADMIN — SENNOSIDES AND DOCUSATE SODIUM 1 TABLET: 50; 8.6 TABLET ORAL at 21:08

## 2023-09-28 RX ADMIN — LOSARTAN POTASSIUM 100 MG: 50 TABLET, FILM COATED ORAL at 08:39

## 2023-09-28 RX ADMIN — HYDROMORPHONE HYDROCHLORIDE 0.4 MG: 0.2 INJECTION, SOLUTION INTRAMUSCULAR; INTRAVENOUS; SUBCUTANEOUS at 21:01

## 2023-09-28 RX ADMIN — GABAPENTIN 300 MG: 300 CAPSULE ORAL at 19:29

## 2023-09-28 RX ADMIN — SODIUM CHLORIDE, POTASSIUM CHLORIDE, SODIUM LACTATE AND CALCIUM CHLORIDE: 600; 310; 30; 20 INJECTION, SOLUTION INTRAVENOUS at 17:23

## 2023-09-28 RX ADMIN — KETAMINE HYDROCHLORIDE 20 MG: 10 INJECTION INTRAMUSCULAR; INTRAVENOUS at 17:10

## 2023-09-28 RX ADMIN — METOPROLOL SUCCINATE 200 MG: 100 TABLET, EXTENDED RELEASE ORAL at 08:39

## 2023-09-28 RX ADMIN — PHENYLEPHRINE HYDROCHLORIDE 100 MCG: 10 INJECTION INTRAVENOUS at 17:29

## 2023-09-28 RX ADMIN — ONDANSETRON 4 MG: 2 INJECTION INTRAMUSCULAR; INTRAVENOUS at 18:21

## 2023-09-28 RX ADMIN — PHENYLEPHRINE HYDROCHLORIDE 100 MCG: 10 INJECTION INTRAVENOUS at 17:51

## 2023-09-28 RX ADMIN — Medication 180 MG: at 16:40

## 2023-09-28 RX ADMIN — ROCURONIUM BROMIDE 20 MG: 50 INJECTION, SOLUTION INTRAVENOUS at 17:45

## 2023-09-28 RX ADMIN — CEFTRIAXONE SODIUM 2 G: 2 INJECTION, POWDER, FOR SOLUTION INTRAMUSCULAR; INTRAVENOUS at 16:39

## 2023-09-28 RX ADMIN — ROCURONIUM BROMIDE 50 MG: 50 INJECTION, SOLUTION INTRAVENOUS at 16:42

## 2023-09-28 RX ADMIN — MIDAZOLAM 2 MG: 1 INJECTION INTRAMUSCULAR; INTRAVENOUS at 16:35

## 2023-09-28 RX ADMIN — MAGNESIUM SULFATE HEPTAHYDRATE 2 G: 40 INJECTION, SOLUTION INTRAVENOUS at 17:27

## 2023-09-28 RX ADMIN — FENTANYL CITRATE 50 MCG: 50 INJECTION INTRAMUSCULAR; INTRAVENOUS at 19:00

## 2023-09-28 RX ADMIN — FENTANYL CITRATE 100 MCG: 50 INJECTION, SOLUTION INTRAMUSCULAR; INTRAVENOUS at 17:05

## 2023-09-28 RX ADMIN — KETOROLAC TROMETHAMINE 15 MG: 15 INJECTION, SOLUTION INTRAMUSCULAR; INTRAVENOUS at 21:01

## 2023-09-28 RX ADMIN — VANCOMYCIN HYDROCHLORIDE 1500 MG: 5 INJECTION, POWDER, LYOPHILIZED, FOR SOLUTION INTRAVENOUS at 05:01

## 2023-09-28 RX ADMIN — FENTANYL CITRATE 50 MCG: 50 INJECTION, SOLUTION INTRAMUSCULAR; INTRAVENOUS at 17:15

## 2023-09-28 RX ADMIN — OXYCODONE HYDROCHLORIDE 10 MG: 5 TABLET ORAL at 22:20

## 2023-09-28 RX ADMIN — AMLODIPINE BESYLATE 2.5 MG: 2.5 TABLET ORAL at 08:39

## 2023-09-28 RX ADMIN — FENTANYL CITRATE 50 MCG: 50 INJECTION, SOLUTION INTRAMUSCULAR; INTRAVENOUS at 17:51

## 2023-09-28 RX ADMIN — KETAMINE HYDROCHLORIDE 20 MG: 10 INJECTION INTRAMUSCULAR; INTRAVENOUS at 17:15

## 2023-09-28 RX ADMIN — KETAMINE HYDROCHLORIDE 10 MG: 10 INJECTION INTRAMUSCULAR; INTRAVENOUS at 17:29

## 2023-09-28 RX ADMIN — PROPOFOL 250 MG: 10 INJECTION, EMULSION INTRAVENOUS at 16:40

## 2023-09-28 RX ADMIN — PHENYLEPHRINE HYDROCHLORIDE 100 MCG: 10 INJECTION INTRAVENOUS at 17:57

## 2023-09-28 RX ADMIN — SODIUM CHLORIDE, POTASSIUM CHLORIDE, SODIUM LACTATE AND CALCIUM CHLORIDE: 600; 310; 30; 20 INJECTION, SOLUTION INTRAVENOUS at 10:27

## 2023-09-28 RX ADMIN — ACETAMINOPHEN 650 MG: 325 TABLET, FILM COATED ORAL at 00:03

## 2023-09-28 RX ADMIN — FENTANYL CITRATE 100 MCG: 50 INJECTION, SOLUTION INTRAMUSCULAR; INTRAVENOUS at 16:39

## 2023-09-28 RX ADMIN — SODIUM CHLORIDE, POTASSIUM CHLORIDE, SODIUM LACTATE AND CALCIUM CHLORIDE: 600; 310; 30; 20 INJECTION, SOLUTION INTRAVENOUS at 21:09

## 2023-09-28 RX ADMIN — FENTANYL CITRATE 50 MCG: 50 INJECTION INTRAMUSCULAR; INTRAVENOUS at 19:06

## 2023-09-28 RX ADMIN — DEXTROSE MONOHYDRATE 25 ML: 25 INJECTION, SOLUTION INTRAVENOUS at 16:20

## 2023-09-28 RX ADMIN — DEXAMETHASONE SODIUM PHOSPHATE 4 MG: 4 INJECTION, SOLUTION INTRA-ARTICULAR; INTRALESIONAL; INTRAMUSCULAR; INTRAVENOUS; SOFT TISSUE at 18:21

## 2023-09-28 RX ADMIN — PHENYLEPHRINE HYDROCHLORIDE 100 MCG: 10 INJECTION INTRAVENOUS at 17:41

## 2023-09-28 RX ADMIN — SUGAMMADEX 200 MG: 100 INJECTION, SOLUTION INTRAVENOUS at 18:26

## 2023-09-28 RX ADMIN — TRANEXAMIC ACID 1 G: 1 INJECTION, SOLUTION INTRAVENOUS at 16:35

## 2023-09-28 ASSESSMENT — ACTIVITIES OF DAILY LIVING (ADL)
ADLS_ACUITY_SCORE: 20

## 2023-09-28 ASSESSMENT — LIFESTYLE VARIABLES: TOBACCO_USE: 1

## 2023-09-28 NOTE — BRIEF OP NOTE
[facility] Brief Operative Note    Pre-operative diagnosis: Other acute osteomyelitis of left foot (H) [M86.172]   Post-operative diagnosis * No post-op diagnosis entered *   Procedure: Procedure(s):  AMPUTATION, BELOW KNEE   Surgeon: Bjorn Rueda MD   Assistants(s): LIZETH Johansen   Estimated blood loss: 200 ml    Specimens: None   Findings: Amputation appear to be in clean, non infected zone           NWB LLE.  Will need crutches  Elevate stump as much as possible  Knee immobilizer while in bed - doesn't it on if setting in chair or moving around  Antibiotics per medicine team  Follow up in 2 weeks for staple removal.  Will need to establish care with prosthetist.    Bjorn Rueda MD

## 2023-09-28 NOTE — ANESTHESIA POSTPROCEDURE EVALUATION
Patient: Juan Balderrama    Procedure: Procedure(s):  Left, AMPUTATION, BELOW KNEE       Anesthesia Type:  General    Note:  Disposition: Outpatient   Postop Pain Control: Uneventful            Sign Out: Well controlled pain   PONV: No   Neuro/Psych: Uneventful            Sign Out: Acceptable/Baseline neuro status   Airway/Respiratory: Uneventful            Sign Out: Acceptable/Baseline resp. status   CV/Hemodynamics: Uneventful            Sign Out: Acceptable CV status; No obvious hypovolemia; No obvious fluid overload   Other NRE:    DID A NON-ROUTINE EVENT OCCUR?            Last vitals:  Vitals Value Taken Time   /74 09/28/23 1846   Temp 36.9  C (98.5  F) 09/28/23 1846   Pulse 71 09/28/23 1853   Resp 15 09/28/23 1853   SpO2 96 % 09/28/23 1853   Vitals shown include unvalidated device data.    Electronically Signed By: GT Murphy CRNA  September 28, 2023  6:54 PM

## 2023-09-28 NOTE — PLAN OF CARE
Temperature 100.5, given Tylenol 650 mg orally at midnight. Patient's IV was accidentally pulled out. He does not want IV started till 0430, vanco is due at 0500.

## 2023-09-28 NOTE — PROCEDURES
9/28/2023    PREOPERATIVE DIAGNOSIS: Left foot and ankle osteomyelitis with underlying charcot foot    POSTOPERATIVE DIAGNOSIS: Left foot and ankle osteomyelitis with underlying charcot foot    PROCEDURE: Left below knee amputation    SURGEON: Bjorn Reuda MD    ASSISTANT: Barbara Vargas PA-c.  The presence of a PA was necessary for positioning retraction, and safe progression of the case    ANESTHESIA:  General    BLOOD LOSS: 300cc    COMPLICATIONS: None apparent    DISPOSITION: Stable to PACU    INDICATIONS: Grupo is a 62 year old male with a long history regarding his left foot.  He started developing cellulitis and sores on his left foot associated with diabetes.  He has been treated for this since May 2023.  A couple weeks ago, he had increased swelling and redness.  He was seen by his podiatrist who sent him to the ER.  In the ER, examined the hospitalist and started on IV vancomycin and as well as ceftriaxone.  He had an x-rays and MRI which demonstrated Charcot changes as well as diffuse osteomyelitis changes involving his talus, calcaneus, with a septic ankle.  Given the underlying bony destruction from his Charcot ankle as well as his acute infection, we felt the best treatment for the infection as well as most functional outcome would be a below-knee amputation.  Furthermore, we felt with anything shy of a below knee amputation he'd be at risk for developing sepsis and getting systemically ill with potential for a life threatening situation.  Therefore, after discussing treatment options, he elected to proceed with a left below-knee amputation to eradicate the infection and optimize long-term function, understanding the risks of ongoing infection, phantom pain, blood clots and risks inherent to anesthesia.    PROCEDURE: Grupo was met in the preoperative holding area where the left was marked.  He was then transferred to the operating theater.  After smooth induction of general anesthesia, he was  positioned supine with a bump under his left hip.  A timeout was performed verifying the correct patient, surgery, and location.  He received preoperative antibiotics.  The left lower extremity was then prepped and draped in a standard sterile fashion.    We started by making a incision for an amputation about 10 cm distal to the tibial tubercle.  This was a axial incision which went two thirds of the way around the tibia with a posteriorly based flap which extended for an additional approximately 12 cm.  Dissection sharp carried down through the skin and subcutaneous tissue around the incision, until we got down to the fascial layer.  He had quite a bit of subcutaneous edema although no signs of gross infection.  We then incised the deep fascia.  We used an oscillating saw to transect his tibia again about 10 cm distal to his tibial tubercle.  We then went through the anterior compartment musculature sharply and resected his fibula 1 to 2 cm proximal to his tibial cut.  We then identified the plane between the posterior compartment and the anterolateral compartment.  We preserved a posteriorly based flap of the gastroc and soleus.  We sharply divided all the other muscles and soft tissues.  We then let the tourniquet down and obtained hemostasis, using silk stick ties to tie off arterial bleeders.  We identified the tibial and superficial peroneal nerves.  These were pulled distally, sharply transected to minimize the risk of the neuroma.  We then performed a myodesis of the posterior muscles to the anterior tibia utilizing #5 Ethibond and drill holes through the tibial tubercle.  The wounds were thoroughly irrigated.  We debulked some of the muscle in order to allow for closure.  We closed the deep fascial layer with 0 Vicryl, subcutaneous layer with 2-0 Monocryl followed by staples.  A sterile compressive dressing followed by a knee immobilizer were applied and Grupo was awoke from anesthesia and transferred  recovery room in stable condition    POSTOPERATIVE PLAN:    1. NWB left lower extremity.  Knee immobilizer on when in bed to minimize risk of knee flexion contracture   2. Prosthetics consult   3. DVT prophylaxis: Early mobilization   4. Ongoing antibiotics per hospitalist   5. Return to clinic in two weeks for wound check and staple removal    MIRYAM HERNANDEZ MD

## 2023-09-28 NOTE — ANESTHESIA PROCEDURE NOTES
Airway       Patient location during procedure: OR       Procedure Start/Stop Times: 9/28/2023 4:41 PM and 9/28/2023 4:42 PM  Staff -        CRNA: Vineet Brown APRN CRNA       Performed By: CRNAIndications and Patient Condition       Indications for airway management: khushi-procedural        Final Airway Details       Final airway type: endotracheal airway       Successful airway: ETT - single  Endotracheal Airway Details        ETT size (mm): 7.5       Cuffed: yes       Successful intubation technique: video laryngoscopy       VL Blade Size: Sharma 4       Grade View of Cords: 1       Adjucts: stylet       Position: Right       Measured from: gums/teeth       Secured at (cm): 23       Bite block used: None    Post intubation assessment        Number of attempts at approach: 1       Secured with: plastic tape       Ease of procedure: easy    Medication(s) Administered   Medication Administration Time: 9/28/2023 4:41 PM

## 2023-09-28 NOTE — PROGRESS NOTES
[facility] TRANSPORT NOTE  Data:   Reason for Transport:  Left SARA Balderrama was transported to Surgery via wheel chair at 1445.  Patient was accompanied by Nursing Assistant. Equipment used for transport: None. Family was aware of reason for transport: yes    Action:  Report: given to Clara BERNSTEIN    Response:  Patient's condition when transferred off unit was Stable.    Chip Miles, RN

## 2023-09-28 NOTE — ANESTHESIA PREPROCEDURE EVALUATION
Anesthesia Pre-Procedure Evaluation    Patient: Juan Balderrama   MRN: [id number] : 1960        Procedure : Procedure(s):  AMPUTATION, BELOW KNEE          Past Medical History:   Diagnosis Date    Diabetes (H) -    Hypertension -    Septic arthritis of right foot (H) 2023      Past Surgical History:   Procedure Laterality Date    EYE SURGERY  4-3-2017    Cataract      Allergies   Allergen Reactions    Bees     Sulfamethoxazole Swelling     throat      Social History     Tobacco Use    Smoking status: Never     Passive exposure: Never    Smokeless tobacco: Never   Substance Use Topics    Alcohol use: Yes     Comment: social      Wt Readings from Last 1 Encounters:   23 140.1 kg (308 lb 13.8 oz)        Anesthesia Evaluation   Pt has had prior anesthetic. Type: General and MAC.        ROS/MED HX  ENT/Pulmonary:     (+)     RIKI risk factors,  hypertension, obese,        tobacco use, Past use,                      Neurologic:       Cardiovascular:     (+) Dyslipidemia hypertension- -   -  - -                                      METS/Exercise Tolerance:     Hematologic:       Musculoskeletal: Comment: Septic arthritis left foot    (+)  arthritis,             GI/Hepatic:       Renal/Genitourinary:       Endo:     (+)  type II DM, Last HgA1c: 6.7, date: 22, Using insulin,    Diabetic complications: neuropathy.      Obesity (Extreme morbid),       Psychiatric/Substance Use:       Infectious Disease:       Malignancy:       Other:            Physical Exam    Airway        Mallampati: II   TM distance: > 3 FB   Neck ROM: full   Mouth opening: > 3 cm    Respiratory Devices and Support  Comment: Not on oxygen currently       Dental  no notable dental history         Cardiovascular   cardiovascular exam normal          Pulmonary   pulmonary exam normal                OUTSIDE LABS:  CBC:   Lab Results   Component Value Date    WBC 13.3 (H) 2023    WBC 12.6 (H) 2023    HGB 9.4 (L)  09/28/2023    HGB 9.2 (L) 09/27/2023    HCT 29.4 (L) 09/28/2023    HCT 28.7 (L) 09/27/2023     (H) 09/28/2023     (H) 09/27/2023     BMP:   Lab Results   Component Value Date     (L) 09/28/2023     09/27/2023    POTASSIUM 4.3 09/28/2023    POTASSIUM 4.0 09/27/2023    CHLORIDE 100 09/28/2023    CHLORIDE 101 09/27/2023    CO2 22 09/28/2023    CO2 22 09/27/2023    BUN 16.2 09/28/2023    BUN 21.1 09/27/2023    CR 0.90 09/28/2023    CR 0.96 09/27/2023     (H) 09/28/2023     (H) 09/28/2023     COAGS:   Lab Results   Component Value Date    INR 1.25 (H) 09/25/2023     POC: No results found for: BGM, HCG, HCGS  HEPATIC:   Lab Results   Component Value Date    ALBUMIN 3.2 (L) 09/25/2023    PROTTOTAL 7.6 09/25/2023    ALT 24 09/25/2023    AST 23 09/25/2023    ALKPHOS 92 09/25/2023    BILITOTAL 0.4 09/25/2023     OTHER:   Lab Results   Component Value Date    LACT 1.3 09/26/2023    A1C 6.7 (H) 09/25/2023    PHONG 8.7 (L) 09/28/2023    MAG 1.5 (L) 09/28/2023    TSH 3.07 11/29/2018    CRP 22.2 (H) 11/08/2019    SED 81 (H) 09/25/2023       Anesthesia Plan    ASA Status:  4       Anesthesia Type: General.     - Airway: ETT   Induction: Intravenous, Propofol, RSI.   Maintenance: Balanced.        Consents    Anesthesia Plan(s) and associated risks, benefits, and realistic alternatives discussed. Questions answered and patient/representative(s) expressed understanding.     - Discussed: Risks, Benefits and Alternatives for BOTH SEDATION and the PROCEDURE were discussed     - Discussed with:  Patient            Postoperative Care    Pain management: IV analgesics, Oral pain medications, Multi-modal analgesia.   PONV prophylaxis: Ondansetron (or other 5HT-3), Dexamethasone or Solumedrol     Comments:    Other Comments: Patient aware of plan, what to expect, and potential risks. Timeout was performed before bringing the patient back to OR, and once again, patient was asked if they had any questions             Luana Gallegos, APRN CRNA

## 2023-09-28 NOTE — PROGRESS NOTES
"[facility] Medicine Progress Note  Date of Service (when I saw the patient): 09/28/2023    REASON FOR ADMISSION / INTERVAL HISTORY:  Juan Balderrama is a 62 year old male admitted on 9/25/2023. He presented to the emergency department for evaluation of non-healing left foot wound despite outpatient antibiotic use and was found to have left foot osteomyelitis / septic joint and tenosynovitis     MRI foot- MRI left foot demonstrates - \"1. Overall findings consistent with extensive osteomyelitis/septic joint at idfoot/tarsometatarsal joint on a background of presumed Charcot arthropathy: a. Proximally, presumed septic tenosynovitis extension along peroneal tendon, anterior extensor and medial flexor tendon sheath. Full proximal extent of tenosynovitis not included in the field of view.  b. Finding concerning for tibiotalar joint septic arthritis extension with distal tibial osteomyelitis, maybe extending from posterior tenosynovitis.  c. Extensive destruction/fragmentation tarsal bones, metatarsal bases with proximal marrow abnormality compatible with osteomyelitis involving talus and calcaneus. 2. Finding concerning for active osteomyelitis/septic arthritis of  forth PIP joint.\"       Assessment/ Plan     Chronic left foot wound - osteomyelitis of left foot, tenosynovitis of foot, septic arthritis of left foot   Has been following podiatry Dr. Boston as outpatient, recently on Augmentin without improvement. Initially afebrile, but fever up to 101.4 shortly after admission, with leukocytosis (WBC 16.6), but normal lactic acid. CRP elevated (387.74).   Started on vanco/  Rocephin 2 g IV every 24 hours  Ortho seen. To have BKA today      Uncontrolled type 2 diabetes mellitus with complication, with long-term current use of insulin  Most recent HgbA1c 6.7%. Managed prior to admission with metformin 1000 mg bid and Basaglar 64 U qhs.   BS <150. Last lantus dose was at 4pm on 9/27.   - " "will decrease home  Basaglar dose from 64 to 32 for surgery today evening.   - continue to Hold metformin  - Medium sliding scale insulin  - Hyper/hypoglycemia protocols  - Consistent carbohydrate diet     Thrombocytosis  Admit platelets 606. Unclear cause, possibly related to infection.    Stable.      Hyponatremia   Admit Na = 134. Minimally low, not clinically relevant.   Resolved      Elevated anion gap, resolved  Admit anion gap 16. Serum bicarb WNL. No blood gas to determine if acidotic. Lactic acid WNL.   Resolved      Benign essential hypertension  Blood pressure slightly elevated at time of admission. Managed prior to admission with hydrochlorothiazide 25 mg daily, losartan 100 mg daily, amlodipine 2.5 mg daily, and metoprolol  mg daily.   BS stable  - continue to Hold hydrochlorothiazide , resume post-op as able  - Continue Losartan  - Continue metoprolol and amlodipine with holding parameters     Hyperlipidemia LDL goal <70  Managed prior to admission with atorvastatin 20 mg daily, continue.        Diet: NPO per Anesthesia Guidelines for Procedure/Surgery Except for: Meds    DVT Prophylaxis: Enoxaparin (Lovenox) SQ  Elkins Catheter: Not present  Code Status: Full Code        SAROJ DILLON MD   Pg [phone]        ROS:  As described in A/P and Exam.  Otherwise ALL are  negative.    PHYSICAL EXAM:  All vitals have been reviewed    Blood pressure 125/67, pulse 69, temperature 98.9  F (37.2  C), temperature source Oral, resp. rate 18, height 1.549 m (5' 1\"), weight 140.1 kg (308 lb 13.8 oz), SpO2 95 %.    I/O this shift:  In: 375 [I.V.:375]  Out: -     GENERAL APPEARANCE: healthy, alert and no distress  EYES: conjunctiva clear, eyes grossly normal  HENT: external ears and nose normal   MS: no clubbing, cyanosis; no edema-L leg in dressing  SKIN: clear without significant rashes or lesions  NEURO: -non-focal moves all 4 extr    ROUTINE  LABS (Last four results)  CMP  Recent Labs   Lab 09/28/23  0758 " 09/28/23  0611 09/28/23  0228 09/27/23  2149 09/27/23  0740 09/27/23  0540 09/26/23  1714 09/26/23  1453 09/26/23  0751 09/26/23  0536 09/25/23  2226 09/25/23  1210   NA  --  134*  --   --   --  135  --   --   --  137  --  134*   POTASSIUM  --  4.3  --   --   --  4.0  --   --   --  3.9  --  3.8   CHLORIDE  --  100  --   --   --  101  --   --   --  99  --  96*   CO2  --  22  --   --   --  22  --   --   --  24  --  22   ANIONGAP  --  12  --   --   --  12  --   --   --  14  --  16*   * 112* 136* 172*   < > 116*   < >  --    < > 176*   < > 108*   BUN  --  16.2  --   --   --  21.1  --   --   --  28.1*  --  26.3*   CR  --  0.90  --   --   --  0.96  --  1.11  --  1.18*  --  1.03   GFRESTIMATED  --  >90  --   --   --  90  --  76  --  71  --  83   PHONG  --  8.7*  --   --   --  8.6*  --   --   --  8.8  --  9.8   MAG  --  1.5*  --   --   --  1.6*  --   --   --   --   --   --    PROTTOTAL  --   --   --   --   --   --   --   --   --   --   --  7.6   ALBUMIN  --   --   --   --   --   --   --   --   --   --   --  3.2*   BILITOTAL  --   --   --   --   --   --   --   --   --   --   --  0.4   ALKPHOS  --   --   --   --   --   --   --   --   --   --   --  92   AST  --   --   --   --   --   --   --   --   --   --   --  23   ALT  --   --   --   --   --   --   --   --   --   --   --  24    < > = values in this interval not displayed.       CBC  Recent Labs   Lab 09/28/23  0611 09/27/23  0540 09/26/23  0536 09/25/23  1210   WBC 13.3* 12.6* 13.9* 16.6*   RBC 3.38* 3.33* 3.58* 4.07*   HGB 9.4* 9.2* 10.0* 11.3*   HCT 29.4* 28.7* 30.9* 35.2*   MCV 87 86 86 87   MCH 27.8 27.6 27.9 27.8   MCHC 32.0 32.1 32.4 32.1   RDW 15.4* 15.3* 15.3* 15.2*   * 542* 572* 606*       INR  Recent Labs   Lab 09/25/23  1210   INR 1.25*       Arterial Blood GasNo lab results found in last 7 days.    No results found for this or any previous visit (from the past 24 hour(s)).

## 2023-09-28 NOTE — ANESTHESIA CARE TRANSFER NOTE
Patient: Juan Balderrama    Procedure: Procedure(s):  Left, AMPUTATION, BELOW KNEE       Diagnosis: Other acute osteomyelitis of left foot (H) [M86.172]  Diagnosis Additional Information: No value filed.    Anesthesia Type:   General     Note:    Oropharynx: oropharynx clear of all foreign objects  Level of Consciousness: drowsy      Independent Airway: airway patency satisfactory and stable  Dentition: dentition unchanged  Vital Signs Stable: post-procedure vital signs reviewed and stable  Report to RN Given: handoff report given  Patient transferred to: PACU    Handoff Report: Identifed the Patient, Identified the Reponsible Provider, Reviewed the pertinent medical history, Discussed the surgical course, Reviewed Intra-OP anesthesia mangement and issues during anesthesia, Set expectations for post-procedure period and Allowed opportunity for questions and acknowledgement of understanding      Vitals:  Vitals Value Taken Time   /74 09/28/23 1846   Temp 36.9  C (98.5  F) 09/28/23 1846   Pulse 71 09/28/23 1853   Resp 15 09/28/23 1853   SpO2 96 % 09/28/23 1853   Vitals shown include unvalidated device data.    Electronically Signed By: GT Murphy CRNA  September 28, 2023  6:54 PM

## 2023-09-29 ENCOUNTER — APPOINTMENT (OUTPATIENT)
Dept: PHYSICAL THERAPY | Facility: CLINIC | Age: 63
DRG: 617 | End: 2023-09-29
Payer: COMMERCIAL

## 2023-09-29 ENCOUNTER — APPOINTMENT (OUTPATIENT)
Dept: OCCUPATIONAL THERAPY | Facility: CLINIC | Age: 63
DRG: 617 | End: 2023-09-29
Attending: ORTHOPAEDIC SURGERY
Payer: COMMERCIAL

## 2023-09-29 LAB
ANION GAP SERPL CALCULATED.3IONS-SCNC: 11 MMOL/L (ref 7–15)
BASOPHILS # BLD AUTO: 0.1 10E3/UL (ref 0–0.2)
BASOPHILS NFR BLD AUTO: 1 %
BUN SERPL-MCNC: 18 MG/DL (ref 8–23)
CALCIUM SERPL-MCNC: 8.4 MG/DL (ref 8.8–10.2)
CHLORIDE SERPL-SCNC: 98 MMOL/L (ref 98–107)
CREAT SERPL-MCNC: 1.11 MG/DL (ref 0.67–1.17)
DEPRECATED HCO3 PLAS-SCNC: 23 MMOL/L (ref 22–29)
EGFRCR SERPLBLD CKD-EPI 2021: 76 ML/MIN/1.73M2
EOSINOPHIL # BLD AUTO: 0 10E3/UL (ref 0–0.7)
EOSINOPHIL NFR BLD AUTO: 0 %
ERYTHROCYTE [DISTWIDTH] IN BLOOD BY AUTOMATED COUNT: 15.3 % (ref 10–15)
GLUCOSE BLDC GLUCOMTR-MCNC: 278 MG/DL (ref 70–99)
GLUCOSE BLDC GLUCOMTR-MCNC: 285 MG/DL (ref 70–99)
GLUCOSE BLDC GLUCOMTR-MCNC: 302 MG/DL (ref 70–99)
GLUCOSE BLDC GLUCOMTR-MCNC: 309 MG/DL (ref 70–99)
GLUCOSE BLDC GLUCOMTR-MCNC: 356 MG/DL (ref 70–99)
GLUCOSE SERPL-MCNC: 280 MG/DL (ref 70–99)
HCT VFR BLD AUTO: 30.3 % (ref 40–53)
HGB BLD-MCNC: 9.5 G/DL (ref 13.3–17.7)
IMM GRANULOCYTES # BLD: 0.2 10E3/UL
IMM GRANULOCYTES NFR BLD: 2 %
LYMPHOCYTES # BLD AUTO: 1.3 10E3/UL (ref 0.8–5.3)
LYMPHOCYTES NFR BLD AUTO: 11 %
MAGNESIUM SERPL-MCNC: 1.9 MG/DL (ref 1.7–2.3)
MCH RBC QN AUTO: 27.5 PG (ref 26.5–33)
MCHC RBC AUTO-ENTMCNC: 31.4 G/DL (ref 31.5–36.5)
MCV RBC AUTO: 88 FL (ref 78–100)
MONOCYTES # BLD AUTO: 0.6 10E3/UL (ref 0–1.3)
MONOCYTES NFR BLD AUTO: 5 %
NEUTROPHILS # BLD AUTO: 10.2 10E3/UL (ref 1.6–8.3)
NEUTROPHILS NFR BLD AUTO: 81 %
NRBC # BLD AUTO: 0 10E3/UL
NRBC BLD AUTO-RTO: 0 /100
PLATELET # BLD AUTO: 556 10E3/UL (ref 150–450)
POTASSIUM SERPL-SCNC: 4.9 MMOL/L (ref 3.4–5.3)
RBC # BLD AUTO: 3.45 10E6/UL (ref 4.4–5.9)
SODIUM SERPL-SCNC: 132 MMOL/L (ref 135–145)
WBC # BLD AUTO: 12.4 10E3/UL (ref 4–11)

## 2023-09-29 PROCEDURE — 250N000011 HC RX IP 250 OP 636: Performed by: INTERNAL MEDICINE

## 2023-09-29 PROCEDURE — 97542 WHEELCHAIR MNGMENT TRAINING: CPT | Mod: GP

## 2023-09-29 PROCEDURE — 97165 OT EVAL LOW COMPLEX 30 MIN: CPT | Mod: GO | Performed by: OCCUPATIONAL THERAPIST

## 2023-09-29 PROCEDURE — 85025 COMPLETE CBC W/AUTO DIFF WBC: CPT | Performed by: ORTHOPAEDIC SURGERY

## 2023-09-29 PROCEDURE — 120N000001 HC R&B MED SURG/OB

## 2023-09-29 PROCEDURE — 97530 THERAPEUTIC ACTIVITIES: CPT | Mod: GP

## 2023-09-29 PROCEDURE — 250N000011 HC RX IP 250 OP 636: Mod: JZ | Performed by: ORTHOPAEDIC SURGERY

## 2023-09-29 PROCEDURE — 36415 COLL VENOUS BLD VENIPUNCTURE: CPT | Performed by: ORTHOPAEDIC SURGERY

## 2023-09-29 PROCEDURE — 250N000013 HC RX MED GY IP 250 OP 250 PS 637: Performed by: PHYSICIAN ASSISTANT

## 2023-09-29 PROCEDURE — 97535 SELF CARE MNGMENT TRAINING: CPT | Mod: GO | Performed by: OCCUPATIONAL THERAPIST

## 2023-09-29 PROCEDURE — 82310 ASSAY OF CALCIUM: CPT | Performed by: ORTHOPAEDIC SURGERY

## 2023-09-29 PROCEDURE — 97116 GAIT TRAINING THERAPY: CPT | Mod: GP

## 2023-09-29 PROCEDURE — 99232 SBSQ HOSP IP/OBS MODERATE 35: CPT | Performed by: INTERNAL MEDICINE

## 2023-09-29 PROCEDURE — 97161 PT EVAL LOW COMPLEX 20 MIN: CPT | Mod: GP

## 2023-09-29 PROCEDURE — 250N000013 HC RX MED GY IP 250 OP 250 PS 637: Performed by: ORTHOPAEDIC SURGERY

## 2023-09-29 PROCEDURE — 83735 ASSAY OF MAGNESIUM: CPT | Performed by: ORTHOPAEDIC SURGERY

## 2023-09-29 PROCEDURE — 258N000003 HC RX IP 258 OP 636: Performed by: INTERNAL MEDICINE

## 2023-09-29 RX ORDER — ASPIRIN 81 MG/1
81 TABLET ORAL 2 TIMES DAILY
Status: DISCONTINUED | OUTPATIENT
Start: 2023-09-29 | End: 2023-10-02 | Stop reason: HOSPADM

## 2023-09-29 RX ORDER — CEFAZOLIN SODIUM 1 G/50ML
1250 SOLUTION INTRAVENOUS EVERY 12 HOURS
Status: DISCONTINUED | OUTPATIENT
Start: 2023-09-29 | End: 2023-09-29

## 2023-09-29 RX ADMIN — INSULIN ASPART 3 UNITS: 100 INJECTION, SOLUTION INTRAVENOUS; SUBCUTANEOUS at 08:48

## 2023-09-29 RX ADMIN — ASPIRIN 81 MG: 81 TABLET, COATED ORAL at 20:25

## 2023-09-29 RX ADMIN — HYDROMORPHONE HYDROCHLORIDE 0.4 MG: 0.2 INJECTION, SOLUTION INTRAMUSCULAR; INTRAVENOUS; SUBCUTANEOUS at 00:08

## 2023-09-29 RX ADMIN — VANCOMYCIN HYDROCHLORIDE 1250 MG: 5 INJECTION, POWDER, LYOPHILIZED, FOR SOLUTION INTRAVENOUS at 11:14

## 2023-09-29 RX ADMIN — ASPIRIN 81 MG: 81 TABLET, COATED ORAL at 10:57

## 2023-09-29 RX ADMIN — CEFAZOLIN SODIUM 2 G: 2 INJECTION, SOLUTION INTRAVENOUS at 08:30

## 2023-09-29 RX ADMIN — INSULIN ASPART 4 UNITS: 100 INJECTION, SOLUTION INTRAVENOUS; SUBCUTANEOUS at 12:22

## 2023-09-29 RX ADMIN — KETOROLAC TROMETHAMINE 15 MG: 15 INJECTION, SOLUTION INTRAMUSCULAR; INTRAVENOUS at 03:03

## 2023-09-29 RX ADMIN — CEFAZOLIN SODIUM 2 G: 2 INJECTION, SOLUTION INTRAVENOUS at 00:04

## 2023-09-29 RX ADMIN — KETOROLAC TROMETHAMINE 15 MG: 15 INJECTION, SOLUTION INTRAMUSCULAR; INTRAVENOUS at 08:58

## 2023-09-29 RX ADMIN — LOSARTAN POTASSIUM 100 MG: 50 TABLET, FILM COATED ORAL at 08:41

## 2023-09-29 RX ADMIN — VANCOMYCIN HYDROCHLORIDE 1500 MG: 5 INJECTION, POWDER, LYOPHILIZED, FOR SOLUTION INTRAVENOUS at 01:09

## 2023-09-29 RX ADMIN — ATORVASTATIN CALCIUM 20 MG: 20 TABLET, FILM COATED ORAL at 17:28

## 2023-09-29 RX ADMIN — METOPROLOL SUCCINATE 200 MG: 100 TABLET, EXTENDED RELEASE ORAL at 08:41

## 2023-09-29 RX ADMIN — POLYETHYLENE GLYCOL 3350 17 G: 17 POWDER, FOR SOLUTION ORAL at 08:44

## 2023-09-29 RX ADMIN — OXYCODONE HYDROCHLORIDE 10 MG: 5 TABLET ORAL at 07:07

## 2023-09-29 RX ADMIN — HYDROMORPHONE HYDROCHLORIDE 0.4 MG: 0.2 INJECTION, SOLUTION INTRAMUSCULAR; INTRAVENOUS; SUBCUTANEOUS at 05:08

## 2023-09-29 RX ADMIN — KETOROLAC TROMETHAMINE 15 MG: 15 INJECTION, SOLUTION INTRAMUSCULAR; INTRAVENOUS at 15:31

## 2023-09-29 RX ADMIN — OXYCODONE HYDROCHLORIDE 10 MG: 5 TABLET ORAL at 20:25

## 2023-09-29 RX ADMIN — AMLODIPINE BESYLATE 2.5 MG: 2.5 TABLET ORAL at 08:41

## 2023-09-29 RX ADMIN — METFORMIN HYDROCHLORIDE 1000 MG: 500 TABLET, EXTENDED RELEASE ORAL at 17:28

## 2023-09-29 RX ADMIN — INSULIN ASPART 3 UNITS: 100 INJECTION, SOLUTION INTRAVENOUS; SUBCUTANEOUS at 17:27

## 2023-09-29 RX ADMIN — OXYCODONE HYDROCHLORIDE 10 MG: 5 TABLET ORAL at 13:35

## 2023-09-29 ASSESSMENT — ACTIVITIES OF DAILY LIVING (ADL)
IADL_COMMENTS: PREVIOUSLY INDEPENDENT
ADLS_ACUITY_SCORE: 20
PREVIOUS_RESPONSIBILITIES: WORK;DRIVING;FINANCES;MEDICATION MANAGEMENT;YARDWORK;SHOPPING;LAUNDRY;HOUSEKEEPING;MEAL PREP
ADLS_ACUITY_SCORE: 20
DEPENDENT_IADLS:: INDEPENDENT

## 2023-09-29 NOTE — PHARMACY-VANCOMYCIN DOSING SERVICE
Pharmacy Vancomycin Note  Date of Service 2023  Patient's  1960   62 year old, male    Indication: Osteomyelitis and Skin and Soft Tissue Infection  Day of Therapy: 4  Current vancomycin regimen:  1500 mg IV q18h  Current vancomycin monitoring method: AUC  Current vancomycin therapeutic monitoring goal: 400-600 mg*h/L    InsightRX Prediction of Current Vancomycin Regimen  Loading dose: N/A  Regimen: 1500 mg IV every 18 hours.  Start time: 23:01 on 2023  Exposure target: AUC24 (range)400-600 mg/L.hr   AUC24,ss: 384 mg/L.hr  Probability of AUC24 > 400: 43 %  Ctrough,ss: 5.1 mg/L  Probability of Ctrough,ss > 20: 0 %  Probability of nephrotoxicity (Lodise LUMA ): 3 %    Current estimated CrCl = Estimated Creatinine Clearance: 107.9 mL/min (based on SCr of 0.9 mg/dL).    Creatinine for last 3 days  2023:  5:36 AM Creatinine 1.18 mg/dL;  2:53 PM Creatinine 1.11 mg/dL  2023:  5:40 AM Creatinine 0.96 mg/dL  2023:  6:11 AM Creatinine 0.90 mg/dL    Recent Vancomycin Levels (past 3 days)  2023:  9:42 PM Vancomycin 6.0 ug/mL    Vancomycin IV Administrations (past 72 hours)                     vancomycin (VANCOCIN) 1,500 mg in sodium chloride 0.9 % 250 mL intermittent infusion (mg) 1,500 mg New Bag 23 0501     1,500 mg New Bag 23 1136    vancomycin (VANCOCIN) 1,500 mg in sodium chloride 0.9 % 250 mL intermittent infusion (mg) 1,500 mg New Bag 23 1536                    Nephrotoxins and other renal medications (From now, onward)      Start     Dose/Rate Route Frequency Ordered Stop    23 2300  vancomycin (VANCOCIN) 1,500 mg in sodium chloride 0.9 % 250 mL intermittent infusion         1,500 mg  over 90 Minutes Intravenous EVERY 12 HOURS 23 2233      23 2100  ketorolac (TORADOL) injection 15 mg        Note to Pharmacy: IF celecoxib was given pre-operatively, start ketorolac 12 hours after celecoxib given.    15 mg Intravenous EVERY 6 HOURS  09/28/23 2053 09/29/23 2059               Contrast Orders - past 72 hours (72h ago, onward)      None            Interpretation of levels and current regimen:  Vancomycin level is reflective of AUC less than 400    Has serum creatinine changed greater than 50% in last 72 hours: No    Urine output:  unable to determine    Renal Function: Stable    InsightRX Prediction of Planned New Vancomycin Regimen  Loading dose: N/A  Regimen: 1500 mg IV every 12 hours.  Start time: 23:01 on 09/28/2023  Exposure target: AUC24 (range)400-600 mg/L.hr   AUC24,ss: 576 mg/L.hr  Probability of AUC24 > 400: 93 %  Ctrough,ss: 12.1 mg/L  Probability of Ctrough,ss > 20: 0 %  Probability of nephrotoxicity (Lodise LUMA 2009): 7 %    Plan:  Increase Dose to 1500 mg IV every 12 hours  Vancomycin monitoring method: AUC  Vancomycin therapeutic monitoring goal: 400-600 mg*h/L  Pharmacy will check vancomycin levels as appropriate in 1-3 Days.  Serum creatinine levels will be ordered daily for the first week of therapy and at least twice weekly for subsequent weeks.    Karrie Dietz, [facility]

## 2023-09-29 NOTE — PLAN OF CARE
Time: 2715-3450    Activity: A1, gaitbelt, and walker    Neuro: A&O x4    Cardiac: WDL    Respiratory: WDL    GI/: WDL    Pain: Utilizing PRN Oxycodone.Having phantom leg pain in his LLE.     LDA: SL.     Patient had a prosthetic consult today.    Ace wrap intact to left stump and is clean, dry, and intact. Area has been elevated and ice utilized. Edema to RLE -area has been elevated per patient's tolerance.     - Knee immobilizer obtained from ER.

## 2023-09-29 NOTE — PROGRESS NOTES
Patient vital signs are at baseline: Yes  Patient able to ambulate as they were prior to admission or with assist devices provided by therapies during their stay:  No,  Reason:  Awaiting therapy for 1st transfer after BKA  Patient MUST void prior to discharge:  Yes  Patient able to tolerate oral intake:  Yes  Pain has adequate pain control using Oral analgesics:  No,  Reason:  Dilaudid was needed for breakthrough pain  Does patient have an identified :  Yes  Has goal D/C date and time been discussed with patient:  No,  Reason:  Will be done today

## 2023-09-29 NOTE — PROGRESS NOTES
"[facility] Orthopaedics Progress Note      Post-operative Day: 1 Day Post-Op    Procedure(s):  Left, AMPUTATION, BELOW KNEE  Subjective:    Pt reports that overall he is doing well; his pain is controlled. He understands that he will need to work with PT and that a prosthetics consult has been placed for further discussion of the BKA management. He hopes to be able to get around his house and yard soon.     Chest pain, SOB:  No  Nausea, vomiting:  No  Lightheadedness, dizziness:  No  Neuro:  Patient denies new onset numbness or paresthesias      Objective:  Blood pressure 127/58, pulse 64, temperature 97.3  F (36.3  C), temperature source Oral, resp. rate 18, height 1.549 m (5' 1\"), weight 140.1 kg (308 lb 13.8 oz), SpO2 93 %.    Patient Vitals for the past 24 hrs:   BP Temp Temp src Pulse Resp SpO2   09/29/23 0839 127/58 -- -- 64 -- --   09/29/23 0632 (!) 143/59 97.3  F (36.3  C) Oral 58 18 93 %   09/29/23 0203 121/66 97.3  F (36.3  C) Oral 67 16 98 %   09/28/23 2231 132/67 97.3  F (36.3  C) Oral 65 17 92 %   09/28/23 2200 137/65 -- -- 71 -- --   09/28/23 2145 -- -- -- -- 16 --   09/28/23 2128 (!) 150/73 -- -- 68 -- --   09/28/23 2101 -- -- -- -- 16 --   09/28/23 2100 (!) 146/61 -- -- 68 -- --   09/28/23 2054 135/64 98.7  F (37.1  C) Oral 68 15 93 %   09/28/23 2028 135/64 -- -- 72 -- --   09/28/23 2013 (!) 147/61 -- -- 77 -- --   09/28/23 1945 (!) 162/87 -- -- 63 -- 96 %   09/28/23 1939 (!) 161/77 -- -- 65 -- 95 %   09/28/23 1933 -- 97.7  F (36.5  C) Oral -- -- --   09/28/23 1915 (!) 147/77 -- -- 65 (!) 4 98 %   09/28/23 1906 139/69 -- -- 67 (!) 7 95 %   09/28/23 1900 -- -- -- -- -- 99 %   09/28/23 1846 137/74 98.5  F (36.9  C) Oral -- 12 96 %   09/28/23 1500 (!) 140/59 99.2  F (37.3  C) Oral 72 18 97 %   09/28/23 1308 (!) 140/63 99.5  F (37.5  C) Oral 68 18 95 %   09/28/23 1232 (!) 140/59 99.1  F (37.3  C) Oral 65 20 94 %       Wt Readings from Last 4 Encounters:   09/27/23 140.1 kg (308 lb 13.8 oz)   09/25/23 " 136.5 kg (301 lb)   06/12/23 136.5 kg (301 lb)   03/14/23 136.7 kg (301 lb 6.4 oz)       Gen: A&O x 3. NAD.   Wound status: Covered, post op dressings are c/d/I.   Circulation, motion and sensation: Sensation intact at the left BKA stump per pt report, unable to assess due to dressings  Swelling: Mild    Pertinent Labs   Lab Results: personally reviewed.     Recent Labs   Lab Test 09/29/23  0457 09/28/23  0611 09/27/23  0540 09/26/23  0536 09/25/23  1210 07/21/20  1455 11/08/19  0651   INR  --   --   --   --  1.25*  --   --    HGB 9.5* 9.4* 9.2*   < > 11.3*  --  12.8*   HCT 30.3* 29.4* 28.7*   < > 35.2*  --  40.0   MCV 88 87 86   < > 87  --  86   * 531* 542*   < > 606*  --  399   * 134* 135   < > 134*   < >  --    CRP  --   --   --   --   --   --  22.2*    < > = values in this interval not displayed.       Plan:   Continue current cares and rehabilitation.  Anticoagulation protocol: Aspirin 81 mg BID  x 30  days  Pain medications:  oxycodone, dilaudid, tylenol, and vistaril  Weight bearing status:  NWB LLE  Knee immobilizer ordered, to be worn while in bed to prevent flexion contracture. Discussed with pt and RN.   Continue to monitor dressings; no dressing change planned unless Prosthetics needs to visualize the incision.   Prosthetics consult for BKA management placed.   IV abx per hospitalist, source control achieved with BKA.   Disposition:  Discharge per hospitalist, will be orthopedically stable once pain controlled, seen by Prosthetics and cleared by PT. Likely 1-2 more days.              Report completed by:  Riky Blackman PA-C  Date: 9/29/2023  Time: 9:44 AM

## 2023-09-29 NOTE — CONSULTS
Care Management Initial Consult    General Information  Assessment completed with: Patient, Spouse or significant other, wife present in room  Type of CM/SW Visit: Initial Assessment    Primary Care Provider verified and updated as needed: Yes   Readmission within the last 30 days: no previous admission in last 30 days      Reason for Consult: discharge planning  Advance Care Planning: Advance Care Planning Reviewed: no concerns identified          Communication Assessment  Patient's communication style: spoken language (English or Bilingual)    Hearing Difficulty or Deaf: no   Wear Glasses or Blind: no    Cognitive  Cognitive/Neuro/Behavioral: WDL                      Living Environment:   People in home: child(mj), adult, spouse     Current living Arrangements: house      Able to return to prior arrangements: yes       Family/Social Support:  Care provided by: self  Provides care for: no one  Marital Status:   Wife, Children          Description of Support System: Supportive, Involved         Current Resources:   Patient receiving home care services: No     Community Resources: None  Equipment currently used at home: none  Supplies currently used at home: Diabetic Supplies    Employment/Financial:  Employment Status:          Financial Concerns: No concerns identified           Does the patient's insurance plan have a 3 day qualifying hospital stay waiver?  Yes     Which insurance plan 3 day waiver is available? [employer]    Will the waiver be used for post-acute placement? No    Lifestyle & Psychosocial Needs:  Social Determinants of Health     Food Insecurity: Not on file   Depression: Not at risk (3/14/2023)    PHQ-2     PHQ-2 Score: 0   Housing Stability: Not on file   Tobacco Use: Low Risk  (9/28/2023)    Patient History     Smoking Tobacco Use: Never     Smokeless Tobacco Use: Never     Passive Exposure: Never   Financial Resource Strain: Not on file   Alcohol Use: Not on file   Transportation Needs:  "Not on file   Physical Activity: Not on file   Interpersonal Safety: Not on file   Stress: Not on file   Social Connections: Not on file       Functional Status:  Prior to admission patient needed assistance:   Dependent ADLs:: Independent  Dependent IADLs:: Independent  Assesssment of Functional Status: Not at baseline with mobility    Mental Health Status:  Mental Health Status: No Current Concerns       Chemical Dependency Status:                Values/Beliefs:  Spiritual, Cultural Beliefs, Alevism Practices, Values that affect care: no               Additional Information:  Per MD at IDT rounds pt is not medically ready for discharge today. Pt is a new BKA, per therapy notes recommending PT and OT home care upon discharge as well as DME including a manual wheelchair and walker. CM met bedside with pt and wife to discuss discharge planning. Pt and wife live in a home in [place] with their adult children, they have a 4 level home but patient plans to live on the main level which has a bedroom and bathroom and does not have any steps to get to. Patient and wife are already working on getting a shower chair for that bathroom. Patient and wife confident they can accommodate their home to pts needs. CM discussed home care recommendation and educated on home bound status, pt accepting of home care and denied preferred agency. CM sent referral through the [facility]. CM discussed DME needs with pt, pt in agreement with DME needs going home. Pt denied questions regarding DME at this time. PT to provide walker, BRYSON working on DME order through [employer] [phone]. CM filled out form and spoke with Neelima who is working on order. CM discussed transportation on discharged and educated about cost of transport, pt denied wanting to be transported and said \"me and my wife need to figure it out at some point so we better start now\". Patient and wife denied other needs from CM at this time, pt and wife " confident they have a plan when going home. CM to continue to follow for discharge planning and DME needs.    Plan: Home with home care-ref pending    Transport: Family    NADEEM Ferrell  Care Transitions Registered Nurse  Tele: [phone]

## 2023-09-29 NOTE — PROGRESS NOTES
"[facility] Medicine Progress Note  Date of Service (when I saw the patient): 09/29/2023    REASON FOR ADMISSION / INTERVAL HISTORY:  Juan Balderrama is a 62 year old male admitted on 9/25/2023. He presented to the emergency department for evaluation of non-healing left foot wound despite outpatient antibiotic use and was found to have left foot osteomyelitis / septic joint and tenosynovitis     MRI foot- MRI left foot demonstrates - \"1. Overall findings consistent with extensive osteomyelitis/septic joint at idfoot/tarsometatarsal joint on a background of presumed Charcot arthropathy: a. Proximally, presumed septic tenosynovitis extension along peroneal tendon, anterior extensor and medial flexor tendon sheath. Full proximal extent of tenosynovitis not included in the field of view.  b. Finding concerning for tibiotalar joint septic arthritis extension with distal tibial osteomyelitis, maybe extending from posterior tenosynovitis.  c. Extensive destruction/fragmentation tarsal bones, metatarsal bases with proximal marrow abnormality compatible with osteomyelitis involving talus and calcaneus. 2. Finding concerning for active osteomyelitis/septic arthritis of  forth PIP joint.\"       Assessment/ Plan     Chronic left foot wound - osteomyelitis of left foot, tenosynovitis of foot, septic arthritis of left foot   Has been following podiatry Dr. Boston as outpatient, recently on Augmentin without improvement. Initially afebrile, but fever up to 101.4 shortly after admission, with leukocytosis (WBC 16.6), but normal lactic acid. CRP elevated (387.74).   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 Stop antbx. To have  Aspirin 81 mg BID  x 30  days per ortho. Prosthetics consult for BKA management placed.      Uncontrolled type 2 diabetes mellitus with complication, with long-term current use of insulin  Most recent HgbA1c 6.7%. Managed prior to admission with " "metformin 1000 mg bid and Basaglar 64 U qhs.   BS 73 pre-op. Lantus was decreased to 32 units from 64.   BS today 280-309  - will give 16 units lantus once this AM and 32 units tonight. Will resume 64 units lantus q hs from 9/30  - resume metformin  - Medium sliding scale insulin  - Hyper/hypoglycemia protocols  - Consistent carbohydrate diet     Thrombocytosis  Admit platelets 606. Unclear cause, possibly related to infection.    Stable.      Hyponatremia   Admit Na = 134. Minimally low, not clinically relevant.      Elevated anion gap, resolved  Admit anion gap 16. Serum bicarb WNL. No blood gas to determine if acidotic. Lactic acid WNL.   Resolved      Benign essential hypertension  Blood pressure slightly elevated at time of admission. Managed prior to admission with hydrochlorothiazide 25 mg daily, losartan 100 mg daily, amlodipine 2.5 mg daily, and metoprolol  mg daily.   BS stable  Resume all home meds     Hyperlipidemia LDL goal <70  Managed prior to admission with atorvastatin 20 mg daily, continue.        Diet: Advance Diet as Tolerated: Regular Diet Adult    DVT Prophylaxis: Enoxaparin (Lovenox) SQ  Elkins Catheter: Not present  Code Status: Full Code        SAROJ DILLON MD   Pg [phone]        ROS:  As described in A/P and Exam.  Otherwise ALL are  negative.    PHYSICAL EXAM:  All vitals have been reviewed    Blood pressure 112/46, pulse 66, temperature 98.2  F (36.8  C), temperature source Oral, resp. rate 18, height 1.549 m (5' 1\"), weight 140.1 kg (308 lb 13.8 oz), SpO2 93 %.    I/O this shift:  In: -   Out: 200 [Urine:200]    GENERAL APPEARANCE: healthy, alert and no distress  EYES: conjunctiva clear, eyes grossly normal  HENT: external ears and nose normal   MS: no clubbing, cyanosis; no edema-L stump  in dressing  SKIN: clear without significant rashes or lesions  NEURO: -non-focal moves all 4 extr    ROUTINE  LABS (Last four results)  CMP  Recent Labs   Lab 09/29/23  1126 09/29/23  0754 " 09/29/23  0457 09/29/23  0202 09/28/23  0758 09/28/23  0611 09/27/23  0740 09/27/23  0540 09/26/23  1714 09/26/23  1453 09/26/23  0751 09/26/23  0536 09/25/23  2226 09/25/23  1210   NA  --   --  132*  --   --  134*  --  135  --   --   --  137  --  134*   POTASSIUM  --   --  4.9  --   --  4.3  --  4.0  --   --   --  3.9  --  3.8   CHLORIDE  --   --  98  --   --  100  --  101  --   --   --  99  --  96*   CO2  --   --  23  --   --  22  --  22  --   --   --  24  --  22   ANIONGAP  --   --  11  --   --  12  --  12  --   --   --  14  --  16*   * 278* 280* 302*   < > 112*   < > 116*   < >  --    < > 176*   < > 108*   BUN  --   --  18.0  --   --  16.2  --  21.1  --   --   --  28.1*  --  26.3*   CR  --   --  1.11  --   --  0.90  --  0.96  --  1.11  --  1.18*  --  1.03   GFRESTIMATED  --   --  76  --   --  >90  --  90  --  76  --  71  --  83   PHONG  --   --  8.4*  --   --  8.7*  --  8.6*  --   --   --  8.8  --  9.8   MAG  --   --  1.9  --   --  1.5*  --  1.6*  --   --   --   --   --   --    PROTTOTAL  --   --   --   --   --   --   --   --   --   --   --   --   --  7.6   ALBUMIN  --   --   --   --   --   --   --   --   --   --   --   --   --  3.2*   BILITOTAL  --   --   --   --   --   --   --   --   --   --   --   --   --  0.4   ALKPHOS  --   --   --   --   --   --   --   --   --   --   --   --   --  92   AST  --   --   --   --   --   --   --   --   --   --   --   --   --  23   ALT  --   --   --   --   --   --   --   --   --   --   --   --   --  24    < > = values in this interval not displayed.       CBC  Recent Labs   Lab 09/29/23  0457 09/28/23  0611 09/27/23  0540 09/26/23  0536   WBC 12.4* 13.3* 12.6* 13.9*   RBC 3.45* 3.38* 3.33* 3.58*   HGB 9.5* 9.4* 9.2* 10.0*   HCT 30.3* 29.4* 28.7* 30.9*   MCV 88 87 86 86   MCH 27.5 27.8 27.6 27.9   MCHC 31.4* 32.0 32.1 32.4   RDW 15.3* 15.4* 15.3* 15.3*   * 531* 542* 572*       INR  Recent Labs   Lab 09/25/23  1210   INR 1.25*       Arterial Blood GasNo lab results  found in last 7 days.    No results found for this or any previous visit (from the past 24 hour(s)).

## 2023-09-29 NOTE — PROGRESS NOTES
09/29/23 0930   Appointment Info   Signing Clinician's Name / Credentials (PT) Dami Levine, PT, DPT   Rehab Comments (PT) Patient left sitting in bed with LLE elevated, needs within reach.   Living Environment   People in Home spouse   Current Living Arrangements house   Home Accessibility stairs within home;stairs to enter home   Number of Stairs, Main Entrance 3   Number of Stairs, Within Home, Primary two  (see comments section)   Living Environment Comments Patient lives in a split level home - 15 stairs to upstairs, 2 steps to kitchen/dining room 1 rail, 10 steps to lower level. 3 SHAHZAD, uses garage with normal low threshold. Right inside garage door there is a bedroom and bathroom. Bathroom: tub/shower, no grab bars, fixed shower head, no seat available. Regular height toilet across from tub with vanity next to it. Regular flat bed.   Self-Care   Equipment Currently Used at Home none   Fall history within last six months no   Activity/Exercise/Self-Care Comment Patient is typically indep with mobility without AD, indep with bADL, IADL. 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 Patient has to be mobile for the second job but he doesn't return to that job until the Spring.   General Information   Onset of Illness/Injury or Date of Surgery 09/25/23   Referring Physician Bjorn Rueda MD   Patient/Family Therapy Goals Statement (PT) home when safe   Pertinent History of Current Problem (include personal factors and/or comorbidities that impact the POC) L BKA 9/28/23   Existing Precautions/Restrictions fall;weight bearing  (knee immobilizer when in bed)   Weight-Bearing Status - LLE nonweight-bearing   Pain Assessment   Patient Currently in Pain No   Range of Motion (ROM)   Range of Motion ROM deficits secondary to surgical procedure   Strength (Manual Muscle Testing)   Strength (Manual Muscle Testing)  Deficits observed during functional mobility   Bed Mobility   Bed Mobility supine-sit;sit-supine   Supine-Sit [place] (Bed Mobility) supervision;verbal cues   Sit-Supine [place] (Bed Mobility) supervision   Assistive Device (Bed Mobility) bed rails   Transfers   Transfers sit-stand transfer   Sit-Stand Transfer   Sit-Stand [place] (Transfers) moderate assist (50% patient effort);set up;verbal cues;nonverbal cues (demo/gesture)   Assistive Device (Sit-Stand Transfers) walker, front-wheeled   Gait/Stairs (Locomotion)   [place] Level (Gait) minimum assist (75% patient effort);verbal cues;nonverbal cues (demo/gesture)   Assistive Device (Gait) walker, front-wheeled   Distance in Feet initial 10ft in room   Distance in Feet (Gait) additional ~15ft   Comment, (Gait/Stairs) not appropriate to assess on eval   Sensory Examination   Sensory Perception Comments reports intact light touch to distal residual limb, able to sense light touch from PT through ace wrap   Clinical Impression   Criteria for Skilled Therapeutic Intervention Yes, treatment indicated   PT Diagnosis (PT) impaired functional mobility   Influenced by the following impairments impaired balance, edema, impaired strength, body habitus, NWB LLE precautions, impaired activity tolerance   Functional limitations due to impairments impaired bed mobility, impaired transfers, impaired gait   Clinical Presentation (PT Evaluation Complexity) Evolving/Changing   Clinical Presentation Rationale clinical judgement   Clinical Decision Making (Complexity) low complexity   Planned Therapy Interventions (PT) balance training;bed mobility training;gait training;home exercise program;neuromuscular re-education;patient/family education;strengthening;transfer training;wheelchair management/propulsion training;progressive activity/exercise   Anticipated Equipment Needs at Discharge (PT) wheelchair;wheelchair cushion;walker, rolling  (L residual limb rest for  wheelchair; FWW)   Risk & Benefits of therapy have been explained evaluation/treatment results reviewed;care plan/treatment goals reviewed;participants voiced agreement with care plan;participants included;patient   PT Total Evaluation Time   PT Eval, Low Complexity Minutes (14905) 15   Physical Therapy Goals   PT Frequency Daily   PT Predicted Duration/Target Date for Goal Attainment 10/06/23   PT Goals Bed Mobility;Transfers;Gait;Wheelchair Mobility   PT: Bed Mobility Supervision/stand-by assist;Supine to/from sit  (no rails)   PT: Transfers Supervision/stand-by assist;Sit to/from stand;Bed to/from chair;Assistive device;Within precautions   PT: Gait Supervision/stand-by assist;Rolling walker;25 feet;Within precautions   PT: Wheelchair Mobility 50 feet;Caregiver SBA;manual wheelchair   Interventions   Interventions Quick Adds Gait Training;Therapeutic Activity;Wheelchair Mgmt/Training   Therapeutic Activity   Therapeutic Activities: dynamic activities to improve functional performance Minutes (67824) 30   Symptoms Noted During/After Treatment Fatigue   Treatment Detail/Skilled Intervention Patient with many appropriate questions regarding his surgery, mobility, and potential for rehab. Patient able to perform various transfers during the session with min>modA. Discussed crutches vs FWW vs 2 canes vs wheelchair for mobility, by end of session patient in agreement regarding wheelchair for primary mobility and FWW for short distances into the bathroom and through doorways if needed. Patient able to scoot laterally at EOB without assist. Patient requires increased momentum to come to stand due to body habitus. Patient able to perform squat pivot transfers bed<>wc both directions twice with cues for hand placement and min>modA. Patient required seated rest breaks due to fatigue. Provided extensive education regarding preparation of residual limb for prosthetics,  sock use in the future for shaping the  limb/edema reduction/altering the brain homonculus to new end point of LLE, knee and hip ROM and strength, avoiding resting L knee in flexion, and posture/LLE positioning in standing. Patient provided with handout of exercises and partially verbally reviewed with patient. Briefly discussed loss of limb/mental health.   Gait Training   Gait Training Minutes (33304) 15   Symptoms Noted During/After Treatment (Gait Training) fatigue;shortness of breath   Treatment Detail/Skilled Intervention Patient able to hop approx 15ft with FWW and Cherry, one LOB when preparing to turn to the right - required modA to steady. Discussed FWW management to avoid pushing it too far ahead of him and then trying to hop faster to catch up. Discussed slow and controlled movements overall to decrease reliance on momentum but also to decrease pounding on RLE. Patient with very heavy, pounding hops with RLE and due to his weight, is at risk for increased joint deterioration if he attempts to hop with FWW (or crutches in the future) as primary mobility. Patient fairly fatigued following short distance hopping.   [place] Level (Gait Training) moderate assist (50% patient effort)  (Cherry primarily, modA x 1 LOB)   Physical Assistance Level (Gait Training) verbal cues;nonverbal cues (demo/gestures);1 person assist   Assistive Device (Gait Training) rolling walker  (FWW)   Wheelchair Managment/Training   Wheelchair Mgmt/Training Minutes (37383) 10   Treatment Detail/Skilled Intervention Patient able to propel manual wheelchair ~15ft with SBA and cues for turns. He was able to set the wheelchair up for transfer back to bed with cues and Cherry due to bed rails and foot rests that do not remove. Briefly discussed wheelchair components regarding removable arm rest for squat pivot transfers and residual limb rest to assist with maintaining L knee extension.   PT Discharge Planning   PT Plan Daily - progress bed mobility without rails, progress stand  pivot transfers with FWW and squat pivot transfers to wheelchair both directions, wheelchair propulsion, please review BKA exercises (handout with patient)   PT Discharge Recommendation (DC Rec) (S)  home with assist;home with home care physical therapy   PT Rationale for DC Rec (S)  Patient does not appear ready to dc home as of yet; however, anticipate after continued medical management and therapies, patient will be able to transition home with spouse and home PT. Patient currently requires min>modA for transfers and short distance hopping with FWW and is not able to safely ambulate household distances at this time. Patient will require a manual wheelchair with removable armrests, general use cushion, and a left residual limb rest for safe household mobility.   PT Brief overview of current status SBA supine<>sit with bed rails, transfers min>modA with FWW or squat pivot to wc, SBA wc propulsion ~15ft with cues, hopping ~15ft with FWW with min>modA and one LOB to his left.   Total Session Time   Timed Code Treatment Minutes 55   Total Session Time (sum of timed and untimed services) 70

## 2023-09-29 NOTE — PROGRESS NOTES
"S:  Patient seen at ([place] [facility] room 2402) with an order to post op consult for LT BK ordered by Rogers Blackman PA-C from [facility] .       Patient states: Motivated to get up and back to work.   Patient reported functional abilities prior to amputation: K-3  O: We talked and he reported that he walks on uneven terrain. 5'1\" and 308#. Patient reports Sepsis in limb.   Visual inspection: Wrapped in post op ace and bandaging.   Measurement of limb: NA  Products and sizes dispensed: None.  Prosthetic packet and business card left with patient.    A:  Patient is a good candidate for:  Weather impervious prosthesis Heavy duty.   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   Potential K level: K-3   Motivation for prosthetic device: Very High  P:  Anticipated DC is Unsure of when and where.   Will contact patient 1 week from today to check on status.    This note has been electronically signed by Pacheco Davies CPO, LPO.  "

## 2023-09-29 NOTE — PROGRESS NOTES
09/29/23 1300   Appointment Info   Signing Clinician's Name / Credentials (OT) Wilman Lilia OTR/L   Living Environment   People in Home spouse   Current Living Arrangements house   Home Accessibility stairs within home;stairs to enter home   Number of Stairs, Main Entrance 3   Number of Stairs, Within Home, Primary two   Living Environment Comments Patient lives in a split level home - 15 stairs to upstairs, 2 steps to kitchen/dining room 1 rail, 10 steps to lower level. 3 SHAHZAD, uses garage with normal low threshold. Right inside garage door there is a bedroom and bathroom. Bathroom: tub/shower, no grab bars, fixed shower head, no seat available. Regular height toilet across from tub with vanity next to it. Regular flat bed.   Self-Care   Usual Activity Tolerance moderate   Current Activity Tolerance fair   Regular Exercise No   Equipment Currently Used at Home none   Fall history within last six months no   Activity/Exercise/Self-Care Comment Patient is typically indep with mobility without AD, indep with bADL, IADL. 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 Patient has to be mobile for the second job but he doesn't return to that job until the Spring.   Instrumental Activities of Daily Living (IADL)   Previous Responsibilities work;driving;finances;medication management;yardwork;shopping;laundry;housekeeping;meal prep   IADL Comments previously independent   General Information   Onset of Illness/Injury or Date of Surgery 09/28/23   Referring Physician Rigoberto Stroud   Additional Occupational Profile Info/Pertinent History of Current Problem L SARA 9/28/23   Existing Precautions/Restrictions fall;weight bearing   Left Lower Extremity (Weight-bearing Status) non weight-bearing (NWB)   General Observations and Info L BKA   Cognitive Status Examination   Orientation Status orientation to person, place and time    Cognitive Status Comments talkative with some forgetfulness noted, continue to monitor   Visual Perception   Impact of Vision Impairment on Function (Vision) no acute changes   Pain Assessment   Patient Currently in Pain No   Range of Motion Comprehensive   General Range of Motion no range of motion deficits identified;bilateral upper extremity ROM WFL   Comment, General Range of Motion B UE WFL   Strength Comprehensive (MMT)   General Manual Muscle Testing (MMT) Assessment no strength deficits identified   Comment, General Manual Muscle Testing (MMT) Assessment B UE WFL   Coordination   Upper Extremity Coordination No deficits were identified   Bed Mobility   Comment (Bed Mobility) SBA to get to EOB   Transfers   Transfer Comments SBA for standing with 2WW and ambulating to bathroom   Clinical Impression   Criteria for Skilled Therapeutic Interventions Met (OT) Yes, treatment indicated   OT Diagnosis decreased ADL independence   OT Problem List-Impairments impacting ADL problems related to;activity tolerance impaired;post-surgical precautions;pain   Assessment of Occupational Performance 3-5 Performance Deficits   Identified Performance Deficits dressing, bathing, toileting, IADL   Planned Therapy Interventions (OT) ADL retraining;IADL retraining;transfer training;strengthening;home program guidelines;progressive activity/exercise   Clinical Decision Making Complexity (OT) low complexity   Anticipated Equipment Needs Upon Discharge (OT) tub bench;raised toilet seat   Risk & Benefits of therapy have been explained evaluation/treatment results reviewed;care plan/treatment goals reviewed;participants voiced agreement with care plan;patient;spouse/significant other   Clinical Impression Comments Pt presents s/p L BKA resulting in decreased independence with ADL.  Pt would benefit from  skilled OT services to provide education on DME and adaptive techniques   OT Total Evaluation Time   OT Eval, Low Complexity Minutes  (17980) 10   OT Goals   Therapy Frequency (OT) 6 times/wk   OT Predicted Duration/Target Date for Goal Attainment 10/06/23   OT Goals Hygiene/Grooming;Upper Body Dressing;Lower Body Dressing;Lower Body Bathing;Toilet Transfer/Toileting;OT Goal 1   OT: Hygiene/Grooming modified independent;while standing;from wheelchair   OT: Upper Body Dressing Modified independent;including set-up/clothing retrieval;from wheelchair   OT: Lower Body Dressing Modified independent;including set-up/clothing retrieval;from wheelchair   OT: Lower Body Bathing Modified independent;using adaptive equipment   OT: Toilet Transfer/Toileting Modified independent;cleaning and garment management;toilet transfer   OT: Goal 1 Pt will demonstrate understanding of UE HEP   Self-Care/Home Management   Self-Care/Home Mgmt/ADL, Compensatory, Meal Prep Minutes (32264) 10   Symptoms Noted During/After Treatment (Meal Preparation/Planning Training) none   Treatment Detail/Skilled Intervention Therapist issuing handout on DME needed following surgery.  pt verbalizing understanding of education provided.  Therapsit reviewing use of tub transfer bench and raised toilet seat.  Also encouraged consideration of grab bars and toiletframe.  Pt and spouse verbalizing understanding of education.  Therapist reviewing techniques for ADL completion and reviewing ways to promote improved independence.   OT Discharge Planning   OT Plan UE HEP, dressing, reviewing toilet/tub transfers   OT Discharge Recommendation (DC Rec) home with assist;home with home care occupational therapy   OT Rationale for DC Rec Pt will likely progress to being able to return home with spousal assist, may benefit from HH OT to promote improved safety at home following amputation   Total Session Time   Timed Code Treatment Minutes 10   Total Session Time (sum of timed and untimed services) 20

## 2023-09-29 NOTE — PLAN OF CARE
Problem: Plan of Care - These are the overarching goals to be used throughout the patient stay.    Goal: Optimal Comfort and Wellbeing  Outcome: Progressing     Problem: Plan of Care - These are the overarching goals to be used throughout the patient stay.    Goal: Optimal Comfort and Wellbeing  Intervention: Monitor Pain and Promote Comfort  Recent Flowsheet Documentation  Taken 9/28/2023 2101 by Chip Miles RN  Pain Management Interventions: medication (see MAR)   Patient had a BKA today removing his left foot and ankle. Patient reports that his pain was up to a 9/10 after settling back in on med surg and was given dilaudid to manage this. Pain improved to 6/10 and was then given oxycodone which kept the pain around 6/10. Limb has been elevated on two pillows and ice has been applied.    Patient reports some phantom pain in the left limb. He expresses he is worried about how his mobility will be.     Noted that the patients right foot and ankle were edematous +3, this was also elevated on a pillow.      Pt reports no nausea and was able to tolerate both fluids and ate a full dinner.

## 2023-09-30 ENCOUNTER — APPOINTMENT (OUTPATIENT)
Dept: OCCUPATIONAL THERAPY | Facility: CLINIC | Age: 63
DRG: 617 | End: 2023-09-30
Payer: COMMERCIAL

## 2023-09-30 LAB
GLUCOSE BLDC GLUCOMTR-MCNC: 190 MG/DL (ref 70–99)
GLUCOSE BLDC GLUCOMTR-MCNC: 193 MG/DL (ref 70–99)
GLUCOSE BLDC GLUCOMTR-MCNC: 194 MG/DL (ref 70–99)
GLUCOSE BLDC GLUCOMTR-MCNC: 283 MG/DL (ref 70–99)
GLUCOSE BLDC GLUCOMTR-MCNC: 285 MG/DL (ref 70–99)
HGB BLD-MCNC: 8.7 G/DL (ref 13.3–17.7)

## 2023-09-30 PROCEDURE — 36415 COLL VENOUS BLD VENIPUNCTURE: CPT | Performed by: ORTHOPAEDIC SURGERY

## 2023-09-30 PROCEDURE — 250N000013 HC RX MED GY IP 250 OP 250 PS 637: Performed by: PHYSICIAN ASSISTANT

## 2023-09-30 PROCEDURE — 250N000012 HC RX MED GY IP 250 OP 636 PS 637: Performed by: INTERNAL MEDICINE

## 2023-09-30 PROCEDURE — 120N000001 HC R&B MED SURG/OB

## 2023-09-30 PROCEDURE — 250N000013 HC RX MED GY IP 250 OP 250 PS 637: Performed by: ORTHOPAEDIC SURGERY

## 2023-09-30 PROCEDURE — 97535 SELF CARE MNGMENT TRAINING: CPT | Mod: GO | Performed by: OCCUPATIONAL THERAPIST

## 2023-09-30 PROCEDURE — 250N000011 HC RX IP 250 OP 636: Mod: JZ | Performed by: INTERNAL MEDICINE

## 2023-09-30 PROCEDURE — 99232 SBSQ HOSP IP/OBS MODERATE 35: CPT | Performed by: INTERNAL MEDICINE

## 2023-09-30 PROCEDURE — 85018 HEMOGLOBIN: CPT | Performed by: ORTHOPAEDIC SURGERY

## 2023-09-30 RX ORDER — ASPIRIN 81 MG/1
81 TABLET ORAL 2 TIMES DAILY
Qty: 60 TABLET | Refills: 0 | Status: SHIPPED | OUTPATIENT
Start: 2023-09-30

## 2023-09-30 RX ORDER — ACETAMINOPHEN 325 MG/1
650 TABLET ORAL EVERY 4 HOURS PRN
Qty: 100 TABLET | Refills: 0 | Status: SHIPPED | OUTPATIENT
Start: 2023-09-30

## 2023-09-30 RX ORDER — FUROSEMIDE 10 MG/ML
40 INJECTION INTRAMUSCULAR; INTRAVENOUS
Status: COMPLETED | OUTPATIENT
Start: 2023-09-30 | End: 2023-10-01

## 2023-09-30 RX ORDER — OXYCODONE HYDROCHLORIDE 5 MG/1
5-10 TABLET ORAL EVERY 4 HOURS PRN
Qty: 20 TABLET | Refills: 0 | Status: SHIPPED | OUTPATIENT
Start: 2023-09-30 | End: 2024-08-26

## 2023-09-30 RX ADMIN — METFORMIN HYDROCHLORIDE 1000 MG: 500 TABLET, EXTENDED RELEASE ORAL at 07:41

## 2023-09-30 RX ADMIN — ATORVASTATIN CALCIUM 20 MG: 20 TABLET, FILM COATED ORAL at 17:02

## 2023-09-30 RX ADMIN — INSULIN GLARGINE 64 UNITS: 100 INJECTION, SOLUTION SUBCUTANEOUS at 22:26

## 2023-09-30 RX ADMIN — OXYCODONE HYDROCHLORIDE 10 MG: 5 TABLET ORAL at 08:21

## 2023-09-30 RX ADMIN — FUROSEMIDE 40 MG: 10 INJECTION, SOLUTION INTRAMUSCULAR; INTRAVENOUS at 15:57

## 2023-09-30 RX ADMIN — OXYCODONE HYDROCHLORIDE 10 MG: 5 TABLET ORAL at 14:44

## 2023-09-30 RX ADMIN — ACETAMINOPHEN 650 MG: 325 TABLET, FILM COATED ORAL at 16:05

## 2023-09-30 RX ADMIN — METFORMIN HYDROCHLORIDE 1000 MG: 500 TABLET, EXTENDED RELEASE ORAL at 17:02

## 2023-09-30 RX ADMIN — LOSARTAN POTASSIUM 100 MG: 50 TABLET, FILM COATED ORAL at 07:41

## 2023-09-30 RX ADMIN — ASPIRIN 81 MG: 81 TABLET, COATED ORAL at 19:46

## 2023-09-30 RX ADMIN — OXYCODONE HYDROCHLORIDE 10 MG: 5 TABLET ORAL at 01:04

## 2023-09-30 RX ADMIN — INSULIN ASPART 2 UNITS: 100 INJECTION, SOLUTION INTRAVENOUS; SUBCUTANEOUS at 07:44

## 2023-09-30 RX ADMIN — METOPROLOL SUCCINATE 200 MG: 100 TABLET, EXTENDED RELEASE ORAL at 07:41

## 2023-09-30 RX ADMIN — ASPIRIN 81 MG: 81 TABLET, COATED ORAL at 07:41

## 2023-09-30 RX ADMIN — INSULIN ASPART 3 UNITS: 100 INJECTION, SOLUTION INTRAVENOUS; SUBCUTANEOUS at 12:09

## 2023-09-30 RX ADMIN — OXYCODONE HYDROCHLORIDE 10 MG: 5 TABLET ORAL at 19:46

## 2023-09-30 RX ADMIN — HYDROCHLOROTHIAZIDE 25 MG: 25 TABLET ORAL at 07:41

## 2023-09-30 RX ADMIN — AMLODIPINE BESYLATE 2.5 MG: 2.5 TABLET ORAL at 07:41

## 2023-09-30 ASSESSMENT — ACTIVITIES OF DAILY LIVING (ADL)
ADLS_ACUITY_SCORE: 20

## 2023-09-30 NOTE — PLAN OF CARE
[id number]    Alert and orientated x4. Up with SBA, gaitbelt, and walker. Continues to have edema in RLE and IV lasix was administered per MAR. ACE wrap to left thigh/stump is clean, dry, and intact. Patient reports more pain than yesterday to his left leg and itching to the area. Some of the pain is phantom pain, but also is having pain to the sides of his knee. PRN Oxycodone and ice utilized for pain management, along with elevating his BLE extremities.

## 2023-09-30 NOTE — PLAN OF CARE
DATE & TIME: 9/29 7P-7A    Cognitive Concerns/ Orientation : Alert and oriented   BEHAVIOR & AGGRESSION TOOL COLOR: Green, pleasant and cooperative   ABNL VS/O2: VSS on room air  MOBILITY: SBA and walker from chair to bed, left knee immobilizer on while in bed  PAIN MANAGEMENT: 10 mg oxy  DIET: Regular  BOWEL/BLADDER: Continent, using urinal at bedside  ABNL LAB/BG: , 283, WBC 12.4  DRAIN/DEVICES: PIV SL   TELEMETRY RHYTHM: n/a   SKIN: LLE dressing C/D/I  TESTS/PROCEDURES: n/a  D/C DATE: Home with home care, likely today  OTHER IMPORTANT INFO: IS encouraged

## 2023-09-30 NOTE — PROGRESS NOTES
"[facility] Medicine Progress Note  Date of Service (when I saw the patient): 09/30/2023    REASON FOR ADMISSION / INTERVAL HISTORY:  Juan Balderrama is a 62 year old male admitted on 9/25/2023. He presented to the emergency department for evaluation of non-healing left foot wound despite outpatient antibiotic use and was found to have left foot osteomyelitis / septic joint and tenosynovitis     MRI foot- MRI left foot demonstrates - \"1. Overall findings consistent with extensive osteomyelitis/septic joint at idfoot/tarsometatarsal joint on a background of presumed Charcot arthropathy: a. Proximally, presumed septic tenosynovitis extension along peroneal tendon, anterior extensor and medial flexor tendon sheath. Full proximal extent of tenosynovitis not included in the field of view.  b. Finding concerning for tibiotalar joint septic arthritis extension with distal tibial osteomyelitis, maybe extending from posterior tenosynovitis.  c. Extensive destruction/fragmentation tarsal bones, metatarsal bases with proximal marrow abnormality compatible with osteomyelitis involving talus and calcaneus. 2. Finding concerning for active osteomyelitis/septic arthritis of  forth PIP joint.\"       Assessment/ Plan     Chronic left foot wound - osteomyelitis of left foot, tenosynovitis of foot, septic arthritis of left foot   Has been following podiatry Dr. Boston as outpatient, recently on Augmentin without improvement. Initially afebrile, but fever up to 101.4 shortly after admission, with leukocytosis (WBC 16.6), but normal lactic acid. CRP elevated (387.74).   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 Stop antbx. To have  Aspirin 81 mg BID  x 30  days per ortho. Prosthetics consult for BKA management placed.      Uncontrolled type 2 diabetes mellitus with complication, with long-term current use of insulin  Most recent HgbA1c 6.7%. Managed prior to admission with " "metformin 1000 mg bid and Basaglar 64 U qhs.   BS 73 pre-op. Lantus was decreased to 32 units from 64.   BS today 194-285  -  Will resume 64 units lantus q hs from today  - continue  metformin  - increase to high dose sliding scale insulin  - Hyper/hypoglycemia protocols  - Consistent carbohydrate diet    RLE edema  Significant edema since after surgery. No pain  Will give iv lasix couple doses and follow. Elevate leg     Thrombocytosis  Admit platelets 606. Unclear cause, possibly related to infection.    Stable.      Hyponatremia   Admit Na = 134. Minimally low, not clinically relevant.      Elevated anion gap, resolved  Admit anion gap 16. Serum bicarb WNL. No blood gas to determine if acidotic. Lactic acid WNL.   Resolved      Benign essential hypertension  Blood pressure slightly elevated at time of admission. Managed prior to admission with hydrochlorothiazide 25 mg daily, losartan 100 mg daily, amlodipine 2.5 mg daily, and metoprolol  mg daily.   BS stable  Resume all home meds     Hyperlipidemia LDL goal <70  Managed prior to admission with atorvastatin 20 mg daily, continue.     DISPO  Waiting for wheelchair-which may not be till monday       Diet: Advance Diet as Tolerated: Regular Diet Adult    DVT Prophylaxis: Enoxaparin (Lovenox) SQ  Elkins Catheter: Not present  Code Status: Full Code        SAROJ DILLON MD   Pg [phone]        ROS:  As described in A/P and Exam.  Otherwise ALL are  negative.    PHYSICAL EXAM:  All vitals have been reviewed    Blood pressure 132/56, pulse 64, temperature 98.7  F (37.1  C), temperature source Oral, resp. rate 18, height 1.549 m (5' 1\"), weight 141.5 kg (311 lb 15.2 oz), SpO2 95 %.    I/O this shift:  In: 400 [P.O.:400]  Out: 875 [Urine:875]    GENERAL APPEARANCE: healthy, alert and no distress  EYES: conjunctiva clear, eyes grossly normal  HENT: external ears and nose normal   Hrt-s1s2 rrr-no m/g/r  Lungs-CTA b  MS: no clubbing, cyanosis; 2+ edema RLE-L stump  " in dressing  SKIN: clear without significant rashes or lesions  NEURO: -non-focal moves all 4 extr    ROUTINE  LABS (Last four results)  CMP  Recent Labs   Lab 09/30/23  1133 09/30/23  0737 09/30/23  0228 09/29/23 2218 09/29/23  0754 09/29/23  0457 09/28/23  0758 09/28/23  0611 09/27/23  0740 09/27/23  0540 09/26/23  1714 09/26/23  1453 09/26/23  0751 09/26/23  0536 09/25/23  2226 09/25/23  1210   NA  --   --   --   --   --  132*  --  134*  --  135  --   --   --  137  --  134*   POTASSIUM  --   --   --   --   --  4.9  --  4.3  --  4.0  --   --   --  3.9  --  3.8   CHLORIDE  --   --   --   --   --  98  --  100  --  101  --   --   --  99  --  96*   CO2  --   --   --   --   --  23  --  22  --  22  --   --   --  24  --  22   ANIONGAP  --   --   --   --   --  11  --  12  --  12  --   --   --  14  --  16*   * 194* 283* 356*   < > 280*   < > 112*   < > 116*   < >  --    < > 176*   < > 108*   BUN  --   --   --   --   --  18.0  --  16.2  --  21.1  --   --   --  28.1*  --  26.3*   CR  --   --   --   --   --  1.11  --  0.90  --  0.96  --  1.11  --  1.18*  --  1.03   GFRESTIMATED  --   --   --   --   --  76  --  >90  --  90  --  76  --  71  --  83   PHONG  --   --   --   --   --  8.4*  --  8.7*  --  8.6*  --   --   --  8.8  --  9.8   MAG  --   --   --   --   --  1.9  --  1.5*  --  1.6*  --   --   --   --   --   --    PROTTOTAL  --   --   --   --   --   --   --   --   --   --   --   --   --   --   --  7.6   ALBUMIN  --   --   --   --   --   --   --   --   --   --   --   --   --   --   --  3.2*   BILITOTAL  --   --   --   --   --   --   --   --   --   --   --   --   --   --   --  0.4   ALKPHOS  --   --   --   --   --   --   --   --   --   --   --   --   --   --   --  92   AST  --   --   --   --   --   --   --   --   --   --   --   --   --   --   --  23   ALT  --   --   --   --   --   --   --   --   --   --   --   --   --   --   --  24    < > = values in this interval not displayed.       CBC  Recent Labs   Lab  09/30/23  0506 09/29/23  0457 09/28/23  0611 09/27/23  0540 09/26/23  0536   WBC  --  12.4* 13.3* 12.6* 13.9*   RBC  --  3.45* 3.38* 3.33* 3.58*   HGB 8.7* 9.5* 9.4* 9.2* 10.0*   HCT  --  30.3* 29.4* 28.7* 30.9*   MCV  --  88 87 86 86   MCH  --  27.5 27.8 27.6 27.9   MCHC  --  31.4* 32.0 32.1 32.4   RDW  --  15.3* 15.4* 15.3* 15.3*   PLT  --  556* 531* 542* 572*       INR  Recent Labs   Lab 09/25/23  1210   INR 1.25*       Arterial Blood GasNo lab results found in last 7 days.    No results found for this or any previous visit (from the past 24 hour(s)).

## 2023-09-30 NOTE — PROGRESS NOTES
[facility] Orthopedic Post-Op Note         Assessment and Plan:    Assessment:   Post-operative day #2  S/p L BKA     Doing well.  No excessive bleeding  Pain well-controlled.  Tolerating physical therapy and rehabilitation well.       Plan:   Abx per internal medicine, appreciate recs.  Discharge when ok with IM.  NWB LLE  Prosthetics consult completed  ASA BID for DVT ppx  Maintain dressing clean and dry.  Follow ortho 10-14 days for wound check.             Interval History:     Stable.  Doing well.  Improving slowly.  Pain is reasonably controlled.  No fevers. Prosthetics consult completed.            Significant Problems:   (Refer to attending physician notes regarding additional medical problems and issues)          Review of Systems:   The patient denies any chest pain, shortness of breath, excessive pain, fever, chills, purulent drainage from the wound, nausea or vomiting.          Medications:   All medications related to the patient's surgery have been reviewed          Physical Exam:   All vitals stable  Temp: 98.7  F (37.1  C) Temp src: Oral BP: 132/56 Pulse: 64   Resp: 18 SpO2: 95 % O2 Device: None (Room air)    Dressing dry and intact.           Data:   All laboratory data related to this surgery reviewed  Lab Results   Component Value Date    WBC 12.4 (H) 09/29/2023    HGB 8.7 (L) 09/30/2023    HCT 30.3 (L) 09/29/2023     (H) 09/29/2023     (L) 09/29/2023    POTASSIUM 4.9 09/29/2023    CHLORIDE 98 09/29/2023    CO2 23 09/29/2023    BUN 18.0 09/29/2023    CR 1.11 09/29/2023     (H) 09/30/2023    SED 81 (H) 09/25/2023    AST 23 09/25/2023    ALT 24 09/25/2023    ALKPHOS 92 09/25/2023    BILITOTAL 0.4 09/25/2023    INR 1.25 (H) 09/25/2023     All imaging studies related to this surgery reviewed     Dino Farr DO

## 2023-09-30 NOTE — PLAN OF CARE
[id number]  Pt has no further c/o pain. Good appetite - ate 100% dinner.  corrected with 3 unit(s) sliding scale insulin.  Wife at bedside throughout the day.    \Jason Alan RN on 9/29/2023 at 7:10 PM

## 2023-10-01 ENCOUNTER — APPOINTMENT (OUTPATIENT)
Dept: OCCUPATIONAL THERAPY | Facility: CLINIC | Age: 63
DRG: 617 | End: 2023-10-01
Payer: COMMERCIAL

## 2023-10-01 ENCOUNTER — APPOINTMENT (OUTPATIENT)
Dept: PHYSICAL THERAPY | Facility: CLINIC | Age: 63
DRG: 617 | End: 2023-10-01
Payer: COMMERCIAL

## 2023-10-01 LAB
ANION GAP SERPL CALCULATED.3IONS-SCNC: 8 MMOL/L (ref 7–15)
BUN SERPL-MCNC: 19.9 MG/DL (ref 8–23)
CALCIUM SERPL-MCNC: 7.7 MG/DL (ref 8.8–10.2)
CHLORIDE SERPL-SCNC: 100 MMOL/L (ref 98–107)
CREAT SERPL-MCNC: 0.94 MG/DL (ref 0.67–1.17)
DEPRECATED HCO3 PLAS-SCNC: 27 MMOL/L (ref 22–29)
EGFRCR SERPLBLD CKD-EPI 2021: >90 ML/MIN/1.73M2
GLUCOSE BLDC GLUCOMTR-MCNC: 114 MG/DL (ref 70–99)
GLUCOSE BLDC GLUCOMTR-MCNC: 141 MG/DL (ref 70–99)
GLUCOSE BLDC GLUCOMTR-MCNC: 202 MG/DL (ref 70–99)
GLUCOSE BLDC GLUCOMTR-MCNC: 216 MG/DL (ref 70–99)
GLUCOSE BLDC GLUCOMTR-MCNC: 217 MG/DL (ref 70–99)
GLUCOSE SERPL-MCNC: 135 MG/DL (ref 70–99)
HOLD SPECIMEN: NORMAL
POTASSIUM SERPL-SCNC: 4.3 MMOL/L (ref 3.4–5.3)
SODIUM SERPL-SCNC: 135 MMOL/L (ref 135–145)

## 2023-10-01 PROCEDURE — 250N000013 HC RX MED GY IP 250 OP 250 PS 637: Performed by: PHYSICIAN ASSISTANT

## 2023-10-01 PROCEDURE — 97535 SELF CARE MNGMENT TRAINING: CPT | Mod: GO | Performed by: OCCUPATIONAL THERAPIST

## 2023-10-01 PROCEDURE — 97116 GAIT TRAINING THERAPY: CPT | Mod: GP

## 2023-10-01 PROCEDURE — 80048 BASIC METABOLIC PNL TOTAL CA: CPT | Performed by: INTERNAL MEDICINE

## 2023-10-01 PROCEDURE — 250N000013 HC RX MED GY IP 250 OP 250 PS 637: Performed by: ORTHOPAEDIC SURGERY

## 2023-10-01 PROCEDURE — 36415 COLL VENOUS BLD VENIPUNCTURE: CPT | Performed by: INTERNAL MEDICINE

## 2023-10-01 PROCEDURE — 120N000001 HC R&B MED SURG/OB

## 2023-10-01 PROCEDURE — 250N000011 HC RX IP 250 OP 636: Mod: JZ | Performed by: INTERNAL MEDICINE

## 2023-10-01 PROCEDURE — 97542 WHEELCHAIR MNGMENT TRAINING: CPT | Mod: GP

## 2023-10-01 PROCEDURE — 99232 SBSQ HOSP IP/OBS MODERATE 35: CPT | Performed by: INTERNAL MEDICINE

## 2023-10-01 RX ORDER — FUROSEMIDE 10 MG/ML
40 INJECTION INTRAMUSCULAR; INTRAVENOUS ONCE
Status: COMPLETED | OUTPATIENT
Start: 2023-10-01 | End: 2023-10-01

## 2023-10-01 RX ADMIN — OXYCODONE HYDROCHLORIDE 10 MG: 5 TABLET ORAL at 20:46

## 2023-10-01 RX ADMIN — METFORMIN HYDROCHLORIDE 1000 MG: 500 TABLET, EXTENDED RELEASE ORAL at 07:44

## 2023-10-01 RX ADMIN — AMLODIPINE BESYLATE 2.5 MG: 2.5 TABLET ORAL at 07:44

## 2023-10-01 RX ADMIN — METOPROLOL SUCCINATE 200 MG: 100 TABLET, EXTENDED RELEASE ORAL at 07:44

## 2023-10-01 RX ADMIN — FUROSEMIDE 40 MG: 10 INJECTION, SOLUTION INTRAMUSCULAR; INTRAVENOUS at 07:45

## 2023-10-01 RX ADMIN — OXYCODONE HYDROCHLORIDE 10 MG: 5 TABLET ORAL at 16:45

## 2023-10-01 RX ADMIN — ASPIRIN 81 MG: 81 TABLET, COATED ORAL at 20:46

## 2023-10-01 RX ADMIN — INSULIN GLARGINE 64 UNITS: 100 INJECTION, SOLUTION SUBCUTANEOUS at 22:59

## 2023-10-01 RX ADMIN — ATORVASTATIN CALCIUM 20 MG: 20 TABLET, FILM COATED ORAL at 17:12

## 2023-10-01 RX ADMIN — METFORMIN HYDROCHLORIDE 1000 MG: 500 TABLET, EXTENDED RELEASE ORAL at 17:12

## 2023-10-01 RX ADMIN — ASPIRIN 81 MG: 81 TABLET, COATED ORAL at 07:44

## 2023-10-01 RX ADMIN — FUROSEMIDE 40 MG: 10 INJECTION, SOLUTION INTRAMUSCULAR; INTRAVENOUS at 18:50

## 2023-10-01 RX ADMIN — HYDROCHLOROTHIAZIDE 25 MG: 25 TABLET ORAL at 07:44

## 2023-10-01 RX ADMIN — LOSARTAN POTASSIUM 100 MG: 50 TABLET, FILM COATED ORAL at 07:44

## 2023-10-01 ASSESSMENT — ACTIVITIES OF DAILY LIVING (ADL)
ADLS_ACUITY_SCORE: 20

## 2023-10-01 NOTE — DISCHARGE SUMMARY
Occupational Therapy Discharge Summary    Reason for therapy discharge:    All goals and outcomes met, no further needs identified.    Progress towards therapy goal(s). See goals on Care Plan in [facility] electronic health record for goal details.  Goals met    Patient demonstrates the ability to complete all daily care tasks with Modified independence. His pending discharge is due to medical devices.   Therapy recommendation(s):    Continue outpatient physical therapy due to patient wanting an orthosis.

## 2023-10-01 NOTE — PLAN OF CARE
Patient is alert and orientated x4. Up with SBA, gaitbelt, and walker. Blood sugars continue to be elevated, but have improved from yesterday. Patient understands carb counting and ways to improve his blood sugar levels. He also verbalized understanding of the importance of improving blood sugar levels for wound healing. Continues to have edema in RLE, but this has improved with IV lasix and elevation.     Patient is on track to discharge once homecare/DME in place.

## 2023-10-01 NOTE — PROGRESS NOTES
"[facility] Orthopedic Post-Op Note         Assessment and Plan:    Assessment:   Post-operative day #3  S/p L BKA     Doing well. Minimal pain.  No excessive bleeding  Pain well-controlled.  Tolerating physical therapy and rehabilitation well.       Plan:   Abx per internal medicine, appreciate recs.  Discharge when ok with IM.  NWB LLE  Prosthetics consult completed  ASA BID for DVT ppx  Maintain dressing clean and dry.  Follow ortho 10-14 days for wound check.             Interval History:     Stable.  Doing well.  Improving slowly.  Pain is reasonably controlled.  No fevers. Prosthetics consult completed. Awaiting home health.            Significant Problems:   (Refer to attending physician notes regarding additional medical problems and issues)          Review of Systems:   The patient denies any chest pain, shortness of breath, excessive pain, fever, chills, purulent drainage from the wound, nausea or vomiting.          Medications:   All medications related to the patient's surgery have been reviewed          Physical Exam:   All vitals stable  BP (!) 140/62 (BP Location: Right arm)   Pulse 61   Temp 98.3  F (36.8  C) (Oral)   Resp 18   Ht 1.549 m (5' 1\")   Wt 141.5 kg (311 lb 15.2 oz)   SpO2 97%   BMI 58.94 kg/m      Dressing dry and intact.           Data:   All laboratory data related to this surgery reviewed  Lab Results   Component Value Date    WBC 12.4 (H) 09/29/2023    HGB 8.7 (L) 09/30/2023    HCT 30.3 (L) 09/29/2023     (H) 09/29/2023     10/01/2023    POTASSIUM 4.3 10/01/2023    CHLORIDE 100 10/01/2023    CO2 27 10/01/2023    BUN 19.9 10/01/2023    CR 0.94 10/01/2023     (H) 10/01/2023    SED 81 (H) 09/25/2023    AST 23 09/25/2023    ALT 24 09/25/2023    ALKPHOS 92 09/25/2023    BILITOTAL 0.4 09/25/2023    INR 1.25 (H) 09/25/2023       All imaging studies related to this surgery reviewed     Dino Farr, DO  "

## 2023-10-01 NOTE — PLAN OF CARE
Physical Therapy Discharge Summary    Reason for therapy discharge:    All goals and outcomes met, no further needs identified.  Pt will be discharged home tomorrow.    Progress towards therapy goal(s). See goals on Care Plan in [facility] electronic health record for goal details.  Goals met    Therapy recommendation(s):    Continued therapy is recommended.  Rationale/Recommendations:  Pt will benefit from OP PT for LE strengthening and gait training with a prosethesis..    Sara Pittman PT

## 2023-10-01 NOTE — PLAN OF CARE
Goal Outcome Evaluation:      Plan of Care Reviewed With: patient    Overall Patient Progress: improvingOverall Patient Progress: improving       POD 3 Left BKA    DATE & TIME: 9/30 7P-7A    Cognitive Concerns/ Orientation : Alert and oriented   BEHAVIOR & AGGRESSION TOOL COLOR: Green, pleasant and cooperative  ABNL VS/O2: VSS on room air  MOBILITY: Left BKA. SBA and walker from chair to bed. Left knee immobilizer on while in bed.  PAIN MANAGEMENT: Oxy 10  DIET: Regular  BOWEL/BLADDER: Continent, using the urinal independently   ABNL LAB/BG: , 141  DRAIN/DEVICES: PIV SL   TELEMETRY RHYTHM: n/a  SKIN: Left BKA site wrapped. CDI. +2 RLE edema  TESTS/PROCEDURES: n/a  D/C DATE: Pending     Patient requesting flu shot prior to discharge

## 2023-10-01 NOTE — PROGRESS NOTES
"[facility] Medicine Progress Note  Date of Service (when I saw the patient): 10/01/2023    REASON FOR ADMISSION / INTERVAL HISTORY:  Juan Balderrama is a 62 year old male admitted on 9/25/2023. He presented to the emergency department for evaluation of non-healing left foot wound despite outpatient antibiotic use and was found to have left foot osteomyelitis / septic joint and tenosynovitis     MRI foot- MRI left foot demonstrates - \"1. Overall findings consistent with extensive osteomyelitis/septic joint at idfoot/tarsometatarsal joint on a background of presumed Charcot arthropathy: a. Proximally, presumed septic tenosynovitis extension along peroneal tendon, anterior extensor and medial flexor tendon sheath. Full proximal extent of tenosynovitis not included in the field of view.  b. Finding concerning for tibiotalar joint septic arthritis extension with distal tibial osteomyelitis, maybe extending from posterior tenosynovitis.  c. Extensive destruction/fragmentation tarsal bones, metatarsal bases with proximal marrow abnormality compatible with osteomyelitis involving talus and calcaneus. 2. Finding concerning for active osteomyelitis/septic arthritis of  forth PIP joint.\"       Assessment/ Plan     Chronic left foot wound - osteomyelitis of left foot, tenosynovitis of foot, septic arthritis of left foot   Has been following podiatry Dr. Boston as outpatient, recently on Augmentin without improvement. Initially afebrile, but fever up to 101.4 shortly after admission, with leukocytosis (WBC 16.6), but normal lactic acid. CRP elevated (387.74).   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 Stopped antbx. To have  Aspirin 81 mg BID  x 30  days per ortho. Prosthetics consult for BKA management placed.      Uncontrolled type 2 diabetes mellitus with complication, with long-term current use of insulin  Most recent HgbA1c 6.7%. Managed prior to admission with " "metformin 1000 mg bid and Basaglar 64 U qhs.   BS 73 pre-op. Lantus was decreased to 32 units from 64.   BS today 194-201  -continue  64 units lantus   - continue  metformin  - continue  high dose sliding scale insulin  - Hyper/hypoglycemia protocols  - Consistent carbohydrate diet    RLE edema  Significant edema since after surgery. No pain. Got 2 doses of iv lasix until this AM-edema better. Cr stable  -will give one more dose iv lasix tonight.  Elevate leg     Thrombocytosis  Admit platelets 606. Unclear cause, possibly related to infection.    Stable.      Hyponatremia   Admit Na = 134. Minimally low, not clinically relevant.      Elevated anion gap, resolved  Admit anion gap 16. Serum bicarb WNL. No blood gas to determine if acidotic. Lactic acid WNL.   Resolved      Benign essential hypertension  Blood pressure slightly elevated at time of admission. Managed prior to admission with hydrochlorothiazide 25 mg daily, losartan 100 mg daily, amlodipine 2.5 mg daily, and metoprolol  mg daily.   BS stable  Continue  all home meds     Hyperlipidemia LDL goal <70  Managed prior to admission with atorvastatin 20 mg daily, continue.     DISPO  Waiting for wheelchair-which may not be till monday       Diet: Advance Diet as Tolerated: Regular Diet Adult    DVT Prophylaxis: Enoxaparin (Lovenox) SQ  Elkins Catheter: Not present  Code Status: Full Code        SAROJ DILLON MD   Pg [phone]        ROS:  As described in A/P and Exam.  Otherwise ALL are  negative.    PHYSICAL EXAM:  All vitals have been reviewed    Blood pressure (!) 140/62, pulse 61, temperature 98.3  F (36.8  C), temperature source Oral, resp. rate 18, height 1.549 m (5' 1\"), weight 141.5 kg (311 lb 15.2 oz), SpO2 97 %.    I/O this shift:  In: 480 [P.O.:480]  Out: 1100 [Urine:1100]    GENERAL APPEARANCE: healthy, alert and no distress  EYES: conjunctiva clear, eyes grossly normal  HENT: external ears and nose normal   MS: no clubbing, cyanosis; 1-2+ " edema RLE-L stump  in dressing  SKIN: clear without significant rashes or lesions  NEURO: -non-focal moves all 4 extr    ROUTINE  LABS (Last four results)  CMP  Recent Labs   Lab 10/01/23  1200 10/01/23  0734 10/01/23  0536 10/01/23  0158 09/29/23  0754 09/29/23  0457 09/28/23  0758 09/28/23  0611 09/27/23  0740 09/27/23  0540 09/25/23  2226 09/25/23  1210   NA  --   --  135  --   --  132*  --  134*  --  135   < > 134*   POTASSIUM  --   --  4.3  --   --  4.9  --  4.3  --  4.0   < > 3.8   CHLORIDE  --   --  100  --   --  98  --  100  --  101   < > 96*   CO2  --   --  27  --   --  23  --  22  --  22   < > 22   ANIONGAP  --   --  8  --   --  11  --  12  --  12   < > 16*   * 114* 135* 141*   < > 280*   < > 112*   < > 116*   < > 108*   BUN  --   --  19.9  --   --  18.0  --  16.2  --  21.1   < > 26.3*   CR  --   --  0.94  --   --  1.11  --  0.90  --  0.96   < > 1.03   GFRESTIMATED  --   --  >90  --   --  76  --  >90  --  90   < > 83   PHONG  --   --  7.7*  --   --  8.4*  --  8.7*  --  8.6*   < > 9.8   MAG  --   --   --   --   --  1.9  --  1.5*  --  1.6*  --   --    PROTTOTAL  --   --   --   --   --   --   --   --   --   --   --  7.6   ALBUMIN  --   --   --   --   --   --   --   --   --   --   --  3.2*   BILITOTAL  --   --   --   --   --   --   --   --   --   --   --  0.4   ALKPHOS  --   --   --   --   --   --   --   --   --   --   --  92   AST  --   --   --   --   --   --   --   --   --   --   --  23   ALT  --   --   --   --   --   --   --   --   --   --   --  24    < > = values in this interval not displayed.       CBC  Recent Labs   Lab 09/30/23  0506 09/29/23  0457 09/28/23  0611 09/27/23  0540 09/26/23  0536   WBC  --  12.4* 13.3* 12.6* 13.9*   RBC  --  3.45* 3.38* 3.33* 3.58*   HGB 8.7* 9.5* 9.4* 9.2* 10.0*   HCT  --  30.3* 29.4* 28.7* 30.9*   MCV  --  88 87 86 86   MCH  --  27.5 27.8 27.6 27.9   MCHC  --  31.4* 32.0 32.1 32.4   RDW  --  15.3* 15.4* 15.3* 15.3*   PLT  --  556* 531* 542* 572*       INR  Recent  Labs   Lab 09/25/23  1210   INR 1.25*       Arterial Blood GasNo lab results found in last 7 days.    No results found for this or any previous visit (from the past 24 hour(s)).

## 2023-10-02 VITALS
RESPIRATION RATE: 16 BRPM | OXYGEN SATURATION: 95 % | HEIGHT: 61 IN | WEIGHT: 311.95 LBS | HEART RATE: 61 BPM | SYSTOLIC BLOOD PRESSURE: 147 MMHG | DIASTOLIC BLOOD PRESSURE: 59 MMHG | TEMPERATURE: 98.3 F | BODY MASS INDEX: 58.9 KG/M2

## 2023-10-02 LAB
CREAT SERPL-MCNC: 0.91 MG/DL (ref 0.67–1.17)
EGFRCR SERPLBLD CKD-EPI 2021: >90 ML/MIN/1.73M2
GLUCOSE BLDC GLUCOMTR-MCNC: 126 MG/DL (ref 70–99)
GLUCOSE BLDC GLUCOMTR-MCNC: 139 MG/DL (ref 70–99)
PLATELET # BLD AUTO: 707 10E3/UL (ref 150–450)

## 2023-10-02 PROCEDURE — 99239 HOSP IP/OBS DSCHRG MGMT >30: CPT | Performed by: INTERNAL MEDICINE

## 2023-10-02 PROCEDURE — 82565 ASSAY OF CREATININE: CPT | Performed by: ORTHOPAEDIC SURGERY

## 2023-10-02 PROCEDURE — 250N000013 HC RX MED GY IP 250 OP 250 PS 637: Performed by: ORTHOPAEDIC SURGERY

## 2023-10-02 PROCEDURE — 250N000013 HC RX MED GY IP 250 OP 250 PS 637: Performed by: PHYSICIAN ASSISTANT

## 2023-10-02 PROCEDURE — 85049 AUTOMATED PLATELET COUNT: CPT | Performed by: ORTHOPAEDIC SURGERY

## 2023-10-02 PROCEDURE — 36415 COLL VENOUS BLD VENIPUNCTURE: CPT | Performed by: ORTHOPAEDIC SURGERY

## 2023-10-02 RX ADMIN — AMLODIPINE BESYLATE 2.5 MG: 2.5 TABLET ORAL at 08:49

## 2023-10-02 RX ADMIN — OXYCODONE HYDROCHLORIDE 10 MG: 5 TABLET ORAL at 08:48

## 2023-10-02 RX ADMIN — LOSARTAN POTASSIUM 100 MG: 50 TABLET, FILM COATED ORAL at 08:49

## 2023-10-02 RX ADMIN — METFORMIN HYDROCHLORIDE 1000 MG: 500 TABLET, EXTENDED RELEASE ORAL at 08:49

## 2023-10-02 RX ADMIN — ASPIRIN 81 MG: 81 TABLET, COATED ORAL at 08:49

## 2023-10-02 RX ADMIN — HYDROCHLOROTHIAZIDE 25 MG: 25 TABLET ORAL at 08:49

## 2023-10-02 RX ADMIN — METOPROLOL SUCCINATE 200 MG: 100 TABLET, EXTENDED RELEASE ORAL at 08:49

## 2023-10-02 ASSESSMENT — ACTIVITIES OF DAILY LIVING (ADL)
ADLS_ACUITY_SCORE: 20

## 2023-10-02 NOTE — DISCHARGE SUMMARY
[facility] Hospitalist Discharge Summary    Juan Balderrama MRN# [id number]   Age: 63 year old YOB: 1960     Date of Admission:  9/25/2023  Date of Discharge::  10/2/2023  Admitting Physician:  Valente Olvera MD  Discharge Physician:  Rigoberto Stroud MD  Primary Physician: Mauricio Pearce  Transferring Facility: N/A     Home clinic: [facility]          Admission Diagnoses:   Other acute osteomyelitis of left foot (H) [M86.172]          Discharge Diagnosis:     Principle diagnosis: Chronic left foot wound - osteomyelitis of left foot, tenosynovitis of foot, septic arthritis of left foot -  S/p BKA 9/28   Secondary diagnoses:  Patient Active Problem List   Diagnosis    Benign essential hypertension    Former smoker    Left cataract    Morbid obesity, unspecified obesity type (H)    Uncontrolled type 2 diabetes mellitus with complication, with long-term current use of insulin    Hyperlipidemia LDL goal <70    Hypertension    Other acute osteomyelitis of left foot (H)    Tenosynovitis of foot    Septic arthritis of left foot (H)    Thrombocytosis          Brief History of Presenting Illness:   As per admit hx  Juan Balderrama is a 62 year old male who presented to the emergency department for evaluation of worsening foot wounds.     Patient has a chronic wound on his left foot for which he has been seeing podiatry Dr. Boston.  8 days ago his foot worsened and he noticed some blistering, so he called Dr. Boston's office and was prescribed some Augmentin.  The pharmacy was initially out of Augmentin and he had to wait 3 days to start it; his first dose was on 9/20.  Since starting the Augmentin he is noticed a slight dry cough, but otherwise feels his breathing is normal.  Despite starting the antibiotic his foot continued to worsen, so he saw Dr. Boston again this morning in clinic and was advised to go to the emergency department for evaluation due to the worsening of his  "foot.  Work-up in the emergency department revealed multiple abnormalities of the left foot including osteomyelitis, a septic joint, and tenosynovitis.     Denies any fevers at home (but did feel chilled), but did spike a fever this evening after admission.  The foot is minimally painful, which is chronic and unchanged from baseline.  He has a recent decrease in appetite.  He had a small episode of emesis 2 days ago, but no ongoing symptoms.  The remainder review of systems is negative.       MRI foot- MRI left foot demonstrates - \"1. Overall findings consistent with extensive osteomyelitis/septic joint at idfoot/tarsometatarsal joint on a background of presumed Charcot arthropathy: a. Proximally, presumed septic tenosynovitis extension along peroneal tendon, anterior extensor and medial flexor tendon sheath. Full proximal extent of tenosynovitis not included in the field of view.  b. Finding concerning for tibiotalar joint septic arthritis extension with distal tibial osteomyelitis, maybe extending from posterior tenosynovitis.  c. Extensive destruction/fragmentation tarsal bones, metatarsal bases with proximal marrow abnormality compatible with osteomyelitis involving talus and calcaneus. 2. Finding concerning for active osteomyelitis/septic arthritis of  forth PIP joint.\"          Hospital Course:   Chronic left foot wound - osteomyelitis of left foot, tenosynovitis of foot, septic arthritis of left foot   Has been following podiatry Dr. Boston as outpatient, recently on Augmentin without improvement. Initially afebrile, but fever up to 101.4 shortly after admission, with leukocytosis (WBC 16.6), but normal lactic acid. CRP elevated (387.74).   S/p BKA 9/28. Stopped antbx. To have  Aspirin 81 mg BID  x 30  days per ortho. Prosthetics consult for BKA management placed.      Uncontrolled type 2 diabetes mellitus with complication, with long-term current use of insulin  Most recent HgbA1c 6.7%. Managed prior to " admission with metformin 1000 mg bid and Basaglar 64 U qhs.   BS 73 pre-op. Lantus was decreased to 32 units from 64.   -200  -continue  64 units lantus   - continue  metformin     RLE edema  Significant edema since after surgery. No pain. Got 2 doses of iv lasix until this AM-edema better. Cr stable  Got total 3 doses lasix iv -decreased edema.     Thrombocytosis  Admit platelets 606. Unclear cause, possibly related to infection.    Stable.      Hyponatremia   Admit Na = 134. Minimally low, not clinically relevant.      Elevated anion gap, resolved  Admit anion gap 16. Serum bicarb WNL. No blood gas to determine if acidotic. Lactic acid WNL.   Resolved      Benign essential hypertension  Blood pressure slightly elevated at time of admission. Managed prior to admission with hydrochlorothiazide 25 mg daily, losartan 100 mg daily, amlodipine 2.5 mg daily, and metoprolol  mg daily.   BS stable  Continue  all home meds     Hyperlipidemia LDL goal <70  Managed prior to admission with atorvastatin 20 mg daily, continue.              Procedures:   No procedures performed during this admission         Allergies:      Allergies   Allergen Reactions    Bees     Sulfamethoxazole Swelling     throat             Medications Prior to Admission:     Medications Prior to Admission   Medication Sig Dispense Refill Last Dose    amLODIPine (NORVASC) 2.5 MG tablet Take 1 tablet (2.5 mg) by mouth daily 90 tablet 3 9/25/2023 at am    atorvastatin (LIPITOR) 20 MG tablet TAKE 1 TABLET BY MOUTH EVERY DAY (Patient taking differently: Take 20 mg by mouth every evening) 90 tablet 2 9/24/2023 at pm    BD PEN NEEDLE YENNY 2ND GEN 32G X 4 MM miscellaneous USE 1 PEN NEEDLES DAILY OR AS DIRECTED. 100 each 2 9/24/2023 at hs    blood glucose (ACCU-CHEK GANGA) test strip Use to test blood sugar 1 times daily or as directed. 100 strip 3 Past Week at Unknown    blood glucose monitoring (ACCU-CHEK GANGA PLUS) meter device kit USE TO TEST BLOOD  SUGAR 1 TIMES DAILY OR AS DIRECTED.  0 Past Week at Unknown    blood glucose monitoring (ACCU-CHEK MULTICLIX) lancets USE TO TEST BLOOD SUGAR 1 TIMES DAILY OR AS DIRECTED.  3 Past Week at Unknown    EPINEPHrine (EPIPEN/ADRENACLICK/OR ANY BX GENERIC EQUIV) 0.3 MG/0.3ML injection 2-pack Inject 0.3 mLs (0.3 mg) into the muscle once as needed for anaphylaxis 1 each 1 never used at on hand if needed    hydrochlorothiazide (HYDRODIURIL) 25 MG tablet Take 1 tablet (25 mg) by mouth daily 90 tablet 3 9/25/2023 at am    insulin glargine (BASAGLAR KWIKPEN) 100 UNIT/ML pen Inject 64 Units Subcutaneous At Bedtime SCHEDULE FASTING LAB APPOINTMENT WITHIN THE NEXT 30 DAYS. 30 mL 0 9/24/2023 at hs    losartan (COZAAR) 100 MG tablet Take 1 tablet (100 mg) by mouth daily 90 tablet 3 9/25/2023 at am    metFORMIN (GLUCOPHAGE XR) 500 MG 24 hr tablet TAKE 2 TABLETS BY MOUTH TWICE A DAY WITH MEALS. 360 tablet 3 9/25/2023 at am    metoprolol succinate ER (TOPROL XL) 200 MG 24 hr tablet Take 1 tablet (200 mg) by mouth daily SCHEDULE NURSE BLOOD PRESSURE CHECK BEFORE OTHER REFILLS ARE GIVEN 90 tablet 0 9/25/2023 at am    [DISCONTINUED] amoxicillin-clavulanate (AUGMENTIN) 875-125 MG tablet Take 1 tablet by mouth 2 times daily for 10 days 20 tablet 0 9/25/2023 at am             Discharge Medications:     Current Discharge Medication List        START taking these medications    Details   acetaminophen (TYLENOL) 325 MG tablet Take 2 tablets (650 mg) by mouth every 4 hours as needed for other (mild pain)  Qty: 100 tablet, Refills: 0    Associated Diagnoses: S/P below knee amputation, left (H)      aspirin 81 MG EC tablet Take 1 tablet (81 mg) by mouth 2 times daily  Qty: 60 tablet, Refills: 0    Associated Diagnoses: S/P below knee amputation, left (H)      oxyCODONE (ROXICODONE) 5 MG tablet Take 1-2 tablets (5-10 mg) by mouth every 4 hours as needed for moderate to severe pain  Qty: 20 tablet, Refills: 0    Associated Diagnoses: S/P below knee  amputation, left (H)           CONTINUE these medications which have NOT CHANGED    Details   amLODIPine (NORVASC) 2.5 MG tablet Take 1 tablet (2.5 mg) by mouth daily  Qty: 90 tablet, Refills: 3    Associated Diagnoses: Benign hypertension      atorvastatin (LIPITOR) 20 MG tablet TAKE 1 TABLET BY MOUTH EVERY DAY  Qty: 90 tablet, Refills: 2    Associated Diagnoses: Controlled type 2 diabetes mellitus with complication, with long-term current use of insulin (H)      BD PEN NEEDLE YENNY 2ND GEN 32G X 4 MM miscellaneous USE 1 PEN NEEDLES DAILY OR AS DIRECTED.  Qty: 100 each, Refills: 2    Associated Diagnoses: Uncontrolled type 2 diabetes mellitus with complication, with long-term current use of insulin      blood glucose (ACCU-CHEK GANGA) test strip Use to test blood sugar 1 times daily or as directed.  Qty: 100 strip, Refills: 3    Associated Diagnoses: Uncontrolled type 2 diabetes mellitus with complication, with long-term current use of insulin      blood glucose monitoring (ACCU-CHEK GANGA PLUS) meter device kit USE TO TEST BLOOD SUGAR 1 TIMES DAILY OR AS DIRECTED.  Refills: 0      blood glucose monitoring (ACCU-CHEK MULTICLIX) lancets USE TO TEST BLOOD SUGAR 1 TIMES DAILY OR AS DIRECTED.  Refills: 3      EPINEPHrine (EPIPEN/ADRENACLICK/OR ANY BX GENERIC EQUIV) 0.3 MG/0.3ML injection 2-pack Inject 0.3 mLs (0.3 mg) into the muscle once as needed for anaphylaxis  Qty: 1 each, Refills: 1    Associated Diagnoses: Bee sting allergy      hydrochlorothiazide (HYDRODIURIL) 25 MG tablet Take 1 tablet (25 mg) by mouth daily  Qty: 90 tablet, Refills: 3    Associated Diagnoses: Benign hypertension      insulin glargine (BASAGLAR KWIKPEN) 100 UNIT/ML pen Inject 64 Units Subcutaneous At Bedtime SCHEDULE FASTING LAB APPOINTMENT WITHIN THE NEXT 30 DAYS.  Qty: 30 mL, Refills: 0    Comments: If Basaglar is not covered by insurance, may substitute Lantus or Semglee or other insulin glargine product per insurance preference at same  "dose and frequency.    Associated Diagnoses: Controlled type 2 diabetes mellitus with complication, with long-term current use of insulin (H)      losartan (COZAAR) 100 MG tablet Take 1 tablet (100 mg) by mouth daily  Qty: 90 tablet, Refills: 3    Associated Diagnoses: Benign hypertension      metFORMIN (GLUCOPHAGE XR) 500 MG 24 hr tablet TAKE 2 TABLETS BY MOUTH TWICE A DAY WITH MEALS.  Qty: 360 tablet, Refills: 3    Associated Diagnoses: Controlled type 2 diabetes mellitus with complication, with long-term current use of insulin (H)      metoprolol succinate ER (TOPROL XL) 200 MG 24 hr tablet Take 1 tablet (200 mg) by mouth daily SCHEDULE NURSE BLOOD PRESSURE CHECK BEFORE OTHER REFILLS ARE GIVEN  Qty: 90 tablet, Refills: 0    Associated Diagnoses: Benign hypertension           STOP taking these medications       amoxicillin-clavulanate (AUGMENTIN) 875-125 MG tablet Comments:   Reason for Stopping:                     Consultations:   Ortho consultation was  requested during this admission            Discharge Exam:   Blood pressure (!) 147/59, pulse 61, temperature 98.3  F (36.8  C), temperature source Oral, resp. rate 16, height 1.549 m (5' 1\"), weight 141.5 kg (311 lb 15.2 oz), SpO2 95 %.  GENERAL APPEARANCE: healthy, alert and no distress  EYES: conjunctiva clear, eyes grossly normal  HENT: external ears and nose normal   MS: no clubbing, cyanosis; trace edema RLE  SKIN: clear without significant rashes or lesions  NEURO: non-focal-moves all 4 extr    Unresulted Labs Ordered in the Past 30 Days of this Admission       Date and Time Order Name Status Description    9/28/2023  5:28 PM Surgical Pathology Exam In process             No results found for this or any previous visit (from the past 24 hour(s)).         Pending Tests at Discharge:   None         Discharge Instructions and Follow-Up:     Discharge diet: Regular   Discharge activity: Activity as tolerated   Discharge follow-up: Follow up with primary care " provider in 1-2 weeks  F/up ortho as recommended           Discharge Disposition:     Discharged to home      Attestation:  I have reviewed today's vital signs, notes, medications, labs and imaging.    Time Spent on this Encounter   I, Rigoberto Stroud MD, personally saw the patient today and spent greater than 30 minutes discharging this patient.    Rigoberto Stroud MD

## 2023-10-02 NOTE — DISCHARGE INSTRUCTIONS
Grupo,    Your wheelchair is ready for  today upon discharge.   [place] [address], [place] [address]   [phone]  Closes 4 PM    Best regards.  KALANI Perdomo  Care Transitions   Tele: [phone]

## 2023-10-02 NOTE — PLAN OF CARE
Goal Outcome Evaluation:      Plan of Care Reviewed With: patient    Overall Patient Progress: improving     Post op day 4  DATE & TIME: 10/1 7P-7A    Cognitive Concerns/ Orientation : Alert and oriented   BEHAVIOR & AGGRESSION TOOL COLOR: Green, pleasant and cooperative  ABNL VS/O2: VSS on room air  MOBILITY: SBA and walker for bed/chair transfers. WC behind for ambulation in hallway  PAIN MANAGEMENT: 10 mg oxy  DIET: Regular  BOWEL/BLADDER: Continent   ABNL LAB/BG: , 139  DRAIN/DEVICES: PIV SL   TELEMETRY RHYTHM: n/a   SKIN: Left BKA dressing CDI . +2 edema of RLE  TESTS/PROCEDURES: n/a  D/C DATE: Likely today   OTHER IMPORTANT INFO: Left knee immobilizer on while in bed

## 2023-10-02 NOTE — PROGRESS NOTES
Care Management Discharge Note    Discharge Date: 10/02/2023     Discharge Disposition: Home Care; [facility]  P: [phone] /F: [phone], for RN, PT & OT cares.    Discharge Services: Home Care    Discharge DME: Wheelchair, Walker    Discharge Transportation: family or friend will provide    Private pay costs discussed: insurance costs co-pays for DME    Does the patient's insurance plan have a 3 day qualifying hospital stay waiver?  Yes     Which insurance plan 3 day waiver is available? Alternative insurance waiver    Will the waiver be used for post-acute placement? No    PAS Confirmation Code:  NA  Patient/family educated on Medicare website which has current facility and service quality ratings: yes    Education Provided on the Discharge Plan: Yes  Persons Notified of Discharge Plans: Patient  Patient/Family in Agreement with the Plan: yes    Handoff Referral Completed: Yes    Additional Information:  Per IDT MD delmar states that pt is medically stable for discharge today.    PT/OT recommends Home with home care & new wheelchair & walker.  Pt/family are in agreement.    Pt is accepted for cares at [facility]  P: [phone] /F: [phone] for RN, PT/OT cares.  Pt was informed they will begin cares within 48-72 hours of discharge & call his home to schedule first assessment.    Pt also notified that his wheelchair is at [employer].  Hailey called, spoke with staff, Neelima, who informed the wheelchair is in the store & available for .  Hailey put this information in the pt's AVS orders.    Transportation discussed and wife to transport home.         KALANI George

## 2023-10-02 NOTE — PROGRESS NOTES
"[facility] Progress Note      Post-operative Day: 4 Days Post-Op    Procedure(s):  Left, AMPUTATION, BELOW KNEE  Subjective:    Pt states that overall he is doing well; he had more nerve type pain yesterday and took some pain meds, but today is feeling better. He is looking forward to discharging.     Chest pain, SOB:  No  Nausea, vomiting:  No  Lightheadedness, dizziness:  No  Neuro:  Patient denies new onset numbness or paresthesias      Objective:  Blood pressure (!) 147/59, pulse 61, temperature 98.3  F (36.8  C), temperature source Oral, resp. rate 16, height 1.549 m (5' 1\"), weight 141.5 kg (311 lb 15.2 oz), SpO2 95 %.    Patient Vitals for the past 24 hrs:   BP Temp Temp src Pulse Resp SpO2   10/02/23 0606 (!) 147/59 98.3  F (36.8  C) Oral 61 16 95 %   10/01/23 2235 138/63 98.8  F (37.1  C) Oral 63 18 93 %   10/01/23 1504 139/52 98.8  F (37.1  C) Oral 65 17 96 %       Wt Readings from Last 4 Encounters:   09/30/23 141.5 kg (311 lb 15.2 oz)   09/25/23 136.5 kg (301 lb)   06/12/23 136.5 kg (301 lb)   03/14/23 136.7 kg (301 lb 6.4 oz)       Gen: A&O x 3. NAD. Appears comfortable, up to the recliner.   Wound status: Covered. Post op dressings are c/d/I on left BKA.     Pertinent Labs   Lab Results: personally reviewed.     Recent Labs   Lab Test 10/02/23  0551 10/01/23  0536 09/30/23  0506 09/29/23  0457 09/28/23  0611 09/27/23  0540 09/26/23  0536 09/25/23  1210 07/21/20  1455 11/08/19  0651   INR  --   --   --   --   --   --   --  1.25*  --   --    HGB  --   --  8.7* 9.5* 9.4* 9.2*   < > 11.3*  --  12.8*   HCT  --   --   --  30.3* 29.4* 28.7*   < > 35.2*  --  40.0   MCV  --   --   --  88 87 86   < > 87  --  86   *  --   --  556* 531* 542*   < > 606*  --  399   NA  --  135  --  132* 134* 135   < > 134*   < >  --    CRP  --   --   --   --   --   --   --   --   --  22.2*    < > = values in this interval not displayed.       Plan:   YOJANA LLMARTIN  Prosthetics consult completed  ASA BID for DVT " ppx  Maintain dressing clean and dry.  Follow ortho 10-14 days for wound check.  Orthopedically stable. Will follow peripherally for the remainder of his stay.             Report completed by:  Riky Blackman PA-C  Date: 10/2/2023  Time: 9:40 AM

## 2023-10-02 NOTE — PLAN OF CARE
Goal Outcome Evaluation:      Plan of Care Reviewed With: patient    Overall Patient Progress: improvingOverall Patient Progress: improving     Neuro: A&Ox4. Slightly off to situation. Pt upset that wheelchair was for purchase, he wanted to rent it and was able to but did not want to buy it, explained that he may need it for the future and he wasn't interested.   Cardiac: Afebrile, VSS. Hr regular   Respiratory: Sating mid 90's on RA.  GI/: Adequate urine output. BM X1  Diet/appetite: Tolerating Regular diet. Eating well.  Activity:  Assist of 1, up to wheelchair pivots with walker.  Pain: At acceptable level on current regimen. Oxycodone this am.   Skin: Left BKA Dressing in place.   LDA's: Piv in place, removed for discharge    Plan: Wheelchair picked up and Wife was going to get walker and  meds when they go to car. Continue with POC. Notify primary team with changes.

## 2023-10-03 ENCOUNTER — TELEPHONE (OUTPATIENT)
Dept: FAMILY MEDICINE | Facility: CLINIC | Age: 63
End: 2023-10-03

## 2023-10-03 ENCOUNTER — PATIENT OUTREACH (OUTPATIENT)
Dept: CARE COORDINATION | Facility: CLINIC | Age: 63
End: 2023-10-03
Payer: COMMERCIAL

## 2023-10-03 LAB
PATH REPORT.COMMENTS IMP SPEC: NORMAL
PATH REPORT.COMMENTS IMP SPEC: NORMAL
PATH REPORT.FINAL DX SPEC: NORMAL
PATH REPORT.GROSS SPEC: NORMAL
PATH REPORT.MICROSCOPIC SPEC OTHER STN: NORMAL
PATH REPORT.RELEVANT HX SPEC: NORMAL
PHOTO IMAGE: NORMAL

## 2023-10-03 PROCEDURE — 88311 DECALCIFY TISSUE: CPT | Mod: 26 | Performed by: PATHOLOGY

## 2023-10-03 PROCEDURE — 88307 TISSUE EXAM BY PATHOLOGIST: CPT | Mod: 26 | Performed by: PATHOLOGY

## 2023-10-03 ASSESSMENT — ACTIVITIES OF DAILY LIVING (ADL): DEPENDENT_IADLS:: CLEANING;COOKING;LAUNDRY;SHOPPING;MEAL PREPARATION;TRANSPORTATION

## 2023-10-03 NOTE — PROGRESS NOTES
Clinic Care Coordination Contact  [facility]/Voicemail    Referral Source: IP Handoff  Clinical Data: Care Coordinator Outreach  Outreach attempted x 1.  Left message on patient's voicemail with call back information and requested return call. Left a reminder message to make a PCP Hospital follow up visit   Plan: . Care Coordinator will try to reach patient again in 1-2 business days.    [facility]   Aline Heck RN, Care Coordinator   [facility]'s   E-mail [email or web address]   [phone]

## 2023-10-03 NOTE — TELEPHONE ENCOUNTER
Pt is being discharged from hospital today, Antonette with [facility] asks if Dr. Pearce will follow pt for home care orders. Writer notes that pt is established, last visit was a physical on 3/14/23. Writer advised that PCP can follow, and notified that pt needs to schedule a hospital follow up visit. Routing to PCP as FYI.    Pilar Vásquez RN  [facility]

## 2023-10-04 ASSESSMENT — ACTIVITIES OF DAILY LIVING (ADL): DEPENDENT_IADLS:: CLEANING;COOKING;LAUNDRY;SHOPPING;MEAL PREPARATION;TRANSPORTATION

## 2023-10-04 NOTE — PROGRESS NOTES
Clinic Care Coordination Contact  Clinic Care Coordination Contact  OUTREACH    Referral Information:  Referral Source: IP Handoff    Primary Diagnosis: Orthopedic    Chief Complaint   Patient presents with    Clinic Care Coordination - Post Hospital     Clinic Care Coordination RN         Universal Utilization:   9/25/2023 - 10/2/2023 (7 days)  [facility]   Chronic left foot wound - osteomyelitis of left foot, tenosynovitis of foot, septic arthritis of left foot -  S/p BKA 9/28       Clinic Utilization  Difficulty keeping appointments:: No  Compliance Concerns: No  No-Show Concerns: No  No PCP office visit in Past Year: No  Utilization      Hospital Admissions  1             ED Visits  2             No Show Count (past year)  0                    Current as of: 10/3/2023  9:31 PM                Clinical Concerns:  Current Medical Concerns:  Brief conversation with patient today.     Patient reports everything is going well as expected.  Very little pain and surgical site is healing well  Patient has a home care visit tomorrow and has no questions or concerns at this time   Patient remined to make a hospital follow up with PCP  Patient agrees to call the clinic in a few days   Current Behavioral Concerns: No    Education Provided to patient: CC RN role introduced    Pain  Pain (GOAL):: No  Health Maintenance Reviewed: Not assessed  Clinical Pathway: None    Medication Management:  Medication review status: No additional questions        Functional Status:  Dependent ADLs:: Wheelchair-with assist  Dependent IADLs:: Cleaning, Cooking, Laundry, Shopping, Meal Preparation, Transportation  Bed or wheelchair confined:: No  Mobility Status: Dependent/Assisted by Another    Living Situation:  Current living arrangement:: I live in a private home with spouse    Lifestyle & Psychosocial Needs:    Social Determinants of Health     Food Insecurity: Not on file   Depression: Not at risk (3/14/2023)     PHQ-2     PHQ-2 Score: 0   Housing Stability: Not on file   Tobacco Use: Low Risk  (9/28/2023)    Patient History     Smoking Tobacco Use: Never     Smokeless Tobacco Use: Never     Passive Exposure: Never   Financial Resource Strain: Not on file   Alcohol Use: Not on file   Transportation Needs: Not on file   Physical Activity: Not on file   Interpersonal Safety: Not on file   Stress: Not on file   Social Connections: Not on file     Diet:: Diabetic diet  Inadequate nutrition (GOAL):: No  Tube Feeding: No  Inadequate activity/exercise (GOAL):: No  Significant changes in sleep pattern (GOAL): No  Transportation means:: Family     Restorationist or spiritual beliefs that impact treatment:: No  Mental health DX:: No  Mental health management concern (GOAL):: No  Chemical Dependency Status: No Current Concerns  Informal Support system:: Spouse             Resources and Interventions:  Current Resources:   Skilled Home Care Services: Skilled Nursing, Home Health Aid, Physical Therapy  Community Resources: Home Care  Supplies Currently Used at Home: Diabetic Supplies, Wound Care Supplies  Equipment Currently Used at Home: wheelchair, manual            Advance Care Plan/Directive  Advanced Care Plans/Directives on file:: No    Referrals Placed: None      Patient/Caregiver understanding: Patient expresses good understanding of discharge instructions     Outreach Frequency: weekly      Plan:   Patient will start home care services tomorrow   No unmet needs, declines any further care coordination at this time    [facility]   Aline Heck RN, Care Coordinator   [facility]'s   E-mail [email or web address]   [phone]

## 2023-10-05 ENCOUNTER — TELEPHONE (OUTPATIENT)
Dept: FAMILY MEDICINE | Facility: CLINIC | Age: 63
End: 2023-10-05
Payer: COMMERCIAL

## 2023-10-05 NOTE — TELEPHONE ENCOUNTER
Home Care is calling regarding an established patient with [facility].       Requesting orders from: Mauricio Pearce  Provider is following patient: Yes  Is this a 60-day recertification request?  No    Orders Requested    Skilled Nursing  Request for initial certification (first set of orders)   Frequency:  1x/wk for 4 wks to follow up on wound assessment, blood pressure & blood sugars.    Physical Therapy  Request for initial certification (first set of orders)   Frequency:  2x/wk for 2 wks, then 1x/wk x2 weeks to follow up on home exercises, transfers, balance & activity tolerance for gait training.    Occupational Therapy  *to evaluate & treat.    Information was gathered and will be sent to provider for review.  RN will contact Home Care with information after provider review.  Confirmed ok to leave a detailed message with call back.  Contact information confirmed and updated as needed.    Wendy Richards RN

## 2023-10-09 ENCOUNTER — TELEPHONE (OUTPATIENT)
Dept: FAMILY MEDICINE | Facility: CLINIC | Age: 63
End: 2023-10-09
Payer: COMMERCIAL

## 2023-10-09 NOTE — TELEPHONE ENCOUNTER
GARCIA Thomas with [facility] is calling regarding an established patient with [facility].  Requesting orders from: Mauricio Pearce [facility]  Provider is following patient: Yes  Is this a 60-day recertification request?  No    Orders Requested    Occupational Therapy  Request for initial certification (first set of orders)   Frequency:  1x/wk for 4 wks to address upper extremity weakness, adaptive equipment needs and ADL independence.     Confirmed ok to leave a detailed message with call back.  Contact information confirmed and updated as needed.    If leaving a detailed voicemail, need first and last name of RN and title of RN.      YAHAIRA VASQUEZ, RN

## 2023-10-16 ENCOUNTER — TELEPHONE (OUTPATIENT)
Dept: FAMILY MEDICINE | Facility: CLINIC | Age: 63
End: 2023-10-16
Payer: COMMERCIAL

## 2023-10-16 NOTE — LETTER
October 16, 2023    To  Juan Balderrama  [address]  [place] [address]    Your team at [facility] cares about your health. We have reviewed your chart and based on our findings; we are making the following recommendations to better manage your health.     You are in particular need of attention regarding the following:     Call or MyChart message your clinic to schedule a colonoscopy, schedule/ a FIT Test, or order a Cologuard test. If you are unsure what type of test you need, please call your clinic and speak to clinic staff.   Colon cancer is now the second leading cause of cancer-related deaths in the [facility] for both men and women and there are over 130,000 new cases and 50,000 deaths per year from colon cancer. Colonoscopies can prevent 90-95% of these deaths. Problem lesions can be removed before they ever become cancer. This test is not only looking for cancer, but also getting rid of precancerous lesions.   If you are under/uninsured, we recommend you contact the [employer] Program.[employer] is a free colorectal cancer screening program that provides colonoscopies for eligible under/uninsured [place] men and women. If you are interested in receiving a free colonoscopy, please call [employer] at t [phone] (mention code [employer]) to see if you're eligible. Please have them send us the results.   Please call [phone] to schedule a colonoscopy.    If you have already completed these items, please contact the clinic via phone or   [facility] so your care team can review and update your records. Thank you for   choosing [facility] for your healthcare needs. For any questions,   concerns, or to schedule an appointment please contact our clinic.    Healthy Regards,      Your [facility] Care Team

## 2023-10-16 NOTE — TELEPHONE ENCOUNTER
Patient Quality Outreach    Patient is due for the following:   Colon Cancer Screening    Next Steps:   Schedule appointment for colon cancer screening    Type of outreach:    Sent letter.      Questions for provider review:    None           Nicki Zapata MA

## 2023-10-19 ENCOUNTER — DOCUMENTATION ONLY (OUTPATIENT)
Dept: ORTHOPEDICS | Facility: CLINIC | Age: 63
End: 2023-10-19
Payer: COMMERCIAL

## 2023-10-19 NOTE — PROGRESS NOTES
I called and left a message on his Phone# in BT stating that the next step is to have Siena call him and schedule a time for him in the [facility] or at [facility] dept ( what ever works best for him) to get Required documentation so we can begin the process of making him a prosthesis. I mentioned that she would be back in the office next week.  Pacheco Davies CPO, LPO.

## 2023-10-25 ENCOUNTER — TRANSFERRED RECORDS (OUTPATIENT)
Dept: HEALTH INFORMATION MANAGEMENT | Facility: CLINIC | Age: 63
End: 2023-10-25
Payer: COMMERCIAL

## 2023-10-26 ENCOUNTER — ANESTHESIA EVENT (OUTPATIENT)
Dept: SURGERY | Facility: CLINIC | Age: 63
End: 2023-10-26
Payer: COMMERCIAL

## 2023-10-26 ENCOUNTER — TELEPHONE (OUTPATIENT)
Dept: FAMILY MEDICINE | Facility: CLINIC | Age: 63
End: 2023-10-26
Payer: COMMERCIAL

## 2023-10-26 ASSESSMENT — LIFESTYLE VARIABLES: TOBACCO_USE: 1

## 2023-10-26 NOTE — TELEPHONE ENCOUNTER
Home Care is calling regarding an established patient with [facility].       Requesting orders from: Mauricio Pearce  Provider is following patient: Yes  Is this a 60-day recertification request?  No    Orders Requested    Skilled Nursinx/week x 2 weeks, and 3 PRN visits for follow up wound care/mgmt for left stump amputation site.    FYI: pt has appt with Dr Rueda tomorrow for I & D due to wound dehisc.  Update: pt had 1 missed home care visit yesterday because pt was seeing Dr Rueda after bumping his stump & had some bleeding.        Information was gathered and will be sent to provider for review.  RN will contact Home Care with information after provider review.  Confirmed ok to leave a detailed message with call back.  Contact information confirmed and updated as needed.    Wendy Richards RN

## 2023-10-26 NOTE — TELEPHONE ENCOUNTER
Called & left detailed msg for AMANDEEP Ackerman with  [facility] & read providers message.    Tarah Richards RN

## 2023-10-26 NOTE — ANESTHESIA PREPROCEDURE EVALUATION
Anesthesia Pre-Procedure Evaluation    Patient: Juan Balderrama   MRN: [id number] : 1960        Procedure : Procedure(s):  Lower Extremity Incision and Drainage  Apply Wound Vac          Past Medical History:   Diagnosis Date     Diabetes (H) -     Hypertension -     Septic arthritis of right foot (H) 2023      Past Surgical History:   Procedure Laterality Date     AMPUTATE LEG BELOW KNEE Left 2023    Procedure: Left, AMPUTATION, BELOW KNEE;  Surgeon: Bjorn Rueda MD;  Location: [facility] OR     EYE SURGERY  4-3-2017    Cataract      Allergies   Allergen Reactions     Bees      Sulfamethoxazole Swelling     throat      Social History     Tobacco Use     Smoking status: Never     Passive exposure: Never     Smokeless tobacco: Never   Substance Use Topics     Alcohol use: Yes     Comment: social      Wt Readings from Last 1 Encounters:   23 141.5 kg (311 lb 15.2 oz)        Anesthesia Evaluation   Pt has had prior anesthetic. Type: MAC and General.        ROS/MED HX  ENT/Pulmonary:     (+)                tobacco use, Past use,                      Neurologic:  - neg neurologic ROS     Cardiovascular:     (+) Dyslipidemia hypertension- -   -  - -                                      METS/Exercise Tolerance:     Hematologic:       Musculoskeletal: Comment: Septic arthritis and osteomyelitis    (+)  arthritis,             GI/Hepatic:  - neg GI/hepatic ROS     Renal/Genitourinary:  - neg Renal ROS     Endo:     (+)  type II DM,   Using insulin,          Obesity (Extreme morbid),       Psychiatric/Substance Use:       Infectious Disease:       Malignancy:  - neg malignancy ROS     Other:  - neg other ROS          Physical Exam    Airway        Mallampati: II   TM distance: > 3 FB   Neck ROM: full   Mouth opening: > 3 cm    Respiratory Devices and Support         Dental    unable to assess        Cardiovascular   cardiovascular exam normal          Pulmonary   pulmonary exam normal       "      OUTSIDE LABS:  CBC:   Lab Results   Component Value Date    WBC 12.4 (H) 09/29/2023    WBC 13.3 (H) 09/28/2023    HGB 8.7 (L) 09/30/2023    HGB 9.5 (L) 09/29/2023    HCT 30.3 (L) 09/29/2023    HCT 29.4 (L) 09/28/2023     (H) 10/02/2023     (H) 09/29/2023     BMP:   Lab Results   Component Value Date     10/01/2023     (L) 09/29/2023    POTASSIUM 4.3 10/01/2023    POTASSIUM 4.9 09/29/2023    CHLORIDE 100 10/01/2023    CHLORIDE 98 09/29/2023    CO2 27 10/01/2023    CO2 23 09/29/2023    BUN 19.9 10/01/2023    BUN 18.0 09/29/2023    CR 0.91 10/02/2023    CR 0.94 10/01/2023     (H) 10/02/2023     (H) 10/02/2023     COAGS:   Lab Results   Component Value Date    INR 1.25 (H) 09/25/2023     POC: No results found for: \"BGM\", \"HCG\", \"HCGS\"  HEPATIC:   Lab Results   Component Value Date    ALBUMIN 3.2 (L) 09/25/2023    PROTTOTAL 7.6 09/25/2023    ALT 24 09/25/2023    AST 23 09/25/2023    ALKPHOS 92 09/25/2023    BILITOTAL 0.4 09/25/2023     OTHER:   Lab Results   Component Value Date    LACT 0.9 09/28/2023    A1C 6.7 (H) 09/25/2023    PHONG 7.7 (L) 10/01/2023    MAG 1.9 09/29/2023    TSH 3.07 11/29/2018    CRP 22.2 (H) 11/08/2019    SED 81 (H) 09/25/2023       Anesthesia Plan    ASA Status:  3       Anesthesia Type: General.     - Airway: ETT   Induction: Intravenous.   Maintenance: TIVA.        Consents    Anesthesia Plan(s) and associated risks, benefits, and realistic alternatives discussed. Questions answered and patient/representative(s) expressed understanding.     - Discussed: Risks, Benefits and Alternatives for the PROCEDURE were discussed     - Discussed with:  Patient            Postoperative Care    Pain management: IV analgesics, Oral pain medications.   PONV prophylaxis: Ondansetron (or other 5HT-3), Dexamethasone or Solumedrol     Comments:              GT Adler CRNA  "

## 2023-10-27 ENCOUNTER — HOSPITAL ENCOUNTER (OUTPATIENT)
Facility: CLINIC | Age: 63
Discharge: HOME | End: 2023-10-27
Attending: ORTHOPAEDIC SURGERY | Admitting: ORTHOPAEDIC SURGERY
Payer: COMMERCIAL

## 2023-10-27 ENCOUNTER — ANESTHESIA (OUTPATIENT)
Dept: SURGERY | Facility: CLINIC | Age: 63
End: 2023-10-27
Payer: COMMERCIAL

## 2023-10-27 ENCOUNTER — MEDICAL CORRESPONDENCE (OUTPATIENT)
Dept: HEALTH INFORMATION MANAGEMENT | Facility: CLINIC | Age: 63
End: 2023-10-27

## 2023-10-27 VITALS
HEART RATE: 59 BPM | OXYGEN SATURATION: 98 % | TEMPERATURE: 98.1 F | DIASTOLIC BLOOD PRESSURE: 72 MMHG | RESPIRATION RATE: 16 BRPM | SYSTOLIC BLOOD PRESSURE: 131 MMHG

## 2023-10-27 DIAGNOSIS — T87.44 INFECTION OF LEFT BELOW KNEE AMPUTATION (H): Primary | ICD-10-CM

## 2023-10-27 LAB
GLUCOSE BLDC GLUCOMTR-MCNC: 125 MG/DL (ref 70–99)
GLUCOSE BLDC GLUCOMTR-MCNC: 68 MG/DL (ref 70–99)
GRAM STAIN RESULT: ABNORMAL
GRAM STAIN RESULT: ABNORMAL

## 2023-10-27 PROCEDURE — 250N000009 HC RX 250: Performed by: NURSE ANESTHETIST, CERTIFIED REGISTERED

## 2023-10-27 PROCEDURE — 710N000012 HC RECOVERY PHASE 2, PER MINUTE: Performed by: ORTHOPAEDIC SURGERY

## 2023-10-27 PROCEDURE — 250N000013 HC RX MED GY IP 250 OP 250 PS 637: Performed by: NURSE ANESTHETIST, CERTIFIED REGISTERED

## 2023-10-27 PROCEDURE — 250N000011 HC RX IP 250 OP 636

## 2023-10-27 PROCEDURE — 258N000003 HC RX IP 258 OP 636: Performed by: NURSE ANESTHETIST, CERTIFIED REGISTERED

## 2023-10-27 PROCEDURE — 82962 GLUCOSE BLOOD TEST: CPT

## 2023-10-27 PROCEDURE — 87075 CULTR BACTERIA EXCEPT BLOOD: CPT | Performed by: ORTHOPAEDIC SURGERY

## 2023-10-27 PROCEDURE — 87077 CULTURE AEROBIC IDENTIFY: CPT | Mod: XU | Performed by: ORTHOPAEDIC SURGERY

## 2023-10-27 PROCEDURE — 370N000017 HC ANESTHESIA TECHNICAL FEE, PER MIN: Performed by: ORTHOPAEDIC SURGERY

## 2023-10-27 PROCEDURE — 360N000075 HC SURGERY LEVEL 2, PER MIN: Performed by: ORTHOPAEDIC SURGERY

## 2023-10-27 PROCEDURE — 258N000001 HC RX 258: Performed by: ORTHOPAEDIC SURGERY

## 2023-10-27 PROCEDURE — 272N000001 HC OR GENERAL SUPPLY STERILE: Performed by: ORTHOPAEDIC SURGERY

## 2023-10-27 PROCEDURE — 999N000141 HC STATISTIC PRE-PROCEDURE NURSING ASSESSMENT: Performed by: ORTHOPAEDIC SURGERY

## 2023-10-27 PROCEDURE — 250N000011 HC RX IP 250 OP 636: Performed by: NURSE ANESTHETIST, CERTIFIED REGISTERED

## 2023-10-27 PROCEDURE — 258N000001 HC RX 258: Performed by: NURSE ANESTHETIST, CERTIFIED REGISTERED

## 2023-10-27 PROCEDURE — 87205 SMEAR GRAM STAIN: CPT | Performed by: ORTHOPAEDIC SURGERY

## 2023-10-27 RX ORDER — PROPOFOL 10 MG/ML
INJECTION, EMULSION INTRAVENOUS CONTINUOUS PRN
Status: DISCONTINUED | OUTPATIENT
Start: 2023-10-27 | End: 2023-10-27

## 2023-10-27 RX ORDER — ACETAMINOPHEN 325 MG/1
975 TABLET ORAL ONCE
Status: COMPLETED | OUTPATIENT
Start: 2023-10-27 | End: 2023-10-27

## 2023-10-27 RX ORDER — ONDANSETRON 4 MG/1
4 TABLET, ORALLY DISINTEGRATING ORAL EVERY 30 MIN PRN
Status: CANCELLED | OUTPATIENT
Start: 2023-10-27

## 2023-10-27 RX ORDER — SODIUM CHLORIDE, SODIUM LACTATE, POTASSIUM CHLORIDE, CALCIUM CHLORIDE 600; 310; 30; 20 MG/100ML; MG/100ML; MG/100ML; MG/100ML
INJECTION, SOLUTION INTRAVENOUS CONTINUOUS
Status: DISCONTINUED | OUTPATIENT
Start: 2023-10-27 | End: 2023-10-27 | Stop reason: HOSPADM

## 2023-10-27 RX ORDER — OXYCODONE HYDROCHLORIDE 5 MG/1
5-10 TABLET ORAL EVERY 4 HOURS PRN
Qty: 20 TABLET | Refills: 0 | Status: SHIPPED | OUTPATIENT
Start: 2023-10-27 | End: 2024-08-26

## 2023-10-27 RX ORDER — ONDANSETRON 2 MG/ML
INJECTION INTRAMUSCULAR; INTRAVENOUS PRN
Status: DISCONTINUED | OUTPATIENT
Start: 2023-10-27 | End: 2023-10-27

## 2023-10-27 RX ORDER — FENTANYL CITRATE 50 UG/ML
25 INJECTION, SOLUTION INTRAMUSCULAR; INTRAVENOUS EVERY 5 MIN PRN
Status: CANCELLED | OUTPATIENT
Start: 2023-10-27

## 2023-10-27 RX ORDER — CEFAZOLIN SODIUM/WATER 3 G/30 ML
3 SYRINGE (ML) INTRAVENOUS SEE ADMIN INSTRUCTIONS
Status: DISCONTINUED | OUTPATIENT
Start: 2023-10-27 | End: 2023-10-27 | Stop reason: HOSPADM

## 2023-10-27 RX ORDER — ONDANSETRON 2 MG/ML
4 INJECTION INTRAMUSCULAR; INTRAVENOUS EVERY 30 MIN PRN
Status: CANCELLED | OUTPATIENT
Start: 2023-10-27

## 2023-10-27 RX ORDER — CEFAZOLIN SODIUM/WATER 3 G/30 ML
3 SYRINGE (ML) INTRAVENOUS
Status: COMPLETED | OUTPATIENT
Start: 2023-10-27 | End: 2023-10-27

## 2023-10-27 RX ORDER — KETAMINE HYDROCHLORIDE 10 MG/ML
INJECTION INTRAMUSCULAR; INTRAVENOUS PRN
Status: DISCONTINUED | OUTPATIENT
Start: 2023-10-27 | End: 2023-10-27

## 2023-10-27 RX ORDER — HYDROMORPHONE HCL IN WATER/PF 6 MG/30 ML
0.2 PATIENT CONTROLLED ANALGESIA SYRINGE INTRAVENOUS EVERY 5 MIN PRN
Status: CANCELLED | OUTPATIENT
Start: 2023-10-27

## 2023-10-27 RX ORDER — CEPHALEXIN 500 MG/1
500 CAPSULE ORAL 3 TIMES DAILY
Qty: 42 CAPSULE | Refills: 0 | Status: SHIPPED | OUTPATIENT
Start: 2023-10-27 | End: 2023-11-10

## 2023-10-27 RX ORDER — LIDOCAINE 40 MG/G
CREAM TOPICAL
Status: DISCONTINUED | OUTPATIENT
Start: 2023-10-27 | End: 2023-10-27 | Stop reason: HOSPADM

## 2023-10-27 RX ORDER — GABAPENTIN 300 MG/1
300 CAPSULE ORAL
Status: COMPLETED | OUTPATIENT
Start: 2023-10-27 | End: 2023-10-27

## 2023-10-27 RX ORDER — SODIUM CHLORIDE, SODIUM LACTATE, POTASSIUM CHLORIDE, CALCIUM CHLORIDE 600; 310; 30; 20 MG/100ML; MG/100ML; MG/100ML; MG/100ML
INJECTION, SOLUTION INTRAVENOUS CONTINUOUS
Status: CANCELLED | OUTPATIENT
Start: 2023-10-27

## 2023-10-27 RX ORDER — FENTANYL CITRATE 50 UG/ML
50 INJECTION, SOLUTION INTRAMUSCULAR; INTRAVENOUS EVERY 5 MIN PRN
Status: CANCELLED | OUTPATIENT
Start: 2023-10-27

## 2023-10-27 RX ORDER — DEXTROSE MONOHYDRATE 25 G/50ML
50 INJECTION, SOLUTION INTRAVENOUS ONCE
Status: COMPLETED | OUTPATIENT
Start: 2023-10-27 | End: 2023-10-27

## 2023-10-27 RX ORDER — HYDROMORPHONE HCL IN WATER/PF 6 MG/30 ML
0.4 PATIENT CONTROLLED ANALGESIA SYRINGE INTRAVENOUS EVERY 5 MIN PRN
Status: CANCELLED | OUTPATIENT
Start: 2023-10-27

## 2023-10-27 RX ORDER — FENTANYL CITRATE 50 UG/ML
INJECTION, SOLUTION INTRAMUSCULAR; INTRAVENOUS PRN
Status: DISCONTINUED | OUTPATIENT
Start: 2023-10-27 | End: 2023-10-27

## 2023-10-27 RX ADMIN — LIDOCAINE HYDROCHLORIDE 0.2 ML: 10 INJECTION, SOLUTION EPIDURAL; INFILTRATION; INTRACAUDAL; PERINEURAL at 12:41

## 2023-10-27 RX ADMIN — PROPOFOL 100 MCG/KG/MIN: 10 INJECTION, EMULSION INTRAVENOUS at 13:50

## 2023-10-27 RX ADMIN — FENTANYL CITRATE 100 MCG: 50 INJECTION INTRAMUSCULAR; INTRAVENOUS at 13:48

## 2023-10-27 RX ADMIN — MIDAZOLAM 2 MG: 1 INJECTION INTRAMUSCULAR; INTRAVENOUS at 13:48

## 2023-10-27 RX ADMIN — ONDANSETRON 4 MG: 2 INJECTION INTRAMUSCULAR; INTRAVENOUS at 13:59

## 2023-10-27 RX ADMIN — DEXTROSE MONOHYDRATE 50 ML: 25 INJECTION, SOLUTION INTRAVENOUS at 13:29

## 2023-10-27 RX ADMIN — ACETAMINOPHEN 975 MG: 325 TABLET, FILM COATED ORAL at 12:12

## 2023-10-27 RX ADMIN — SODIUM CHLORIDE, POTASSIUM CHLORIDE, SODIUM LACTATE AND CALCIUM CHLORIDE: 600; 310; 30; 20 INJECTION, SOLUTION INTRAVENOUS at 12:41

## 2023-10-27 RX ADMIN — KETAMINE HYDROCHLORIDE 30 MG: 10 INJECTION INTRAMUSCULAR; INTRAVENOUS at 13:48

## 2023-10-27 RX ADMIN — GABAPENTIN 300 MG: 300 CAPSULE ORAL at 12:12

## 2023-10-27 RX ADMIN — Medication 3 G: at 13:44

## 2023-10-27 ASSESSMENT — ACTIVITIES OF DAILY LIVING (ADL)
ADLS_ACUITY_SCORE: 35
ADLS_ACUITY_SCORE: 35

## 2023-10-27 NOTE — H&P
ORTHO CONSULT      HPI: Grupo is a 63-year-old male who underwent a left bone amputation 9/20/2023 for a diabetic foot infection associated with Charcot neuropathy.  When he was seen in clinic for staple removal the lateral area of his incision had dehisced by about 3 to 4 cm with exposed subcutaneous tissue and muscle.  Therefore, elected proceed with an I&D with incisional VAC placement.  There has been no change in his overall health status    ROS: A 10 pt ROS was obtained and negative except as mentioned in the HPI    ALLERGIES: NKDA    MEDICATIONS:   No current facility-administered medications on file prior to encounter.  acetaminophen (TYLENOL) 325 MG tablet, Take 2 tablets (650 mg) by mouth every 4 hours as needed for other (mild pain)  amLODIPine (NORVASC) 2.5 MG tablet, Take 1 tablet (2.5 mg) by mouth daily  aspirin 81 MG EC tablet, Take 1 tablet (81 mg) by mouth 2 times daily  atorvastatin (LIPITOR) 20 MG tablet, TAKE 1 TABLET BY MOUTH EVERY DAY (Patient taking differently: Take 20 mg by mouth every evening)  hydrochlorothiazide (HYDRODIURIL) 25 MG tablet, Take 1 tablet (25 mg) by mouth daily  insulin glargine (BASAGLAR KWIKPEN) 100 UNIT/ML pen, Inject 64 Units Subcutaneous At Bedtime SCHEDULE FASTING LAB APPOINTMENT WITHIN THE NEXT 30 DAYS.  losartan (COZAAR) 100 MG tablet, Take 1 tablet (100 mg) by mouth daily  metFORMIN (GLUCOPHAGE XR) 500 MG 24 hr tablet, TAKE 2 TABLETS BY MOUTH TWICE A DAY WITH MEALS.  metoprolol succinate ER (TOPROL XL) 200 MG 24 hr tablet, Take 1 tablet (200 mg) by mouth daily SCHEDULE NURSE BLOOD PRESSURE CHECK BEFORE OTHER REFILLS ARE GIVEN  oxyCODONE (ROXICODONE) 5 MG tablet, Take 1-2 tablets (5-10 mg) by mouth every 4 hours as needed for moderate to severe pain  BD PEN NEEDLE YENNY 2ND GEN 32G X 4 MM miscellaneous, USE 1 PEN NEEDLES DAILY OR AS DIRECTED.  blood glucose (ACCU-CHEK GANGA) test strip, Use to test blood sugar 1 times daily or as directed.  blood glucose monitoring  (ACCU-CHEK GANGA PLUS) meter device kit, USE TO TEST BLOOD SUGAR 1 TIMES DAILY OR AS DIRECTED.  blood glucose monitoring (ACCU-CHEK MULTICLIX) lancets, USE TO TEST BLOOD SUGAR 1 TIMES DAILY OR AS DIRECTED.  EPINEPHrine (EPIPEN/ADRENACLICK/OR ANY BX GENERIC EQUIV) 0.3 MG/0.3ML injection 2-pack, Inject 0.3 mLs (0.3 mg) into the muscle once as needed for anaphylaxis        PMH:  Past Medical History:   Diagnosis Date    Diabetes (H) 1-2017    Hypertension 1-2017    Septic arthritis of right foot (H) 9/26/2023       SOCIAL HISTORY: Lives with his wife    FAMILY HISTORY: Negative for bleeding disorders, clotting disorders, or adverse reactions to anesthesia    EXAM:  General: Pleasant, oriented  Neuro: CN II-XII intact  Eyes: Extraocular movements intact  Skin:  No rashes or lesions  Resp:  Nonlabored breathing  CV: Peripheral pulses regular  Abd:  Soft, NT, ND  Lymph: No localized lympadenopathy  MSK: Left below-knee amputation in place with 3 to 4 cm dehiscence of lateral portion of the wound      ASSESSMENT:    1.  Left below-knee amputation wound breakdown  2.  Comorbidities including type 2 diabetes    PLAN:   1.  At this point, plan moving forward with an left lower leg irrigation and debridement with placement of incisional VAC.  This to be a same-day surgery we will see him back in clinic in 2 weeks for incisional VAC placement 3 weeks for suture or staple removal he is medically optimized and cleared for surgery    Bjorn Rueda MD

## 2023-10-27 NOTE — ANESTHESIA CARE TRANSFER NOTE
Patient: Juan Balderrama    Procedure: Procedure(s):  Lower Extremity Incision and Drainage  Apply Wound Vac       Diagnosis: Infection [B99.9]  Diagnosis Additional Information: No value filed.    Anesthesia Type:   General     Note:    Oropharynx: oropharynx clear of all foreign objects  Level of Consciousness: awake  Oxygen Supplementation: face mask      Dentition: dentition unchanged  Vital Signs Stable: post-procedure vital signs reviewed and stable    Patient transferred to: Phase II    Handoff Report: Identifed the Patient, Identified the Reponsible Provider, Reviewed the pertinent medical history, Discussed the surgical course, Reviewed Intra-OP anesthesia mangement and issues during anesthesia, Set expectations for post-procedure period and Allowed opportunity for questions and acknowledgement of understanding  Vitals:  Vitals Value Taken Time   BP     Temp     Pulse     Resp     SpO2         Electronically Signed By: GT Parra CRNA  October 27, 2023  2:30 PM

## 2023-10-27 NOTE — PROCEDURES
10/27/2023    PREOPERATIVE DIAGNOSIS: Wound breakdown s/p left below knee amputation    POSTOPERATIVE DIAGNOSIS: Wound breakdown s/p left below knee amputation    PROCEDURE:    1. Left below knee amputation irrigation and debridement of skin, subcutaneous tissue, and muscle   2. Placement of left lower leg incisional VAC, 8 cm    SURGEON: Bjorn Rueda MD    ASSISTANT: Barbara Vargas PA-c.  The presence of a PA was necessary for positioning retraction, and safe progression of the case    ANESTHESIA:  General    BLOOD LOSS: 50cc    COMPLICATIONS: None apparent    CULTURES: Left knee tissue for aerobic and anaerobic    DISPOSITION: Stable to PACU    IMPLANTS: None    INDICATIONS: Grupo is a 63 year old male who underwent a below left knee amputation on 9/28/2023 for a diabetic foot infection associated with Charcot foot.  He initially did well and was recovering well.  He was seen in clinic in mid October and his staples removed.  A couple days later, he bumped his leg and noticed the lateral portion of his wound had dehisced.  He was seen in clinic.  Given the full-thickness skin dehiscence, we recommended irrigation and debridement with placement of an incisional VAC to optimize the chance of healing.  He understood the risks of infection, damage to vessels nerves, continued nonhealing, need for further surgery.    PROCEDURE: Grupo was met in the preoperative holding area where the left lower leg was marked.  He was then transferred to the operating theater.  After smooth induction of general anesthesia, he was positioned supine.  A timeout was performed verifying the correct patient, surgery, and location.  He received preoperative antibiotics.  The left lower leg was then prepped and draped in a standard sterile fashion.    We started by extending the dehisced area of the lateral portion incision by about 2 cm both medially and laterally.  Dissected sharply down through skin and subcutaneous tissue.    Fortunately, there was no tracking deep to the muscle.  The posterior gastroc soleus flap had healed well.  There was a potential space between the subcutaneous tissue and the muscle layer.  We took swab from this area which was sent for aerobic and anaerobic cultures.  We used a scalpel electrocautery to debride unhealthy appearing skin, subcutaneous tissue, and muscle.  Overall, this was about 8 cm in length.  The wound was then thoroughly irrigated with 3 L of normal saline via pulse lavage.  After this, we closed the subcutaneous layer with 2-0 Monocryl and skin with staples.  We then placed an 8 cm incisional VAC over the incision to optimize healing.  After VAC placement, he was stuck to the hooked up to the canister and had good suction.  We then placed a compressive Ace wrap. Grupo was then awoke from general anesthesia and transferred to recovery in stable condition    POSTOPERATIVE PLAN:    1.  Discharge home today when same-day discharge criteria   2.  Continue elevating left lower leg as well as using compressive wrap as much as possible to decrease swelling   3. Keflex 500mg tid x 14 days   34  Return to clinic in 2 weeks for incisional VAC removal.  We will take out the staples at 3 weeks postoperatively    MIRYAM HERNANDEZ MD

## 2023-10-27 NOTE — ANESTHESIA POSTPROCEDURE EVALUATION
Patient: Juan Balderrama    Procedure: Procedure(s):  Lower Extremity Incision and Drainage  Apply Wound Vac       Anesthesia Type:  General    Note:  Disposition: Outpatient   Postop Pain Control: Uneventful            Sign Out: Well controlled pain   PONV: No   Neuro/Psych: Uneventful            Sign Out: Acceptable/Baseline neuro status   Airway/Respiratory: Uneventful            Sign Out: Acceptable/Baseline resp. status   CV/Hemodynamics: Uneventful            Sign Out: Acceptable CV status; No obvious hypovolemia; No obvious fluid overload   Other NRE: NONE   DID A NON-ROUTINE EVENT OCCUR? No           Last vitals:  Vitals Value Taken Time   /62 10/27/23 1445   Temp 37.3  C (99.1  F) 10/27/23 1432   Pulse 59 10/27/23 1445   Resp 16 10/27/23 1432   SpO2 95 % 10/27/23 1448   Vitals shown include unfiled device data.    Electronically Signed By: Yonathan Heck CRNA, APRN CRNA  October 27, 2023  2:49 PM

## 2023-10-27 NOTE — DISCHARGE INSTRUCTIONS
Same Day Surgery Discharge Instructions  Special Precautions After Surgery - Adult    It is not unusual to feel lightheaded or faint, up to 24 hours after surgery or while taking pain medication.  If you have these symptoms; sit for a few minutes before standing and have someone assist you when getting up.  You should rest and relax for the next 24 hours and must have someone stay with you for at least 24 hours after your discharge.  DO NOT DRIVE any vehicle or operate mechanical equipment for 24 hours following the end of your surgery.  DO NOT DRIVE while taking narcotic pain medications that have been prescribed by your physician.  If you had a limb operated on, you must be able to use it fully to drive.  DO NOT drink alcoholic beverages for 24 hours following surgery or while taking prescription pain medication.  Drink clear liquids (apple juice, ginger ale, broth, 7-Up, etc.).  Progress to your regular diet as you feel able.  Any questions call your physician and do not make important decisions for 24 hours.      _____________________________________________________________________  IMPORTANT NUMBERS:    [facility] Main Number:  [phone], [phone]  Pharmacy:  [phone]  Same Day Surgery:  [phone], Monday - Friday until 8:30 p.m.  Urgent Care:  [phone]  Emergency Room:  [phone]      [facility]:  [phone]                                                                             [facility]:  [phone] option 1  [facility]:  [phone]     [facility]:  [phone]   [facility]:  [phone]   Home Medical Equipment: [phone]  [facility]:  [phone]

## 2023-10-30 ENCOUNTER — VIRTUAL VISIT (OUTPATIENT)
Dept: FAMILY MEDICINE | Facility: CLINIC | Age: 63
End: 2023-10-30
Payer: COMMERCIAL

## 2023-10-30 DIAGNOSIS — I10 BENIGN HYPERTENSION: Primary | ICD-10-CM

## 2023-10-30 DIAGNOSIS — Z91.030 BEE STING ALLERGY: ICD-10-CM

## 2023-10-30 DIAGNOSIS — E11.8 CONTROLLED TYPE 2 DIABETES MELLITUS WITH COMPLICATION, WITH LONG-TERM CURRENT USE OF INSULIN (H): ICD-10-CM

## 2023-10-30 DIAGNOSIS — Z79.4 CONTROLLED TYPE 2 DIABETES MELLITUS WITH COMPLICATION, WITH LONG-TERM CURRENT USE OF INSULIN (H): ICD-10-CM

## 2023-10-30 DIAGNOSIS — Z89.512 S/P BKA (BELOW KNEE AMPUTATION) UNILATERAL, LEFT (H): ICD-10-CM

## 2023-10-30 DIAGNOSIS — Z12.11 SCREEN FOR COLON CANCER: ICD-10-CM

## 2023-10-30 LAB
BACTERIA SPEC CULT: ABNORMAL

## 2023-10-30 PROCEDURE — 99214 OFFICE O/P EST MOD 30 MIN: CPT | Mod: VID | Performed by: FAMILY MEDICINE

## 2023-10-30 RX ORDER — ATORVASTATIN CALCIUM 20 MG/1
20 TABLET, FILM COATED ORAL EVERY EVENING
Qty: 90 TABLET | Refills: 3 | Status: SHIPPED | OUTPATIENT
Start: 2023-10-30 | End: 2024-06-12

## 2023-10-30 RX ORDER — AMLODIPINE BESYLATE 2.5 MG/1
2.5 TABLET ORAL DAILY
Qty: 90 TABLET | Refills: 3 | Status: SHIPPED | OUTPATIENT
Start: 2023-10-30 | End: 2024-06-12

## 2023-10-30 RX ORDER — HYDROCHLOROTHIAZIDE 25 MG/1
25 TABLET ORAL DAILY
Qty: 90 TABLET | Refills: 3 | Status: SHIPPED | OUTPATIENT
Start: 2023-10-30 | End: 2024-06-12

## 2023-10-30 RX ORDER — EPINEPHRINE 0.3 MG/.3ML
0.3 INJECTION SUBCUTANEOUS
Qty: 1 EACH | Refills: 1 | Status: SHIPPED | OUTPATIENT
Start: 2023-10-30 | End: 2024-06-12

## 2023-10-30 RX ORDER — LOSARTAN POTASSIUM 100 MG/1
100 TABLET ORAL DAILY
Qty: 90 TABLET | Refills: 3 | Status: SHIPPED | OUTPATIENT
Start: 2023-10-30 | End: 2024-06-12

## 2023-10-30 RX ORDER — METOPROLOL SUCCINATE 200 MG/1
200 TABLET, EXTENDED RELEASE ORAL DAILY
Qty: 90 TABLET | Refills: 3 | Status: SHIPPED | OUTPATIENT
Start: 2023-10-30 | End: 2024-06-12

## 2023-10-30 RX ORDER — INSULIN GLARGINE 100 [IU]/ML
64 INJECTION, SOLUTION SUBCUTANEOUS AT BEDTIME
Qty: 30 ML | Refills: 3 | Status: SHIPPED | OUTPATIENT
Start: 2023-10-30 | End: 2024-04-25

## 2023-10-30 RX ORDER — METFORMIN HCL 500 MG
TABLET, EXTENDED RELEASE 24 HR ORAL
Qty: 360 TABLET | Refills: 3 | Status: SHIPPED | OUTPATIENT
Start: 2023-10-30 | End: 2024-06-12

## 2023-10-30 NOTE — PROGRESS NOTES
Grupo is a 63 year old who is being evaluated via a billable video visit.      How would you like to obtain your AVS? MyChart  If the video visit is dropped, the invitation should be resent by: Text to cell phone: [phone]  Will anyone else be joining your video visit? No          Assessment & Plan     Benign hypertension  Last BP on record, in goal range.  Reinforced heart healthy lifestyle.   - metoprolol succinate ER (TOPROL XL) 200 MG 24 hr tablet  Dispense: 90 tablet; Refill: 3  - losartan (COZAAR) 100 MG tablet  Dispense: 90 tablet; Refill: 3  - hydrochlorothiazide (HYDRODIURIL) 25 MG tablet  Dispense: 90 tablet; Refill: 3  - amLODIPine (NORVASC) 2.5 MG tablet  Dispense: 90 tablet; Refill: 3    Controlled type 2 diabetes mellitus with complication, with long-term current use of insulin (H)  Reviewed and verified with patient his insulin dose.  Reinforced carbohydrate control.  Regular exercise as he is able to.  Regular foot care, annual eye exam.  Flu vaccine every year.   - metFORMIN (GLUCOPHAGE XR) 500 MG 24 hr tablet  Dispense: 360 tablet; Refill: 3  - insulin glargine (BASAGLAR KWIKPEN) 100 UNIT/ML pen  Dispense: 30 mL; Refill: 3  - atorvastatin (LIPITOR) 20 MG tablet  Dispense: 90 tablet; Refill: 3  - Hemoglobin A1c  - Basic metabolic panel    Bee sting allergy  No recent issues.  - EPINEPHrine (ANY BX GENERIC EQUIV) 0.3 MG/0.3ML injection 2-pack  Dispense: 1 each; Refill: 1    S/P BKA (below knee amputation) unilateral, left (H)  Sees orthopedics.  Will be getting prosthesis.    Screen for colon cancer  Patient agreed to have colonoscopy order placed.  - Colonoscopy Screening  Referral           MED REC REQUIRED  Post Medication Reconciliation Status: discharge medications reconciled, continue medications without change      Patient Instructions   Continue all medications unchanged.    Be consistent with low salt, low trans fat and low saturated fat diet.  Eat food rich in omega-3-fatty  acids as you tolerate. (salmon, olive oil)  Eat 5 cups of vegetables, fruits and whole grains per day.  Limit starchy food (white rice, white bread, white pasta, white potatoes) to less than a cup per meal.  Minimize sweets, junk food and fastfood. Limit soda beverages to one serving per day; best to avoid it altogether though.    Exercise: moderate intensity sustained for at least 30 mins per episode, goal of 150 mins per week at least  Combine cardiovascular and resistance exercises.  These exercise recommendations are in addition to your daily activity at work or home.  Work on losing weight.    Check your right foot regularly and see a doctor if you have non-healing sores.  Annual eye exam.  Please request your eye doctor to furnish a copy of the eye exam report to the clinic to be placed in your record.  Flu vaccine by the end of October yearly.  Be sure to update any other recommended vaccinations for diabetics.    See orthopedics as planned.    Mauricio Pearce MD  [facility]ICHOLAS Lombardo is a 63 year old, presenting for the following health issues:  Surgical Followup and Recheck Medication        10/30/2023     4:06 PM   Additional Questions   Roomed by Nicki NAYAK MA   Accompanied by Self         10/30/2023     4:06 PM   Patient Reported Additional Medications   Patient reports taking the following new medications None       HPI     Diabetes Follow-up    How often are you checking your blood sugar? One time daily  What time of day are you checking your blood sugars (select all that apply)?   Morning  Have you had any blood sugars above 200?  No  Have you had any blood sugars below 70?  No  What symptoms do you notice when your blood sugar is low?  Dizzy and Not applicable  What concerns do you have today about your diabetes? None   Do you have any of these symptoms? (Select all that apply)  No numbness or tingling in feet.  No redness, sores or blisters on feet.  No complaints  of excessive thirst.  No reports of blurry vision.  No significant changes to weight.  Have you had a diabetic eye exam in the last 12 months? No            Hyperlipidemia Follow-Up    Are you regularly taking any medication or supplement to lower your cholesterol?   Yes- Atorvastatin 20mg  Are you having muscle aches or other side effects that you think could be caused by your cholesterol lowering medication?  No    Hypertension Follow-up    Do you check your blood pressure regularly outside of the clinic? Yes   Are you following a low salt diet? Yes  Are your blood pressures ever more than 140 on the top number (systolic) OR more   than 90 on the bottom number (diastolic), for example 140/90? Yes 142/70 occasionally    BP Readings from Last 2 Encounters:   10/27/23 131/72   10/02/23 (!) 147/59     Hemoglobin A1C (%)   Date Value   09/25/2023 6.7 (H)   08/01/2022 6.6 (H)   12/04/2020 9.6 (H)   07/21/2020 9.0 (H)     LDL Cholesterol Calculated (mg/dL)   Date Value   08/01/2022 57   07/23/2021 88   12/04/2020     Cannot estimate LDL when triglyceride exceeds 400 mg/dL   11/08/2019 63     LDL Cholesterol Direct (mg/dL)   Date Value   12/04/2020 78     How many servings of fruits and vegetables do you eat daily?  2-3  On average, how many sweetened beverages do you drink each day (Examples: soda, juice, sweet tea, etc.  Do NOT count diet or artificially sweetened beverages)?   0  How many days per week do you exercise enough to make your heart beat faster? 5  How many minutes a day do you exercise enough to make your heart beat faster? 30 - 60  How many days per week do you miss taking your medication? 0    S/p BKA left due to severe foot osteomyelitis.  Sees ortho.      Review of Systems   Constitutional, HEENT, cardiovascular, pulmonary, GI, , musculoskeletal, neuro, skin, endocrine and psych systems are negative, except as otherwise noted.      Objective    Vitals - Patient Reported  Systolic (Patient Reported):  "132  Diastolic (Patient Reported): 68  Weight (Patient Reported): 122.5 kg (270 lb)  Height (Patient Reported): 175.3 cm (5' 9\")  BMI (Based on Pt Reported Ht/Wt): 39.87  Temperature (Patient Reported): 98.7  F (37.1  C)  Pain Score: No Pain (0)        Physical Exam   GENERAL: alert, no distress, and in good spirits  EYES: Eyes grossly normal to inspection.  No discharge or erythema, or obvious scleral/conjunctival abnormalities.  RESP: No audible wheeze, cough, or visible cyanosis.  No visible retractions or increased work of breathing.    SKIN: Visible skin clear. No significant rash, abnormal pigmentation or lesions.  NEURO: Cranial nerves grossly intact.  Mentation and speech appropriate for age.  PSYCH: Mentation appears normal, affect normal/bright, judgement and insight intact, normal speech and appearance well-groomed.    Admission on 10/27/2023, Discharged on 10/27/2023   Component Date Value Ref Range Status    GLUCOSE BY METER POCT 10/27/2023 68 (L)  70 - 99 mg/dL Final    GLUCOSE BY METER POCT 10/27/2023 125 (H)  70 - 99 mg/dL Final    Culture 10/27/2023 Culture in progress   Preliminary    Culture 10/27/2023 4+ Finegoldia magna (A)   Preliminary    Susceptibilities not routinely done, refer to antibiogram to view typical susceptibility profiles    Gram Stain Result 10/27/2023 4+ Gram positive cocci (A)   Final    Gram Stain Result 10/27/2023 1+ WBC seen (A)   Final    Culture 10/27/2023 4+ Staphylococcus aureus MRSA (A)   Final    Culture 10/27/2023 2+ Acinetobacter baumannii complex (A)   Final    Culture 10/27/2023 2+ Stenotrophomonas maltophilia (A)   Final    Culture 10/27/2023 1+ Acinetobacter baumannii complex (A)   Final         Video-Visit Details    Type of service:  Video Visit     Originating Location (pt. Location): Home    Distant Location (provider location):  On-site  Platform used for Video Visit: Kalani"

## 2023-10-30 NOTE — PATIENT INSTRUCTIONS
Continue all medications unchanged.    Be consistent with low salt, low trans fat and low saturated fat diet.  Eat food rich in omega-3-fatty acids as you tolerate. (salmon, olive oil)  Eat 5 cups of vegetables, fruits and whole grains per day.  Limit starchy food (white rice, white bread, white pasta, white potatoes) to less than a cup per meal.  Minimize sweets, junk food and fastfood. Limit soda beverages to one serving per day; best to avoid it altogether though.    Exercise: moderate intensity sustained for at least 30 mins per episode, goal of 150 mins per week at least  Combine cardiovascular and resistance exercises.  These exercise recommendations are in addition to your daily activity at work or home.  Work on losing weight.    Check your right foot regularly and see a doctor if you have non-healing sores.  Annual eye exam.  Please request your eye doctor to furnish a copy of the eye exam report to the clinic to be placed in your record.  Flu vaccine by the end of October yearly.  Be sure to update any other recommended vaccinations for diabetics.    See orthopedics as planned.

## 2023-10-31 ENCOUNTER — TRANSCRIBE ORDERS (OUTPATIENT)
Dept: OTHER | Age: 63
End: 2023-10-31

## 2023-10-31 ENCOUNTER — DOCUMENTATION ONLY (OUTPATIENT)
Dept: FAMILY MEDICINE | Facility: CLINIC | Age: 63
End: 2023-10-31
Payer: COMMERCIAL

## 2023-10-31 DIAGNOSIS — Z89.512 S/P BKA (BELOW KNEE AMPUTATION), LEFT (H): Primary | ICD-10-CM

## 2023-10-31 DIAGNOSIS — Z89.512 S/P BKA (BELOW KNEE AMPUTATION) UNILATERAL, LEFT (H): Primary | ICD-10-CM

## 2023-10-31 DIAGNOSIS — Z44.9 PROSTHESIS FITTING: ICD-10-CM

## 2023-11-01 ENCOUNTER — TRANSCRIBE ORDERS (OUTPATIENT)
Dept: OTHER | Age: 63
End: 2023-11-01

## 2023-11-01 DIAGNOSIS — Z44.9 PROSTHESIS FITTING: Primary | ICD-10-CM

## 2023-11-02 ENCOUNTER — TELEPHONE (OUTPATIENT)
Dept: FAMILY MEDICINE | Facility: CLINIC | Age: 63
End: 2023-11-02

## 2023-11-02 NOTE — TELEPHONE ENCOUNTER
Home Care is calling regarding an established patient with [facility].       Requesting orders from: Mauricio Pearce  Provider is following patient: Yes  Is this a 60-day recertification request?  No    Orders Requested    Skilled Nursing  Request for continuation of care with decrease in frequency   SN to be held for 2 weeks, with one PRN to follow up.     This is because patient saw Dr. Rueda at [facility] on 10/27/2023 and had a I&D to his left leg amputation site. They placed a wound vac and they would like it to be left in place until his follow up on 11/9/2023 with Dr. Rueda.  He has another follow up on 11/16 for a suture removal.   They are asking to discontinue SN for right now      Information was gathered and will be sent to provider for review.  RN will contact Home Care with information after provider review.  Contact information confirmed and updated as needed.    Darlyn Esquivel RN

## 2023-11-03 ENCOUNTER — MEDICAL CORRESPONDENCE (OUTPATIENT)
Dept: HEALTH INFORMATION MANAGEMENT | Facility: CLINIC | Age: 63
End: 2023-11-03
Payer: COMMERCIAL

## 2023-11-03 LAB
BACTERIA SPEC CULT: ABNORMAL
BACTERIA SPEC CULT: ABNORMAL

## 2023-11-06 ENCOUNTER — MEDICAL CORRESPONDENCE (OUTPATIENT)
Dept: HEALTH INFORMATION MANAGEMENT | Facility: CLINIC | Age: 63
End: 2023-11-06
Payer: COMMERCIAL

## 2023-11-06 ENCOUNTER — TELEPHONE (OUTPATIENT)
Dept: FAMILY MEDICINE | Facility: CLINIC | Age: 63
End: 2023-11-06
Payer: COMMERCIAL

## 2023-11-06 NOTE — TELEPHONE ENCOUNTER
Left detailed message with Meka BERNSTEIN with [employer] with verbal authorization for orders requested.     Julie Behrendt RN

## 2023-11-06 NOTE — TELEPHONE ENCOUNTER
Spoke with Meka BERNSTEIN with HomeCare who states that [facility] provider has arranged for outpatient PT so they will be discharging patient from [facility].      Home Care is calling regarding an established patient with [facility].       Requesting orders from: Mauricio Pearce  Provider is following patient: Yes  Is this a 60-day recertification request?  No    Orders Requested    Skilled Nursing  Request for discontinuation of care   Goals have been met/progressing.  Frequency: SN visits currently on hold, only has 1 PRN order.        Verbal orders given.  Home Care will send orders for provider to sign.  Confirmed ok to leave a detailed message with call back.  Contact information confirmed and updated as needed.    Julie Behrendt, RN

## 2023-11-07 ENCOUNTER — THERAPY VISIT (OUTPATIENT)
Dept: PHYSICAL THERAPY | Facility: CLINIC | Age: 63
End: 2023-11-07
Payer: COMMERCIAL

## 2023-11-07 DIAGNOSIS — Z89.512 S/P BKA (BELOW KNEE AMPUTATION) UNILATERAL, LEFT (H): ICD-10-CM

## 2023-11-07 PROCEDURE — 97161 PT EVAL LOW COMPLEX 20 MIN: CPT | Mod: GP | Performed by: PHYSICAL THERAPIST

## 2023-11-07 PROCEDURE — 97110 THERAPEUTIC EXERCISES: CPT | Mod: GP | Performed by: PHYSICAL THERAPIST

## 2023-11-07 NOTE — PROGRESS NOTES
"PHYSICAL THERAPY EVALUATION  Type of Visit: Evaluation    See electronic medical record for Abuse and Falls Screening details.    Subjective       Presenting condition or subjective complaint: PT for prosthetic  Date of onset: 09/28/23    Relevant medical history: Diabetes type 2; morbid obesity; Admitted to [facility] 9/25/2023 due to osteomyelitis L foot.  L BKA 9/28/23. Discharge to home 10/2/23 with wife and home PT and RN. On 10/27/23 I&D with wound VAC due to wound dehiscence  Dates & types of surgery:  9/28/23 L BKA; 10/27/23 I&D due to wound dehiscence    Prior diagnostic imaging/testing results: MRI; X-ray     Prior therapy history for the same diagnosis, illness or injury: Yes In home pt    Prior Level of Function  Transfers: independent  Ambulation: household distances using FWW; No stairs  ADL: assist with cooking and cleaning; meds, community mobility       Living Environment  Social support: With a significant other or spouse   Type of home: House; Multi-level   Stairs to enter the home: Yes 30 Is there a railing: Yes   Ramp: No   Stairs inside the home: Yes 30 Is there a railing: Yes   Help at home: Self Cares (home health aide/personal care attendant, family, etc); Home management tasks (cooking, cleaning); Medication and/or finances; Assist for driving and community activities  Equipment owned: Walker with wheels; Standard wheelchair     Employment: Yes [employer]  Hobbies/Interests: Yard/ 5k    Patient goals for therapy: Walk/run      Objective     INTEGUMENTARY: Residual L limb with wound vac and ace wrap. Not inspected. He reports they are not to remove ace wrap until going to wound clinic tomorrow   POSTURE: WNL LEs abd   RANGE OF MOTION: WFL  STRENGTH: WFL  BED MOBILITY: indpenendt  TRANSFERS: independent  WHEELCHAIR MOBILITY: independent household distance    GAIT: independent house hold distances FWW, \"hops\" on R LE, heavy reliance on UEs   Stairs: up/down on butt due to " risk of fall     BALANCE: requires UE assist in standing on R LE      Assessment & Plan   CLINICAL IMPRESSIONS  Medical Diagnosis: L BKA    Treatment Diagnosis: L BKA  s/p wound dehisance   Impression/Assessment: Patient is a 63 year old male with referred to PT for eval to initiate process of acquiring BK prosthesis.  The following significant findings have been identified: Decreased strength, Impaired balance, and Impaired gait. These impairments interfere with their ability to perform self care tasks, recreational activities, household chores, household mobility, and community mobility as compared to previous level of function.     Clinical Decision Making (Complexity):  Clinical Presentation: Evolving/Changing  due to wound status on residual limb  Clinical Presentation Rationale: based on medical and personal factors listed in PT evaluation  Clinical Decision Making (Complexity): Moderate complexity due to wound status     PLAN OF CARE  Treatment Interventions:  Interventions: Gait Training, Manual Therapy, Therapeutic Activity, Therapeutic Exercise, Self-Care/Home Management, Prosthetic Fitting/Training    Long Term Goals     PT Goal 1  Goal Description: Independent HEP strengthening and balance  Rationale: to maximize safety and independence with performance of ADLs and functional tasks  Target Date: 02/07/24  PT Goal 2  Goal Description: independent ambulation no AD indoors no gait deviation  Rationale: to maximize safety and independence with performance of ADLs and functional tasks  Target Date: 02/07/24  PT Goal 3  Goal Description: Up/down full flight of stairs reciprocal pattern one rail  Rationale: to maximize safety and independence with performance of ADLs and functional tasks  Target Date: 02/07/24  PT Goal 4  Goal Description: independent ambulation out doors with trekking pole  Rationale: to maximize safety and independence with performance of ADLs and functional tasks  Target Date:  "24      Frequency of Treatment: currently eval only; 2x/wk for gait training with prosthesis  Duration of Treatment:  3 months      Education Assessment:   Learner/Method: Patient;Listening;Reading;Demonstration;Pictures/Video  Education Comments: PTRx    Risks and benefits of evaluation/treatment have been explained.   Patient/Family/caregiver agrees with Plan of Care.     Evaluation Time:           Signing Clinician: Gayle Rowan, PT, DTP      Prosthesis letter of medical necessity    Date: 23    Patient's Last Name:  Tacos  First Name: Juan  Diagnosis: L BKA  : 1960  Height: 5' 1\"  Weight: 308 lbs      History of Amputation  See PT eval dated 2023.  Admitted to [facility] 2023 due to osteomyelitis L foot.  L BKA 23. Discharge to home 10/2/23 with wife and home PT and RN. On 10/27/23 I&D with wound VAC due to wound dehiscence.      Functional Limitations  See PT eval dated 23.  Stand with heavy reliance on UEs using FWW; Ambulation limited to slightly more than household distances due to increased energy expenditure and R LE impact from \"hopping\" during gait; unable to safely walk up/down stairs       Functional Capabilities  See PT eval dated 23; Independent in transfers and bed mobility; Independent in HEP; good active Left knee extn. Adequate upper body strength to begin prosthetic gait training with an assistive.     K-3  is most appropriate K-level      Functional level 0: The patient does not have the ability or potential to ambulate or transfer safely with or without assistance and prosthesis does not enhance his/her quality of life or mobility.    Functional level 1: The patient has the ability or potential to use a prosthesis for transfers or ambulation on level surfaces at fixed lesley. Typical of the limited and unlimited household ambulator.    Functional level 2: The patient has the ability or potential for ambulation with the ability to " traverse low level environmental barriers such as curbs, stairs or uneven surfaces. Typical of the limited community ambulator.    Functional level 3: The patient has the ability or potential for ambulation with variable lesley. Typical of the community ambulator who has the ability to traverse most environmental barriers and may have vocational, therapeutic, or exercise activity that demands prosthetic utilization beyond simple locomotion.    Functional level 4: The patient who has the ability or potential for prosthetic ambulation that exceeds basic ambulation skills, exhibiting high impact, stress, or energy levels. Typical of the prosthetic demands of the child, active adults or athlete.        Condition of Limb  (skin irritation, breakdown, infection, limb volume changes, swelling, weight fluctuation, muscle change, and healing issues)  Currently with wound Vac, wound not visualized due to ace wrap.  Scheduled to follow up with Dr Bjorn Rueda's team Nov 8 or 9, 2023      Condition of Current Prosthesis:  none      Past Experience with Prosthesis:  none      Patient's Motivation to Use New Prosthesis:  Yes  He expresses sincere desire to return to work, walk out doors and on uneven terrain without an assistive device      Electronically signed by:                                I CERTIFY THE NEED FOR THESE SERVICES FURNISHED UNDER THIS PLAN OF TREATMENT AND WHILE UNDER MY CARE     (Physician co-signature of this document indicates review and certification of the therapy plan).                           Co-signing physician:

## 2023-11-09 ENCOUNTER — TRANSFERRED RECORDS (OUTPATIENT)
Dept: HEALTH INFORMATION MANAGEMENT | Facility: CLINIC | Age: 63
End: 2023-11-09
Payer: COMMERCIAL

## 2023-11-10 ENCOUNTER — TRANSCRIBE ORDERS (OUTPATIENT)
Dept: OTHER | Age: 63
End: 2023-11-10

## 2023-11-10 DIAGNOSIS — Z89.512 S/P BKA (BELOW KNEE AMPUTATION), LEFT (H): Primary | ICD-10-CM

## 2023-11-16 ENCOUNTER — TRANSFERRED RECORDS (OUTPATIENT)
Dept: HEALTH INFORMATION MANAGEMENT | Facility: CLINIC | Age: 63
End: 2023-11-16
Payer: COMMERCIAL

## 2023-11-29 ENCOUNTER — TRANSFERRED RECORDS (OUTPATIENT)
Dept: HEALTH INFORMATION MANAGEMENT | Facility: CLINIC | Age: 63
End: 2023-11-29
Payer: COMMERCIAL

## 2023-12-08 ENCOUNTER — DOCUMENTATION ONLY (OUTPATIENT)
Dept: FAMILY MEDICINE | Facility: CLINIC | Age: 63
End: 2023-12-08
Payer: COMMERCIAL

## 2023-12-08 DIAGNOSIS — Z89.512 ACQUIRED ABSENCE OF LEFT LEG BELOW KNEE (H): Primary | ICD-10-CM

## 2023-12-22 ENCOUNTER — DOCUMENTATION ONLY (OUTPATIENT)
Dept: ORTHOPEDICS | Facility: CLINIC | Age: 63
End: 2023-12-22
Payer: COMMERCIAL

## 2023-12-22 NOTE — PROGRESS NOTES
S: Pt seen at [facility] lab with his wife very eager to receive his initial BK prosthesis and supplies. O: I see the socket fit well with 3 ply today even though I reduced the model from last time 5 ply. He appears to be shrinking rapidly and may require a replacement socket sooner than typical. A: We walked until exhaustion 3 times in the parallel bars today and he recalled stories of his time in the [employer]. Instructions were given and seemed to be understood by all. P: He agreed to take it slow and practice his technique with ambulation and start PT for training. He will experiment with socks to manage his shrinking limb in the prosthesis and call PRN. The goal of ambulation was accomplished.  Electronically signed Pacheco Davies CPO, LPO.

## 2023-12-27 ENCOUNTER — TRANSFERRED RECORDS (OUTPATIENT)
Dept: HEALTH INFORMATION MANAGEMENT | Facility: CLINIC | Age: 63
End: 2023-12-27
Payer: COMMERCIAL

## 2024-01-10 ENCOUNTER — TRANSFERRED RECORDS (OUTPATIENT)
Dept: HEALTH INFORMATION MANAGEMENT | Facility: CLINIC | Age: 64
End: 2024-01-10
Payer: COMMERCIAL

## 2024-01-18 ENCOUNTER — TELEPHONE (OUTPATIENT)
Dept: FAMILY MEDICINE | Facility: CLINIC | Age: 64
End: 2024-01-18
Payer: COMMERCIAL

## 2024-01-18 NOTE — TELEPHONE ENCOUNTER
Patient Quality Outreach    Patient is due for the following:   Colon Cancer Screening  Physical Preventive Adult Physical    Next Steps:   Schedule a Adult Preventative    Type of outreach:    Sent Nov[employer] message.      Questions for provider review:    None           Nicki Zapata MA

## 2024-01-19 ENCOUNTER — THERAPY VISIT (OUTPATIENT)
Dept: PHYSICAL THERAPY | Facility: CLINIC | Age: 64
End: 2024-01-19
Payer: COMMERCIAL

## 2024-01-19 DIAGNOSIS — Z89.512 S/P BKA (BELOW KNEE AMPUTATION) UNILATERAL, LEFT (H): Primary | ICD-10-CM

## 2024-01-19 PROCEDURE — 97530 THERAPEUTIC ACTIVITIES: CPT | Mod: GP | Performed by: PHYSICAL THERAPIST

## 2024-01-19 NOTE — PROGRESS NOTES
Physical Therapy Progress Note  01/19/24 0500   Appointment Info   Signing clinician's name / credentials Gayle Erlin Gutierrez PT DPT   Total/Authorized Visits 1/50 per calendar yr   Visits Used 2  soc 11/7/23   Medical Diagnosis L BKA   PT Tx Diagnosis L BKA  s/p wound dehisance   Quick Adds Certification   Progress Note/Certification   Onset of illness/injury or Date of Surgery 09/28/23   Therapy Frequency 1x/wk for gait training, strengthening, balance and endurance   Certification date from 01/19/24   Certification date to 04/18/24   Progress Note Completed Date 11/07/23   GOALS   PT Goals 2;3;4   PT Goal 1   Goal Description Independent HEP strengthening and balance   Rationale to maximize safety and independence with performance of ADLs and functional tasks   Target Date 02/07/24   PT Goal 2   Goal Description independent ambulation no AD indoors no gait deviation   Rationale to maximize safety and independence with performance of ADLs and functional tasks   Target Date 02/07/24   PT Goal 3   Goal Description Up/down full flight of stairs reciprocal pattern one rail   Rationale to maximize safety and independence with performance of ADLs and functional tasks   Target Date 02/07/24   PT Goal 4   Goal Description independent ambulation out doors with trekking pole   Rationale to maximize safety and independence with performance of ADLs and functional tasks   Target Date 02/07/24   Subjective Report   Subjective Report Has had BK prosthesis since Dec 15 but had episode of blistering and sores after walking a lot one day so had a few weeks not wearing prosthetic leg   Treatment Interventions (PT)   Interventions Therapeutic Activity   Therapeutic Activity   Therapeutic Activities: dynamic activities to improve functional performance minutes ([id number]) 25   Ther Act 1 - Details sit to stand cues to scoot butt forward, feet back and reach forward past walker to stand wt bear on R LE,; instructed in stand to sit  "(19\" seat with pillow) 2x10 reps for functional strenght, wt shift and balance; stand balance not holding walker   Skilled Intervention functional strengthening through ADLs; balance activities to decrease risk of fall and improve safety at home and community   Patient Response/Progress LOB post standing; c/o pain along tibial crest of residual limb   Plan   Home program strongly encouraged return to HEP; be attentive to technique, NOT use UEs EVERY time he sits or stands   Updates to plan of care 1x/wk   Plan for next session gait training; wt shift, balance, strengthening,  endurance   Total Session Time   Timed Code Treatment Minutes 25   Total Treatment Time (sum of timed and untimed services) 25       [facility]                                                                                   OUTPATIENT PHYSICAL THERAPY    PLAN OF TREATMENT FOR OUTPATIENT REHABILITATION   Patient's Last Name, First Name, Juan Felix YOB: 1960   Provider's Name   [facility]   Medical Record No.  [id number]     Onset Date: 09/28/23  Start of Care Date:  11/7/2023     Medical Diagnosis:  L BKA      PT Treatment Diagnosis:  L BKA  s/p wound dehisance Plan of Treatment  Frequency/Duration: (P) 1x/wk for gait training, strengthening, balance and endurance/      Certification date from (P) 01/19/24 to (P) 04/18/24         See note for plan of treatment details and functional goals     Gayle Rowan, PT. DPT                         I CERTIFY THE NEED FOR THESE SERVICES FURNISHED UNDER        THIS PLAN OF TREATMENT AND WHILE UNDER MY CARE     (Physician attestation of this document indicates review and certification of the therapy plan).              Referring Provider:  Barbara Vargas    Initial Assessment  See Epic Evaluation-            "

## 2024-02-02 ENCOUNTER — THERAPY VISIT (OUTPATIENT)
Dept: PHYSICAL THERAPY | Facility: CLINIC | Age: 64
End: 2024-02-02
Payer: COMMERCIAL

## 2024-02-02 DIAGNOSIS — Z89.512 S/P BKA (BELOW KNEE AMPUTATION) UNILATERAL, LEFT (H): Primary | ICD-10-CM

## 2024-02-02 PROCEDURE — 97110 THERAPEUTIC EXERCISES: CPT | Mod: GP | Performed by: PHYSICAL THERAPIST

## 2024-02-02 PROCEDURE — 97530 THERAPEUTIC ACTIVITIES: CPT | Mod: GP | Performed by: PHYSICAL THERAPIST

## 2024-02-09 ENCOUNTER — THERAPY VISIT (OUTPATIENT)
Dept: PHYSICAL THERAPY | Facility: CLINIC | Age: 64
End: 2024-02-09
Payer: COMMERCIAL

## 2024-02-09 DIAGNOSIS — Z89.512 S/P BKA (BELOW KNEE AMPUTATION) UNILATERAL, LEFT (H): Primary | ICD-10-CM

## 2024-02-09 PROCEDURE — 97116 GAIT TRAINING THERAPY: CPT | Mod: GP | Performed by: PHYSICAL THERAPIST

## 2024-02-09 PROCEDURE — 97530 THERAPEUTIC ACTIVITIES: CPT | Mod: GP | Performed by: PHYSICAL THERAPIST

## 2024-02-13 ENCOUNTER — PATIENT OUTREACH (OUTPATIENT)
Dept: CARE COORDINATION | Facility: CLINIC | Age: 64
End: 2024-02-13
Payer: COMMERCIAL

## 2024-02-14 ENCOUNTER — MYC MEDICAL ADVICE (OUTPATIENT)
Dept: PHYSICAL THERAPY | Facility: CLINIC | Age: 64
End: 2024-02-14

## 2024-02-14 DIAGNOSIS — Z89.512 S/P BKA (BELOW KNEE AMPUTATION) UNILATERAL, LEFT (H): Primary | ICD-10-CM

## 2024-02-27 ENCOUNTER — PATIENT OUTREACH (OUTPATIENT)
Dept: CARE COORDINATION | Facility: CLINIC | Age: 64
End: 2024-02-27
Payer: COMMERCIAL

## 2024-03-15 ENCOUNTER — DOCUMENTATION ONLY (OUTPATIENT)
Dept: ORTHOPEDICS | Facility: CLINIC | Age: 64
End: 2024-03-15
Payer: COMMERCIAL

## 2024-03-15 NOTE — PROGRESS NOTES
S: [facility] with his wife and his limb is healing well. O: I see the new replacement socket with Wisdom air lock and it fits snug with 0 ply. A: I swapped the socket and attained height and alignment and he walked and reports that it feels incredible compared to the old one. He took delivery of the socket and supplies sans any suspension sleeve as mentioned in the last note. Instructions to take it easy were given and seemed to be understood. P: VIKRAM PRN. G: The goal was accomplished.  Electronically signed Pacheco Davies CPO, LPO.

## 2024-03-27 ENCOUNTER — TRANSFERRED RECORDS (OUTPATIENT)
Dept: HEALTH INFORMATION MANAGEMENT | Facility: CLINIC | Age: 64
End: 2024-03-27
Payer: COMMERCIAL

## 2024-04-24 DIAGNOSIS — E11.8 CONTROLLED TYPE 2 DIABETES MELLITUS WITH COMPLICATION, WITH LONG-TERM CURRENT USE OF INSULIN (H): ICD-10-CM

## 2024-04-24 DIAGNOSIS — Z79.4 CONTROLLED TYPE 2 DIABETES MELLITUS WITH COMPLICATION, WITH LONG-TERM CURRENT USE OF INSULIN (H): ICD-10-CM

## 2024-04-25 RX ORDER — INSULIN GLARGINE-YFGN 100 [IU]/ML
64 INJECTION, SOLUTION SUBCUTANEOUS AT BEDTIME
Qty: 30 ML | Refills: 1 | Status: SHIPPED | OUTPATIENT
Start: 2024-04-25 | End: 2024-06-12

## 2024-04-28 ENCOUNTER — HEALTH MAINTENANCE LETTER (OUTPATIENT)
Age: 64
End: 2024-04-28

## 2024-05-09 ENCOUNTER — TRANSFERRED RECORDS (OUTPATIENT)
Dept: HEALTH INFORMATION MANAGEMENT | Facility: CLINIC | Age: 64
End: 2024-05-09
Payer: COMMERCIAL

## 2024-06-03 ENCOUNTER — TELEPHONE (OUTPATIENT)
Dept: FAMILY MEDICINE | Facility: CLINIC | Age: 64
End: 2024-06-03
Payer: COMMERCIAL

## 2024-06-03 NOTE — TELEPHONE ENCOUNTER
Patient Quality Outreach    Patient is due for the following:   Colon Cancer Screening  Physical Preventive Adult Physical    Next Steps:   Schedule a Adult Preventative    Type of outreach:    Sent [employer] [facility] message.      Questions for provider review:    None           Nicki Zapata MA

## 2024-06-11 ENCOUNTER — MYC MEDICAL ADVICE (OUTPATIENT)
Dept: FAMILY MEDICINE | Facility: CLINIC | Age: 64
End: 2024-06-11
Payer: COMMERCIAL

## 2024-06-11 DIAGNOSIS — I10 BENIGN HYPERTENSION: ICD-10-CM

## 2024-06-11 DIAGNOSIS — Z79.4 CONTROLLED TYPE 2 DIABETES MELLITUS WITH COMPLICATION, WITH LONG-TERM CURRENT USE OF INSULIN (H): ICD-10-CM

## 2024-06-11 DIAGNOSIS — Z91.030 BEE STING ALLERGY: ICD-10-CM

## 2024-06-11 DIAGNOSIS — E11.8 CONTROLLED TYPE 2 DIABETES MELLITUS WITH COMPLICATION, WITH LONG-TERM CURRENT USE OF INSULIN (H): ICD-10-CM

## 2024-06-12 RX ORDER — METFORMIN HCL 500 MG
TABLET, EXTENDED RELEASE 24 HR ORAL
Qty: 360 TABLET | Refills: 1 | Status: SHIPPED | OUTPATIENT
Start: 2024-06-12

## 2024-06-12 RX ORDER — HYDROCHLOROTHIAZIDE 25 MG/1
25 TABLET ORAL DAILY
Qty: 90 TABLET | Refills: 1 | Status: SHIPPED | OUTPATIENT
Start: 2024-06-12

## 2024-06-12 RX ORDER — ATORVASTATIN CALCIUM 20 MG/1
20 TABLET, FILM COATED ORAL EVERY EVENING
Qty: 90 TABLET | Refills: 1 | Status: SHIPPED | OUTPATIENT
Start: 2024-06-12

## 2024-06-12 RX ORDER — EPINEPHRINE 0.3 MG/.3ML
0.3 INJECTION SUBCUTANEOUS
Qty: 1 EACH | Refills: 1 | Status: SHIPPED | OUTPATIENT
Start: 2024-06-12

## 2024-06-12 RX ORDER — AMLODIPINE BESYLATE 2.5 MG/1
2.5 TABLET ORAL DAILY
Qty: 90 TABLET | Refills: 1 | Status: SHIPPED | OUTPATIENT
Start: 2024-06-12

## 2024-06-12 RX ORDER — METOPROLOL SUCCINATE 200 MG/1
200 TABLET, EXTENDED RELEASE ORAL DAILY
Qty: 90 TABLET | Refills: 1 | Status: SHIPPED | OUTPATIENT
Start: 2024-06-12

## 2024-06-12 RX ORDER — INSULIN GLARGINE-YFGN 100 [IU]/ML
64 INJECTION, SOLUTION SUBCUTANEOUS AT BEDTIME
Qty: 30 ML | Refills: 1 | Status: SHIPPED | OUTPATIENT
Start: 2024-06-12

## 2024-06-12 RX ORDER — LOSARTAN POTASSIUM 100 MG/1
100 TABLET ORAL DAILY
Qty: 90 TABLET | Refills: 1 | Status: SHIPPED | OUTPATIENT
Start: 2024-06-12

## 2024-06-13 PROBLEM — Z89.512 S/P BKA (BELOW KNEE AMPUTATION) UNILATERAL, LEFT (H): Status: RESOLVED | Noted: 2023-10-30 | Resolved: 2024-06-13

## 2024-06-13 NOTE — PROGRESS NOTES
"   Physical Therapy Discharge Summary  02/09/24 0506   Appointment Info   Signing clinician's name / credentials Gayle Kern Brenda PT DPT   Total/Authorized Visits 3/50 per calendar yr   Visits Used 4  soc 11/7/23   Medical Diagnosis L BKA   PT Tx Diagnosis L BKA  s/p wound dehisance   Quick Adds Certification   Progress Note/Certification   Onset of illness/injury or Date of Surgery 09/28/23   Therapy Frequency 1x/wk for gait training, strengthening, balance and endurance   Certification date from 01/19/24   Certification date to 04/18/24   Progress Note Completed Date 11/07/23   GOALS   PT Goals 2;3;4   PT Goal 1   Goal Description Independent HEP strengthening and balance   Rationale to maximize safety and independence with performance of ADLs and functional tasks   Target Date 05/03/24   PT Goal 2   Goal Description independent ambulation no AD indoors no gait deviation   Rationale to maximize safety and independence with performance of ADLs and functional tasks   Target Date 05/02/24   PT Goal 3   Goal Description Up/down full flight of stairs reciprocal pattern one rail   Rationale to maximize safety and independence with performance of ADLs and functional tasks   Target Date 05/02/24   PT Goal 4   Goal Description independent ambulation out doors with trekking pole   Rationale to maximize safety and independence with performance of ADLs and functional tasks   Target Date 07/02/24   Treatment Interventions (PT)   Interventions Gait Training   Therapeutic Activity   Therapeutic Activities: dynamic activities to improve functional performance minutes (65059) 40   Ther Act 1 - Details stand balance in midline using mirror for visual feedback; stepping ont 4\" step with R foot using walker both hands and R hand only stepping up onto 4\" roll with R foot;   Skilled Intervention wt shift onto L LE during functional activity   Gait Training   Gait Training Minutes, includes stair climbing (59857) 15   Gait 1 - " Details ambulation 500' with FWW cues to extn knee for IC on heel; 150' focus on wt shift onto L step forward with R; less wt on UEs more on L LE  slow lesley to focus on wt shift and IC heel strike on L   Skilled Intervention gait training with prosthesis and FWW   Patient Response/Progress gait better with cueing and when walking slower   Plan   Home program sit to/from stand focus on wt shift onto L LE   Updates to plan of care 1x/wk   Plan for next session wt shift onto L; stairs; functional balance in standing   Comments   Comments strongly enouraged Grupo to contact [facility] prosthetist as he is up to 13 ply and c/o pressure tibial ridge with wt bearing on L  LE   Total Session Time   Timed Code Treatment Minutes 55   Total Treatment Time (sum of timed and untimed services) 55        DISCHARGE  Reason for Discharge: Patient has failed to schedule further appointments.    Equipment Issued: non    Discharge Plan: status unknown. At last appt his prosthesis was being revised    Referring Provider:  Barbara Vargas

## 2024-06-19 ENCOUNTER — ANESTHESIA EVENT (OUTPATIENT)
Dept: SURGERY | Facility: CLINIC | Age: 64
End: 2024-06-19
Payer: COMMERCIAL

## 2024-06-19 ASSESSMENT — LIFESTYLE VARIABLES: TOBACCO_USE: 1

## 2024-06-19 NOTE — ANESTHESIA PREPROCEDURE EVALUATION
Anesthesia Pre-Procedure Evaluation    Patient: Juan Balderrama   MRN: [id number] : 1960        Procedure : Procedure(s):  Revision Left Leg Below Knee Amputation          Past Medical History:   Diagnosis Date     Diabetes (H) -     Hypertension -     Septic arthritis of right foot (H) 2023      Past Surgical History:   Procedure Laterality Date     AMPUTATE LEG BELOW KNEE Left 2023    Procedure: Left, AMPUTATION, BELOW KNEE;  Surgeon: Bjorn Rueda MD;  Location: [facility]     APPLY WOUND VAC Left 10/27/2023    Procedure: Apply Wound Vac left leg;  Surgeon: Bjorn Rueda MD;  Location: [facility]     EYE SURGERY  4-3-2017    Cataract     INCISION AND DRAINAGE LOWER EXTREMITY, COMBINED Left 10/27/2023    Procedure: left lower leg Incision and Drainage;  Surgeon: Bjorn Rueda MD;  Location: [facility]      Allergies   Allergen Reactions     Bees      Sulfamethoxazole Swelling     throat      Social History     Tobacco Use     Smoking status: Never     Passive exposure: Never     Smokeless tobacco: Never   Substance Use Topics     Alcohol use: Yes     Comment: social      Wt Readings from Last 1 Encounters:   23 141.5 kg (311 lb 15.2 oz)        Anesthesia Evaluation   Pt has had prior anesthetic. Type: General, MAC and Regional.        ROS/MED HX  ENT/Pulmonary:     (+)     RIKI risk factors,  hypertension, obese,        tobacco use, Past use,                       Neurologic:  - neg neurologic ROS     Cardiovascular:     (+) Dyslipidemia hypertension- -   -  - -                                 Previous cardiac testing   Echo: Date: Results:    Stress Test:  Date: Results:    ECG Reviewed:  Date:  Results:  Sinus  Rhythm   Cath:  Date: Results:      METS/Exercise Tolerance:     Hematologic:  - neg hematologic  ROS     Musculoskeletal:   (+)  arthritis,             GI/Hepatic:  - neg GI/hepatic ROS     Renal/Genitourinary:  - neg Renal ROS     Endo:     (+)  type II  "DM, Last HgA1c: 6.7, date: 9/23, Using insulin,          Obesity (Extreme morbid),       Psychiatric/Substance Use:  - neg psychiatric ROS     Infectious Disease:  - neg infectious disease ROS     Malignancy:  - neg malignancy ROS     Other:  - neg other ROS          Physical Exam    Airway        Mallampati: II   TM distance: > 3 FB   Neck ROM: full   Mouth opening: > 3 cm    Respiratory Devices and Support         Dental           Cardiovascular   cardiovascular exam normal          Pulmonary   pulmonary exam normal            OUTSIDE LABS:  CBC:   Lab Results   Component Value Date    WBC 12.4 (H) 09/29/2023    WBC 13.3 (H) 09/28/2023    HGB 8.7 (L) 09/30/2023    HGB 9.5 (L) 09/29/2023    HCT 30.3 (L) 09/29/2023    HCT 29.4 (L) 09/28/2023     (H) 10/02/2023     (H) 09/29/2023     BMP:   Lab Results   Component Value Date     10/01/2023     (L) 09/29/2023    POTASSIUM 4.3 10/01/2023    POTASSIUM 4.9 09/29/2023    CHLORIDE 100 10/01/2023    CHLORIDE 98 09/29/2023    CO2 27 10/01/2023    CO2 23 09/29/2023    BUN 19.9 10/01/2023    BUN 18.0 09/29/2023    CR 0.91 10/02/2023    CR 0.94 10/01/2023     (H) 10/27/2023    GLC 68 (L) 10/27/2023     COAGS:   Lab Results   Component Value Date    INR 1.25 (H) 09/25/2023     POC: No results found for: \"BGM\", \"HCG\", \"HCGS\"  HEPATIC:   Lab Results   Component Value Date    ALBUMIN 3.2 (L) 09/25/2023    PROTTOTAL 7.6 09/25/2023    ALT 24 09/25/2023    AST 23 09/25/2023    ALKPHOS 92 09/25/2023    BILITOTAL 0.4 09/25/2023     OTHER:   Lab Results   Component Value Date    LACT 0.9 09/28/2023    A1C 6.7 (H) 09/25/2023    PHONG 7.7 (L) 10/01/2023    MAG 1.9 09/29/2023    TSH 3.07 11/29/2018    CRP 22.2 (H) 11/08/2019    SED 81 (H) 09/25/2023       Anesthesia Plan    ASA Status:  3       Anesthesia Type: General.     - Airway: ETT   Induction: Propofol.   Maintenance: Balanced.        Consents    Anesthesia Plan(s) and associated risks, benefits, and " realistic alternatives discussed. Questions answered and patient/representative(s) expressed understanding.     - Discussed: Risks, Benefits and Alternatives for BOTH SEDATION and the PROCEDURE were discussed     - Discussed with:  Patient            Postoperative Care    Pain management: IV analgesics, Oral pain medications, Multi-modal analgesia.   PONV prophylaxis: Ondansetron (or other 5HT-3), Dexamethasone or Solumedrol     Comments:             TG Hodges CRNA    I have reviewed the pertinent notes and labs in the chart from the past 30 days and (re)examined the patient.  Any updates or changes from those notes are reflected in this note.

## 2024-06-20 ENCOUNTER — HOSPITAL ENCOUNTER (OUTPATIENT)
Facility: CLINIC | Age: 64
Discharge: HOME | End: 2024-06-20
Attending: ORTHOPAEDIC SURGERY | Admitting: ORTHOPAEDIC SURGERY
Payer: COMMERCIAL

## 2024-06-20 ENCOUNTER — ANESTHESIA (OUTPATIENT)
Dept: SURGERY | Facility: CLINIC | Age: 64
End: 2024-06-20
Payer: COMMERCIAL

## 2024-06-20 VITALS
HEART RATE: 60 BPM | OXYGEN SATURATION: 96 % | SYSTOLIC BLOOD PRESSURE: 138 MMHG | WEIGHT: 268 LBS | TEMPERATURE: 97.8 F | BODY MASS INDEX: 50.64 KG/M2 | DIASTOLIC BLOOD PRESSURE: 69 MMHG

## 2024-06-20 DIAGNOSIS — S88.112A BELOW-KNEE AMPUTATION OF LEFT LOWER EXTREMITY, INITIAL ENCOUNTER (H): Primary | ICD-10-CM

## 2024-06-20 LAB — GLUCOSE BLDC GLUCOMTR-MCNC: 112 MG/DL (ref 70–99)

## 2024-06-20 PROCEDURE — 272N000001 HC OR GENERAL SUPPLY STERILE: Performed by: ORTHOPAEDIC SURGERY

## 2024-06-20 PROCEDURE — 250N000013 HC RX MED GY IP 250 OP 250 PS 637: Performed by: NURSE ANESTHETIST, CERTIFIED REGISTERED

## 2024-06-20 PROCEDURE — 250N000009 HC RX 250: Performed by: NURSE ANESTHETIST, CERTIFIED REGISTERED

## 2024-06-20 PROCEDURE — 82962 GLUCOSE BLOOD TEST: CPT

## 2024-06-20 PROCEDURE — 250N000011 HC RX IP 250 OP 636: Performed by: NURSE ANESTHETIST, CERTIFIED REGISTERED

## 2024-06-20 PROCEDURE — 360N000076 HC SURGERY LEVEL 3, PER MIN: Performed by: ORTHOPAEDIC SURGERY

## 2024-06-20 PROCEDURE — 710N000012 HC RECOVERY PHASE 2, PER MINUTE: Performed by: ORTHOPAEDIC SURGERY

## 2024-06-20 PROCEDURE — 258N000003 HC RX IP 258 OP 636: Mod: JZ | Performed by: NURSE ANESTHETIST, CERTIFIED REGISTERED

## 2024-06-20 PROCEDURE — 250N000011 HC RX IP 250 OP 636

## 2024-06-20 PROCEDURE — 370N000017 HC ANESTHESIA TECHNICAL FEE, PER MIN: Performed by: ORTHOPAEDIC SURGERY

## 2024-06-20 PROCEDURE — 999N000141 HC STATISTIC PRE-PROCEDURE NURSING ASSESSMENT: Performed by: ORTHOPAEDIC SURGERY

## 2024-06-20 RX ORDER — KETOROLAC TROMETHAMINE 30 MG/ML
INJECTION, SOLUTION INTRAMUSCULAR; INTRAVENOUS PRN
Status: DISCONTINUED | OUTPATIENT
Start: 2024-06-20 | End: 2024-06-20

## 2024-06-20 RX ORDER — ONDANSETRON 4 MG/1
4 TABLET, ORALLY DISINTEGRATING ORAL EVERY 30 MIN PRN
Status: DISCONTINUED | OUTPATIENT
Start: 2024-06-20 | End: 2024-06-20 | Stop reason: HOSPADM

## 2024-06-20 RX ORDER — KETAMINE HYDROCHLORIDE 10 MG/ML
INJECTION INTRAMUSCULAR; INTRAVENOUS PRN
Status: DISCONTINUED | OUTPATIENT
Start: 2024-06-20 | End: 2024-06-20

## 2024-06-20 RX ORDER — CEFAZOLIN SODIUM/WATER 3 G/30 ML
3 SYRINGE (ML) INTRAVENOUS SEE ADMIN INSTRUCTIONS
Status: DISCONTINUED | OUTPATIENT
Start: 2024-06-20 | End: 2024-06-20 | Stop reason: HOSPADM

## 2024-06-20 RX ORDER — PROPOFOL 10 MG/ML
INJECTION, EMULSION INTRAVENOUS CONTINUOUS PRN
Status: DISCONTINUED | OUTPATIENT
Start: 2024-06-20 | End: 2024-06-20

## 2024-06-20 RX ORDER — OXYCODONE HYDROCHLORIDE 5 MG/1
5-10 TABLET ORAL EVERY 4 HOURS PRN
Qty: 30 TABLET | Refills: 0 | Status: ON HOLD | OUTPATIENT
Start: 2024-06-20 | End: 2024-07-09

## 2024-06-20 RX ORDER — NALOXONE HYDROCHLORIDE 0.4 MG/ML
0.1 INJECTION, SOLUTION INTRAMUSCULAR; INTRAVENOUS; SUBCUTANEOUS
Status: DISCONTINUED | OUTPATIENT
Start: 2024-06-20 | End: 2024-06-20 | Stop reason: HOSPADM

## 2024-06-20 RX ORDER — MAGNESIUM SULFATE HEPTAHYDRATE 40 MG/ML
INJECTION, SOLUTION INTRAVENOUS PRN
Status: DISCONTINUED | OUTPATIENT
Start: 2024-06-20 | End: 2024-06-20

## 2024-06-20 RX ORDER — GABAPENTIN 300 MG/1
300 CAPSULE ORAL
Status: COMPLETED | OUTPATIENT
Start: 2024-06-20 | End: 2024-06-20

## 2024-06-20 RX ORDER — LIDOCAINE 40 MG/G
CREAM TOPICAL
Status: DISCONTINUED | OUTPATIENT
Start: 2024-06-20 | End: 2024-06-20 | Stop reason: HOSPADM

## 2024-06-20 RX ORDER — ONDANSETRON 2 MG/ML
4 INJECTION INTRAMUSCULAR; INTRAVENOUS EVERY 30 MIN PRN
Status: DISCONTINUED | OUTPATIENT
Start: 2024-06-20 | End: 2024-06-20 | Stop reason: HOSPADM

## 2024-06-20 RX ORDER — LIDOCAINE HYDROCHLORIDE 20 MG/ML
INJECTION, SOLUTION INFILTRATION; PERINEURAL PRN
Status: DISCONTINUED | OUTPATIENT
Start: 2024-06-20 | End: 2024-06-20

## 2024-06-20 RX ORDER — HYDROXYZINE HYDROCHLORIDE 25 MG/1
25 TABLET, FILM COATED ORAL EVERY 6 HOURS PRN
Status: DISCONTINUED | OUTPATIENT
Start: 2024-06-20 | End: 2024-06-20 | Stop reason: HOSPADM

## 2024-06-20 RX ORDER — DEXAMETHASONE SODIUM PHOSPHATE 4 MG/ML
4 INJECTION, SOLUTION INTRA-ARTICULAR; INTRALESIONAL; INTRAMUSCULAR; INTRAVENOUS; SOFT TISSUE
Status: DISCONTINUED | OUTPATIENT
Start: 2024-06-20 | End: 2024-06-20 | Stop reason: HOSPADM

## 2024-06-20 RX ORDER — CEFAZOLIN SODIUM/WATER 3 G/30 ML
3 SYRINGE (ML) INTRAVENOUS
Status: COMPLETED | OUTPATIENT
Start: 2024-06-20 | End: 2024-06-20

## 2024-06-20 RX ORDER — TRANEXAMIC ACID 650 MG/1
1950 TABLET ORAL ONCE
Status: DISCONTINUED | OUTPATIENT
Start: 2024-06-20 | End: 2024-06-20 | Stop reason: HOSPADM

## 2024-06-20 RX ORDER — SODIUM CHLORIDE, SODIUM LACTATE, POTASSIUM CHLORIDE, CALCIUM CHLORIDE 600; 310; 30; 20 MG/100ML; MG/100ML; MG/100ML; MG/100ML
INJECTION, SOLUTION INTRAVENOUS CONTINUOUS
Status: DISCONTINUED | OUTPATIENT
Start: 2024-06-20 | End: 2024-06-20 | Stop reason: HOSPADM

## 2024-06-20 RX ORDER — OXYCODONE HYDROCHLORIDE 5 MG/1
10 TABLET ORAL
Status: COMPLETED | OUTPATIENT
Start: 2024-06-20 | End: 2024-06-20

## 2024-06-20 RX ORDER — GLYCOPYRROLATE 0.2 MG/ML
INJECTION, SOLUTION INTRAMUSCULAR; INTRAVENOUS PRN
Status: DISCONTINUED | OUTPATIENT
Start: 2024-06-20 | End: 2024-06-20

## 2024-06-20 RX ORDER — ONDANSETRON 2 MG/ML
INJECTION INTRAMUSCULAR; INTRAVENOUS PRN
Status: DISCONTINUED | OUTPATIENT
Start: 2024-06-20 | End: 2024-06-20

## 2024-06-20 RX ORDER — FENTANYL CITRATE 50 UG/ML
INJECTION, SOLUTION INTRAMUSCULAR; INTRAVENOUS PRN
Status: DISCONTINUED | OUTPATIENT
Start: 2024-06-20 | End: 2024-06-20

## 2024-06-20 RX ORDER — ACETAMINOPHEN 325 MG/1
975 TABLET ORAL ONCE
Status: COMPLETED | OUTPATIENT
Start: 2024-06-20 | End: 2024-06-20

## 2024-06-20 RX ORDER — DEXAMETHASONE SODIUM PHOSPHATE 4 MG/ML
INJECTION, SOLUTION INTRA-ARTICULAR; INTRALESIONAL; INTRAMUSCULAR; INTRAVENOUS; SOFT TISSUE PRN
Status: DISCONTINUED | OUTPATIENT
Start: 2024-06-20 | End: 2024-06-20

## 2024-06-20 RX ORDER — HYDROXYZINE HYDROCHLORIDE 50 MG/1
50 TABLET, FILM COATED ORAL EVERY 6 HOURS PRN
Status: DISCONTINUED | OUTPATIENT
Start: 2024-06-20 | End: 2024-06-20 | Stop reason: HOSPADM

## 2024-06-20 RX ADMIN — GLYCOPYRROLATE 0.1 MG: 0.2 INJECTION, SOLUTION INTRAMUSCULAR; INTRAVENOUS at 14:13

## 2024-06-20 RX ADMIN — MAGNESIUM SULFATE HEPTAHYDRATE 2 G: 40 INJECTION, SOLUTION INTRAVENOUS at 14:15

## 2024-06-20 RX ADMIN — LIDOCAINE HYDROCHLORIDE 100 MG: 20 INJECTION, SOLUTION INFILTRATION; PERINEURAL at 14:13

## 2024-06-20 RX ADMIN — KETOROLAC TROMETHAMINE 15 MG: 30 INJECTION, SOLUTION INTRAMUSCULAR at 14:13

## 2024-06-20 RX ADMIN — ACETAMINOPHEN 975 MG: 325 TABLET, FILM COATED ORAL at 12:44

## 2024-06-20 RX ADMIN — SODIUM CHLORIDE, POTASSIUM CHLORIDE, SODIUM LACTATE AND CALCIUM CHLORIDE: 600; 310; 30; 20 INJECTION, SOLUTION INTRAVENOUS at 12:43

## 2024-06-20 RX ADMIN — GABAPENTIN 300 MG: 300 CAPSULE ORAL at 12:44

## 2024-06-20 RX ADMIN — PHENYLEPHRINE HYDROCHLORIDE 150 MCG: 10 INJECTION INTRAVENOUS at 14:29

## 2024-06-20 RX ADMIN — OXYCODONE HYDROCHLORIDE 10 MG: 5 TABLET ORAL at 15:43

## 2024-06-20 RX ADMIN — PROPOFOL 150 MCG/KG/MIN: 10 INJECTION, EMULSION INTRAVENOUS at 14:13

## 2024-06-20 RX ADMIN — MIDAZOLAM 2 MG: 1 INJECTION INTRAMUSCULAR; INTRAVENOUS at 14:09

## 2024-06-20 RX ADMIN — LIDOCAINE HYDROCHLORIDE 0.2 ML: 10 INJECTION, SOLUTION EPIDURAL; INFILTRATION; INTRACAUDAL; PERINEURAL at 12:43

## 2024-06-20 RX ADMIN — PHENYLEPHRINE HYDROCHLORIDE 200 MCG: 10 INJECTION INTRAVENOUS at 14:56

## 2024-06-20 RX ADMIN — Medication 3 G: at 14:04

## 2024-06-20 RX ADMIN — KETAMINE HYDROCHLORIDE 20 MG: 10 INJECTION INTRAMUSCULAR; INTRAVENOUS at 14:26

## 2024-06-20 RX ADMIN — ONDANSETRON 4 MG: 2 INJECTION INTRAMUSCULAR; INTRAVENOUS at 14:13

## 2024-06-20 RX ADMIN — HYDROXYZINE HYDROCHLORIDE 50 MG: 50 TABLET, FILM COATED ORAL at 15:44

## 2024-06-20 RX ADMIN — DEXAMETHASONE SODIUM PHOSPHATE 4 MG: 4 INJECTION, SOLUTION INTRA-ARTICULAR; INTRALESIONAL; INTRAMUSCULAR; INTRAVENOUS; SOFT TISSUE at 14:13

## 2024-06-20 RX ADMIN — PHENYLEPHRINE HYDROCHLORIDE 200 MCG: 10 INJECTION INTRAVENOUS at 14:38

## 2024-06-20 RX ADMIN — FENTANYL CITRATE 100 MCG: 50 INJECTION INTRAMUSCULAR; INTRAVENOUS at 14:13

## 2024-06-20 ASSESSMENT — ACTIVITIES OF DAILY LIVING (ADL)
ADLS_ACUITY_SCORE: 37
ADLS_ACUITY_SCORE: 35

## 2024-06-20 NOTE — DISCHARGE INSTRUCTIONS
Same Day Surgery Discharge Instructions  Special Precautions After Surgery - Adult    It is not unusual to feel lightheaded or faint, up to 24 hours after surgery or while taking pain medication.  If you have these symptoms; sit for a few minutes before standing and have someone assist you when getting up.  You should rest and relax for the next 24 hours and must have someone stay with you for at least 24 hours after your discharge.  DO NOT DRIVE any vehicle or operate mechanical equipment for 24 hours following the end of your surgery.  DO NOT DRIVE while taking narcotic pain medications that have been prescribed by your physician.  If you had a limb operated on, you must be able to use it fully to drive.  DO NOT drink alcoholic beverages for 24 hours following surgery or while taking prescription pain medication.  Drink clear liquids (apple juice, ginger ale, broth, 7-Up, etc.).  Progress to your regular diet as you feel able.  Any questions call your physician and do not make important decisions for 24 hours.    Nausea and Vomiting: Nausea and vomiting can occur any time after receiving anesthesia. If you experience nausea and vomiting we encourage you to move to a clear liquid diet and advance your diet as tolerated. If nausea and vomiting do not improve within 12 hours please call the surgeon or present to the Emergency department.     Break-through Bleeding: If your experience bleeding from your surgical site apply pressure and additional dressing per nurse instruction. For simple problems such as a saturated dressing, you may need to reinforce the dressing with more gauze and tape and put slight pressure on the site. If bleeding does not subside contact the surgeon or present to the Emergency Department.    Post-op Infection: If you develop a fever of 100.4 or greater, have pus like drainage, redness, swelling or severe pain at the surgical site not alleviated with pain medications; please  contact the surgeon or present to the Emergency Department.     Medications:  Acetaminophen (Tylenol):  Next dose: 6:30PM.  Ibuprofen (Motrin, Advil):  Next dose: 8PM.  Follow the instructions on the bottle.  __________________________________________________________________________________________________________________________________  IMPORTANT NUMBERS:    [facility] [place]  [phone], [phone]  Pharmacy:  [phone]  Same Day Surgery:  [phone], for general post-op questions call Monday - Thursday until 8:30 p.m., Fridays until 6:00 p.m.  Nurse Advice Line:  [phone]                                                                         [facility]:  [phone]

## 2024-06-20 NOTE — PROCEDURES
6/20/2024    PREOPERATIVE DIAGNOSIS: Redundant soft tissue mass at distal portion of left below knee amputation    POSTOPERATIVE DIAGNOSIS: Redundant soft tissue mass at distal portion of left below knee amputation    PROCEDURE: Revision left below knee amputation    SURGEON: Bjorn Rueda MD    ASSISTANT: Barbara Vargas PA-c.  The presence of a PA was necessary for positioning retraction, and safe progression of the case    ANESTHESIA:  General    BLOOD LOSS: 50cc    COMPLICATIONS: None apparent    DISPOSITION: Stable to PACU    INDICATIONS: Grupo is a 63 year old male who underwent a below-knee amputation in September 2023 for osteomyelitis and soft tissue infection associated with diabetes and Charcot foot.  Initially, he had some drainage and underwent an I&D in October 2024.  Then, he healed well.  He has been working with the process to test and using a prosthetic.  However, has developed a fairly significant redundant soft tissue mass at the distal end of his stump.  This is about the size of his baseball it makes ambulating in his prosthetic quite difficult.  After discussing treatment options, he elected to proceed with a revision left below knee amputation to remove the redundant soft tissue mass in order to improve his mobility, understanding the risks of blood clots, infection, damage to vessels or nerves, and risks inherent to anesthesia.    PROCEDURE: Grupo was met in the preoperative holding area where the left lower leg was marked.  He was then transferred to the operating theater.  After smooth induction of general anesthesia, he was positioned supine.  A timeout was performed verifying the correct patient, surgery, and location.  He received preoperative antibiotics.  The left lower leg was then prepped and draped in a standard sterile fashion.    We then evaluated his left below knee amputation.  There was a baseball sized redundant soft tissue mass at the distal end of his stump.  We therefore  readvised the below-knee amputation.  We made a circumferential incision two thirds of the way around his tibia.  Then, posteriorly, we extended this distally.  Dissection sharply down through the skin and subcutaneous tissue.  For the most part, the tissue was mainly scar tissue, was clearly some viable muscle posteriorly.  It appeared that his myodesis site was intact.  There is no gross purulence.  We then excised the redundant tissue through the midportion of his left lower leg, and created a full thickness skin flap posteriorly.  After doing this, we brought up the posterior flap and were able to close the wound.  It laid nicely over the myodesis on his tibial stump and there is not nearly the amount of redundant soft tissue that he had previously.  We reapproximated this incision with penetrating towel clips.  We then closed the subcutaneous layer with 2-0 Monocryl and skin with staples.  A sterile compressive dressing was applied and Grupo was awoken from anesthesia and transferred to the recovery in stable condition.    POSTOPERATIVE PLAN:    1. Nonweight bearing left lower extremity.  Elevate as much as possible to help with swelling   2. Keep dressing clean and dry   3. Follow up in 3 weeks for staple removal    MIRYAM HERNANDEZ MD

## 2024-06-20 NOTE — ANESTHESIA POSTPROCEDURE EVALUATION
Patient: Juan Balderrama    Procedure: Procedure(s):  Revision Left Leg Below Knee Amputation       Anesthesia Type:  General    Note:  Disposition: Outpatient   Postop Pain Control: Uneventful            Sign Out: Well controlled pain   PONV: No   Neuro/Psych: Uneventful            Sign Out: Acceptable/Baseline neuro status   Airway/Respiratory: Uneventful            Sign Out: Acceptable/Baseline resp. status   CV/Hemodynamics: Uneventful            Sign Out: Acceptable CV status; No obvious hypovolemia; No obvious fluid overload   Other NRE: NONE   DID A NON-ROUTINE EVENT OCCUR? No           Last vitals:  Vitals Value Taken Time   /62 06/20/24 1532   Temp 36.6  C (97.8  F) 06/20/24 1518   Pulse 63 06/20/24 1532   Resp     SpO2 97 % 06/20/24 1536   Vitals shown include unfiled device data.    Electronically Signed By: GT Adler CRNA  June 20, 2024  3:37 PM

## 2024-06-20 NOTE — ANESTHESIA CARE TRANSFER NOTE
Patient: Juan Balderrama    Procedure: Procedure(s):  Revision Left Leg Below Knee Amputation       Diagnosis: Soft tissue mass [M79.89]  Diagnosis Additional Information: No value filed.    Anesthesia Type:   General     Note:    Oropharynx: oropharynx clear of all foreign objects  Level of Consciousness: awake  Oxygen Supplementation: room air    Independent Airway: airway patency satisfactory and stable  Dentition: dentition unchanged  Vital Signs Stable: post-procedure vital signs reviewed and stable  Report to RN Given: handoff report given  Patient transferred to: Phase II    Handoff Report: Identifed the Patient, Identified the Reponsible Provider, Reviewed the pertinent medical history, Discussed the surgical course, Reviewed Intra-OP anesthesia mangement and issues during anesthesia, Set expectations for post-procedure period and Allowed opportunity for questions and acknowledgement of understanding      Vitals:  Vitals Value Taken Time   BP     Temp     Pulse     Resp     SpO2         Electronically Signed By: GT Adler CRNA  June 20, 2024  3:20 PM

## 2024-07-08 ENCOUNTER — TRANSFERRED RECORDS (OUTPATIENT)
Dept: HEALTH INFORMATION MANAGEMENT | Facility: CLINIC | Age: 64
End: 2024-07-08
Payer: COMMERCIAL

## 2024-07-09 ENCOUNTER — ANESTHESIA (OUTPATIENT)
Dept: SURGERY | Facility: CLINIC | Age: 64
End: 2024-07-09
Payer: COMMERCIAL

## 2024-07-09 ENCOUNTER — HOSPITAL ENCOUNTER (OUTPATIENT)
Facility: CLINIC | Age: 64
Discharge: HOME | End: 2024-07-09
Attending: ORTHOPAEDIC SURGERY | Admitting: ORTHOPAEDIC SURGERY
Payer: COMMERCIAL

## 2024-07-09 ENCOUNTER — ANESTHESIA EVENT (OUTPATIENT)
Dept: SURGERY | Facility: CLINIC | Age: 64
End: 2024-07-09
Payer: COMMERCIAL

## 2024-07-09 VITALS
WEIGHT: 268 LBS | SYSTOLIC BLOOD PRESSURE: 117 MMHG | OXYGEN SATURATION: 95 % | HEART RATE: 59 BPM | RESPIRATION RATE: 18 BRPM | DIASTOLIC BLOOD PRESSURE: 52 MMHG | HEIGHT: 69 IN | BODY MASS INDEX: 39.69 KG/M2 | TEMPERATURE: 98.2 F

## 2024-07-09 DIAGNOSIS — S88.112A BELOW-KNEE AMPUTATION OF LEFT LOWER EXTREMITY, INITIAL ENCOUNTER (H): ICD-10-CM

## 2024-07-09 LAB
GLUCOSE BLDC GLUCOMTR-MCNC: 70 MG/DL (ref 70–99)
GLUCOSE BLDC GLUCOMTR-MCNC: 86 MG/DL (ref 70–99)

## 2024-07-09 PROCEDURE — 250N000009 HC RX 250

## 2024-07-09 PROCEDURE — 360N000076 HC SURGERY LEVEL 3, PER MIN: Performed by: ORTHOPAEDIC SURGERY

## 2024-07-09 PROCEDURE — 82962 GLUCOSE BLOOD TEST: CPT

## 2024-07-09 PROCEDURE — 710N000012 HC RECOVERY PHASE 2, PER MINUTE: Performed by: ORTHOPAEDIC SURGERY

## 2024-07-09 PROCEDURE — 258N000001 HC RX 258: Performed by: ORTHOPAEDIC SURGERY

## 2024-07-09 PROCEDURE — 250N000011 HC RX IP 250 OP 636

## 2024-07-09 PROCEDURE — 87075 CULTR BACTERIA EXCEPT BLOOD: CPT | Performed by: ORTHOPAEDIC SURGERY

## 2024-07-09 PROCEDURE — 250N000013 HC RX MED GY IP 250 OP 250 PS 637

## 2024-07-09 PROCEDURE — 258N000003 HC RX IP 258 OP 636

## 2024-07-09 PROCEDURE — 370N000017 HC ANESTHESIA TECHNICAL FEE, PER MIN: Performed by: ORTHOPAEDIC SURGERY

## 2024-07-09 PROCEDURE — 272N000001 HC OR GENERAL SUPPLY STERILE: Performed by: ORTHOPAEDIC SURGERY

## 2024-07-09 PROCEDURE — 87186 SC STD MICRODIL/AGAR DIL: CPT | Performed by: ORTHOPAEDIC SURGERY

## 2024-07-09 PROCEDURE — 999N000141 HC STATISTIC PRE-PROCEDURE NURSING ASSESSMENT: Performed by: ORTHOPAEDIC SURGERY

## 2024-07-09 RX ORDER — SODIUM CHLORIDE, SODIUM LACTATE, POTASSIUM CHLORIDE, CALCIUM CHLORIDE 600; 310; 30; 20 MG/100ML; MG/100ML; MG/100ML; MG/100ML
INJECTION, SOLUTION INTRAVENOUS CONTINUOUS
Status: DISCONTINUED | OUTPATIENT
Start: 2024-07-09 | End: 2024-07-09 | Stop reason: HOSPADM

## 2024-07-09 RX ORDER — FENTANYL CITRATE 50 UG/ML
25 INJECTION, SOLUTION INTRAMUSCULAR; INTRAVENOUS EVERY 5 MIN PRN
Status: DISCONTINUED | OUTPATIENT
Start: 2024-07-09 | End: 2024-07-09 | Stop reason: HOSPADM

## 2024-07-09 RX ORDER — DEXAMETHASONE SODIUM PHOSPHATE 10 MG/ML
INJECTION, SOLUTION INTRAMUSCULAR; INTRAVENOUS
Status: COMPLETED | OUTPATIENT
Start: 2024-07-09 | End: 2024-07-09

## 2024-07-09 RX ORDER — HYDROMORPHONE HCL IN WATER/PF 6 MG/30 ML
0.2 PATIENT CONTROLLED ANALGESIA SYRINGE INTRAVENOUS EVERY 5 MIN PRN
Status: DISCONTINUED | OUTPATIENT
Start: 2024-07-09 | End: 2024-07-09 | Stop reason: HOSPADM

## 2024-07-09 RX ORDER — NALOXONE HYDROCHLORIDE 0.4 MG/ML
0.1 INJECTION, SOLUTION INTRAMUSCULAR; INTRAVENOUS; SUBCUTANEOUS
Status: DISCONTINUED | OUTPATIENT
Start: 2024-07-09 | End: 2024-07-09 | Stop reason: HOSPADM

## 2024-07-09 RX ORDER — ACETAMINOPHEN 325 MG/1
975 TABLET ORAL ONCE
Status: COMPLETED | OUTPATIENT
Start: 2024-07-09 | End: 2024-07-09

## 2024-07-09 RX ORDER — ROPIVACAINE HYDROCHLORIDE 5 MG/ML
INJECTION, SOLUTION EPIDURAL; INFILTRATION; PERINEURAL
Status: COMPLETED | OUTPATIENT
Start: 2024-07-09 | End: 2024-07-09

## 2024-07-09 RX ORDER — OXYCODONE HYDROCHLORIDE 5 MG/1
5-10 TABLET ORAL EVERY 4 HOURS PRN
Qty: 30 TABLET | Refills: 0 | Status: SHIPPED | OUTPATIENT
Start: 2024-07-09 | End: 2024-08-26

## 2024-07-09 RX ORDER — TRANEXAMIC ACID 650 MG/1
1950 TABLET ORAL ONCE
Status: DISCONTINUED | OUTPATIENT
Start: 2024-07-09 | End: 2024-07-09 | Stop reason: HOSPADM

## 2024-07-09 RX ORDER — GABAPENTIN 300 MG/1
300 CAPSULE ORAL ONCE
Status: COMPLETED | OUTPATIENT
Start: 2024-07-09 | End: 2024-07-09

## 2024-07-09 RX ORDER — DEXAMETHASONE SODIUM PHOSPHATE 4 MG/ML
4 INJECTION, SOLUTION INTRA-ARTICULAR; INTRALESIONAL; INTRAMUSCULAR; INTRAVENOUS; SOFT TISSUE
Status: DISCONTINUED | OUTPATIENT
Start: 2024-07-09 | End: 2024-07-09 | Stop reason: HOSPADM

## 2024-07-09 RX ORDER — ONDANSETRON 4 MG/1
4 TABLET, ORALLY DISINTEGRATING ORAL EVERY 30 MIN PRN
Status: DISCONTINUED | OUTPATIENT
Start: 2024-07-09 | End: 2024-07-09 | Stop reason: HOSPADM

## 2024-07-09 RX ORDER — HYDROMORPHONE HCL IN WATER/PF 6 MG/30 ML
0.4 PATIENT CONTROLLED ANALGESIA SYRINGE INTRAVENOUS EVERY 5 MIN PRN
Status: DISCONTINUED | OUTPATIENT
Start: 2024-07-09 | End: 2024-07-09 | Stop reason: HOSPADM

## 2024-07-09 RX ORDER — CEFAZOLIN SODIUM/WATER 3 G/30 ML
3 SYRINGE (ML) INTRAVENOUS
Status: COMPLETED | OUTPATIENT
Start: 2024-07-09 | End: 2024-07-09

## 2024-07-09 RX ORDER — FENTANYL CITRATE 50 UG/ML
50 INJECTION, SOLUTION INTRAMUSCULAR; INTRAVENOUS EVERY 5 MIN PRN
Status: DISCONTINUED | OUTPATIENT
Start: 2024-07-09 | End: 2024-07-09 | Stop reason: HOSPADM

## 2024-07-09 RX ORDER — PROPOFOL 10 MG/ML
INJECTION, EMULSION INTRAVENOUS PRN
Status: DISCONTINUED | OUTPATIENT
Start: 2024-07-09 | End: 2024-07-09

## 2024-07-09 RX ORDER — ONDANSETRON 2 MG/ML
4 INJECTION INTRAMUSCULAR; INTRAVENOUS EVERY 30 MIN PRN
Status: DISCONTINUED | OUTPATIENT
Start: 2024-07-09 | End: 2024-07-09 | Stop reason: HOSPADM

## 2024-07-09 RX ORDER — FENTANYL CITRATE 50 UG/ML
INJECTION, SOLUTION INTRAMUSCULAR; INTRAVENOUS PRN
Status: DISCONTINUED | OUTPATIENT
Start: 2024-07-09 | End: 2024-07-09

## 2024-07-09 RX ORDER — CEPHALEXIN 500 MG/1
500 CAPSULE ORAL 3 TIMES DAILY
Qty: 42 CAPSULE | Refills: 0 | Status: SHIPPED | OUTPATIENT
Start: 2024-07-09 | End: 2024-08-26

## 2024-07-09 RX ORDER — CEFAZOLIN SODIUM/WATER 3 G/30 ML
3 SYRINGE (ML) INTRAVENOUS SEE ADMIN INSTRUCTIONS
Status: DISCONTINUED | OUTPATIENT
Start: 2024-07-09 | End: 2024-07-09 | Stop reason: HOSPADM

## 2024-07-09 RX ORDER — LIDOCAINE 40 MG/G
CREAM TOPICAL
Status: DISCONTINUED | OUTPATIENT
Start: 2024-07-09 | End: 2024-07-09 | Stop reason: HOSPADM

## 2024-07-09 RX ORDER — ACETAMINOPHEN 325 MG/1
650 TABLET ORAL
Status: DISCONTINUED | OUTPATIENT
Start: 2024-07-09 | End: 2024-07-09 | Stop reason: HOSPADM

## 2024-07-09 RX ADMIN — ROPIVACAINE HYDROCHLORIDE 20 ML: 5 INJECTION, SOLUTION EPIDURAL; INFILTRATION; PERINEURAL at 13:55

## 2024-07-09 RX ADMIN — PROPOFOL 75 MCG/KG/MIN: 10 INJECTION, EMULSION INTRAVENOUS at 14:22

## 2024-07-09 RX ADMIN — DEXAMETHASONE SODIUM PHOSPHATE 4 MG: 10 INJECTION, SOLUTION INTRAMUSCULAR; INTRAVENOUS at 13:40

## 2024-07-09 RX ADMIN — MIDAZOLAM 2 MG: 1 INJECTION INTRAMUSCULAR; INTRAVENOUS at 13:25

## 2024-07-09 RX ADMIN — GABAPENTIN 300 MG: 300 CAPSULE ORAL at 12:41

## 2024-07-09 RX ADMIN — PHENYLEPHRINE HYDROCHLORIDE 50 MCG: 10 INJECTION INTRAVENOUS at 15:09

## 2024-07-09 RX ADMIN — Medication 3 G: at 14:18

## 2024-07-09 RX ADMIN — ACETAMINOPHEN 975 MG: 325 TABLET, FILM COATED ORAL at 12:41

## 2024-07-09 RX ADMIN — ROPIVACAINE HYDROCHLORIDE 20 ML: 5 INJECTION, SOLUTION EPIDURAL; INFILTRATION; PERINEURAL at 13:40

## 2024-07-09 RX ADMIN — MIDAZOLAM 2 MG: 1 INJECTION INTRAMUSCULAR; INTRAVENOUS at 14:18

## 2024-07-09 RX ADMIN — LIDOCAINE HYDROCHLORIDE 0.2 ML: 10 INJECTION, SOLUTION EPIDURAL; INFILTRATION; INTRACAUDAL; PERINEURAL at 13:06

## 2024-07-09 RX ADMIN — DEXAMETHASONE SODIUM PHOSPHATE 4 MG: 10 INJECTION, SOLUTION INTRAMUSCULAR; INTRAVENOUS at 13:55

## 2024-07-09 RX ADMIN — SODIUM CHLORIDE, POTASSIUM CHLORIDE, SODIUM LACTATE AND CALCIUM CHLORIDE: 600; 310; 30; 20 INJECTION, SOLUTION INTRAVENOUS at 13:05

## 2024-07-09 RX ADMIN — FENTANYL CITRATE 100 MCG: 50 INJECTION INTRAMUSCULAR; INTRAVENOUS at 13:25

## 2024-07-09 RX ADMIN — PROPOFOL 50 MG: 10 INJECTION, EMULSION INTRAVENOUS at 14:20

## 2024-07-09 ASSESSMENT — ACTIVITIES OF DAILY LIVING (ADL)
ADLS_ACUITY_SCORE: 37

## 2024-07-09 ASSESSMENT — LIFESTYLE VARIABLES: TOBACCO_USE: 1

## 2024-07-09 NOTE — PROCEDURES
7/9/2024    PREOPERATIVE DIAGNOSIS: Wound breakdown s/p left below knee amputation    POSTOPERATIVE DIAGNOSIS: Wound breakdown s/p left below knee amputation    PROCEDURE: Revision left below knee amputation    SURGEON: Bjorn Rueda MD    ASSISTANT: Barbara Vargas PA-c.  The presence of a PA was necessary for positioning retraction, and safe progression of the case    ANESTHESIA:  Block with sedation    BLOOD LOSS: 100cc    COMPLICATIONS: None apparent    DISPOSITION: Stable to PACU    CULTURES: Swab and tissue for aerobic culture    INDICATIONS: Grupo is a 63 year old male who initially underwent a left below-knee amputation in October 2023 for diabetic foot infection associated with his Charcot foot.  He ultimately healed his incision, although developed a fairly large sized redundant soft tissue mass which made walking in his prosthetic difficult.  Therefore, we remove this mass in 6/20/2024.  He did well for the first 2 weeks after surgery, all of his incisions with open last week.  Seen in clinic and there was about a 5 cm 3 cm of dehiscence over the mid medial portion of the incision with underlying soft tissue.  Given the size of the defect, we did not think would do well with simply granulating in.  Therefore, after discussing treatment options, he elected to proceed with a left below-knee amputation irrigation and debridement versus a revision below-knee amputation to optimize his chance of soft tissue healing, understanding the risks of ongoing wound problems, infection, damage to vessels or nerves, and risks inherent to anesthesia.    PROCEDURE: Grupo was met in the preoperative holding area where the left lower leg was marked.  He underwent proximal anesthesia which he tolerated well.  He was then transferred to the operating theater.  After smooth induction of sedation, he was positioned supine.  A timeout was performed verifying the correct patient, surgery, and location.  He received preoperative  antibiotics.  The left was then prepped and draped in a standard sterile fashion.    We then removed the previous staples from his incision.  We opened up the remainder of the incision both medially and laterally.  We took tissue and swabs for aerobic and anaerobic cultures.  We used a towel clip to try and reapproximate the skin edges of the mid medial portion of incision where it had dehisced.  Unfortunate, there is too much tension on this.  We therefore felt we need to shorten his stump in order to decrease tension and get this to heal.  We made a linear incision just over the top of his tibia and then used a Cottrell to expose the most distal 2-1/2 cm of his tibia.  We resected this using the oscillating saw.  We then did a similar thing over his fibula, resecting additional 2-1/2 cm.  We then drilled a hole through the anterior tibial cortex and used a #5 Ethibond to perform a myodesis of the posterior musculature to the anterior tibial stump.  Then used the Bovie to debride excess soft tissue mass from the posterior as well as lateral soft tissues.  We then thoroughly irrigated the wound using 9 L of normal saline via pulse lavage.  We brought the posterior flap anteriorly and able to get this skin edges opposed without any significant tension.  We therefore closed the subcutaneous layer with 2-0 Monocryl and skin with interrupted nylon sutures.  The lower leg was dressed with Adaptic followed by 4 x 4's, ABDs, cast padding, and a double 4 inch Ace.  Microvolt for anesthesia and transferred recovery in stable condition.    POSTOPERATIVE PLAN:    1.  Nonweightbearing left lower extremity.  Elevate is much as possible   2.  Continue Keflex 500 mg 3 times daily x 2 weeks   3.  Return to clinic in 1 to 2 weeks for a wound check.  Will leave his sutures in for 3 to 4 weeks.    MIRYAM HERNANDEZ MD

## 2024-07-09 NOTE — ANESTHESIA POSTPROCEDURE EVALUATION
Patient: Juan Balderrama    Procedure: Procedure(s):  below the knee irrigation and debridement left  below the knee amputation revision left lower extremity       Anesthesia Type:  MAC    Note:  Disposition: Outpatient   Postop Pain Control: Uneventful            Sign Out: Well controlled pain   PONV: No   Neuro/Psych: Uneventful            Sign Out: Acceptable/Baseline neuro status   Airway/Respiratory: Uneventful            Sign Out: Acceptable/Baseline resp. status   CV/Hemodynamics: Uneventful            Sign Out: Acceptable CV status; No obvious hypovolemia; No obvious fluid overload   Other NRE: NONE   DID A NON-ROUTINE EVENT OCCUR? No           Last vitals:  Vitals Value Taken Time   /53 07/09/24 1610   Temp 36.4  C (97.6  F) 07/09/24 1610   Pulse 59 07/09/24 1610   Resp 18 07/09/24 1610   SpO2 93 % 07/09/24 1614   Vitals shown include unfiled device data.    Electronically Signed By: GT Warren CRNA  July 9, 2024  4:15 PM  
no depression/no anxiety

## 2024-07-09 NOTE — ANESTHESIA PREPROCEDURE EVALUATION
Anesthesia Pre-Procedure Evaluation    Patient: Juan Balderrama   MRN: [id number] : 1960        Procedure : Procedure(s):  Below the Knee Amputate  knee irrigation and debridement          Past Medical History:   Diagnosis Date    Diabetes (H)     Hypertension -    Septic arthritis of right foot (H) 2023      Past Surgical History:   Procedure Laterality Date    AMPUTATE LEG BELOW KNEE Left 2023    Procedure: Left, AMPUTATION, BELOW KNEE;  Surgeon: Bjorn Rueda MD;  Location: [facility]    AMPUTATE REVISION STUMP LOWER EXTREMITY Left 2024    Procedure: Revision Left Leg Below Knee Amputation;  Surgeon: Bjorn Rueda MD;  Location: [facility]    APPLY WOUND VAC Left 10/27/2023    Procedure: Apply Wound Vac left leg;  Surgeon: Bjorn Rueda MD;  Location: [facility]    EYE SURGERY  4-3-2017    Cataract    INCISION AND DRAINAGE LOWER EXTREMITY, COMBINED Left 10/27/2023    Procedure: left lower leg Incision and Drainage;  Surgeon: Bjorn Rueda MD;  Location: [facility]      Allergies   Allergen Reactions    Bees     Sulfamethoxazole Swelling     throat      Social History     Tobacco Use    Smoking status: Never     Passive exposure: Never    Smokeless tobacco: Never   Substance Use Topics    Alcohol use: Yes     Comment: social      Wt Readings from Last 1 Encounters:   24 121.6 kg (268 lb)        Anesthesia Evaluation   Pt has had prior anesthetic. Type: General, MAC and Regional.        ROS/MED HX  ENT/Pulmonary:     (+)                tobacco use, Past use,                       Neurologic:       Cardiovascular:     (+) Dyslipidemia hypertension- -   -  - -                                      METS/Exercise Tolerance:     Hematologic:       Musculoskeletal:       GI/Hepatic:       Renal/Genitourinary:       Endo:     (+)  type II DM,             Obesity,       Psychiatric/Substance Use:       Infectious Disease:       Malignancy:       Other:       "      Physical Exam    Airway        Mallampati: II   TM distance: > 3 FB   Neck ROM: full   Mouth opening: > 3 cm    Respiratory Devices and Support         Dental       (+) Modest Abnormalities - crowns, retainers, 1 or 2 missing teeth      Cardiovascular   cardiovascular exam normal       Rhythm and rate: regular and normal     Pulmonary   pulmonary exam normal        breath sounds clear to auscultation           OUTSIDE LABS:  CBC:   Lab Results   Component Value Date    WBC 12.4 (H) 09/29/2023    WBC 13.3 (H) 09/28/2023    HGB 8.7 (L) 09/30/2023    HGB 9.5 (L) 09/29/2023    HCT 30.3 (L) 09/29/2023    HCT 29.4 (L) 09/28/2023     (H) 10/02/2023     (H) 09/29/2023     BMP:   Lab Results   Component Value Date     10/01/2023     (L) 09/29/2023    POTASSIUM 4.3 10/01/2023    POTASSIUM 4.9 09/29/2023    CHLORIDE 100 10/01/2023    CHLORIDE 98 09/29/2023    CO2 27 10/01/2023    CO2 23 09/29/2023    BUN 19.9 10/01/2023    BUN 18.0 09/29/2023    CR 0.91 10/02/2023    CR 0.94 10/01/2023     (H) 06/20/2024     (H) 10/27/2023     COAGS:   Lab Results   Component Value Date    INR 1.25 (H) 09/25/2023     POC: No results found for: \"BGM\", \"HCG\", \"HCGS\"  HEPATIC:   Lab Results   Component Value Date    ALBUMIN 3.2 (L) 09/25/2023    PROTTOTAL 7.6 09/25/2023    ALT 24 09/25/2023    AST 23 09/25/2023    ALKPHOS 92 09/25/2023    BILITOTAL 0.4 09/25/2023     OTHER:   Lab Results   Component Value Date    LACT 0.9 09/28/2023    A1C 6.7 (H) 09/25/2023    PHOGN 7.7 (L) 10/01/2023    MAG 1.9 09/29/2023    TSH 3.07 11/29/2018    CRP 22.2 (H) 11/08/2019    SED 81 (H) 09/25/2023       Anesthesia Plan    ASA Status:  3    NPO Status:  NPO Appropriate    Anesthesia Type: MAC (w nerve blocks).     - Reason for MAC: straight local not clinically adequate   Induction: Propofol, Intravenous.   Maintenance: TIVA.        Consents    Anesthesia Plan(s) and associated risks, benefits, and realistic alternatives " discussed. Questions answered and patient/representative(s) expressed understanding.     - Discussed: Risks, Benefits and Alternatives for BOTH SEDATION and the PROCEDURE were discussed     - Discussed with:  Patient            Postoperative Care    Pain management: Multi-modal analgesia, IV analgesics, Oral pain medications, Peripheral nerve block (Single Shot).   PONV prophylaxis: Background Propofol Infusion, Ondansetron (or other 5HT-3), Dexamethasone or Solumedrol     Comments:               Miguel Covington, APRN CRNA    I have reviewed the pertinent notes and labs in the chart from the past 30 days and (re)examined the patient.  Any updates or changes from those notes are reflected in this note.

## 2024-07-09 NOTE — ANESTHESIA CARE TRANSFER NOTE
Patient: Juan Balderrama    Procedure: Procedure(s):  below the knee irrigation and debridement left  below the knee amputation revision left lower extremity       Diagnosis: Dehiscence of wound [T81.30XA]  Diagnosis Additional Information: No value filed.    Anesthesia Type:   MAC     Note:    Oropharynx: oropharynx clear of all foreign objects  Level of Consciousness: awake  Oxygen Supplementation: room air    Independent Airway: airway patency satisfactory and stable  Dentition: dentition unchanged  Vital Signs Stable: post-procedure vital signs reviewed and stable  Report to RN Given: handoff report given  Patient transferred to: Phase II    Handoff Report: Identifed the Patient, Identified the Reponsible Provider, Reviewed the pertinent medical history, Discussed the surgical course, Reviewed Intra-OP anesthesia mangement and issues during anesthesia, Set expectations for post-procedure period and Allowed opportunity for questions and acknowledgement of understanding      Vitals:  Vitals Value Taken Time   /53 07/09/24 1610   Temp 36.4  C (97.6  F) 07/09/24 1610   Pulse 59 07/09/24 1610   Resp 18 07/09/24 1610   SpO2 93 % 07/09/24 1614   Vitals shown include unfiled device data.    Electronically Signed By: GT Warren CRNA  July 9, 2024  4:15 PM

## 2024-07-09 NOTE — ANESTHESIA PROCEDURE NOTES
Sciatic Procedure Note    Pre-Procedure   Staff -        CRNA: Miguel Covington APRN CRNA       Performed By: CRNA       Location: pre-op       Procedure Start/Stop Times: 7/9/2024 1:40 PM and 7/9/2024 1:55 PM       Pre-Anesthestic Checklist: patient identified, IV checked, site marked, risks and benefits discussed, informed consent, monitors and equipment checked, pre-op evaluation, at physician/surgeon's request and post-op pain management  Timeout:       Correct Patient: Yes        Correct Procedure: Yes        Correct Site: Yes        Correct Position: Yes        Correct Laterality: Yes        Site Marked: Yes  Procedure Documentation  Procedure: Sciatic       Laterality: left       Patient Position: lateral       Patient Prep/Sterile Barriers: sterile gloves, mask, patient draped       Skin prep: Chloraprep (popliteal approach).       Needle Gauge: 21.        Needle Length (Inches): 4        Ultrasound guided       1. Ultrasound was used to identify targeted nerve, plexus, vascular marker, or fascial plane and place a needle adjacent to it in real-time.       2. Ultrasound was used to visualize the spread of anesthetic in close proximity to the above referenced structure.       3. A permanent image is entered into the patient's record.       4. The visualized anatomic structures appeared normal.       5. There were no apparent abnormal pathologic findings.    Assessment/Narrative         The placement was negative for: blood aspirated, painful injection and site bleeding       Paresthesias: No.       Bolus given via needle. no blood aspirated via catheter.        Secured via.        Insertion/Infusion Method: Single Shot       Complications: none       Injection made incrementally with aspirations every 5 mL.    Medication(s) Administered   Ropivacaine 0.5% PF (Infiltration) - Infiltration   20 mL - 7/9/2024 1:55:00 PM  Dexamethasone 10 mg/mL PF (Perineural) - Perineural   4 mg - 7/9/2024 1:55:00  "PM  Medication Administration Time: 7/9/2024 1:40 PM      FOR [facility] ([facility]/West [facility]) ONLY:   Pain Team Contact information: please page the Pain Team Via [employer]. Search \"Pain\". During daytime hours, please page the attending first. At night please page the resident first.      "

## 2024-07-09 NOTE — ANESTHESIA PROCEDURE NOTES
Femoral Procedure Note    Pre-Procedure   Staff -        CRNA: Miguel Covington APRN CRNA       Performed By: CRNA       Procedure Start/Stop Times: 7/9/2024 1:25 PM and 7/9/2024 1:40 PM       Pre-Anesthestic Checklist: patient identified, IV checked, site marked, risks and benefits discussed, informed consent, monitors and equipment checked, pre-op evaluation, at physician/surgeon's request and post-op pain management  Timeout:       Correct Patient: Yes        Correct Procedure: Yes        Correct Site: Yes        Correct Position: Yes        Correct Laterality: Yes        Site Marked: Yes  Procedure Documentation  Procedure: Femoral       Laterality: left       Patient Position: supine       Patient Prep/Sterile Barriers: sterile gloves, mask, patient draped       Skin prep: Chloraprep       Needle Type: insulated       Needle Gauge: 21.        Needle Length (Inches): 4        Ultrasound guided       1. Ultrasound was used to identify targeted nerve, plexus, vascular marker, or fascial plane and place a needle adjacent to it in real-time.       2. Ultrasound was used to visualize the spread of anesthetic in close proximity to the above referenced structure.       3. A permanent image is entered into the patient's record.       4. The visualized anatomic structures appeared normal.       5. There were no apparent abnormal pathologic findings.    Assessment/Narrative         The placement was negative for: blood aspirated, painful injection and site bleeding       Paresthesias: No.       Bolus given via needle. no blood aspirated via catheter.        Secured via.        Insertion/Infusion Method: Single Shot       Complications: none       Injection made incrementally with aspirations every 5 mL.    Medication(s) Administered   Ropivacaine 0.5% PF (Infiltration) - Infiltration   20 mL - 7/9/2024 1:40:00 PM  Dexamethasone 10 mg/mL PF (Perineural) - Perineural   4 mg - 7/9/2024 1:40:00 PM  Medication Administration  "Time: 7/9/2024 1:25 PM      FOR [facility] ([facility]) ONLY:   Pain Team Contact information: please page the Pain Team Via [facility]. Search \"Pain\". During daytime hours, please page the attending first. At night please page the resident first.      "

## 2024-07-09 NOTE — DISCHARGE INSTRUCTIONS
Same Day Surgery Discharge Instructions  Special Precautions After Surgery - Adult    It is not unusual to feel lightheaded or faint, up to 24 hours after surgery or while taking pain medication.  If you have these symptoms; sit for a few minutes before standing and have someone assist you when getting up.  You should rest and relax for the next 24 hours and must have someone stay with you for at least 24 hours after your discharge.  DO NOT DRIVE any vehicle or operate mechanical equipment for 24 hours following the end of your surgery.  DO NOT DRIVE while taking narcotic pain medications that have been prescribed by your physician.  If you had a limb operated on, you must be able to use it fully to drive.  DO NOT drink alcoholic beverages for 24 hours following surgery or while taking prescription pain medication.  Drink clear liquids (apple juice, ginger ale, broth, 7-Up, etc.).  Progress to your regular diet as you feel able.  Any questions call your physician and do not make important decisions for 24 hours.    Nausea and Vomiting: Nausea and vomiting can occur any time after receiving anesthesia. If you experience nausea and vomiting we encourage you to move to a clear liquid diet and advance your diet as tolerated. If nausea and vomiting do not improve within 12 hours please call the surgeon or present to the Emergency department.     Break-through Bleeding: If your experience bleeding from your surgical site apply pressure and additional dressing per nurse instruction. For simple problems such as a saturated dressing, you may need to reinforce the dressing with more gauze and tape and put slight pressure on the site. If bleeding does not subside contact the surgeon or present to the Emergency Department.    Post-op Infection: If you develop a fever of 100.4 or greater, have pus like drainage, redness, swelling or severe pain at the surgical site not alleviated with pain medications; please  contact the surgeon or present to the Emergency Department.     Medications:  Ibuprofen (Motrin, Advil):  Next dose: OK to start taking anytime if your PCP has approved ibuprofen for you.  Follow the instructions on the bottle.  __________________________________________________________________________________________________________________________________  IMPORTANT NUMBERS:    [facility] Main Number:  [phone], [phone]  Pharmacy:  [phone]  Same Day Surgery:  [phone], for general post-op questions call Monday - Thursday until 8:30 p.m., Fridays until 6:00 p.m.                                                                      [facility]:  [phone]

## 2024-07-13 LAB
BACTERIA TISS BX CULT: ABNORMAL
BACTERIA TISS BX CULT: ABNORMAL

## 2024-07-15 LAB
BACTERIA TISS BX CULT: ABNORMAL

## 2024-07-16 LAB — BACTERIA TISS BX CULT: ABNORMAL

## 2024-07-18 ENCOUNTER — TRANSFERRED RECORDS (OUTPATIENT)
Dept: HEALTH INFORMATION MANAGEMENT | Facility: CLINIC | Age: 64
End: 2024-07-18
Payer: COMMERCIAL

## 2024-07-18 DIAGNOSIS — Z89.512 STATUS POST BELOW-KNEE AMPUTATION OF LEFT LOWER EXTREMITY (H): Primary | ICD-10-CM

## 2024-07-29 ENCOUNTER — TELEPHONE (OUTPATIENT)
Dept: INFECTIOUS DISEASES | Facility: CLINIC | Age: 64
End: 2024-07-29
Payer: COMMERCIAL

## 2024-07-29 ENCOUNTER — TRANSFERRED RECORDS (OUTPATIENT)
Dept: HEALTH INFORMATION MANAGEMENT | Facility: CLINIC | Age: 64
End: 2024-07-29
Payer: COMMERCIAL

## 2024-07-29 NOTE — TELEPHONE ENCOUNTER
[facility] Call Center    Phone Message    May a detailed message be left on voicemail: yes     Reason for Call: Appointment Intake    Referring Provider Name: Barbara Cee,  Diagnosis and/or Symptoms: Status post below-knee amputation of left lower extremity (H) // Orthopedic infection    Referring provider, KAM Jimenez is requesting for pt to be seen within the next 1 week, early next week at the latest, due to his infection. Pt is scheduled with Dr. Estes on 8/14. Writer checked template, no appts available within 1 week  timeline for any ID provider. Please call her back to notify if a sooner appt is available Call back #: [phone]    Action Taken: Other: ID    Travel Screening: Not Applicable     Date of Service:

## 2024-07-29 NOTE — CONFIDENTIAL NOTE
DIAGNOSIS:  Status post below-knee amputation of left lower extremity (H)    DATE RECEIVED:  08.14.2024    NOTES (Gather within 2 years) STATUS DETAILS   OFFICE NOTE from referring provider   Received 07.18.2024 Barbara Vargas PA-C [facility]    DISCHARGE SUMMARY from hospital Internal 07.09.2024 [facility]   LABS (any labs) Internal    MEDICATION LIST Received / Internal

## 2024-07-29 NOTE — TELEPHONE ENCOUNTER
Called patient to offer appointment this week. Patient needing virtual due to transportation. Patient scheduled and provided with appointment details.

## 2024-07-30 NOTE — PROGRESS NOTES
[facility] (VIDEO VISIT)  Dr. Sohan Reyes, [facility], Floor [address]  [address] [place] [address]   Patient:  Juan Balderrama, Date of birth 1960, Medical record number [id number]  Date of Visit:  07/31/2024           Assessment and Recommendations:   ID Problem List:  Surgical site infection, superficial - left BKA site  Cultures from 7/9/24 BKA revision with MRSA, Alcaligenes faecalis (R - Aztreonam), Pseudomonas fluorescens/putida (R - TMP/SMX), Finegoldia Magna  Prior BKA c/b redundant mass and wound dehiscence  Initial BKA 9/28/23  I&D 10/27/23  Revision #1 6/20/24 (redundant mass removed)  Revision #2 7/9/24  DM2 c/b Charcot foot, chronic left lower extremity ulcers s/p BKA    Recommendations:  Discontinue clindamycin  Discontinue doxycycline  Start ciprofloxacin 500mg PO q12h x 7 days  No further antibiotics needed after completing cipro for what appears to be superficial surgical site infection  Recommend orthopedics placing wound care consultation - sounds like wound healing issues primarily, which would benefit from WOC evaluation.  No need for ID follow-up - recommend close follow-up for wound check with [facility].    Discussion:  62yo M with h/o HLD, HTN, DM2 with chronic diabetic ulcers s/p left BKA (9/28/23) c/b wound dehiscence requiring I&D (10/27/23) and revisions (6/20/24, 7/9/24) who presents for evaluation of surgical site infection.    With no arie purulence noted in drainage pre-operative or within wound in operative note, my suspicion for a raging anaerobic or MRSA infection is quite low. It's possible there was minor surface infection post-operative which contributed to wound dehiscence, though this wound complication could likely also be due to non-infectious wound healing issues in a patient with prior chronic ulcerations and DM2.     Would stop clindamycin (anaerobes should be fully treated at this point) and  doxycycline, and replace with one week of ciprofloxacin, which should target the MRSA, Alcaligenes, and Pseudomonas.     Sohan Reyes MD  Division of Infectious Diseases and International Medicine  P: [phone]     I spent at least 45 minutes on the day of this encounter on chart review, patient exam/interview, and note preparation.        History of Infectious Disease Illness:     64yo M with h/o HLD, HTN, DM2 with chronic diabetic ulcers s/p left BKA (9/28/23) c/b wound dehiscence requiring I&D (10/27/23) and revisions (6/20/24, 7/9/24) who presents for evaluation of surgical site infection.    Pt's BKA was performed 9/28/23 due to worsening chronic diabetic foot ulcers with SSTI and osteomyelitis (Charcot complications). About one month later, on 10/27/23, an I&D was performed due to dehiscence and wound breakdown at the site - cultures from this procedure grew MRSA, Acinetobacter baumanii, Stenotrophomonas maltophila, and Finegoldia Magna. Ultimately, due to a redundant tissue mass at the site making walking with his prosthesis difficult, a revision BKA was performed on 6/20/24. The operative report from this procedure notes the mass was largely scar tissue, with no arie purulence noted. Near the beginning of July, his surgical wound dehisced. Pt denied any fevers or chills at the time, and described the drainage as serosanguinous rather than purulent. He was prescribed cephalexin on 7/4/24, and then taken to the OR for a further revision on 7/9/24.There is no description of any infected-appearing material in this operative report, though swab cultures from the site were collected and have since grown MRSA, Alcaligenes faecalis (R - Aztreonam), Pseudomonas fluorescens/putida (R - TMP-SMX), and Finegoldia Magna.      At a check-up one week post-op on 7/19/24 with [facility], pt was feeling well - no fevers/chills, wound hadn't had any drainage. His wound was described as healing well with no  signs of dehiscence. By that time, the above cultures had returned with growth. He was still on the previously prescribed cephalexin, so this was changed to doxycycline and clindamycin. Outpatient ID referral was placed to evaluate antibiotic plan.    Wound opened last Thursday - about 3/4 of the wound is opened. He has had mostly bloody drainage, sometimes clear/straw. No significant erythema around the wound since opening. No recent fevers or chills. Only pain at the site occurs at night and has been unchanged since the wound opened.     Review of Systems     Physical Exam  GENERAL: Healthy, alert and no distress  EYES: Eyes grossly normal to inspection.  No discharge or erythema, or obvious scleral/conjunctival abnormalities.  RESP: No audible wheeze, cough, or visible cyanosis.  No visible retractions or increased work of breathing.    SKIN: Visible skin clear. No significant rash, abnormal pigmentation or lesions.  NEURO: Cranial nerves grossly intact.  Mentation and speech appropriate for age.  PSYCH: Mentation appears normal, affect normal/bright, judgement and insight intact, normal speech and appearance well-groomed.    Video-Visit Details    Type of service:  Video Visit   Video Start Time: 10:50am  Video End Time: 11:10am  Originating Location (pt. Location): Home    Distant Location (provider location):  Off-site  Platform used for Video Visit: [employer]

## 2024-07-31 ENCOUNTER — VIRTUAL VISIT (OUTPATIENT)
Dept: INFECTIOUS DISEASES | Facility: CLINIC | Age: 64
End: 2024-07-31
Attending: STUDENT IN AN ORGANIZED HEALTH CARE EDUCATION/TRAINING PROGRAM
Payer: COMMERCIAL

## 2024-07-31 VITALS — BODY MASS INDEX: 39.25 KG/M2 | WEIGHT: 265 LBS | HEIGHT: 69 IN

## 2024-07-31 DIAGNOSIS — Z89.512 STATUS POST BELOW-KNEE AMPUTATION OF LEFT LOWER EXTREMITY (H): ICD-10-CM

## 2024-07-31 PROCEDURE — 99204 OFFICE O/P NEW MOD 45 MIN: CPT | Mod: 95 | Performed by: STUDENT IN AN ORGANIZED HEALTH CARE EDUCATION/TRAINING PROGRAM

## 2024-07-31 RX ORDER — CIPROFLOXACIN 500 MG/1
500 TABLET, FILM COATED ORAL 2 TIMES DAILY
Qty: 14 TABLET | Refills: 0 | Status: SHIPPED | OUTPATIENT
Start: 2024-07-31 | End: 2024-08-07

## 2024-07-31 ASSESSMENT — PAIN SCALES - GENERAL: PAINLEVEL: NO PAIN (0)

## 2024-07-31 NOTE — NURSING NOTE
Current patient location: [address] [place] [address] [address]  [facility] [id number]    Is the patient currently in the state of [place]? YES    Visit mode:VIDEO    If the visit is dropped, the patient can be reconnected by: VIDEO VISIT: Text to cell phone:   Telephone Information:   Mobile [phone]    and VIDEO VISIT: Send to e-mail at: [email or web address]    Will anyone else be joining the visit? Pts wife  (If patient encounters technical issues they should call [phone])    How would you like to obtain your AVS? MyChart    Are changes needed to the allergy or medication list? No    Patient denies any changes since echeck-in completion and states all information entered during echeck-in remains accurate.    Are refills needed on medications prescribed by this physician? NA    Reason for visit: Consult    GOOD Gongora[employer]

## 2024-07-31 NOTE — LETTER
7/31/2024       RE: Juan Balderrama  [address]  [place] [address]     Dear Colleague,    Thank you for referring your patient, Juan Balderrama, to the [facility]IN[facility]. Please see a copy of my visit note below.    [facility] Disease [facility] (VIDEO VISIT)  Dr. Sohan Reyes, [facility], Floor 3  [address] [place] [address]   Patient:  Juan Balderrama, Date of birth 1960, Medical record number [id number]  Date of Visit:  07/31/2024           Assessment and Recommendations:   ID Problem List:  Surgical site infection, superficial - left BKA site  Cultures from 7/9/24 BKA revision with MRSA, Alcaligenes faecalis (R - Aztreonam), Pseudomonas fluorescens/putida (R - TMP/SMX), Finegoldia Magna  Prior BKA c/b redundant mass and wound dehiscence  Initial BKA 9/28/23  I&D 10/27/23  Revision #1 6/20/24 (redundant mass removed)  Revision #2 7/9/24  DM2 c/b Charcot foot, chronic left lower extremity ulcers s/p BKA    Recommendations:  Discontinue clindamycin  Discontinue doxycycline  Start ciprofloxacin 500mg PO q12h x 7 days  No further antibiotics needed after completing cipro for what appears to be superficial surgical site infection  Recommend orthopedics placing wound care consultation - sounds like wound healing issues primarily, which would benefit from WOC evaluation.  No need for ID follow-up - recommend close follow-up for wound check with [facility].    Discussion:  62yo M with h/o HLD, HTN, DM2 with chronic diabetic ulcers s/p left BKA (9/28/23) c/b wound dehiscence requiring I&D (10/27/23) and revisions (6/20/24, 7/9/24) who presents for evaluation of surgical site infection.    With no arie purulence noted in drainage pre-operative or within wound in operative note, my suspicion for a raging anaerobic or MRSA infection is quite low. It's  possible there was minor surface infection post-operative which contributed to wound dehiscence, though this wound complication could likely also be due to non-infectious wound healing issues in a patient with prior chronic ulcerations and DM2.     Would stop clindamycin (anaerobes should be fully treated at this point) and doxycycline, and replace with one week of ciprofloxacin, which should target the MRSA, Alcaligenes, and Pseudomonas.     Sohan Reyes MD  Division of Infectious Diseases and International Medicine  P: [phone]     I spent at least 45 minutes on the day of this encounter on chart review, patient exam/interview, and note preparation.        History of Infectious Disease Illness:     64yo M with h/o HLD, HTN, DM2 with chronic diabetic ulcers s/p left BKA (9/28/23) c/b wound dehiscence requiring I&D (10/27/23) and revisions (6/20/24, 7/9/24) who presents for evaluation of surgical site infection.    Pt's BKA was performed 9/28/23 due to worsening chronic diabetic foot ulcers with SSTI and osteomyelitis (Charcot complications). About one month later, on 10/27/23, an I&D was performed due to dehiscence and wound breakdown at the site - cultures from this procedure grew MRSA, Acinetobacter baumanii, Stenotrophomonas maltophila, and Finegoldia Magna. Ultimately, due to a redundant tissue mass at the site making walking with his prosthesis difficult, a revision BKA was performed on 6/20/24. The operative report from this procedure notes the mass was largely scar tissue, with no arie purulence noted. Near the beginning of July, his surgical wound dehisced. Pt denied any fevers or chills at the time, and described the drainage as serosanguinous rather than purulent. He was prescribed cephalexin on 7/4/24, and then taken to the OR for a further revision on 7/9/24.There is no description of any infected-appearing material in this operative report, though swab cultures from the site were collected and  have since grown MRSA, Alcaligenes faecalis (R - Aztreonam), Pseudomonas fluorescens/putida (R - TMP-SMX), and Finegoldia Magna.      At a check-up one week post-op on 7/19/24 with [facility], pt was feeling well - no fevers/chills, wound hadn't had any drainage. His wound was described as healing well with no signs of dehiscence. By that time, the above cultures had returned with growth. He was still on the previously prescribed cephalexin, so this was changed to doxycycline and clindamycin. Outpatient ID referral was placed to evaluate antibiotic plan.    Wound opened last Thursday - about 3/4 of the wound is opened. He has had mostly bloody drainage, sometimes clear/straw. No significant erythema around the wound since opening. No recent fevers or chills. Only pain at the site occurs at night and has been unchanged since the wound opened.     Review of Systems     Physical Exam  GENERAL: Healthy, alert and no distress  EYES: Eyes grossly normal to inspection.  No discharge or erythema, or obvious scleral/conjunctival abnormalities.  RESP: No audible wheeze, cough, or visible cyanosis.  No visible retractions or increased work of breathing.    SKIN: Visible skin clear. No significant rash, abnormal pigmentation or lesions.  NEURO: Cranial nerves grossly intact.  Mentation and speech appropriate for age.  PSYCH: Mentation appears normal, affect normal/bright, judgement and insight intact, normal speech and appearance well-groomed.    Video-Visit Details    Type of service:  Video Visit   Video Start Time: 10:50am  Video End Time: 11:10am  Originating Location (pt. Location): Home    Distant Location (provider location):  Off-site  Platform used for Video Visit: AmWell      Again, thank you for allowing me to participate in the care of your patient.      Sincerely,    Sohan Reyes MD

## 2024-08-05 ENCOUNTER — TELEPHONE (OUTPATIENT)
Dept: WOUND CARE | Facility: CLINIC | Age: 64
End: 2024-08-05
Payer: COMMERCIAL

## 2024-08-05 ENCOUNTER — TELEPHONE (OUTPATIENT)
Dept: VASCULAR SURGERY | Facility: CLINIC | Age: 64
End: 2024-08-05
Payer: COMMERCIAL

## 2024-08-05 DIAGNOSIS — Z89.512 STATUS POST BELOW-KNEE AMPUTATION OF LEFT LOWER EXTREMITY (H): Primary | ICD-10-CM

## 2024-08-05 NOTE — TELEPHONE ENCOUNTER
Vascular Referral Intake    Referred by: Barbara STANLEY for wound care    Specialty: Wound Clinic    Specific Provider if Necessary:  DELFINA Andres or MD Mark Rader    Visit Type: New    Time Frame: Next Available    Testing/Imaging Needed Before Consult: NA    Appt Note: (to be pasted into appt comments, also add where additional information is located, ie, outside images pushed to PACS, in Epic, sent to HIM[facility], etc)  Wound at Left BKA site

## 2024-08-05 NOTE — TELEPHONE ENCOUNTER
Patient called the clinic to follow up on a wound care referral. Patient's wound is at the left leg amputation site and is currently open.

## 2024-08-05 NOTE — TELEPHONE ENCOUNTER
Does the patient have an open and draining wound? Yes    Please schedule with Dr. Malgorzata De Leon PA-C or Brianna Reyes NP at [facility] [place] for next available appointment.    **For all providers, Maggie Morales PA-C, Dr. Mcclain, Brianna Reyes NP or Dr. Rader, please schedule a follow up 4 weeks after initial appointment.**  --If unable to schedule within 2-3 weeks then please place on cancellation list--    Is the patient able to make their own medical decisions? Yes    Can the patient be scheduled on a Wednesday (Dior) or Thursday (Amy)? Yes    Is patient a DAVID lift? PLEASE INQUIRE WHEN MAKING THE APPOINTMENT AND PUT IN APPOINTMENT NOTES    Routing to  Wound Healing Scheduling.

## 2024-08-13 RX ORDER — GABAPENTIN 300 MG/1
CAPSULE ORAL
COMMUNITY
Start: 2023-12-27 | End: 2024-08-26

## 2024-08-13 RX ORDER — DOXYCYCLINE 100 MG/1
CAPSULE ORAL
COMMUNITY
Start: 2023-11-17 | End: 2024-08-26

## 2024-08-14 ENCOUNTER — PRE VISIT (OUTPATIENT)
Dept: INFECTIOUS DISEASES | Facility: CLINIC | Age: 64
End: 2024-08-14
Payer: COMMERCIAL

## 2024-08-14 ENCOUNTER — OFFICE VISIT (OUTPATIENT)
Dept: VASCULAR SURGERY | Facility: CLINIC | Age: 64
End: 2024-08-14
Payer: COMMERCIAL

## 2024-08-14 VITALS
OXYGEN SATURATION: 98 % | TEMPERATURE: 98.2 F | DIASTOLIC BLOOD PRESSURE: 82 MMHG | HEART RATE: 65 BPM | SYSTOLIC BLOOD PRESSURE: 144 MMHG

## 2024-08-14 DIAGNOSIS — E11.628 TYPE 2 DIABETES MELLITUS WITH OTHER SKIN COMPLICATION, UNSPECIFIED WHETHER LONG TERM INSULIN USE (H): ICD-10-CM

## 2024-08-14 DIAGNOSIS — T81.31XA POSTOPERATIVE WOUND DEHISCENCE, INITIAL ENCOUNTER: Primary | ICD-10-CM

## 2024-08-14 DIAGNOSIS — E66.01 MORBID OBESITY (H): ICD-10-CM

## 2024-08-14 DIAGNOSIS — Z89.512 STATUS POST BELOW-KNEE AMPUTATION OF LEFT LOWER EXTREMITY (H): ICD-10-CM

## 2024-08-14 PROCEDURE — 11043 DBRDMT MUSC&/FSCA 1ST 20/<: CPT | Performed by: NURSE PRACTITIONER

## 2024-08-14 PROCEDURE — 99215 OFFICE O/P EST HI 40 MIN: CPT | Mod: 25 | Performed by: NURSE PRACTITIONER

## 2024-08-14 PROCEDURE — 11046 DBRDMT MUSC&/FSCA EA ADDL: CPT | Performed by: NURSE PRACTITIONER

## 2024-08-14 PROCEDURE — 99204 OFFICE O/P NEW MOD 45 MIN: CPT | Mod: 25 | Performed by: NURSE PRACTITIONER

## 2024-08-14 RX ORDER — LIDOCAINE 50 MG/G
OINTMENT TOPICAL DAILY PRN
Status: ACTIVE | OUTPATIENT
Start: 2024-08-14

## 2024-08-14 ASSESSMENT — PAIN SCALES - GENERAL: PAINLEVEL: NO PAIN (0)

## 2024-08-14 NOTE — PATIENT INSTRUCTIONS
"Wound care supplies were ordered today through [employer] and if you are not receiving your supplies or have a question on your bill please contact Jes Quintana at [phone]. Please allow 2-5 business days for delivery of supplies. You may get a call from a 1-800 # if there are additional information Shara needs. It is important to  or return their call. PLEASE NOTE: If you need to return your supplies, you MUST call customer service within 15 days of delivery date.       Referral to PCP to establish care with a new provider they will call you to make appt    Aim for 90g of protein per day    Consider getting Mustapha which is a wound supplement    No prosthesis wearing until well healed            Wound Care Instructions    EVERY OTHER DAY and as needed, Cleanse your left stump wound(s) with Dilute hibiclens 30cc in 500cc NS.    Pat Dry with non-sterile gauze    Then pour Vashe solution onto gauze and lay on the wound for 5-10 minutes; pat dry; do not rinse    Primary Dressing: Apply Urgo Clean AG into/onto the wounds    Secondary dressing: Cover with ABD    Secure with non-sterile roll gauze (4\" x 75\" roll) and tape (1\" roll tape) as needed; avoid adhesive directly on the skin    Compression: ace wrap    It is not ok to get your wound wet in the bath or shower      If for some reason you are not able to get your dressing(s) changed as outlined above (due to illness, lack of supplies, lack of help) please do the following: remove old, soiled dressings; wash the wounds with saline; pat dry; apply ABD pad or other absorbant pad and secure with rolled gauze; avoid tape directly on your skin; Call the clinic as soon as possible to let us know what the current issues are in receiving wound care [phone].      SEEK MEDICAL CARE IF:  You have an increase in swelling, pain, or redness around the wound.  You have an increase in the amount of pus coming from the wound.  There is a bad smell coming from the " wound.  The wound appears to be worsening/enlarging  You have a fever greater than 101.5 F      It is ok to continue current wound care treatment/products for the next 2-3 days until new wound care supplies are ordered and arrive. If longer than this please contact our office at [phone].    If you have a 2 layer or 4 layer compression wrap on these are safe to have on for ONLY 7 days. If for some reason you are not able to get the wrap(s) changed (due to illness; lack of supplies, lack of help, lack of transportation) please do the following: unwrap the old 2 or 4 layer compression wrap; avoid using scissors as you could cut your skin and cause wounds; use tubular compression when available. Call to reschedule your home care or clinic visit appointment as soon as possible.    Please NOTE: if you are 15 minutes late to your clinic appointment you will have to be rescheduled. Please call our clinic as soon as possible if you know you will not be able to get to your appointment at [phone].    If you fail to show up to 3 scheduled clinic appointments you will be dismissed from our clinic.              We want to hear from you!  In the next few weeks, you should receive a call or email to complete a survey about your visit at [facility]. Please help us improve your appointment experience by letting us know how we did today. We strive to make your experience good and value any ways in which we could do better.      We value your input and suggestions.    Thank you for choosing the [facility]!           It is recommended that you do not get your ulcer wet when showering.  Listed below are several ways of keeping it dry when you shower.     1. Wrap it with Press and Seal plastic wrap.  It can be found in the stores where the plastic wraps or tin foil is kept.               2.  Some people take a bath and hang their leg/foot out of the tub.                        3  Put your  leg in a plastic bag and tape it on.           4. You can purchase a shower cover for casts at some pharmacies and through the Internet.            5. Take a Bed Bath or wash up at the sink         High Protein Foods  Chicken  -Chicken breast, 3.5oz.-30 grams protein  -Chicken thigh-10 grams(average size)  -Drumstick-11 grams  -Wing- 6 grams  -Chicken meat, cooked, 4 oz.  Beef  -Hamburger jerald, 4 oz-28 grams protein  -Steak, 6 oz-42 grams  -Most cuts of beef- 7 grams of protein per ounce  Fish  -Most fish fillets or steaks are about 22 grams of protein for 3 1/2 oz(100 grams) of cooked fish, or 6 grams per ounce  -Tuna, 6 oz can-40 grams of protein  Pork  -Pork chop, average-22 grams protein  -Pork loin or tenderloin, 4 oz.-29 grams  -Ham, 3oz serving- 19 grams  -Ground pork 3oz cooked-22 grams  -Donovan, 1 slice-3 grams  -Point Lay-style donovan(black donovan), slice-5-6 grams  Eggs and Dairy  -Egg, large-7 grams  -Milk, 1 cup-8 grams  -Cottage cheese, 1/2 cup-15 grams  -Greek yogurt, 1 cup-usually 8-12 grams, check label  -Soft cheeses (Mozzarella, Brie, Camembert)- 6 grams  -Medium cheeses(cheddar, swiss)- 7 or 8 grams per oz  -Hard cheeses(parmesan)- 10 grams per oz  Beans  -Tofu, 1/2 cup 20 grams  -Tofu, 1 oz., 2.3 grams  -Soy milk, 1 cup-6-10 grams  -Most beans(black, qiu, lentils, etc.) about 7-10 grams protein per half cup of cooked beans  -soy beans, 1/2 cup cooked-14 grams  -Split peas, 1/2 cup cooked- 8 grams  Nuts and Seeds  -Peanut butter, 2 Tablespoons- 8 grams protein  -Almonds, 1/4 cup- 8 grams  -Peanuts, 1/4 cup-9 grams  -Cashews, 1/4 cup- 5 grams  -Pecans, 1/4 cup- 2.5 grams  -Sunflower seeds, 1/4 cup- 6 grams  -Pumpkin seeds, 1/4 cup-8 grams  -Flax seeds- 1/4 cup- 8 grams  Protein Supplements  -Ensure  -Boost  -Glucerna, if diabetic  When you have an open ulcer, your bodies protein needs are much higher, so it is recommended eat good sources of protein

## 2024-08-14 NOTE — LETTER
[facility]  [address] [place] [address] [address]  [place], [place] [address]  [phone]      Fax [phone]    [facility]           Fax: [phone]            Customer Service: [phone]        Account #: [id number]    Wound Dressing Rx and Order Form  Order Status: new  Verbal: Mirella  Date: 2024     Juan Balderrama  Gender: male  : 1960  [address] [place]  [place] [address]  [phone] (home) [phone] (work)    Medical Record: [id number]  Primary Care Provider: Mauricio Pearce      ICD-10-CM    1. Type 2 diabetes mellitus with other skin complication, unspecified whether long term insulin use (H)  E11.628 Primary Care Referral     Wound care      2. Status post below-knee amputation of left lower extremity (H)  Z89.512 Wound Care Referral     lidocaine (XYLOCAINE) 5 % ointment     Primary Care Referral     Wound care      3. Morbid obesity (H)  E66.01 Primary Care Referral     Wound care      4. Postoperative wound dehiscence, initial encounter  T81.31XA Primary Care Referral     Wound care            Insurance Info:  INSURER: Payor: [employer] / Plan: [employer] / Product Type: HMO /   Policy ID#:  [id number]  SECONDARY INSURANCE:    Secondary Policy ID#:  N/A        Physician Info:   Name:  JOAQUIN GROSSMAN     Dept Address/Phones:   [address] [place] [address], [address] [address] [phone]  Fax: [phone]    Lymphedema circumferential measurements (in cm):       No data to display                  Wound info:  VASC Wound Left bka (Active)   Pre Size Length 5 24 0800   Pre Size Width 20 24 0800   Pre Size Depth 1.5 24 0800   Pre Total Sq cm 100 24 0800   Tunneling 4.5cm at 12 o'clock 24 0800   Number of days: 0       Incision/Surgical Site 24 Left Tibial (Active)   Number of days: 36        Drainage: moderate  Thickness:  full  Duration of Need: 30 DAYS  Days Supply: 30  "DAYS  Start Date: 8/14/2024  Starter Kit, Ancillary Kit (saline, gloves, gauze): Yes   Qualifying wound/Debridement: Yes     NO SUBSTITUTIONS. Call [phone].      Dressing Type Brand Size Frequency of change  Quantity   Primary vashe  4oz EVERY OTHER DAY and as needed 1 bottle    UrgoClean AG  4\"x5\" EVERY OTHER DAY and as needed 8 pieces   secondary ABD  8\"x10\" EVERY OTHER DAY and as needed 10    Sof form roll gauze  4\"x75\" EVERY OTHER DAY and as needed 10   tape paper  1\" EVERY OTHER DAY and as needed 1 roll     NO SUBSTITUTIONS. Call [phone] with questions.   Wound Care Instructions    EVERY OTHER DAY and as needed, Cleanse your left stump wound(s) with Dilute hibiclens 30cc in 500cc NS.    Pat Dry with non-sterile gauze    Then pour Vashe solution onto gauze and lay on the wound for 5-10 minutes; pat dry; do not rinse    Primary Dressing: Apply Urgo Clean AG 4\"x5\"  into/onto the wounds    Secondary dressing: Cover with ABD 8\"x10\"    Secure with non-sterile roll gauze (4\" x 75\" roll) and tape (1\" roll tape) as needed; avoid adhesive directly on the skin    Compression: ace wrap    OK to forward to covered supplier.    Electronically Signed Physician:  JOAQUIN GROSSMAN             Date: August 14, 2024    "

## 2024-08-14 NOTE — PROGRESS NOTES
"        [facility] Consult Note    Date of Service: August 14, 2024     Requesting Provider: Barbara Vargas PA-C    Chief Complaint: left stump ulcer; dehiscence of surgical incision    History of Present Illness: Juan Balderrama is being seen at [facility] today regarding left stump ulcers; dehiscence of surgical. They arrive to the clinic today with spouse. The patient reports that he had his left leg amputated last year after having a diabetic foot ulcer and states he developed sepsis with this. He healed well from this surgery. He was having some issues with using his prosthesis as there was excess tissue on the end of his stump. He had a stump revision June 2024. He developed drainage and infection in the surgical incision and states his stump \"exploded with infection\" July 9. He was seen by ID and treated with Cipro; culture grew out staph; alcaligenes; and pseudomonas. He finished his last dose today; did develop some GI issues with this otherwise well tolerated. No follow up needed with ID. It was recommended by the surgeon that he have an AKA; pt does not want to do this; he is not ready for this. He is not getting the wound wet in the shower. He is not using his prosthesis. He does not smoke. Was currently/previously using oil emulsion; gauze. Reports pain of 3/10; currently using nothing for pain. Has used ace as compression in the past, is currently using ace for compression. Denies any fevers, chills, or generalized ill feeling. Denies history of cancer. Sleeps in a bed/recliner with legs elevated.  Denies history of DVT, Joint Replacement, Cellulitis, and Vein Procedures. I personally reviewed outside imaging, lab work, and progress noted through Care Everywhere and outside records. Last A1c was 1 year ago and was 6.7%; it appears he did not get his last A1c done. He would like to find another PCP. He reports his am glucose levels are around 90. His wife is really " monitoring his diet and focusing on protein; taking 1-2 shakes per day.       Review of Systems:   Constitutional:  Negative   EENTM: not done for glasses;  not done [facility]  GI:  negative for nausea/vomiting;   negative for constipation   positive diarrhea;   negative for fecal incontinence  negative weight loss  :   negative dysuria,  negative incontinence  MS: patient is not ambulatory;  does use assistive devices  Cardiac: negative   Respiratory:  negative SOB  Endocrine:  positive  diabetes  Psych: negative  depression/anxiety    Past Medical History:   Past Medical History:   Diagnosis Date    Diabetes (H) 1-2017    Hypertension 1-2017    Septic arthritis of right foot (H) 9/26/2023        Surgical History:   Past Surgical History:   Procedure Laterality Date    AMPUTATE LEG BELOW KNEE Left 9/28/2023    Procedure: Left, AMPUTATION, BELOW KNEE;  Surgeon: Bjorn Rueda MD;  Location: [facility] OR    AMPUTATE REVISION STUMP LOWER EXTREMITY Left 6/20/2024    Procedure: Revision Left Leg Below Knee Amputation;  Surgeon: Bjorn Rueda MD;  Location: [facility] OR    AMPUTATE REVISION STUMP LOWER EXTREMITY Left 7/9/2024    Procedure: below the knee amputation revision left lower extremity;  Surgeon: Bjorn Rueda MD;  Location: [facility] OR    APPLY WOUND VAC Left 10/27/2023    Procedure: Apply Wound Vac left leg;  Surgeon: Bjorn Rueda MD;  Location: [facility] OR    EYE SURGERY  4-3-2017    Cataract    INCISION AND DRAINAGE KNEE, COMBINED Left 7/9/2024    Procedure: below the knee irrigation and debridement left;  Surgeon: Bjorn Rueda MD;  Location: [facility] OR    INCISION AND DRAINAGE LOWER EXTREMITY, COMBINED Left 10/27/2023    Procedure: left lower leg Incision and Drainage;  Surgeon: Bjorn Rueda MD;  Location: [facility] OR        Medications:   Current Outpatient Medications   Medication Sig Dispense Refill    acetaminophen (TYLENOL) 325 MG tablet Take 2 tablets (650 mg) by mouth every 4 hours as  needed for other (mild pain) 100 tablet 0    amLODIPine (NORVASC) 2.5 MG tablet Take 1 tablet (2.5 mg) by mouth daily 90 tablet 1    aspirin 81 MG EC tablet Take 1 tablet (81 mg) by mouth 2 times daily 60 tablet 0    atorvastatin (LIPITOR) 20 MG tablet Take 1 tablet (20 mg) by mouth every evening 90 tablet 1    BD PEN NEEDLE YENNY 2ND GEN 32G X 4 MM miscellaneous USE 1 PEN NEEDLES DAILY OR AS DIRECTED. 100 each 2    blood glucose (ACCU-CHEK GANGA) test strip Use to test blood sugar 1 times daily or as directed. 100 strip 3    blood glucose monitoring (ACCU-CHEK GANGA PLUS) meter device kit USE TO TEST BLOOD SUGAR 1 TIMES DAILY OR AS DIRECTED.  0    blood glucose monitoring (ACCU-CHEK MULTICLIX) lancets USE TO TEST BLOOD SUGAR 1 TIMES DAILY OR AS DIRECTED.  3    EPINEPHrine (ANY BX GENERIC EQUIV) 0.3 MG/0.3ML injection 2-pack Inject 0.3 mLs (0.3 mg) into the muscle once as needed for anaphylaxis 1 each 1    gabapentin (NEURONTIN) 300 MG capsule TAKE 1 CAPSULE BY MOUTH AT BEDTIME AS NEEDED FOR PAIN      hydrochlorothiazide (HYDRODIURIL) 25 MG tablet Take 1 tablet (25 mg) by mouth daily 90 tablet 1    Insulin Glargine-yfgn 100 UNIT/ML SOPN Inject 64 Units Subcutaneous at bedtime SCHEDULE FASTING LAB APPOINTMENT, THEN AN IN-CLINIC PROVIDER APPOINTMENT. 30 mL 1    losartan (COZAAR) 100 MG tablet Take 1 tablet (100 mg) by mouth daily 90 tablet 1    metFORMIN (GLUCOPHAGE XR) 500 MG 24 hr tablet TAKE 2 TABLETS BY MOUTH TWICE A DAY WITH MEALS. 360 tablet 1    metoprolol succinate ER (TOPROL XL) 200 MG 24 hr tablet Take 1 tablet (200 mg) by mouth daily 90 tablet 1    cephALEXin (KEFLEX) 500 MG capsule Take 1 capsule (500 mg) by mouth 3 times daily (Patient not taking: Reported on 8/14/2024) 42 capsule 0    doxycycline hyclate (VIBRAMYCIN) 100 MG capsule TAKE ONE CAPSULE BY MOUTH EVERY 12 HOURS FOR 10 DAYS. (Patient not taking: Reported on 8/14/2024)      oxyCODONE (ROXICODONE) 5 MG tablet Take 1-2 tablets (5-10 mg) by mouth  every 4 hours as needed for moderate to severe pain (Patient not taking: Reported on 8/14/2024) 30 tablet 0    oxyCODONE (ROXICODONE) 5 MG tablet Take 1-2 tablets (5-10 mg) by mouth every 4 hours as needed for moderate to severe pain (Patient not taking: Reported on 8/14/2024) 20 tablet 0    oxyCODONE (ROXICODONE) 5 MG tablet Take 1-2 tablets (5-10 mg) by mouth every 4 hours as needed for moderate to severe pain (Patient not taking: Reported on 8/14/2024) 20 tablet 0     Current Facility-Administered Medications   Medication Dose Route Frequency Provider Last Rate Last Admin    lidocaine (XYLOCAINE) 5 % ointment   Topical Daily PRN Joyce Andres NP           Allergies:   Allergies   Allergen Reactions    Bees     Sulfamethoxazole Swelling     throat       Family history:   Family History   Problem Relation Age of Onset    Hypertension Mother     Hypertension Father         Social History:   Social History     Socioeconomic History    Marital status:      Spouse name: Not on file    Number of children: Not on file    Years of education: Not on file    Highest education level: Not on file   Occupational History    Not on file   Tobacco Use    Smoking status: Not on file     Passive exposure: Never    Smokeless tobacco: Never   Vaping Use    Vaping status: Never Used   Substance and Sexual Activity    Alcohol use: Yes     Comment: social    Drug use: No    Sexual activity: Yes     Partners: Female     Birth control/protection: Post-menopausal   Other Topics Concern    Parent/sibling w/ CABG, MI or angioplasty before 65F 55M? No   Social History Narrative    Not on file     Social Determinants of Health     Financial Resource Strain: Low Risk  (10/23/2023)    Financial Resource Strain     Within the past 12 months, have you or your family members you live with been unable to get utilities (heat, electricity) when it was really needed?: No   Food Insecurity: Low Risk  (10/23/2023)    Food Insecurity     Within  the past 12 months, did you worry that your food would run out before you got money to buy more?: No     Within the past 12 months, did the food you bought just not last and you didn t have money to get more?: No   Transportation Needs: Low Risk  (10/23/2023)    Transportation Needs     Within the past 12 months, has lack of transportation kept you from medical appointments, getting your medicines, non-medical meetings or appointments, work, or from getting things that you need?: No   Physical Activity: Not on file   Stress: Not on file   Social Connections: Not on file   Interpersonal Safety: Not on file   Housing Stability: Low Risk  (10/23/2023)    Housing Stability     Do you have housing? : Yes     Are you worried about losing your housing?: No        Physical Exam  Vitals: BP (!) 144/82   Pulse 65   Temp 98.2  F (36.8  C)   SpO2 98%   Weight is 0 lbs 0 oz          There is no height or weight on file to calculate BMI.  General: This is a 63 year old male who appears their reported age, not in acute distress  Head: normocephalic, Atraumatic; not wearing glasses; non-icteric sclera; no exudate; mild hearing loss   Respiratory: Clear throughout all lung fields; unlabored breathing; no cough   Cardiac: Regular, Rate and Rhythm, no murmurs appreciated   Skin: Uniformly warm and dry  Psych: Alert and oriented x4; calm and cooperative throughout exam  Extremities: Wound #1 Location: left stump  Size: 5L x 20W x 1.5depth.  no sinus tract present, Wound base: slough; black; gray; sutures hanging; this was all removed to reveal much healthier wound bed  +undermining present. 6-9 o'clock 1cm  Wound is full thickness. There is moderate drainage. Periwound: no denudement, erythema, induration, maceration or warmth.        Sensation: Decreased to pinprick and light touch in a stocking distribution bilaterally     Peripheral Vascular: using a handheld doppler the left posterior popliteal was biphasic in nature.  Good  capillary refill. No unusual venous distention. Negative for spider veins and telangiectasias  Positive for hemosiderin deposition or hyperpigmentation and fibrosis or scarring      Circumferential volume measures:             No data to display                Ulceration(s)/Wound(s):     VASC Wound Left bka (Active)   Pre Size Length 5 08/14/24 0800   Pre Size Width 20 08/14/24 0800   Pre Size Depth 1.5 08/14/24 0800   Pre Total Sq cm 100 08/14/24 0800   Tunneling 4.5cm at 12 o'clock 08/14/24 0800       Laboratory studies:     I personally reviewed the following lab results today and those on care everywhere, if indicated     CRP Inflammation   Date Value Ref Range Status   11/08/2019 22.2 (H) 0.0 - 8.0 mg/L Final      Sed Rate   Date Value Ref Range Status   11/08/2019 33 (H) 0 - 20 mm/h Final     Erythrocyte Sedimentation Rate   Date Value Ref Range Status   09/25/2023 81 (H) 0 - 20 mm/hr Final      Last Renal Panel:  Sodium   Date Value Ref Range Status   10/01/2023 135 135 - 145 mmol/L Final     Comment:     Reference intervals for this test were updated on 09/26/2023 to more accurately reflect our healthy population. There may be differences in the flagging of prior results with similar values performed with this method. Interpretation of those prior results can be made in the context of the updated reference intervals.    12/04/2020 138 133 - 144 mmol/L Final     Potassium   Date Value Ref Range Status   10/01/2023 4.3 3.4 - 5.3 mmol/L Final   08/09/2021 4.9 3.4 - 5.3 mmol/L Final   12/04/2020 4.5 3.4 - 5.3 mmol/L Final     Chloride   Date Value Ref Range Status   10/01/2023 100 98 - 107 mmol/L Final   07/23/2021 101 98 - 107 mmol/L Final     Comment:     Last Lab Result: LDL Cholesterol Calculated (mg/dL   12/04/2020 103 94 - 109 mmol/L Final     Carbon Dioxide   Date Value Ref Range Status   12/04/2020 28 20 - 32 mmol/L Final     Carbon Dioxide (CO2)   Date Value Ref Range Status   10/01/2023 27 22 - 29  mmol/L Final   07/23/2021 26 22 - 31 mmol/L Final     Comment:     Last Lab Result: LDL Cholesterol Calculated (mg/dL     Anion Gap   Date Value Ref Range Status   10/01/2023 8 7 - 15 mmol/L Final   07/23/2021 12 5 - 18 mmol/L Final   12/04/2020 7 3 - 14 mmol/L Final     Glucose   Date Value Ref Range Status   07/23/2021 137 (H) 70 - 125 mg/dL Final     Comment:     Last Lab Result: LDL Cholesterol Calculated (mg/dL   12/04/2020 269 (H) 70 - 99 mg/dL Final     Comment:     Fasting specimen     GLUCOSE BY METER POCT   Date Value Ref Range Status   07/09/2024 70 70 - 99 mg/dL Final     Urea Nitrogen   Date Value Ref Range Status   10/01/2023 19.9 8.0 - 23.0 mg/dL Final   07/23/2021 18 8 - 22 mg/dL Final     Comment:     Last Lab Result: LDL Cholesterol Calculated (mg/dL   12/04/2020 25 7 - 30 mg/dL Final     Creatinine   Date Value Ref Range Status   10/02/2023 0.91 0.67 - 1.17 mg/dL Final   12/04/2020 0.92 0.66 - 1.25 mg/dL Final     GFR Estimate   Date Value Ref Range Status   10/02/2023 >90 >60 mL/min/1.73m2 Final   12/04/2020 >90 >60 mL/min/[1.73_m2] Final     Comment:     Non  GFR Calc  Starting 12/18/2018, serum creatinine based estimated GFR (eGFR) will be   calculated using the Chronic Kidney Disease Epidemiology Collaboration   (CKD-EPI) equation.       Calcium   Date Value Ref Range Status   10/01/2023 7.7 (L) 8.8 - 10.2 mg/dL Final   12/04/2020 8.8 8.5 - 10.1 mg/dL Final     Albumin   Date Value Ref Range Status   09/25/2023 3.2 (L) 3.5 - 5.2 g/dL Final   07/23/2021 4.1 3.5 - 5.0 g/dL Final     Comment:     Last Lab Result: LDL Cholesterol Calculated (mg/dL   12/04/2020 3.4 3.4 - 5.0 g/dL Final      Lab Results   Component Value Date    WBC 12.4 09/29/2023    WBC 11.1 11/08/2019     Lab Results   Component Value Date    RBC 3.45 09/29/2023    RBC 4.68 11/08/2019     Lab Results   Component Value Date    HGB 8.7 09/30/2023    HGB 12.8 11/08/2019     Lab Results   Component Value Date    HCT  "30.3 09/29/2023    HCT 40.0 11/08/2019     No components found for: \"MCT\"  Lab Results   Component Value Date    MCV 88 09/29/2023    MCV 86 11/08/2019     Lab Results   Component Value Date    MCH 27.5 09/29/2023    MCH 27.4 11/08/2019     Lab Results   Component Value Date    MCHC 31.4 09/29/2023    MCHC 32.0 11/08/2019     Lab Results   Component Value Date    RDW 15.3 09/29/2023    RDW 13.8 11/08/2019     Lab Results   Component Value Date     10/02/2023     11/08/2019      Lab Results   Component Value Date    A1C 6.7 09/25/2023    A1C 6.6 08/01/2022    A1C 7.5 03/25/2022    A1C 7.3 07/23/2021    A1C 9.6 12/04/2020    A1C 9.0 07/21/2020    A1C 7.8 11/08/2019    A1C 6.5 11/29/2018    A1C 7.3 06/28/2017      TSH   Date Value Ref Range Status   11/29/2018 3.07 0.40 - 4.00 mU/L Final      No results found for: \"VITDT\"       Impression:    Encounter Diagnoses   Name Primary?    Postoperative wound dehiscence, initial encounter Yes    Status post below-knee amputation of left lower extremity (H)     Type 2 diabetes mellitus with other skin complication, unspecified whether long term insulin use (H)     Morbid obesity (H)          8/14/2024 left stump pre-debridement 8/14/2024 left stump post debridement          Assessment/Plan:  1. Debridement: Nursing staff remove the old dressing and cleanse the wound and surrounding skin with recommended solution. After discussion of risk factors and verbal consent was obtained 2% Lidocaine HCL jelly was applied, under clean conditions, the left stump ulceration(s) were debrided using currette and iris Devitalized and nonviable tissue, along with any fibrin and slough, was removed to improve granulation tissue formation, stimulate wound healing, decrease overall bacteria load, disrupt biofilm formation and decrease edge senescence.  Total excisional debridement was 100 sq cm from the epidermis/dermis area, into the subcutaneous tissue, or into the muscle/fascia with " a depth of 1.5 cm.   Ulcers were improved afterwards and .  Measures were unchanged after debridement.    2. Treatment: wound treatment will include irrigation and dressings to promote autolytic debridement which will include: will further clean up the wound with dilute hibiclens wash; then vashe soak then urgoclean ag; abd; rolled gauze; wife was taught how to change the dressing; will order supplies. Will order vac next week and apply in 2 weeks.    If for some reason the patient is not able to get your dressing(s) changed as outlined above (due to illness, lack of supplies, lack of help) please do the following: remove old, soiled dressings; wash the ulcers with saline; pat dry; apply ABD pad or other absorbant pad and secure with rolled gauze; avoid tape directly on your skin; Patient instructed to call the clinic as soon as possible to let us know what the current issues are in receiving ulcer care.    3. Edema. Continue elevation and ace wrap    If a 2 layer or 4 layer compression wrap is being used; these are safe to have on for ONLY 7 days. If for some reason the patient is not able to get the wrap(s) changed (due to illness; lack of supplies, lack of help, lack of transportation) please do the following: unwrap the old 2 or 4 layer compression wrap; avoid using scissors as you could cut your skin and cause ulcers; use tubular compression when available. Call to reschedule your home care or clinic visit appointment as soon as possible.    4. Offloading: no prosthesis wearing until well healed; wheelchair    5. Nutrition: focus on protein; aim for 90g per day; good glucose control; provided samples of jair    6. Wound Etiology: surgical    7. PCP: pt has not had follow with pcp; will refer to establish care; needs updated labs    Patient to return to clinic in 2 week(s) for re-evaluation. They were instructed to call the clinic sooner with any further questions or concerns. Answered all  questions.          Joyce Andres NP   [facility]  [phone]      This note was electronically signed by Joyce Andres NP

## 2024-08-14 NOTE — PROGRESS NOTES
Clinic Administered Medication Documentation    Patient was given Lidocaine. Prior to medication administration, verified patient's identity using patient's name and date of birth.    Lizette Espinoza LPN

## 2024-08-26 ENCOUNTER — VIRTUAL VISIT (OUTPATIENT)
Dept: FAMILY MEDICINE | Facility: CLINIC | Age: 64
End: 2024-08-26
Attending: NURSE PRACTITIONER
Payer: COMMERCIAL

## 2024-08-26 ENCOUNTER — ORDERS ONLY (AUTO-RELEASED) (OUTPATIENT)
Dept: FAMILY MEDICINE | Facility: CLINIC | Age: 64
End: 2024-08-26

## 2024-08-26 DIAGNOSIS — E66.01 MORBID OBESITY (H): ICD-10-CM

## 2024-08-26 DIAGNOSIS — Z89.512 STATUS POST BELOW-KNEE AMPUTATION OF LEFT LOWER EXTREMITY (H): ICD-10-CM

## 2024-08-26 DIAGNOSIS — Z79.4 TYPE 2 DIABETES MELLITUS WITH HYPERGLYCEMIA, WITH LONG-TERM CURRENT USE OF INSULIN (H): ICD-10-CM

## 2024-08-26 DIAGNOSIS — E78.5 HYPERLIPIDEMIA LDL GOAL <70: Primary | ICD-10-CM

## 2024-08-26 DIAGNOSIS — Z12.11 SCREEN FOR COLON CANCER: ICD-10-CM

## 2024-08-26 DIAGNOSIS — I10 BENIGN ESSENTIAL HYPERTENSION: ICD-10-CM

## 2024-08-26 DIAGNOSIS — E11.65 TYPE 2 DIABETES MELLITUS WITH HYPERGLYCEMIA, WITH LONG-TERM CURRENT USE OF INSULIN (H): ICD-10-CM

## 2024-08-26 PROBLEM — Z87.891 FORMER SMOKER: Status: RESOLVED | Noted: 2017-01-23 | Resolved: 2024-08-26

## 2024-08-26 PROBLEM — H26.9 LEFT CATARACT: Status: RESOLVED | Noted: 2017-01-25 | Resolved: 2024-08-26

## 2024-08-26 PROBLEM — M00.9: Status: RESOLVED | Noted: 2023-09-26 | Resolved: 2024-08-26

## 2024-08-26 PROBLEM — E11.8 TYPE 2 DIABETES MELLITUS WITH UNSPECIFIED COMPLICATIONS (H): Status: RESOLVED | Noted: 2017-03-28 | Resolved: 2024-08-26

## 2024-08-26 PROBLEM — M86.172 OTHER ACUTE OSTEOMYELITIS OF LEFT FOOT (H): Status: RESOLVED | Noted: 2023-09-25 | Resolved: 2024-08-26

## 2024-08-26 PROCEDURE — 99441 PR PHYSICIAN TELEPHONE EVALUATION 5-10 MIN: CPT | Mod: 93 | Performed by: NURSE PRACTITIONER

## 2024-08-26 NOTE — PATIENT INSTRUCTIONS
Cologualydia ordered for screening.  Please bring a copy of your eye exam to the clinic  and we will scan this in on your chart.  Labs ordered for monitoring.  Make appointment when convenient to get fasting labs completed.  I will notify you of results on MyChart.  I look forward to seeing your around the end of the year for recheck diabetes and medication refills.

## 2024-08-26 NOTE — PROGRESS NOTES
Grupo is a 63 year old who is being evaluated via a Telephone visit.    How would you like to obtain your AVS? Noe  The invitation should be sent to: Text to cell phone: [phone]  Will anyone else be joining your telephone visit? No    Assessment & Plan     Type 2 diabetes mellitus with hyperglycemia, with long-term current use of insulin (H)  Ordered labs for monitoring.  - Hemoglobin A1c; Future  - Albumin Random Urine Quantitative with Creat Ratio; Future    Status post below-knee amputation of left lower extremity (H)  Working with wound care.  - Primary Care Referral    Morbid obesity (H)  Stable.  - Primary Care Referral    Screen for colon cancer  Ordered cologuard for screening.  - COLOGUARD([employer]); Future    Hyperlipidemia LDL goal <70  Ordered Lipid for monitoring.  - Lipid panel reflex to direct LDL Fasting; Future    Benign essential hypertension  Ordered BMP for monitoring.  - Basic metabolic panel  (Ca, Cl, CO2, Creat, Gluc, K, Na, BUN); Future    See Patient Instructions    Subjective   Grupo is a 63 year old, presenting for the following health issues:  Establish Care (Needs refill on medications ,had leg amputated and is unable to come in )        8/26/2024    10:35 AM   Additional Questions   Roomed by rmb   Accompanied by wife     History of Present Illness       Reason for visit:  Annual maninder visit   He is taking medications regularly.       Diabetes Follow-up    How often are you checking your blood sugar? Two times daily  Blood sugar testing frequency justification:   none  What time of day are you checking your blood sugars (select all that apply)?  Before and after meals  Have you had any blood sugars above 200?  No  Have you had any blood sugars below 70?  No  What symptoms do you notice when your blood sugar is low?  None  What concerns do you have today about your diabetes? None   Do you have any of these symptoms? (Select all that apply)  No numbness or tingling in feet.   No redness, sores or blisters on feet.  No complaints of excessive thirst.  No reports of blurry vision.  No significant changes to weight.  Have you had a diabetic eye exam in the last 12 months? No    Patient currently has refills until 12/24.  He has not completed labs.  He has appointment for eye exam coming up and was advised to bring a copy to the clinic.    Hyperlipidemia Follow-Up    Are you regularly taking any medication or supplement to lower your cholesterol?   Yes-    Are you having muscle aches or other side effects that you think could be caused by your cholesterol lowering medication?  No    Hypertension Follow-up    Do you check your blood pressure regularly outside of the clinic? Yes   Are you following a low salt diet? Yes  Are your blood pressures ever more than 140 on the top number (systolic) OR more   than 90 on the bottom number (diastolic), for example 140/90? No    BP Readings from Last 2 Encounters:   08/14/24 (!) 144/82   07/09/24 117/52     Hemoglobin A1C (%)   Date Value   09/25/2023 6.7 (H)   08/01/2022 6.6 (H)   12/04/2020 9.6 (H)   07/21/2020 9.0 (H)     LDL Cholesterol Calculated (mg/dL)   Date Value   08/01/2022 57   07/23/2021 88   12/04/2020     Cannot estimate LDL when triglyceride exceeds 400 mg/dL   11/08/2019 63     LDL Cholesterol Direct (mg/dL)   Date Value   12/04/2020 78     How many servings of fruits and vegetables do you eat daily?  4  On average, how many sweetened beverages do you drink each day (Examples: soda, juice, sweet tea, etc.  Do NOT count diet or artificially sweetened beverages)?   0  How many days per week do you exercise enough to make your heart beat faster? 3 or less  How many minutes a day do you exercise enough to make your heart beat faster? 9 or less  How many days per week do you miss taking your medication? 0        Review of Systems  CONSTITUTIONAL: NEGATIVE for fever, chills, change in weight  ENT/MOUTH: NEGATIVE for ear, mouth and throat  problems  RESP: NEGATIVE for significant cough or SOB  CV: NEGATIVE for chest pain, palpitations or peripheral edema  PSYCHIATRIC: NEGATIVE for changes in mood or affect  ROS otherwise negative      Objective           Vitals:  No vitals were obtained today due to virtual visit.    Physical Exam   Unable to do do exam due to telephone visit.      Telephone-Visit Details    Type of service:  Telephone Visit   Length of visit: 10 minutes  Originating Location (pt. Location): Home  Distant Location (provider location):  On-site  Platform used for Video Visit: Kalani  Signed Electronically by: Kristie Oliver NP

## 2024-08-29 ENCOUNTER — LAB (OUTPATIENT)
Dept: LAB | Facility: CLINIC | Age: 64
End: 2024-08-29
Payer: COMMERCIAL

## 2024-08-29 DIAGNOSIS — I10 BENIGN ESSENTIAL HYPERTENSION: ICD-10-CM

## 2024-08-29 DIAGNOSIS — E78.5 HYPERLIPIDEMIA LDL GOAL <70: ICD-10-CM

## 2024-08-29 DIAGNOSIS — E11.65 TYPE 2 DIABETES MELLITUS WITH HYPERGLYCEMIA, WITH LONG-TERM CURRENT USE OF INSULIN (H): ICD-10-CM

## 2024-08-29 DIAGNOSIS — Z79.4 TYPE 2 DIABETES MELLITUS WITH HYPERGLYCEMIA, WITH LONG-TERM CURRENT USE OF INSULIN (H): ICD-10-CM

## 2024-08-29 LAB
ANION GAP SERPL CALCULATED.3IONS-SCNC: 12 MMOL/L (ref 7–15)
BUN SERPL-MCNC: 25.2 MG/DL (ref 8–23)
CALCIUM SERPL-MCNC: 9.5 MG/DL (ref 8.8–10.4)
CHLORIDE SERPL-SCNC: 94 MMOL/L (ref 98–107)
CHOLEST SERPL-MCNC: 118 MG/DL
CREAT SERPL-MCNC: 0.69 MG/DL (ref 0.67–1.17)
CREAT UR-MCNC: 40.5 MG/DL
EGFRCR SERPLBLD CKD-EPI 2021: >90 ML/MIN/1.73M2
FASTING STATUS PATIENT QL REPORTED: NO
FASTING STATUS PATIENT QL REPORTED: NO
GLUCOSE SERPL-MCNC: 143 MG/DL (ref 70–99)
HBA1C MFR BLD: 5 % (ref 0–5.6)
HCO3 SERPL-SCNC: 26 MMOL/L (ref 22–29)
HDLC SERPL-MCNC: 29 MG/DL
LDLC SERPL CALC-MCNC: 67 MG/DL
MICROALBUMIN UR-MCNC: 37.9 MG/L
MICROALBUMIN/CREAT UR: 93.58 MG/G CR (ref 0–17)
NONHDLC SERPL-MCNC: 89 MG/DL
POTASSIUM SERPL-SCNC: 4.7 MMOL/L (ref 3.4–5.3)
SODIUM SERPL-SCNC: 132 MMOL/L (ref 135–145)
TRIGL SERPL-MCNC: 108 MG/DL

## 2024-08-29 PROCEDURE — 80061 LIPID PANEL: CPT

## 2024-08-29 PROCEDURE — 82570 ASSAY OF URINE CREATININE: CPT

## 2024-08-29 PROCEDURE — 80048 BASIC METABOLIC PNL TOTAL CA: CPT

## 2024-08-29 PROCEDURE — 83036 HEMOGLOBIN GLYCOSYLATED A1C: CPT

## 2024-08-29 PROCEDURE — 36415 COLL VENOUS BLD VENIPUNCTURE: CPT

## 2024-08-29 PROCEDURE — 82043 UR ALBUMIN QUANTITATIVE: CPT

## 2024-08-30 ENCOUNTER — OFFICE VISIT (OUTPATIENT)
Dept: VASCULAR SURGERY | Facility: CLINIC | Age: 64
End: 2024-08-30
Attending: NURSE PRACTITIONER
Payer: COMMERCIAL

## 2024-08-30 VITALS — WEIGHT: 265 LBS | HEART RATE: 65 BPM | OXYGEN SATURATION: 98 % | BODY MASS INDEX: 39.13 KG/M2

## 2024-08-30 DIAGNOSIS — T81.31XA POSTOPERATIVE WOUND DEHISCENCE, INITIAL ENCOUNTER: ICD-10-CM

## 2024-08-30 DIAGNOSIS — Z89.512 STATUS POST BELOW-KNEE AMPUTATION OF LEFT LOWER EXTREMITY (H): Primary | ICD-10-CM

## 2024-08-30 DIAGNOSIS — E11.628 TYPE 2 DIABETES MELLITUS WITH OTHER SKIN COMPLICATION, UNSPECIFIED WHETHER LONG TERM INSULIN USE (H): ICD-10-CM

## 2024-08-30 DIAGNOSIS — E66.01 MORBID OBESITY (H): ICD-10-CM

## 2024-08-30 PROCEDURE — 11043 DBRDMT MUSC&/FSCA 1ST 20/<: CPT | Performed by: NURSE PRACTITIONER

## 2024-08-30 PROCEDURE — 11046 DBRDMT MUSC&/FSCA EA ADDL: CPT | Performed by: NURSE PRACTITIONER

## 2024-08-30 ASSESSMENT — PAIN SCALES - GENERAL: PAINLEVEL: NO PAIN (0)

## 2024-08-30 NOTE — PATIENT INSTRUCTIONS
Aim for 90g of protein per day    Consider getting Mustapha which is a wound supplement    No prosthesis wearing until well healed            Wound Care Instructions      Wound Vac Dressing Change Instructions  Every Monday, Wednesday and Friday    Gently remove old dressing    Cleanse the wound and surrounding skin with dilute hibiclens (30cc in 500cc NS)    Pat dry    Apply Cavilon no sting barrier wipe to the periwound skin    Apply vac drape to the periwound skin to protect from the drainage and sponge; window pane fashion    Cut the black vac foam to fit the size of the wound    If bridging technique is being used pick a fleshy area on the body where there will not be pressure    Apply vac drape to wherever the bridging is going to be (NEVER apply black sponge to the intact skin)    Cut a long narrow strip of black foam for the bridge    Cover with vac drape for air tight seal    Where you are going to apply the suction pad; cut a hole the size of a quarter    Apply the suction pad; be mindful of the direction of the tubing    Connect tubing    125mmHG continuous suction    Ace wrap and/or tubular compression on stump    Elevation      It is recommended that you do not get your ulcer wet when showering.  Listed below are several ways of keeping it dry when you shower.     1. Wrap it with Press and Seal plastic wrap.  It can be found in the stores where the plastic wraps or tin foil is kept.               2.  Some people take a bath and hang their leg/foot out of the tub.                        3  Put your leg in a plastic bag and tape it on.           4. You can purchase a shower cover for casts at some pharmacies and through the Internet.            5. Take a Bed Bath or wash up at the sink

## 2024-08-30 NOTE — PROGRESS NOTES
Follow up Vascular Visit       Date of Service:08/30/24      Chief Complaint: left stump dehiscence      Pt returns to [facility] with regards to their left stump dehsicence.  They arrive today with spouse. They are currently using urgotol ag; ABD; rolled gauze to the wounds. This is being done by spouse every 2 days. They are using ace and or tubular coompression for compression. They are feeling well today. Denies fevers, chills. No shortness of breath.     Allergies:   Allergies   Allergen Reactions    Bees     Sulfamethoxazole Swelling     throat       Medications:   Current Outpatient Medications:     acetaminophen (TYLENOL) 325 MG tablet, Take 2 tablets (650 mg) by mouth every 4 hours as needed for other (mild pain), Disp: 100 tablet, Rfl: 0    amLODIPine (NORVASC) 2.5 MG tablet, Take 1 tablet (2.5 mg) by mouth daily, Disp: 90 tablet, Rfl: 1    aspirin 81 MG EC tablet, Take 1 tablet (81 mg) by mouth 2 times daily, Disp: 60 tablet, Rfl: 0    atorvastatin (LIPITOR) 20 MG tablet, Take 1 tablet (20 mg) by mouth every evening, Disp: 90 tablet, Rfl: 1    BD PEN NEEDLE YENNY 2ND GEN 32G X 4 MM miscellaneous, USE 1 PEN NEEDLES DAILY OR AS DIRECTED., Disp: 100 each, Rfl: 2    blood glucose (ACCU-CHEK GANGA) test strip, Use to test blood sugar 1 times daily or as directed., Disp: 100 strip, Rfl: 3    blood glucose monitoring (ACCU-CHEK GANGA PLUS) meter device kit, USE TO TEST BLOOD SUGAR 1 TIMES DAILY OR AS DIRECTED., Disp: , Rfl: 0    blood glucose monitoring (ACCU-CHEK MULTICLIX) lancets, USE TO TEST BLOOD SUGAR 1 TIMES DAILY OR AS DIRECTED., Disp: , Rfl: 3    EPINEPHrine (ANY BX GENERIC EQUIV) 0.3 MG/0.3ML injection 2-pack, Inject 0.3 mLs (0.3 mg) into the muscle once as needed for anaphylaxis, Disp: 1 each, Rfl: 1    hydrochlorothiazide (HYDRODIURIL) 25 MG tablet, Take 1 tablet (25 mg) by mouth daily, Disp: 90 tablet, Rfl: 1    Insulin Glargine-yfgn 100 UNIT/ML SOPN, Inject 64 Units  Subcutaneous at bedtime SCHEDULE FASTING LAB APPOINTMENT, THEN AN IN-CLINIC PROVIDER APPOINTMENT., Disp: 30 mL, Rfl: 1    losartan (COZAAR) 100 MG tablet, Take 1 tablet (100 mg) by mouth daily, Disp: 90 tablet, Rfl: 1    metFORMIN (GLUCOPHAGE XR) 500 MG 24 hr tablet, TAKE 2 TABLETS BY MOUTH TWICE A DAY WITH MEALS., Disp: 360 tablet, Rfl: 1    metoprolol succinate ER (TOPROL XL) 200 MG 24 hr tablet, Take 1 tablet (200 mg) by mouth daily, Disp: 90 tablet, Rfl: 1    Current Facility-Administered Medications:     lidocaine (XYLOCAINE) 5 % ointment, , Topical, Daily PRN, Joyce Andres NP    History:   Past Medical History:   Diagnosis Date    Diabetes (H) 1-2017    Hypertension 1-2017    Septic arthritis of right foot (H) 9/26/2023       Physical Exam:    Pulse 65   Wt 265 lb (120.2 kg)   SpO2 98%   BMI 39.13 kg/m      General:  Patient presents to clinic in no apparent distress.  Head: normocephalic atraumatic  Psychiatric:  Alert and oriented x3.   Respiratory: unlabored breathing; no cough  Integumentary:  Skin is uniformly warm, dry and pink.    Ulcer #1 Location: left stump  Size: 6.5L x 15.5W x 1.5depth.  no sinus tract present, Wound base: slough; underneath 95% viable  no undermining present. Ulcer is full thickness. There is heavy drainage. Periwound: no denudement, erythema, induration, maceration or warmth.      VASC Wound Left bka (Active)   Pre Size Length 6.5 08/30/24 1400   Pre Size Width 15.5 08/30/24 1400   Pre Size Depth 1.5 08/14/24 0800   Pre Total Sq cm 100 08/14/24 0800   Tunneling 4.5cm at 12 o'clock 08/14/24 0800   Number of days: 16       Incision/Surgical Site 07/09/24 Left Tibial (Active)   Number of days: 52            Circumferential volume measures:           No data to display                Labs:    I personally reviewed the following lab results today and those on care everywhere    CRP Inflammation   Date Value Ref Range Status   11/08/2019 22.2 (H) 0.0 - 8.0 mg/L Final      Sed Rate    Date Value Ref Range Status   11/08/2019 33 (H) 0 - 20 mm/h Final     Erythrocyte Sedimentation Rate   Date Value Ref Range Status   09/25/2023 81 (H) 0 - 20 mm/hr Final      Last Renal Panel:  Sodium   Date Value Ref Range Status   08/29/2024 132 (L) 135 - 145 mmol/L Final   12/04/2020 138 133 - 144 mmol/L Final     Potassium   Date Value Ref Range Status   08/29/2024 4.7 3.4 - 5.3 mmol/L Final   08/09/2021 4.9 3.4 - 5.3 mmol/L Final   12/04/2020 4.5 3.4 - 5.3 mmol/L Final     Chloride   Date Value Ref Range Status   08/29/2024 94 (L) 98 - 107 mmol/L Final   07/23/2021 101 98 - 107 mmol/L Final     Comment:     Last Lab Result: LDL Cholesterol Calculated (mg/dL   12/04/2020 103 94 - 109 mmol/L Final     Carbon Dioxide   Date Value Ref Range Status   12/04/2020 28 20 - 32 mmol/L Final     Carbon Dioxide (CO2)   Date Value Ref Range Status   08/29/2024 26 22 - 29 mmol/L Final   07/23/2021 26 22 - 31 mmol/L Final     Comment:     Last Lab Result: LDL Cholesterol Calculated (mg/dL     Anion Gap   Date Value Ref Range Status   08/29/2024 12 7 - 15 mmol/L Final   07/23/2021 12 5 - 18 mmol/L Final   12/04/2020 7 3 - 14 mmol/L Final     Glucose   Date Value Ref Range Status   08/29/2024 143 (H) 70 - 99 mg/dL Final   07/23/2021 137 (H) 70 - 125 mg/dL Final     Comment:     Last Lab Result: LDL Cholesterol Calculated (mg/dL   12/04/2020 269 (H) 70 - 99 mg/dL Final     Comment:     Fasting specimen     GLUCOSE BY METER POCT   Date Value Ref Range Status   07/09/2024 70 70 - 99 mg/dL Final     Urea Nitrogen   Date Value Ref Range Status   08/29/2024 25.2 (H) 8.0 - 23.0 mg/dL Final   07/23/2021 18 8 - 22 mg/dL Final     Comment:     Last Lab Result: LDL Cholesterol Calculated (mg/dL   12/04/2020 25 7 - 30 mg/dL Final     Creatinine   Date Value Ref Range Status   08/29/2024 0.69 0.67 - 1.17 mg/dL Final   12/04/2020 0.92 0.66 - 1.25 mg/dL Final     GFR Estimate   Date Value Ref Range Status   08/29/2024 >90 >60 mL/min/1.73m2  "Final     Comment:     eGFR calculated using 2021 CKD-EPI equation.   12/04/2020 >90 >60 mL/min/[1.73_m2] Final     Comment:     Non  GFR Calc  Starting 12/18/2018, serum creatinine based estimated GFR (eGFR) will be   calculated using the Chronic Kidney Disease Epidemiology Collaboration   (CKD-EPI) equation.       Calcium   Date Value Ref Range Status   08/29/2024 9.5 8.8 - 10.4 mg/dL Final     Comment:     Reference intervals for this test were updated on 7/16/2024 to reflect our healthy population more accurately. There may be differences in the flagging of prior results with similar values performed with this method. Those prior results can be interpreted in the context of the updated reference intervals.   12/04/2020 8.8 8.5 - 10.1 mg/dL Final     Albumin   Date Value Ref Range Status   09/25/2023 3.2 (L) 3.5 - 5.2 g/dL Final   07/23/2021 4.1 3.5 - 5.0 g/dL Final     Comment:     Last Lab Result: LDL Cholesterol Calculated (mg/dL   12/04/2020 3.4 3.4 - 5.0 g/dL Final      Lab Results   Component Value Date    WBC 12.4 09/29/2023    WBC 11.1 11/08/2019     Lab Results   Component Value Date    RBC 3.45 09/29/2023    RBC 4.68 11/08/2019     Lab Results   Component Value Date    HGB 8.7 09/30/2023    HGB 12.8 11/08/2019     Lab Results   Component Value Date    HCT 30.3 09/29/2023    HCT 40.0 11/08/2019     No components found for: \"MCT\"  Lab Results   Component Value Date    MCV 88 09/29/2023    MCV 86 11/08/2019     Lab Results   Component Value Date    MCH 27.5 09/29/2023    MCH 27.4 11/08/2019     Lab Results   Component Value Date    MCHC 31.4 09/29/2023    MCHC 32.0 11/08/2019     Lab Results   Component Value Date    RDW 15.3 09/29/2023    RDW 13.8 11/08/2019     Lab Results   Component Value Date     10/02/2023     11/08/2019      Lab Results   Component Value Date    A1C 5.0 08/29/2024    A1C 6.7 09/25/2023    A1C 6.6 08/01/2022    A1C 7.5 03/25/2022    A1C 7.3 07/23/2021    " "A1C 9.6 12/04/2020    A1C 9.0 07/21/2020    A1C 7.8 11/08/2019    A1C 6.5 11/29/2018    A1C 7.3 06/28/2017      TSH   Date Value Ref Range Status   11/29/2018 3.07 0.40 - 4.00 mU/L Final      No results found for: \"VITDT\"                Impression:  Encounter Diagnoses   Name Primary?    Status post below-knee amputation of left lower extremity (H) Yes    Type 2 diabetes mellitus with other skin complication, unspecified whether long term insulin use (H)     Morbid obesity (H)     Postoperative wound dehiscence, initial encounter           8/30/2024 left stump                   Are any of these ulcers new today: No; Location: na    Assessment/Plan:          1. Debridement: Nursing staff removed the old dressing and cleanse the wound(s) with specified solution. After discussion of risk factors and verbal consent was obtained 2% Lidocaine HCL jelly was applied, under clean conditions, the left stump ulceration(s) were debrided using currette. Devitalized and nonviable tissue, along with any fibrin and slough, was removed to improve granulation tissue formation, stimulate wound healing, decrease overall bacteria load, disrupt biofilm formation and decrease edge senescence.  Total excisional debridement was 100 sq cm from the epidermis/dermis area, into the subcutaneous tissue, and into the muscle/fascia with a depth of 1.5 cm.   Ulcers were improved afterwards and .  Measures were unchanged after debridement.       2.  Ulcer treatment: ulcer treatment will include irrigation and dressings to promote autolytic debridement which will include:will start wound vac; change M, W, F. If for some reason the patient is not able to get their dressing(s) changed as outlined above (due to illness, lack of supplies, lack of help) please do the following: remove old, soiled dressings; wash the ulcers with saline; pat dry; apply ABD pad or other absorbant pad and secure with rolled gauze; avoid tape directly on your skin; " patient instructed to call the clinic as soon as possible to let us know what the current issues are in receiving ulcer care. Stable            3. Edema: will use ace and/or tubular compression; encouraged elevation. The compression wraps were applied today in clinic.     If a 2 layer or 4 layer compression wrap is being used; these are safe to have on for ONLY 7 days. If for some reason the patient is not able to get the wrap(s) changed (due to illness; lack of supplies, lack of help, lack of transportation) please do the following: unwrap the old 2 or 4 layer compression wrap; avoid using scissors as you could cut your skin and cause ulcers; use tubular compression when available. Call to reschedule your home care or clinic visit appointment as soon as possible.  Stable            4. Nutrition: focus on blood sugar control; focus on protein; continue jair           5. Offloading: no prosthesis wearing until well healed          6. Wound Etiology: surgical     Patient will follow up with me in 3 weeks for reevaluation; NV M, W, F. They were instructed to call the clinic sooner with any signs or symptoms of infection or any further questions/concerns. Answered all questions.          Joyce Andres DNP, RN, CNP, CWOCN, CFCN, CLT  [facility]   [phone]        This note was electronically signed by Joyce Andres NP

## 2024-08-30 NOTE — PROGRESS NOTES
Applied vac drape to the periwound skin to protect from the drainage and sponge; window pane fashion   Cut the black vac foam to fit the size of the wound. Used 2 piece(s) of black sponge total.   Applied vac drape to wherever the bridging is going to be (NEVER apply black sponge to the intact skin)   Covered with vac drape for air tight seal. Cut a hole the size of a quarter and applied suction pad. Be mindful of the direction of the tubing.  Connected tubing and turned vac machine to 125mmHG continuous suction.   No alarm or leaks noted.

## 2024-09-03 ENCOUNTER — OFFICE VISIT (OUTPATIENT)
Dept: VASCULAR SURGERY | Facility: CLINIC | Age: 64
End: 2024-09-03
Payer: COMMERCIAL

## 2024-09-03 VITALS
DIASTOLIC BLOOD PRESSURE: 80 MMHG | TEMPERATURE: 98.5 F | RESPIRATION RATE: 18 BRPM | HEART RATE: 63 BPM | SYSTOLIC BLOOD PRESSURE: 144 MMHG

## 2024-09-03 DIAGNOSIS — Z89.512 STATUS POST BELOW-KNEE AMPUTATION OF LEFT LOWER EXTREMITY (H): Primary | ICD-10-CM

## 2024-09-03 PROCEDURE — 97608 NEG PRS WND THER NDME>50SQCM: CPT

## 2024-09-03 ASSESSMENT — PAIN SCALES - GENERAL: PAINLEVEL: NO PAIN (0)

## 2024-09-03 NOTE — PROGRESS NOTES
[facility]  -  Nurse Visit        Date of Service:  September 3, 2024     Requesting Provider: AKHIL Andres    Diagnosis:     ICD-10-CM    1. Status post below-knee amputation of left lower extremity (H)  Z89.512           Chief Complaint: Juan is being seen today at [facility] [place] for his wound(s) and swelling dressing change. Reports pain of 0/10.  Denies any fevers, chills, or generalized ill feeling.     Dressing on Arrival: NPWT intact and functioning properly, Pt is currently using no compression. Upon removal of dressings moderate Serosanguinous drainage is noted.    New Wounds noted: No    Vital Signs: BP (!) 144/80   Pulse 63   Temp 98.5  F (36.9  C)   Resp 18     Assessment:      General:  Patient presents to clinic in no apparent distress.   Psychiatric:  Alert and oriented x3.   Lower extremity:  edema is present.    Integumentary:  Skin is WNL    Circumferential volume measures:       No data to display                Wound info:  VASC Wound Left bka (Active)   Pre Size Length 6.5 09/03/24 0800   Pre Size Width 17 09/03/24 0800   Pre Size Depth 2 09/03/24 0800   Pre Total Sq cm 110.5 09/03/24 0800   Tunneling 4.5cm at 12 o'clock 08/14/24 0800       Incision/Surgical Site 07/09/24 Left Tibial (Active)       Undermining is not present.    The periwoundskin is WNL      Plan:         1. Patient will return in 3 days for nurse visit.            2. As listed below, treatment provided irrigation, mechanical cleansing, and dressings to promote autolytic debridement.             Cleansed with: Dilute hibiclens 30cc in 500cc NS    Protected skin with: 3M Cavilon    Dressings Applied to wound: Negative wound vac therapy:      Applied vac drape to the periwound skin to protect from the drainage and sponge; window pane fashion   Cut the black vac foam to fit the size of the wound. Used 2 piece(s) of black sponge total.   Applied vac drape to wherever the bridging  is going to be (NEVER apply black sponge to the intact skin)   Covered with vac drape for air tight seal. Cut a hole the size of a quarter and applied suction pad. Be mindful of the direction of the tubing.  Connected tubing and turned vac machine to 125mmHG continuous suction.   No alarm or leaks noted.    Compression Applied to the right leg: n/a  Compression Applied to the left leg: Short stretch    Short Stretch    Offloading used: Wheelchair    Trial Products: No    Provider notified regarding concerns: Yes: increase in swelling, compression applied    Treatment Changes: No    Tolerated Dressing Change:  Yes    Taught Regarding: follow up appointment(s), wound cares, compression, elevation, and compliance    Educational Barriers: No barriers      IDANIA OCONNELL LPN

## 2024-09-04 ENCOUNTER — TELEPHONE (OUTPATIENT)
Dept: VASCULAR SURGERY | Facility: CLINIC | Age: 64
End: 2024-09-04
Payer: COMMERCIAL

## 2024-09-04 NOTE — PATIENT INSTRUCTIONS
Aim for 90g of protein per day    Consider getting Mustapha which is a wound supplement    No prosthesis wearing until well healed            Wound Care Instructions      Wound Vac Dressing Change Instructions  Every Monday, Wednesday and Friday    Gently remove old dressing    Cleanse the wound and surrounding skin with dilute hibiclens (30cc in 500cc NS)    Pat dry    Apply Cavilon no sting barrier wipe to the periwound skin    Apply vac drape to the periwound skin to protect from the drainage and sponge; window pane fashion    Cut the black vac foam to fit the size of the wound    If bridging technique is being used pick a fleshy area on the body where there will not be pressure    Apply vac drape to wherever the bridging is going to be (NEVER apply black sponge to the intact skin)    Cut a long narrow strip of black foam for the bridge    Cover with vac drape for air tight seal    Where you are going to apply the suction pad; cut a hole the size of a quarter    Apply the suction pad; be mindful of the direction of the tubing    Connect tubing    -125mmHG continuous suction    Ace wrap and/or tubular compression on stump    Elevation      It is recommended that you do not get your ulcer wet when showering.  Listed below are several ways of keeping it dry when you shower.     1. Wrap it with Press and Seal plastic wrap.  It can be found in the stores where the plastic wraps or tin foil is kept.               2.  Some people take a bath and hang their leg/foot out of the tub.                        3  Put your leg in a plastic bag and tape it on.           4. You can purchase a shower cover for casts at some pharmacies and through the Internet.            5. Take a Bed Bath or wash up at the sink

## 2024-09-04 NOTE — TELEPHONE ENCOUNTER
Pt wondering what to do with VAC canister. Instructed pt to throw in trash. He states manual said not to throw in trash and to contact clinic. Educated him that most likely that is to make sure pts communicate with their care team about how often the canister is filling with drainage, no issues though with throwing in garbage. He voiced understanding.

## 2024-09-06 ENCOUNTER — HOSPITAL ENCOUNTER (OUTPATIENT)
Dept: WOUND CARE | Facility: CLINIC | Age: 64
Discharge: HOME | End: 2024-09-06
Admitting: NURSE PRACTITIONER
Payer: COMMERCIAL

## 2024-09-06 VITALS
RESPIRATION RATE: 24 BRPM | OXYGEN SATURATION: 98 % | HEART RATE: 70 BPM | SYSTOLIC BLOOD PRESSURE: 150 MMHG | TEMPERATURE: 98.9 F | DIASTOLIC BLOOD PRESSURE: 80 MMHG

## 2024-09-06 DIAGNOSIS — T81.30XA WOUND DEHISCENCE: ICD-10-CM

## 2024-09-06 DIAGNOSIS — Z89.512 STATUS POST BELOW-KNEE AMPUTATION OF LEFT LOWER EXTREMITY (H): Primary | ICD-10-CM

## 2024-09-06 PROCEDURE — 97606 NEG PRS WND THER DME>50 SQCM: CPT

## 2024-09-06 NOTE — PROGRESS NOTES
[facility]  -  Nurse Visit              Date of Service:  September 6, 2024     Requesting Provider: AKHIL Andres    Diagnosis:     ICD-10-CM    1. Status post below-knee amputation of left lower extremity (H)  Z89.512       2. Wound dehiscence  T81.30XA           Chief Complaint: Juan is being seen today at [facility] for his wound(s) dressing change. Reports pain of 0.  Denies any fevers, chills, or generalized ill feeling.     Dressing on Arrival: NPWT, Pt is currently using tubi, short stretch for compression and did tolerate well. Upon removal of dressings heavy serosanguinous drainage is noted. Canister last changed on Tuesday and full today so changed.    New Wounds noted: No    Vital Signs: BP (!) 150/80   Pulse 70   Temp 98.9  F (37.2  C)   Resp 24   SpO2 98%     Assessment:      General:  Patient presents to clinic in no apparent distress.   Psychiatric:  Alert and oriented x3.   Lower extremity:  edema is present.    Integumentary:  Skin is WNL    Circumferential volume measures:       No data to display                Wound info:  VASC Wound Left bka (Active)   Pre Size Length 6.5 09/03/24 0800   Pre Size Width 17 09/03/24 0800   Pre Size Depth 2 09/03/24 0800   Pre Total Sq cm 110.5 09/03/24 0800   Tunneling 4.5cm at 12 o'clock 08/14/24 0800       Incision/Surgical Site 07/09/24 Left Tibial (Active)       Undermining is not present.    The periwound skin is slightly macerated      Plan:         1. Patient will return in 3 days for nurse visit.            2. As listed below, treatment provided irrigation, mechanical cleansing, and dressings to promote autolytic debridement.             Cleansed with: Dilute hibiclens 30cc in 500cc NS    Protected skin with: 3M Cavilon    Dressings Applied to wound: Negative wound vac therapy:      Applied vac drape to the periwound skin to protect from the drainage and sponge; window pane fashion   Cut the black vac  foam to fit the size of the wound. Used 3 piece(s) of black sponge total. Used adapt barrier ring to periwound.  Applied vac drape to wherever the bridging is going to be (NEVER apply black sponge to the intact skin)   Covered with vac drape for air tight seal. Cut a hole the size of a quarter and applied suction pad. Be mindful of the direction of the tubing.  Connected tubing and turned vac machine to 125mmHG continuous suction.   No alarm or leaks noted.    Compression Applied to the right leg: None  Compression Applied to the left leg: Tubular compression and Short stretch    Offloading used: Wheelchair    Trial Products: No    Provider notified regarding concerns: No    Treatment Changes: No    Tolerated Dressing Change:  Yes    Taught Regarding: follow up appointment(s), wound cares, compression, elevation, and signs of infection    Educational Barriers: No barriers      Magda Braga RN, CWOCN

## 2024-09-09 ENCOUNTER — HOSPITAL ENCOUNTER (OUTPATIENT)
Dept: WOUND CARE | Facility: CLINIC | Age: 64
Discharge: HOME | End: 2024-09-09
Admitting: NURSE PRACTITIONER
Payer: COMMERCIAL

## 2024-09-09 VITALS
RESPIRATION RATE: 20 BRPM | TEMPERATURE: 98.6 F | HEART RATE: 69 BPM | DIASTOLIC BLOOD PRESSURE: 82 MMHG | SYSTOLIC BLOOD PRESSURE: 177 MMHG

## 2024-09-09 DIAGNOSIS — T81.30XA WOUND DEHISCENCE: ICD-10-CM

## 2024-09-09 DIAGNOSIS — Z89.512 STATUS POST BELOW-KNEE AMPUTATION OF LEFT LOWER EXTREMITY (H): Primary | ICD-10-CM

## 2024-09-09 PROCEDURE — 97606 NEG PRS WND THER DME>50 SQCM: CPT

## 2024-09-09 NOTE — PROGRESS NOTES
[facility]  -  Nurse Visit      Left stump, underside of left stump (is dry to touch)    Date of Service:  September 9, 2024     Requesting Provider: AKHIL Andres    Diagnosis:     ICD-10-CM    1. Status post below-knee amputation of left lower extremity (H)  Z89.512       2. Wound dehiscence  T81.30XA           Chief Complaint: Juan is being seen today at [facility] for his wound(s) dressing change. Reports pain of 0.  Denies any fevers, chills, or generalized ill feeling.     Dressing on Arrival: NPWT running at 100mmHg, canister is FULL so he notes runs lower when full, Pt is currently using short stretch for compression and did tolerate well. Upon removal of dressings copious Serous drainage is noted.    New Wounds noted: No    Vital Signs: BP (!) 177/82   Pulse 69   Temp 98.6  F (37  C)   Resp 20     Assessment:      General:  Patient presents to clinic in no apparent distress.   Psychiatric:  Alert and oriented x3.   Lower extremity:  edema is present.    Integumentary:  Skin is pink at underside of stump    Circumferential volume measures:       No data to display                Wound info:  VASC Wound Left bka (Active)   Pre Size Length 6.7 09/09/24 1500   Pre Size Width 17.5 09/09/24 1500   Pre Size Depth 2 09/09/24 1500   Pre Total Sq cm 117 09/06/24 0800   Tunneling 4.5cm at 12 o'clock 08/14/24 0800       Incision/Surgical Site 07/09/24 Left Tibial (Active)       Undermining is not present.    The periwoundskin is  pink under stump      Plan:         1. Patient will return in 2 days for nurse visit.            2. As listed below, treatment provided irrigation, mechanical cleansing, and dressings to promote autolytic debridement.             Cleansed with: Dilute hibiclens 30cc in 500cc NS    Protected skin with: 3M Cavilon and  adapt ring    Dressings Applied to wound: Negative wound vac therapy:      Applied vac drape to the periwound skin to protect  from the drainage and sponge; window pane fashion   Cut the black vac foam to fit the size of the wound. Used 2 piece(s) of black sponge total.   Applied vac drape to wherever the bridging is going to be (NEVER apply black sponge to the intact skin)   Covered with vac drape for air tight seal. Cut a hole the size of a quarter and applied suction pad. Be mindful of the direction of the tubing.  Connected tubing and turned vac machine to 125mmHG continuous suction.   No alarm or leaks noted.    Compression Applied to the right leg: None  Compression Applied to the left leg: Short stretch      Offloading used: Wheelchair    Trial Products: No    Provider notified regarding concerns: No    Treatment Changes: No    Tolerated Dressing Change:  Yes    Taught Regarding: follow up appointment(s)    Educational Barriers: No barriers      Krista Pittman RN, CWOCN

## 2024-09-10 ENCOUNTER — PATIENT OUTREACH (OUTPATIENT)
Dept: CARE COORDINATION | Facility: CLINIC | Age: 64
End: 2024-09-10
Payer: COMMERCIAL

## 2024-09-11 ENCOUNTER — OFFICE VISIT (OUTPATIENT)
Dept: VASCULAR SURGERY | Facility: CLINIC | Age: 64
End: 2024-09-11
Attending: NURSE PRACTITIONER
Payer: COMMERCIAL

## 2024-09-11 VITALS — TEMPERATURE: 97.9 F | SYSTOLIC BLOOD PRESSURE: 142 MMHG | DIASTOLIC BLOOD PRESSURE: 78 MMHG

## 2024-09-11 DIAGNOSIS — Z89.512 STATUS POST BELOW-KNEE AMPUTATION OF LEFT LOWER EXTREMITY (H): Primary | ICD-10-CM

## 2024-09-11 LAB — NONINV COLON CA DNA+OCC BLD SCRN STL QL: NEGATIVE

## 2024-09-11 PROCEDURE — 97608 NEG PRS WND THER NDME>50SQCM: CPT

## 2024-09-11 ASSESSMENT — PAIN SCALES - GENERAL: PAINLEVEL: NO PAIN (0)

## 2024-09-11 NOTE — PROGRESS NOTES
[facility]  -  Nurse Visit        Date of Service:  September 11, 2024     Requesting Provider: AKHIL Andres    Diagnosis:     ICD-10-CM    1. Status post below-knee amputation of left lower extremity (H)  Z89.512           Chief Complaint: Juan is being seen today at [facility] [place] for his wound(s) dressing change. Reports pain of 0.  Denies any fevers, chills, or generalized ill feeling.     Dressing on Arrival: NWPT, Pt is currently using Tubi and short stretch for compression and did tolerate well. Upon removal of dressings heavy Serosanguinous drainage is noted.    New Wounds noted: No    Vital Signs: BP (!) 142/78   Temp 97.9  F (36.6  C)     Assessment:      General:  Patient presents to clinic in no apparent distress.   Psychiatric:  Alert and oriented x3.   Lower extremity:  edema is present.    Integumentary:  Skin is pink    Circumferential volume measures:       No data to display                Wound info:  VASC Wound Left bka (Active)   Pre Size Length 6.4 09/11/24 1400   Pre Size Width 16.4 09/11/24 1400   Pre Size Depth 1.2 09/11/24 1400   Pre Total Sq cm 104.96 09/11/24 1400   Tunneling 4.5cm at 12 o'clock 08/14/24 0800       Incision/Surgical Site 07/09/24 Left Tibial (Active)       Undermining is not present.    The periwoundskin is WNL      Plan:         1. Patient will return in 2 days for nurse visit.            2. As listed below, treatment provided irrigation, mechanical cleansing, and dressings to promote autolytic debridement.             Cleansed with: Vashe    Protected skin with: 3M Cavilon    Dressings Applied to wound: Negative wound vac therapy:      Applied vac drape to the periwound skin to protect from the drainage and sponge; window pane fashion   Cut the black vac foam to fit the size of the wound. Used 2 piece(s) of black sponge total.   Applied vac drape to wherever the bridging is going to be (NEVER apply black sponge to the  intact skin)   Covered with vac drape for air tight seal. Cut a hole the size of a quarter and applied suction pad. Be mindful of the direction of the tubing.  Connected tubing and turned vac machine to 125mmHG continuous suction.   No alarm or leaks noted.    Compression Applied to the right leg: None  Compression Applied to the left leg: Tubular compression and Short stretch    Tubigrip Size G     Offloading used: Wheelchair    Trial Products: No    Provider notified regarding concerns: No    Treatment Changes: No    Tolerated Dressing Change:  Yes    Taught Regarding: follow up appointment(s), wound cares, wound care supplies, and compression    Educational Barriers: No barriers      Aissatou Ghotra RN

## 2024-09-13 ENCOUNTER — HOSPITAL ENCOUNTER (OUTPATIENT)
Dept: WOUND CARE | Facility: CLINIC | Age: 64
Discharge: HOME | End: 2024-09-13
Admitting: NURSE PRACTITIONER
Payer: COMMERCIAL

## 2024-09-13 VITALS
TEMPERATURE: 98.4 F | OXYGEN SATURATION: 96 % | HEART RATE: 76 BPM | SYSTOLIC BLOOD PRESSURE: 158 MMHG | DIASTOLIC BLOOD PRESSURE: 80 MMHG

## 2024-09-13 DIAGNOSIS — T81.30XA WOUND DEHISCENCE: ICD-10-CM

## 2024-09-13 DIAGNOSIS — Z89.512 STATUS POST BELOW-KNEE AMPUTATION OF LEFT LOWER EXTREMITY (H): Primary | ICD-10-CM

## 2024-09-13 PROCEDURE — 97606 NEG PRS WND THER DME>50 SQCM: CPT

## 2024-09-13 NOTE — PROGRESS NOTES
[facility]  -  Nurse Visit        Date of Service:  September 13, 2024     Requesting Provider: AKHIL Andres    Diagnosis:     ICD-10-CM    1. Status post below-knee amputation of left lower extremity (H)  Z89.512       2. Wound dehiscence  T81.30XA           Chief Complaint: Juan is being seen today at [facility] for his wound(s) dressing change. Reports pain of 0.  Denies any fevers, chills, or generalized ill feeling.     Dressing on Arrival: NPWT, Pt is currently using tubigrip size g and short stretch for compression and did tolerate well. Upon removal of dressings copious Serosanguinous drainage is noted (canister changed on Wednesday and almost full again today). Pt states he mowed yesterday so was sitting for a longer time without his leg elevated. Drainage is more serous than sanguineous.    New Wounds noted: No    Vital Signs: BP (!) 158/80   Pulse 76   Temp 98.4  F (36.9  C)   SpO2 96%     Assessment:      General:  Patient presents to clinic in no apparent distress.   Psychiatric:  Alert and oriented x3.   Lower extremity:  edema is present.    Integumentary:  Skin is WNL    Circumferential volume measures:       No data to display                Wound info:  VASC Wound Left bka (Active)   Pre Size Length 6.4 09/11/24 1400   Pre Size Width 16.4 09/11/24 1400   Pre Size Depth 1.2 09/11/24 1400   Pre Total Sq cm 104.96 09/11/24 1400   Tunneling 4.5cm at 12 o'clock 08/14/24 0800       Incision/Surgical Site 07/09/24 Left Tibial (Active)       Undermining is not present.    The periwound skin is  scattered pink with 1-2 pustules.      Plan:         1. Patient will return in 3 days for nurse visit.            2. As listed below, treatment provided irrigation, mechanical cleansing, and dressings to promote autolytic debridement.             Cleansed with: Dilute hibiclens 30cc in 500cc NS. Pt didn't have Vashe with him today. Wound without odor after  cleansing today.    Protected skin with: 3M Cavilon and Adapt barrier ring    Dressings Applied to wound: Negative wound vac therapy:      Applied vac drape to the periwound skin to protect from the drainage and sponge; window pane fashion   Cut the black vac foam to fit the size of the wound. Used 1 piece(s) of black sponge total - spiral cut  Applied vac drape to wherever the bridging is going to be (NEVER apply black sponge to the intact skin)   Covered with vac drape for air tight seal. Cut a hole the size of a quarter and applied suction pad. Be mindful of the direction of the tubing.  Connected tubing and turned vac machine to 125mmHG continuous suction.   No alarm or leaks noted.    Compression Applied to the right leg: None  Compression Applied to the left leg: Tubular compression and Short stretch    Offloading used: Wheelchair    Trial Products: No    Provider notified regarding concerns: No    Treatment Changes: No    Tolerated Dressing Change:  Yes    Taught Regarding: follow up appointment(s), wound cares, wound care supplies, compression, and elevation    Educational Barriers: No barriers    Pt declined printed AVS      Magda Braga RN, CWOCN

## 2024-09-16 ENCOUNTER — HOSPITAL ENCOUNTER (OUTPATIENT)
Dept: WOUND CARE | Facility: CLINIC | Age: 64
Discharge: HOME | End: 2024-09-16
Attending: NURSE PRACTITIONER | Admitting: NURSE PRACTITIONER
Payer: COMMERCIAL

## 2024-09-16 VITALS
RESPIRATION RATE: 20 BRPM | HEART RATE: 70 BPM | TEMPERATURE: 98.8 F | SYSTOLIC BLOOD PRESSURE: 176 MMHG | DIASTOLIC BLOOD PRESSURE: 84 MMHG

## 2024-09-16 DIAGNOSIS — T81.30XA WOUND DEHISCENCE: ICD-10-CM

## 2024-09-16 DIAGNOSIS — Z89.512 STATUS POST BELOW-KNEE AMPUTATION OF LEFT LOWER EXTREMITY (H): Primary | ICD-10-CM

## 2024-09-16 PROCEDURE — 97606 NEG PRS WND THER DME>50 SQCM: CPT

## 2024-09-16 NOTE — PROGRESS NOTES
[facility]  -  Nurse Visit        Date of Service:  September 16, 2024     Requesting Provider: AKHIL Andres    Diagnosis:     ICD-10-CM    1. Status post below-knee amputation of left lower extremity (H)  Z89.512       2. Wound dehiscence  T81.30XA           Chief Complaint: Juan is being seen today at [facility] for his wound(s) dressing change. Reports pain of 0.  Denies any fevers, chills, or generalized ill feeling.     Dressing on Arrival: NPWT in place, Pt is currently using short stretch for compression and did tolerate well. Upon removal of dressings copious Serosanguinous drainage is noted.    New Wounds noted: No    Vital Signs: BP (!) 176/84 (BP Location: Left arm, Patient Position: Chair, Cuff Size: Adult Regular)   Pulse 70   Temp 98.8  F (37.1  C) (Oral)   Resp 20     Assessment:      General:  Patient presents to clinic in no apparent distress.   Psychiatric:  Alert and oriented x3.   Lower extremity:  edema is present.    Integumentary:  Skin is with some moist denudement at underside of stump    Circumferential volume measures:       No data to display                Wound info:  VASC Wound Left bka (Active)   Pre Size Length 6.5 09/16/24 1300   Pre Size Width 18 09/16/24 1300   Pre Size Depth 2.6 09/16/24 1300   Pre Total Sq cm 122.4 09/13/24 1512       Incision/Surgical Site 07/09/24 Left Tibial (Active)       Undermining is not present.    The periwoundskin is  with some mild pink moist denudement at underside of stump distal to wound edge, drainage from area was leaking on short stretch wrap      Plan:         1. Patient will return in 2 days for nurse visit.            2. As listed below, treatment provided irrigation, mechanical cleansing, and dressings to promote autolytic debridement.             Cleansed with: Dilute hibiclens 30cc in 500cc NS and Vashe soak    Protected skin with: 3M Cavilon    Dressings Applied to wound: Negative  wound vac therapy:      Applied vac drape to the periwound skin to protect from the drainage and sponge; window pane fashion   Cut the black vac foam to fit the size of the wound. Used 3 piece(s) of black sponge total (two smaller pieces distal edge then one larger over all).   Applied vac drape to wherever the bridging is going to be (NEVER apply black sponge to the intact skin)   Covered with vac drape for air tight seal. Cut a hole the size of a quarter and applied suction pad. Be mindful of the direction of the tubing.  Connected tubing and turned vac machine to 125mmHG continuous suction.   No alarm or leaks noted.  Used 1/2 of  non-bordered Mepilex to the area of denudement distal to wound edge at underside of stump for drainage containment and securement of the adhesive of VAC    Compression Applied to the right leg: None  Compression Applied to the left leg: Short stretch      Offloading used: Wheelchair    Trial Products: No    Provider notified regarding concerns: No    Treatment Changes: No    Tolerated Dressing Change:  Yes    Taught Regarding: follow up appointment(s) and wound cares, elevation of stump and floating distal stump off pillow to prevent pressure/irritation to skin    Educational Barriers: No barriers      Krista Pittman RN, CWOCN

## 2024-09-18 ENCOUNTER — OFFICE VISIT (OUTPATIENT)
Dept: VASCULAR SURGERY | Facility: CLINIC | Age: 64
End: 2024-09-18
Attending: NURSE PRACTITIONER
Payer: COMMERCIAL

## 2024-09-18 VITALS
DIASTOLIC BLOOD PRESSURE: 77 MMHG | HEART RATE: 90 BPM | SYSTOLIC BLOOD PRESSURE: 167 MMHG | RESPIRATION RATE: 16 BRPM | TEMPERATURE: 97.8 F

## 2024-09-18 DIAGNOSIS — Z89.512 STATUS POST BELOW-KNEE AMPUTATION OF LEFT LOWER EXTREMITY (H): Primary | ICD-10-CM

## 2024-09-18 PROCEDURE — 97608 NEG PRS WND THER NDME>50SQCM: CPT

## 2024-09-18 ASSESSMENT — PAIN SCALES - GENERAL: PAINLEVEL: NO PAIN (0)

## 2024-09-18 NOTE — PROGRESS NOTES
[facility]  -  Nurse Visit        Date of Service:  September 18, 2024     Requesting Provider: AKHIL Andres    Diagnosis:     ICD-10-CM    1. Status post below-knee amputation of left lower extremity (H)  Z89.512           Chief Complaint: Juan is being seen today at [facility] [place] for his left BKA wound and VAC dressing change. Reports pain of 0.  Denies any fevers, chills, or generalized ill feeling.     Dressing on Arrival: NWPT running at 125 mmHg.Pt is currently using tubular compression and short stretch for compression and did tolerate well. Upon removal of dressings moderate serosanguinous drainage is noted.    New Wounds noted: No    Left BKA 9/18    Vital Signs: BP (!) 167/77   Pulse 90   Temp 97.8  F (36.6  C)   Resp 16     Assessment:      General:  Patient presents to clinic in no apparent distress.   Psychiatric:  Alert and oriented x3.   Lower extremity:  edema is present.    Integumentary:  Skin is WNL. Pink weepy area on posterior stump measuring about 4 x 5 cm. Will apply Aquacel Ag to area today under drape (per Joyce Andres CNP).    Circumferential volume measures:       No data to display                Wound info:  VASC Wound Left bka (Active)   Pre Size Length 6.6 09/18/24 0800   Pre Size Width 17.4 09/18/24 0800   Pre Size Depth 2.6 09/18/24 0800   Pre Total Sq cm 114.84 09/18/24 0800   Tunneling 4.5cm at 12 o'clock 08/14/24 0800       Incision/Surgical Site 07/09/24 Left Tibial (Active)       Undermining is not present.    The periwoundskin is WNL      Plan:         1. Patient will return in 2 days for routine follow up with Joyce Andres CNP .            2. As listed below, treatment provided irrigation, mechanical cleansing, and dressings to promote autolytic debridement.             Cleansed with: Vashe    Protected skin with: 3M Cavilon    Dressings Applied to wound: Negative wound vac therapy:      Applied vac drape to the periwound  skin to protect from the drainage and sponge; window pane fashion   Cut the black vac foam to fit the size of the wound. Used 3 piece(s) of black sponge total.   Applied vac drape to wherever the bridging is going to be (NEVER apply black sponge to the intact skin)   Covered with vac drape for air tight seal. Cut a hole the size of a quarter and applied suction pad. Be mindful of the direction of the tubing.  Connected tubing and turned vac machine to 125mmHG continuous suction.   No alarm or leaks noted.    Compression Applied to the right leg: None  Compression Applied to the left leg: Tubular compression and Short stretch    Tubigrip Size F       Offloading used: Wheelchair    Trial Products: No    Provider notified regarding concerns: Yes: Weepy area on posterior stump. Will apply Aquacel Ag under drape today and evaluate at appointment o 9/20.    Treatment Changes: No    Tolerated Dressing Change:  Yes    Taught Regarding: follow up appointment(s), wound cares, compression, elevation, offloading, and compliance    Educational Barriers: No barriers      Brianna Leonard RN, WTA-C, [facility]

## 2024-09-20 ENCOUNTER — LAB (OUTPATIENT)
Dept: LAB | Facility: CLINIC | Age: 64
End: 2024-09-20
Payer: COMMERCIAL

## 2024-09-20 ENCOUNTER — OFFICE VISIT (OUTPATIENT)
Dept: VASCULAR SURGERY | Facility: CLINIC | Age: 64
End: 2024-09-20
Attending: NURSE PRACTITIONER
Payer: COMMERCIAL

## 2024-09-20 VITALS
SYSTOLIC BLOOD PRESSURE: 140 MMHG | TEMPERATURE: 98.7 F | HEART RATE: 74 BPM | OXYGEN SATURATION: 98 % | DIASTOLIC BLOOD PRESSURE: 72 MMHG

## 2024-09-20 DIAGNOSIS — E11.628 TYPE 2 DIABETES MELLITUS WITH OTHER SKIN COMPLICATION, UNSPECIFIED WHETHER LONG TERM INSULIN USE (H): ICD-10-CM

## 2024-09-20 DIAGNOSIS — E66.01 MORBID OBESITY (H): ICD-10-CM

## 2024-09-20 DIAGNOSIS — T81.31XA POSTOPERATIVE WOUND DEHISCENCE, INITIAL ENCOUNTER: ICD-10-CM

## 2024-09-20 DIAGNOSIS — A49.02 MRSA INFECTION: ICD-10-CM

## 2024-09-20 DIAGNOSIS — Z89.512 STATUS POST BELOW-KNEE AMPUTATION OF LEFT LOWER EXTREMITY (H): ICD-10-CM

## 2024-09-20 DIAGNOSIS — Z89.512 STATUS POST BELOW-KNEE AMPUTATION OF LEFT LOWER EXTREMITY (H): Primary | ICD-10-CM

## 2024-09-20 LAB
BASOPHILS # BLD AUTO: 0.1 10E3/UL (ref 0–0.2)
BASOPHILS NFR BLD AUTO: 1 %
EOSINOPHIL # BLD AUTO: 0.6 10E3/UL (ref 0–0.7)
EOSINOPHIL NFR BLD AUTO: 4 %
ERYTHROCYTE [DISTWIDTH] IN BLOOD BY AUTOMATED COUNT: 14.5 % (ref 10–15)
HCT VFR BLD AUTO: 35.7 % (ref 40–53)
HGB BLD-MCNC: 11.5 G/DL (ref 13.3–17.7)
IMM GRANULOCYTES # BLD: 0 10E3/UL
IMM GRANULOCYTES NFR BLD: 0 %
LYMPHOCYTES # BLD AUTO: 3.4 10E3/UL (ref 0.8–5.3)
LYMPHOCYTES NFR BLD AUTO: 24 %
MCH RBC QN AUTO: 25.8 PG (ref 26.5–33)
MCHC RBC AUTO-ENTMCNC: 32.2 G/DL (ref 31.5–36.5)
MCV RBC AUTO: 80 FL (ref 78–100)
MONOCYTES # BLD AUTO: 0.8 10E3/UL (ref 0–1.3)
MONOCYTES NFR BLD AUTO: 6 %
NEUTROPHILS # BLD AUTO: 9.5 10E3/UL (ref 1.6–8.3)
NEUTROPHILS NFR BLD AUTO: 66 %
PLATELET # BLD AUTO: 518 10E3/UL (ref 150–450)
RBC # BLD AUTO: 4.46 10E6/UL (ref 4.4–5.9)
WBC # BLD AUTO: 14.4 10E3/UL (ref 4–11)

## 2024-09-20 PROCEDURE — 36415 COLL VENOUS BLD VENIPUNCTURE: CPT

## 2024-09-20 PROCEDURE — 11042 DBRDMT SUBQ TIS 1ST 20SQCM/<: CPT | Performed by: NURSE PRACTITIONER

## 2024-09-20 PROCEDURE — 11045 DBRDMT SUBQ TISS EACH ADDL: CPT | Performed by: NURSE PRACTITIONER

## 2024-09-20 PROCEDURE — 85025 COMPLETE CBC W/AUTO DIFF WBC: CPT

## 2024-09-20 ASSESSMENT — PAIN SCALES - GENERAL: PAINLEVEL: NO PAIN (0)

## 2024-09-20 NOTE — PROGRESS NOTES
Wound VAC application.  Protected skin with: 3M Cavilon    Dressings Applied to wound: Negative wound vac therapy:    Pieces of foam removed from previous dressing: entire black sponge.    Applied vac drape to the periwound skin to protect from the drainage and sponge; window pane fashion   Cut the black vac foam to fit the size of the wound. Used 4 piece(s) of black sponge total.   Applied vac drape to wherever the bridging is going to be (NEVER apply black sponge to the intact skin)   Covered with vac drape for air tight seal. Cut a hole the size of a quarter and applied suction pad. Be mindful of the direction of the tubing.  Connected tubing and turned vac machine to 125mmHG continuous suction.   No alarm or leaks noted.    Compression Applied to the right leg: Short stretch  Compression Applied to the left leg: Short stretch    Short Stretch      Rogers Eller RN

## 2024-09-20 NOTE — PROGRESS NOTES
Follow up Vascular Visit       Date of Service:09/20/24      Chief Complaint: left stump non-healing surgical site      Pt returns to [facility] with regards to their left stump non-healing surgical site.  They arrive today with wife. They are currently using wound vac to the wounds. This is being done by staff at our clinic [facility]. They are using tubular compression and short stretch for compression. They are feeling well today. Denies fevers, chills. No shortness of breath.     Allergies:   Allergies   Allergen Reactions    Bees     Sulfamethoxazole Swelling     throat       Medications:   Current Outpatient Medications:     acetaminophen (TYLENOL) 325 MG tablet, Take 2 tablets (650 mg) by mouth every 4 hours as needed for other (mild pain), Disp: 100 tablet, Rfl: 0    amLODIPine (NORVASC) 2.5 MG tablet, Take 1 tablet (2.5 mg) by mouth daily, Disp: 90 tablet, Rfl: 1    aspirin 81 MG EC tablet, Take 1 tablet (81 mg) by mouth 2 times daily, Disp: 60 tablet, Rfl: 0    atorvastatin (LIPITOR) 20 MG tablet, Take 1 tablet (20 mg) by mouth every evening, Disp: 90 tablet, Rfl: 1    BD PEN NEEDLE YENNY 2ND GEN 32G X 4 MM miscellaneous, USE 1 PEN NEEDLES DAILY OR AS DIRECTED., Disp: 100 each, Rfl: 2    blood glucose (ACCU-CHEK GANGA) test strip, Use to test blood sugar 1 times daily or as directed., Disp: 100 strip, Rfl: 3    blood glucose monitoring (ACCU-CHEK GANGA PLUS) meter device kit, USE TO TEST BLOOD SUGAR 1 TIMES DAILY OR AS DIRECTED., Disp: , Rfl: 0    blood glucose monitoring (ACCU-CHEK MULTICLIX) lancets, USE TO TEST BLOOD SUGAR 1 TIMES DAILY OR AS DIRECTED., Disp: , Rfl: 3    EPINEPHrine (ANY BX GENERIC EQUIV) 0.3 MG/0.3ML injection 2-pack, Inject 0.3 mLs (0.3 mg) into the muscle once as needed for anaphylaxis, Disp: 1 each, Rfl: 1    hydrochlorothiazide (HYDRODIURIL) 25 MG tablet, Take 1 tablet (25 mg) by mouth daily, Disp: 90 tablet, Rfl: 1    Insulin Glargine-yfgn 100 UNIT/ML SOPN,  Inject 64 Units Subcutaneous at bedtime SCHEDULE FASTING LAB APPOINTMENT, THEN AN IN-CLINIC PROVIDER APPOINTMENT., Disp: 30 mL, Rfl: 1    losartan (COZAAR) 100 MG tablet, Take 1 tablet (100 mg) by mouth daily, Disp: 90 tablet, Rfl: 1    metFORMIN (GLUCOPHAGE XR) 500 MG 24 hr tablet, TAKE 2 TABLETS BY MOUTH TWICE A DAY WITH MEALS., Disp: 360 tablet, Rfl: 1    metoprolol succinate ER (TOPROL XL) 200 MG 24 hr tablet, Take 1 tablet (200 mg) by mouth daily, Disp: 90 tablet, Rfl: 1    Current Facility-Administered Medications:     lidocaine (XYLOCAINE) 5 % ointment, , Topical, Daily PRN, Joyce Andres NP    History:   Past Medical History:   Diagnosis Date    Diabetes (H) 1-2017    Hypertension 1-2017    Septic arthritis of right foot (H) 9/26/2023       Physical Exam:    BP (!) 140/72   Pulse 74   Temp 98.7  F (37.1  C)   SpO2 98%     General:  Patient presents to clinic in no apparent distress.  Head: normocephalic atraumatic  Psychiatric:  Alert and oriented x3.   Respiratory: unlabored breathing; no cough  Integumentary:  Skin is uniformly warm, dry and pink.    Ulcer #1 Location: left stump  Size: 6L x 18W x 1.8depth.  no sinus tract present, Wound base: hypergranulation  no undermining present. Ulcer is full thickness. There is moderate drainage. Periwound: no denudement, erythema, induration, maceration or warmth.      VASC Wound Left bka (Active)   Pre Size Length 6 09/20/24 1300   Pre Size Width 18 09/20/24 1300   Pre Size Depth 3.5 09/20/24 1300   Pre Total Sq cm 108 09/20/24 1300   Tunneling 4.5cm at 12 o'clock 08/14/24 0800   Number of days: 37       Incision/Surgical Site 07/09/24 Left Tibial (Active)   Number of days: 73            Circumferential volume measures:           No data to display                Labs:    I personally reviewed the following lab results today and those on care everywhere    CRP Inflammation   Date Value Ref Range Status   11/08/2019 22.2 (H) 0.0 - 8.0 mg/L Final      Sed Rate    Date Value Ref Range Status   11/08/2019 33 (H) 0 - 20 mm/h Final     Erythrocyte Sedimentation Rate   Date Value Ref Range Status   09/25/2023 81 (H) 0 - 20 mm/hr Final      Last Renal Panel:  Sodium   Date Value Ref Range Status   08/29/2024 132 (L) 135 - 145 mmol/L Final   12/04/2020 138 133 - 144 mmol/L Final     Potassium   Date Value Ref Range Status   08/29/2024 4.7 3.4 - 5.3 mmol/L Final   08/09/2021 4.9 3.4 - 5.3 mmol/L Final   12/04/2020 4.5 3.4 - 5.3 mmol/L Final     Chloride   Date Value Ref Range Status   08/29/2024 94 (L) 98 - 107 mmol/L Final   07/23/2021 101 98 - 107 mmol/L Final     Comment:     Last Lab Result: LDL Cholesterol Calculated (mg/dL   12/04/2020 103 94 - 109 mmol/L Final     Carbon Dioxide   Date Value Ref Range Status   12/04/2020 28 20 - 32 mmol/L Final     Carbon Dioxide (CO2)   Date Value Ref Range Status   08/29/2024 26 22 - 29 mmol/L Final   07/23/2021 26 22 - 31 mmol/L Final     Comment:     Last Lab Result: LDL Cholesterol Calculated (mg/dL     Anion Gap   Date Value Ref Range Status   08/29/2024 12 7 - 15 mmol/L Final   07/23/2021 12 5 - 18 mmol/L Final   12/04/2020 7 3 - 14 mmol/L Final     Glucose   Date Value Ref Range Status   08/29/2024 143 (H) 70 - 99 mg/dL Final   07/23/2021 137 (H) 70 - 125 mg/dL Final     Comment:     Last Lab Result: LDL Cholesterol Calculated (mg/dL   12/04/2020 269 (H) 70 - 99 mg/dL Final     Comment:     Fasting specimen     GLUCOSE BY METER POCT   Date Value Ref Range Status   07/09/2024 70 70 - 99 mg/dL Final     Urea Nitrogen   Date Value Ref Range Status   08/29/2024 25.2 (H) 8.0 - 23.0 mg/dL Final   07/23/2021 18 8 - 22 mg/dL Final     Comment:     Last Lab Result: LDL Cholesterol Calculated (mg/dL   12/04/2020 25 7 - 30 mg/dL Final     Creatinine   Date Value Ref Range Status   08/29/2024 0.69 0.67 - 1.17 mg/dL Final   12/04/2020 0.92 0.66 - 1.25 mg/dL Final     GFR Estimate   Date Value Ref Range Status   08/29/2024 >90 >60 mL/min/1.73m2  "Final     Comment:     eGFR calculated using 2021 CKD-EPI equation.   12/04/2020 >90 >60 mL/min/[1.73_m2] Final     Comment:     Non  GFR Calc  Starting 12/18/2018, serum creatinine based estimated GFR (eGFR) will be   calculated using the Chronic Kidney Disease Epidemiology Collaboration   (CKD-EPI) equation.       Calcium   Date Value Ref Range Status   08/29/2024 9.5 8.8 - 10.4 mg/dL Final     Comment:     Reference intervals for this test were updated on 7/16/2024 to reflect our healthy population more accurately. There may be differences in the flagging of prior results with similar values performed with this method. Those prior results can be interpreted in the context of the updated reference intervals.   12/04/2020 8.8 8.5 - 10.1 mg/dL Final     Albumin   Date Value Ref Range Status   09/25/2023 3.2 (L) 3.5 - 5.2 g/dL Final   07/23/2021 4.1 3.5 - 5.0 g/dL Final     Comment:     Last Lab Result: LDL Cholesterol Calculated (mg/dL   12/04/2020 3.4 3.4 - 5.0 g/dL Final      Lab Results   Component Value Date    WBC 12.4 09/29/2023    WBC 11.1 11/08/2019     Lab Results   Component Value Date    RBC 3.45 09/29/2023    RBC 4.68 11/08/2019     Lab Results   Component Value Date    HGB 8.7 09/30/2023    HGB 12.8 11/08/2019     Lab Results   Component Value Date    HCT 30.3 09/29/2023    HCT 40.0 11/08/2019     No components found for: \"MCT\"  Lab Results   Component Value Date    MCV 88 09/29/2023    MCV 86 11/08/2019     Lab Results   Component Value Date    MCH 27.5 09/29/2023    MCH 27.4 11/08/2019     Lab Results   Component Value Date    MCHC 31.4 09/29/2023    MCHC 32.0 11/08/2019     Lab Results   Component Value Date    RDW 15.3 09/29/2023    RDW 13.8 11/08/2019     Lab Results   Component Value Date     10/02/2023     11/08/2019      Lab Results   Component Value Date    A1C 5.0 08/29/2024    A1C 6.7 09/25/2023    A1C 6.6 08/01/2022    A1C 7.5 03/25/2022    A1C 7.3 07/23/2021    " "A1C 9.6 12/04/2020    A1C 9.0 07/21/2020    A1C 7.8 11/08/2019    A1C 6.5 11/29/2018    A1C 7.3 06/28/2017      TSH   Date Value Ref Range Status   11/29/2018 3.07 0.40 - 4.00 mU/L Final      No results found for: \"VITDT\"                Impression:  Encounter Diagnoses   Name Primary?    Status post below-knee amputation of left lower extremity (H) Yes    Type 2 diabetes mellitus with other skin complication, unspecified whether long term insulin use (H)     Morbid obesity (H)     Postoperative wound dehiscence, initial encounter                          Are any of these ulcers new today: No; Location: na    Assessment/Plan:          1. Debridement: Nursing staff removed the old dressing and cleanse the wound(s) with specified solution. After discussion of risk factors and verbal consent was obtained 2% Lidocaine HCL jelly was applied, under clean conditions, the left stump ulceration(s) were debrided using currette. Devitalized and nonviable tissue, along with any fibrin and slough, was removed to improve granulation tissue formation, stimulate wound healing, decrease overall bacteria load, disrupt biofilm formation and decrease edge senescence.  Total excisional debridement was 108 sq cm from the epidermis/dermis area and into the subcutaneous tissue with a depth of 1.8 cm.   Ulcers were improved afterwards and .  Measures were unchanged after debridement. Additionally the hypergranular tissue was treated with silver nitrate       2.  Ulcer treatment: ulcer treatment will include irrigation and dressings to promote autolytic debridement which will include:will continue wound vac until the bottom portion which has the deepest depth fills in more; continue to come to clinic [facility], [facility], [facility].  If for some reason the patient is not able to get their dressing(s) changed as outlined above (due to illness, lack of supplies, lack of help) please do the following: remove old, soiled dressings; wash the ulcers with saline; " pat dry; apply ABD pad or other absorbant pad and secure with rolled gauze; avoid tape directly on your skin; patient instructed to call the clinic as soon as possible to let us know what the current issues are in receiving ulcer care. Improved ADDENDUM: culture grew out MRSA and morganella morganii and staphylococcus aureus. Pt is allergic to Sulfa; will treat with doxycyline; the morganella unfortunately was resistant to all oral options; I will refer him to ID for evaluation; with the slow wound progress; the friabiliity of the tissue and elevated white count this is still very concerning and I feel needs to be addressed.            3. Edema: continue elevation; tubular compression and short stretch. The compression wraps were applied today in clinic.     If a 2 layer or 4 layer compression wrap is being used; these are safe to have on for ONLY 7 days. If for some reason the patient is not able to get the wrap(s) changed (due to illness; lack of supplies, lack of help, lack of transportation) please do the following: unwrap the old 2 or 4 layer compression wrap; avoid using scissors as you could cut your skin and cause ulcers; use tubular compression when available. Call to reschedule your home care or clinic visit appointment as soon as possible.  Stable            4. Nutrition: focus on protein           5. Offloading: no prosthesis until well healed          6. Wound Etiology: surgical          7. Anemia: historically low hgb; filling up cannister with arie blood every 2 days; I am concerned about anemia; we will check hgb today.      Patient will follow up with me in 3-4 weeks for reevaluation; NV until then. They were instructed to call the clinic sooner with any signs or symptoms of infection or any further questions/concerns. Answered all questions.          Joyce Andres DNP, RN, CNP, CWOCN, CFCN, CLT  [facility]   [phone]        This note was electronically signed by Joyce Andres,  NP

## 2024-09-20 NOTE — PATIENT INSTRUCTIONS
Go to the lab after this visit and get hemoglobin checked; we will call you with results    Aim for 90g of protein per day    Consider getting Mustapha which is a wound supplement    No prosthesis wearing until well healed      Wound was treated with silver nitrate today; this will discolor the wound bed a silver gray for 3-5 days and then return to being red      Wound Care Instructions      Wound Vac Dressing Change Instructions  Every Monday, Wednesday and Friday    Gently remove old dressing    Cleanse the wound and surrounding skin with dilute hibiclens (30cc in 500cc NS)    Pat dry    Apply Cavilon no sting barrier wipe to the periwound skin    Apply vac drape to the periwound skin to protect from the drainage and sponge; window pane fashion    Cut the black vac foam to fit the size of the wound    If bridging technique is being used pick a fleshy area on the body where there will not be pressure    Apply vac drape to wherever the bridging is going to be (NEVER apply black sponge to the intact skin)    Cut a long narrow strip of black foam for the bridge    Cover with vac drape for air tight seal    Where you are going to apply the suction pad; cut a hole the size of a quarter    Apply the suction pad; be mindful of the direction of the tubing    Connect tubing    125mmHG continuous suction    Ace wrap and/or tubular compression on stump    Elevation      It is recommended that you do not get your ulcer wet when showering.  Listed below are several ways of keeping it dry when you shower.     1. Wrap it with Press and Seal plastic wrap.  It can be found in the stores where the plastic wraps or tin foil is kept.               2.  Some people take a bath and hang their leg/foot out of the tub.                        3  Put your leg in a plastic bag and tape it on.           4. You can purchase a shower cover for casts at some pharmacies and through the Internet.            5. Take a Bed Bath or wash up at the  sink

## 2024-09-23 ENCOUNTER — HOSPITAL ENCOUNTER (OUTPATIENT)
Dept: WOUND CARE | Facility: CLINIC | Age: 64
Discharge: HOME | End: 2024-09-23
Admitting: NURSE PRACTITIONER
Payer: COMMERCIAL

## 2024-09-23 VITALS
DIASTOLIC BLOOD PRESSURE: 84 MMHG | TEMPERATURE: 98.6 F | SYSTOLIC BLOOD PRESSURE: 134 MMHG | HEART RATE: 77 BPM | OXYGEN SATURATION: 100 %

## 2024-09-23 DIAGNOSIS — Z89.512 STATUS POST BELOW-KNEE AMPUTATION OF LEFT LOWER EXTREMITY (H): Primary | ICD-10-CM

## 2024-09-23 DIAGNOSIS — T81.30XA WOUND DEHISCENCE: ICD-10-CM

## 2024-09-23 DIAGNOSIS — Z89.512 STATUS POST BELOW-KNEE AMPUTATION OF LEFT LOWER EXTREMITY (H): ICD-10-CM

## 2024-09-23 PROCEDURE — 97606 NEG PRS WND THER DME>50 SQCM: CPT

## 2024-09-23 PROCEDURE — 87205 SMEAR GRAM STAIN: CPT | Performed by: NURSE PRACTITIONER

## 2024-09-23 NOTE — PATIENT INSTRUCTIONS
Aim for 90g of protein per day     Consider getting Mustapha which is a wound supplement     No prosthesis wearing until well healed        Wound was treated with silver nitrate today; this will discolor the wound bed a silver gray for 3-5 days and then return to being red        Wound Care Instructions       Wound Vac Dressing Change Instructions  Every Monday, Wednesday and Friday     Gently remove old dressing     Cleanse the wound and surrounding skin with dilute hibiclens (30cc in 500cc NS)     Pat dry     Apply Cavilon no sting barrier wipe to the periwound skin     Apply vac drape to the periwound skin to protect from the drainage and sponge; window pane fashion     Cut the black vac foam to fit the size of the wound     If bridging technique is being used pick a fleshy area on the body where there will not be pressure     Apply vac drape to wherever the bridging is going to be (NEVER apply black sponge to the intact skin)     Cut a long narrow strip of black foam for the bridge     Cover with vac drape for air tight seal     Where you are going to apply the suction pad; cut a hole the size of a quarter     Apply the suction pad; be mindful of the direction of the tubing     Connect tubing     125mmHG continuous suction     Ace wrap and/or tubular compression on stump     Elevation        It is recommended that you do not get your ulcer wet when showering.  Listed below are several ways of keeping it dry when you shower.      1. Wrap it with Press and Seal plastic wrap.  It can be found in the stores where the plastic wraps or tin foil is kept.                  2.  Some people take a bath and hang their leg/foot out of the tub.                             3  Put your leg in a plastic bag and tape it on.             4. You can purchase a shower cover for casts at some pharmacies and through the Internet.                       5. Take a Bed Bath or wash up at the sink

## 2024-09-23 NOTE — PROGRESS NOTES
[facility]  -  Nurse Visit        Date of Service:  September 23, 2024     Requesting Provider: AKHIL Andres    Diagnosis:     ICD-10-CM    1. Status post below-knee amputation of left lower extremity (H)  Z89.512       2. Wound dehiscence  T81.30XA Wound Aerobic Bacterial Culture Routine With Gram Stain     Wound Aerobic Bacterial Culture Routine With Gram Stain      3. Status post below-knee amputation of left lower extremity (H)  Z89.512 Wound Aerobic Bacterial Culture Routine With Gram Stain     Wound Aerobic Bacterial Culture Routine With Gram Stain          Chief Complaint: Juan is being seen today at [facility] for his wound(s) dressing change. Reports pain of 0.  Denies any fevers, chills, or generalized ill feeling.     Dressing on Arrival: NPWT, Pt is currently using tubular for compression and did tolerate well. Upon removal of dressings heavy Serosanguinous drainage is noted.    New Wounds noted: No    Vital Signs: /84 (BP Location: Left arm)   Pulse 77   Temp 98.6  F (37  C)   SpO2 100%     Assessment:      General:  Patient presents to clinic in no apparent distress.   Psychiatric:  Alert and oriented x3.   Lower extremity:  edema is present.    Integumentary:  Skin is WNL    Circumferential volume measures:       No data to display                Wound info:  VASC Wound Left bka (Active)   Pre Size Length 6 09/20/24 1300   Pre Size Width 18 09/20/24 1300   Pre Size Depth 1.8 09/20/24 1300   Pre Total Sq cm 108 09/20/24 1300       Incision/Surgical Site 07/09/24 Left Tibial (Active)       Undermining is not present.    The periwound skin is WNL      Plan:         1. Patient will return in 2 days for nurse visit.            2. As listed below, treatment provided irrigation, mechanical cleansing, and dressings to promote autolytic debridement and included application of multi-layer compression therapy.           3. Small area of the wound was  debrided after verbal consent was obtained using a curette. Then a swab wound culture was obtained. Pt tolerated well             Cleansed with: Dilute hibiclens 30cc in 500cc NS and Vashe    Protected skin with: 3M Cavilon and Adapt barrier ring    Dressings Applied to wound: Negative wound vac therapy:    Pieces of foam removed from previous dressin    Applied vac drape to the periwound skin to protect from the drainage and sponge; window pane fashion   Cut the black vac foam to fit the size of the wound. Used 2 piece(s) of black sponge total.   Applied vac drape to wherever the bridging is going to be (NEVER apply black sponge to the intact skin)   Covered with vac drape for air tight seal. Cut a hole the size of a quarter and applied suction pad. Be mindful of the direction of the tubing.  Connected tubing and turned vac machine to 125mmHG continuous suction.   No alarm or leaks noted.    Compression Applied to the right leg: None  Compression Applied to the left leg: Tubular compression size G    Offloading used: Wheelchair    Trial Products: No    Provider notified regarding concerns: No    Treatment Changes: No    Tolerated Dressing Change:  Yes    Taught Regarding: follow up appointment(s), wound cares, test results, and swab culture    Educational Barriers: No barriers      Magda Braga RN, CWOCN

## 2024-09-25 ENCOUNTER — TELEPHONE (OUTPATIENT)
Dept: VASCULAR SURGERY | Facility: CLINIC | Age: 64
End: 2024-09-25

## 2024-09-25 ENCOUNTER — OFFICE VISIT (OUTPATIENT)
Dept: VASCULAR SURGERY | Facility: CLINIC | Age: 64
End: 2024-09-25
Attending: NURSE PRACTITIONER
Payer: COMMERCIAL

## 2024-09-25 VITALS
RESPIRATION RATE: 16 BRPM | DIASTOLIC BLOOD PRESSURE: 77 MMHG | TEMPERATURE: 97.6 F | SYSTOLIC BLOOD PRESSURE: 162 MMHG | HEART RATE: 74 BPM

## 2024-09-25 DIAGNOSIS — Z89.512 STATUS POST BELOW-KNEE AMPUTATION OF LEFT LOWER EXTREMITY (H): Primary | ICD-10-CM

## 2024-09-25 PROBLEM — T81.30XA WOUND DEHISCENCE: Status: ACTIVE | Noted: 2024-09-25

## 2024-09-25 PROCEDURE — 97608 NEG PRS WND THER NDME>50SQCM: CPT

## 2024-09-25 ASSESSMENT — PAIN SCALES - GENERAL: PAINLEVEL: NO PAIN (0)

## 2024-09-25 NOTE — TELEPHONE ENCOUNTER
Spoke with patient, he will call back if able to move Fri NV to the morning; needs to speak with wife as she provides transportation. [phone]  __________________________________  ----- Message from Magda FONG sent at 9/25/2024 10:08 AM CDT -----  Regarding: moving appointment for Friday to morning  Would one of you please call Grupo and ask him about moving his appointment to any time in the morning? I got the ok from Ernst. I also need from 3-3:30 blocked. There's a chance my 2pm pt will take me longer.    Thank you!  Magda

## 2024-09-25 NOTE — NURSING NOTE
[facility]  -  Nurse Visit        Date of Service:  2024     Requesting Provider: AKHIL Andres    Diagnosis:     ICD-10-CM    1. Status post below-knee amputation of left lower extremity (H)  Z89.512           Chief Complaint: Juan is being seen today at [facility] [place] for his left BKA wound and VAC dressing change. Reports pain of 0.  Denies any fevers, chills, or generalized ill feeling.     Dressing on Arrival: NWPT running at 125 mmHg, Pt is currently using tubular compression for compression and did tolerate well. Upon removal of dressings moderate serosanguinous drainage is noted.    New Wounds noted: No    Left BKA     Vital Signs: BP (!) 162/77   Pulse 74   Temp 97.6  F (36.4  C)   Resp 16     Assessment:      General:  Patient presents to clinic in no apparent distress.   Psychiatric:  Alert and oriented x3.   Lower extremity:  edema is present.    Integumentary:  Skin is WNL    Circumferential volume measures:       No data to display                Wound info:  VASC Wound Left bka (Active)   Pre Size Length 6.6 24 0800   Pre Size Width 16.8 24 0800   Pre Size Depth 1.3 24 0800   Pre Total Sq cm 110.8 24 0800   Tunneling 4.5cm at 12 o'clock 24 0800       Incision/Surgical Site 24 Left Tibial (Active)       Undermining is not present.    The periwoundskin is WNL      Plan:         1. Patient will return in 2 days for nurse visit.            2. As listed below, treatment provided irrigation, mechanical cleansing, and dressings to promote autolytic debridement.             Cleansed with: Normal saline    Protected skin with: 3M Cavilon    Dressings Applied to wound: Negative wound vac therapy:    Pieces of foam removed from previous dressin    Applied vac drape to the periwound skin to protect from the drainage and sponge; window pane fashion   Cut the black vac foam to fit the size of the wound.  Used 2 piece(s) of black sponge total.   Applied vac drape to wherever the bridging is going to be (NEVER apply black sponge to the intact skin)   Covered with vac drape for air tight seal. Cut a hole the size of a quarter and applied suction pad. Be mindful of the direction of the tubing.  Connected tubing and turned vac machine to 125mmHG continuous suction.   No alarm or leaks noted.    Compression Applied to the right leg: compression stocking  Compression Applied to the left leg: Tubular compression    Tubigrip Size F       Offloading used: Wheelchair    Trial Products: No    Provider notified regarding concerns: No    Treatment Changes: No    Tolerated Dressing Change:  Yes    Taught Regarding: follow up appointment(s), wound cares, elevation, offloading, compliance, and signs of infection    Educational Barriers: No barriers      Brianna Leonard RN, WTA-C, CFCN

## 2024-09-27 ENCOUNTER — TELEPHONE (OUTPATIENT)
Dept: VASCULAR SURGERY | Facility: CLINIC | Age: 64
End: 2024-09-27
Payer: COMMERCIAL

## 2024-09-27 ENCOUNTER — HOSPITAL ENCOUNTER (OUTPATIENT)
Dept: WOUND CARE | Facility: CLINIC | Age: 64
Discharge: HOME | End: 2024-09-27
Admitting: NURSE PRACTITIONER
Payer: COMMERCIAL

## 2024-09-27 VITALS
OXYGEN SATURATION: 100 % | DIASTOLIC BLOOD PRESSURE: 72 MMHG | TEMPERATURE: 98.3 F | SYSTOLIC BLOOD PRESSURE: 148 MMHG | HEART RATE: 65 BPM

## 2024-09-27 DIAGNOSIS — Z89.512 STATUS POST BELOW-KNEE AMPUTATION OF LEFT LOWER EXTREMITY (H): Primary | ICD-10-CM

## 2024-09-27 DIAGNOSIS — T81.30XA WOUND DEHISCENCE: ICD-10-CM

## 2024-09-27 LAB
BACTERIA WND CULT: ABNORMAL
GRAM STAIN RESULT: ABNORMAL
GRAM STAIN RESULT: ABNORMAL

## 2024-09-27 PROCEDURE — 97606 NEG PRS WND THER DME>50 SQCM: CPT

## 2024-09-27 RX ORDER — DOXYCYCLINE HYCLATE 100 MG
100 TABLET ORAL 2 TIMES DAILY
Qty: 20 TABLET | Refills: 0 | Status: SHIPPED | OUTPATIENT
Start: 2024-09-27 | End: 2024-10-07

## 2024-09-27 NOTE — PATIENT INSTRUCTIONS
Continue taking doxycycline (2 times/day for 10 days). Avoid vitamins, supplements, dairy products around the time you take the antibiotic as this will decrease the effectiveness of the medication. The medication will also make you more sensitive to the sun so you should avoid the sun. Ok to take with food. Continue to take probiotics to help prevent Antibiotic associated diarrhea. You can take the probiotic 1 hour before or 2 hours after taking the antibiotic. Don't take at the same time as they can cancel out each other.    Aim for 90g of protein per day    Consider getting Mustapha which is a wound supplement    No prosthesis wearing until well healed      Wound Care Instructions    Wound Vac Dressing Change Instructions  Every Monday, Wednesday and Friday    Gently remove old dressing    Cleanse the wound and surrounding skin with Vashe    Pat dry    Apply Cavilon no sting barrier wipe to the periwound skin    Apply vac drape to the periwound skin to protect from the drainage and sponge; window pane fashion    Cut the black vac foam to fit the size of the wound    If bridging technique is being used pick a fleshy area on the body where there will not be pressure    Apply vac drape to wherever the bridging is going to be (NEVER apply black sponge to the intact skin)    Cut a long narrow strip of black foam for the bridge    Cover with vac drape for air tight seal    Where you are going to apply the suction pad; cut a hole the size of a quarter    Apply the suction pad; be mindful of the direction of the tubing    Connect tubing    125mmHG continuous suction    Ace wrap and/or tubular compression on stump    Elevation      It is recommended that you do not get your ulcer wet when showering.  Listed below are several ways of keeping it dry when you shower.     1. Wrap it with Press and Seal plastic wrap.  It can be found in the stores where the plastic wraps or tin foil is kept.               2.  Some people take a  bath and hang their leg/foot out of the tub.                        3  Put your leg in a plastic bag and tape it on.           4. You can purchase a shower cover for casts at some pharmacies and through the Internet.            5. Take a Bed Bath or wash up at the sink

## 2024-09-27 NOTE — TELEPHONE ENCOUNTER
[facility] to discuss below:    ----- Message from Joyce Andres sent at 9/27/2024  8:51 AM CDT -----  Team - please call patient and let him know that his culture grew out x3 organisms; we can treat x2 organisms with doxycyline however he has one bacteria that is resistant to all oral options; I have referred him to ID    will treat with doxycyline 100mg BID for 10 days; avoid vitamins, supplements; dairy products around the time they take the antibiotic as this will decrease the effectiveness of the medication. The medication will also make them more sensitive to the sun so they should avoid the sun. Ok to take with food. Patient can also  some probiotics such as Culturelle to help prevent Antibiotic associated diarrhea. They can take this 1 hour before or 2 hours after taking their antibiotic. Should not be taken at the same time as they can cancel out the effects.

## 2024-09-27 NOTE — ADDENDUM NOTE
Encounter addended by: Magda Braga RN on: 9/27/2024 9:27 AM   Actions taken: Charge Capture section accepted

## 2024-09-27 NOTE — TELEPHONE ENCOUNTER
Pt had just left the clinic and this writer called him back to review results of the culture. They will  doxycycline on their way out. He voiced understanding with plan. Also provided him with the phone number for ID.

## 2024-09-27 NOTE — PROGRESS NOTES
[facility]  -  Nurse Visit                  Date of Service:  September 27, 2024     Requesting Provider: AKHIL Andres    Diagnosis:     ICD-10-CM    1. Status post below-knee amputation of left lower extremity (H)  Z89.512       2. Wound dehiscence  T81.30XA           Chief Complaint: Juan is being seen today at [facility] for his wound(s) dressing change. Reports pain of 0.  Denies any fevers, chills, or generalized ill feeling. Pt states he started having issues with the canister yesterday but VAC still maintained suction. Educated him if having that issue in the future he should switch canisters. He voiced understanding with plan.    Dressing on Arrival: NPWT, Pt is currently using tubular size G for compression and did tolerate well. Upon removal of dressings heavy Serosanguinous drainage is noted.    New Wounds noted: Yes: pustules    Vital Signs: BP (!) 148/72   Pulse 65   Temp 98.3  F (36.8  C)   SpO2 100%     Assessment:      General:  Patient presents to clinic in no apparent distress.   Psychiatric:  Alert and oriented x3.   Lower extremity:  edema is present.    Integumentary:  WNL    Circumferential volume measures:       No data to display                Wound info:  VASC Wound Left bka (Active)   Pre Size Length 6.6 09/25/24 0800   Pre Size Width 16.8 09/25/24 0800   Pre Size Depth 1.3 09/25/24 0800   Pre Total Sq cm 110.8 09/25/24 0800       Incision/Surgical Site 07/09/24 Left Tibial (Active)       Undermining is not present.    The periwound skin is denudement and pustules, drained with cleansing      Plan:         1. Patient will return in 3 days for nurse visit.            2. As listed below, treatment provided irrigation, mechanical cleansing, and dressings to promote autolytic debridement.             Cleansed with: Vashe moist gauze for 5+ minutes    Protected skin with: 3M Cavilon and Adapt Barrier ring x2; ostomy powder to  weeping/denudements followed by Cavilon    Dressings Applied to wound: Negative wound vac therapy:    Pieces of foam removed from previous dressin    Applied vac drape to the periwound skin to protect from the drainage and sponge; window pane fashion   Cut the black vac foam to fit the size of the wound. Used 2 piece(s) of black sponge total.   Applied vac drape to wherever the bridging is going to be (NEVER apply black sponge to the intact skin)   Covered with vac drape for air tight seal. Cut a hole the size of a quarter and applied suction pad. Be mindful of the direction of the tubing.  Connected tubing and turned vac machine to 125mmHG continuous suction.   No alarm or leaks noted.    Compression Applied to the right leg: None  Compression Applied to the left leg: Tubular compression and Short stretch    Tubigrip Size G     Offloading used: Wheelchair    Trial Products: No    Provider notified regarding concerns: Yes: pustules, denudements in periwound    Treatment Changes: Yes - did initiate ostomy powder/Cavilon to periwound. Also avoided applying drape to areas of weeping    Tolerated Dressing Change:  Yes    Taught Regarding: follow up appointment(s), referral(s), wound cares, compression, test results, and antibiotics; educated pt about results of culture, taking doxycycline, and referral to ID    Educational Barriers: No barriers    AVS through Noe Braga RN, CWOCN

## 2024-09-30 ENCOUNTER — HOSPITAL ENCOUNTER (OUTPATIENT)
Dept: WOUND CARE | Facility: CLINIC | Age: 64
Discharge: HOME | End: 2024-09-30
Admitting: NURSE PRACTITIONER
Payer: COMMERCIAL

## 2024-09-30 VITALS
TEMPERATURE: 98.5 F | HEART RATE: 74 BPM | SYSTOLIC BLOOD PRESSURE: 126 MMHG | OXYGEN SATURATION: 98 % | DIASTOLIC BLOOD PRESSURE: 68 MMHG

## 2024-09-30 DIAGNOSIS — Z89.512 STATUS POST BELOW-KNEE AMPUTATION OF LEFT LOWER EXTREMITY (H): Primary | ICD-10-CM

## 2024-09-30 DIAGNOSIS — T81.30XA WOUND DEHISCENCE: ICD-10-CM

## 2024-09-30 PROCEDURE — 97606 NEG PRS WND THER DME>50 SQCM: CPT

## 2024-09-30 NOTE — PROGRESS NOTES
[facility]  -  Nurse Visit              Date of Service:  2024     Requesting Provider: Joyce Andres NP    Diagnosis:     ICD-10-CM    1. Status post below-knee amputation of left lower extremity (H)  Z89.512       2. Wound dehiscence  T81.30XA           Chief Complaint: Juan is being seen today at [facility] for his wound(s) dressing change. Reports pain of 0.  Denies any fevers, chills, or generalized ill feeling.     Dressing on Arrival: NPWT, Pt is currently using tubular and short stretch for compression and did tolerate well. Upon removal of dressings heavy Serosanguinous drainage is noted.    New Wounds noted: No    Vital Signs: /68   Pulse 74   Temp 98.5  F (36.9  C)   SpO2 98%     Assessment:      General:  Patient presents to clinic in no apparent distress.   Psychiatric:  Alert and oriented x3.   Lower extremity:  edema is present.    Integumentary:  Skin is WNL    Circumferential volume measures:       No data to display                Wound info:  VASC Wound Left bka (Active)   Pre Size Length 7 24 1500   Pre Size Width 17.4 24 1500   Pre Size Depth 1.1 24 1500   Pre Total Sq cm 121.8 24 1500       Incision/Surgical Site 24 Left Tibial (Active)       Undermining is not present.    The periwound skin is  scant weeping distally      Plan:         1. Patient will return in 2 days for nurse visit.            2. As listed below, treatment provided irrigation, mechanical cleansing, and dressings to promote autolytic debridement.             Cleansed with: Vashe soak for 5+ minutes    Protected skin with: 3M Cavilon and Adapt barrier ring    Dressings Applied to wound: Negative wound vac therapy:    Pieces of foam removed from previous dressin    Applied vac drape to the periwound skin to protect from the drainage and sponge; window pane fashion   Cut the black vac foam to fit the size of the wound. Used 2  piece(s) of black sponge total.   Applied vac drape to wherever the bridging is going to be (NEVER apply black sponge to the intact skin)   Covered with vac drape for air tight seal. Cut a hole the size of a quarter and applied suction pad. Be mindful of the direction of the tubing.  Connected tubing and turned vac machine to 125mmHG continuous suction.   No alarm or leaks noted.    Compression Applied to the right leg: None  Compression Applied to the left leg: Tubular compression and Short stretch    Tubigrip Size g     Offloading used: Wheelchair    Trial Products: No    Provider notified regarding concerns: No    Treatment Changes: No    Tolerated Dressing Change:  Yes    Taught Regarding: follow up appointment(s), wound cares, wound care supplies, signs of infection, and antibiotics    Educational Barriers: No barriers    AVS through MyChart, declined nain Braga RN, CWOCN

## 2024-09-30 NOTE — PROGRESS NOTES
[facility]  -  Nurse Visit              Date of Service:  2024     Requesting Provider: AKHIL Andres    Diagnosis:     ICD-10-CM    1. Status post below-knee amputation of left lower extremity (H)  Z89.512           Chief Complaint: Juan is being seen today at [facility] for his wound(s) dressing change. Reports pain of 0.  Denies any fevers, chills, or generalized ill feeling.     Dressing on Arrival: NPWT running at 125 mmHg, Pt is currently using tubular and short stretch for compression and did tolerate well. Upon removal of dressings moderate Serosanguinous drainage is noted.    New Wounds noted: No    Vital Signs: BP (!) 162/78   Pulse 64   Temp 98.2  F (36.8  C)   SpO2 98%     Assessment:      General:  Patient presents to clinic in no apparent distress.   Psychiatric:  Alert and oriented x3.   Lower extremity:  edema is present.    Integumentary:  Skin is WNL    Circumferential volume measures:       No data to display              Wound info:  VASC Wound Left bka (Active)   Pre Size Length 7 10/02/24 1400   Pre Size Width 17.5 10/02/24 1400   Pre Size Depth 1 10/02/24 1400   Pre Total Sq cm 122.5 10/02/24 1400   Tunneling 4.5cm at 12 o'clock 24 0800       Incision/Surgical Site 24 Left Tibial (Active)     Undermining is not present.    The periwoundskin is WNL    Plan:         1. Patient will return in 2 days for nurse visit.            2. As listed below, treatment provided irrigation, mechanical cleansing, and dressings to promote autolytic debridement.             Cleansed with: vashe and normal saline    Protected skin with: 3M Cavilon    Dressings Applied to wound: Negative wound vac therapy:    Pieces of foam removed from previous dressin    Applied vac drape to the periwound skin to protect from the drainage and sponge; window pane fashion   Cut the black vac foam to fit the size of the wound. Used 2 piece(s) of  black sponge total.   Applied vac drape to wherever the bridging is going to be (NEVER apply black sponge to the intact skin)   Covered with vac drape for air tight seal. Cut a hole the size of a quarter and applied suction pad. Be mindful of the direction of the tubing.  Connected tubing and turned vac machine to 125mmHG continuous suction.   No alarm or leaks noted.    Compression Applied to the right leg: Compression stockings  Compression Applied to the left leg: Tubular compression and Short stretch    Tubigrip Size F       Offloading used: Wheelchair    Trial Products: No    Provider notified regarding concerns: No    Treatment Changes: No    Tolerated Dressing Change:  Yes    Taught Regarding: wound cares, offloading, compliance, and signs of infection    Educational Barriers: No barriers      Aissatou Ghotra RN

## 2024-10-01 NOTE — PROGRESS NOTES
[facility] (VIDEO VISIT)  Dr. Sohan Reyes, [facility], [address]  [address] [place] [address]   Patient:  Juan Balderrama, Date of birth 1960, Medical record number [id number]  Date of Visit:  10/02/2024           Assessment and Recommendations:   ID Problem List:  Surgical site infection, superficial - left BKA site  Cultures from 7/9/24 BKA revision with MRSA, Alcaligenes faecalis (R - Aztreonam), Pseudomonas fluorescens/putida (R - TMP/SMX), Finegoldia Magna  Prior BKA c/b redundant mass and wound dehiscence  Initial BKA 9/28/23  I&D 10/27/23  Revision #1 6/20/24 (redundant mass removed)  Revision #2 7/9/24  DM2 c/b Charcot foot, chronic left lower extremity ulcers s/p BKA    Recommendations:  Swab culture of wounds is generally not very high yield - this will typically grow various skin stefanie, not indicative of true infection in the absence of other concerning signs or symptoms.  Defer to wound care NP on doxycycline - given lack of purulence or arie signs of wound infection, not sure there is any benefit in treating the Staph aureus from the surface swab of his wound.  RTC PRN    Discussion:  64yo M with h/o HLD, HTN, DM2 with chronic diabetic ulcers s/p left BKA (9/28/23) c/b wound dehiscence requiring I&D (10/27/23) and revisions (6/20/24, 7/9/24) who presents for evaluation of surgical site infection.    Doing well - denies fevers, purulent drainage/erythema/swelling at BKA site. At the tail end of a doxycycline prescription - plans to finish this out and then stop. Ok from my standpoint, but would avoid further abx unless arie signs of infection develop. A recent skin swab from the BKA site grew MRSA x 2 and Morganella - hard to discern utility/importance of a surface swab, particularly without overt signs of infection, so will not provide further abx to target the Morganella at this time.    Will plan on PRN follow-up. Happy to see this  pt back anytime assistance is needed!     Sohan Reyes MD  Division of Infectious Diseases and International Medicine  P: [phone]     I spent at least 40 minutes on the day of this encounter on chart review, patient exam/interview, and note preparation.        History of Infectious Disease Illness:     64yo M with h/o HLD, HTN, DM2 with chronic diabetic ulcers s/p left BKA (9/28/23) c/b wound dehiscence requiring I&D (10/27/23) and revisions (6/20/24, 7/9/24) who presents for evaluation of surgical site infection.    Pt's BKA was performed 9/28/23 due to worsening chronic diabetic foot ulcers with SSTI and osteomyelitis (Charcot complications). About one month later, on 10/27/23, an I&D was performed due to dehiscence and wound breakdown at the site - cultures from this procedure grew MRSA, Acinetobacter baumanii, Stenotrophomonas maltophila, and Finegoldia Magna. Ultimately, due to a redundant tissue mass at the site making walking with his prosthesis difficult, a revision BKA was performed on 6/20/24. The operative report from this procedure notes the mass was largely scar tissue, with no arie purulence noted. Near the beginning of July, his surgical wound dehisced. Pt denied any fevers or chills at the time, and described the drainage as serosanguinous rather than purulent. He was prescribed cephalexin on 7/4/24, and then taken to the OR for a further revision on 7/9/24.There is no description of any infected-appearing material in this operative report, though swab cultures from the site were collected and have since grown MRSA, Alcaligenes faecalis (R - Aztreonam), Pseudomonas fluorescens/putida (R - TMP-SMX), and Finegoldia Magna.      At a check-up one week post-op on 7/19/24 with [facility], pt was feeling well - no fevers/chills, wound hadn't had any drainage. His wound was described as healing well with no signs of dehiscence. By that time, the above cultures had returned with growth.  He was still on the previously prescribed cephalexin, so this was changed to doxycycline and clindamycin. Outpatient ID referral was placed to evaluate antibiotic plan.    Review of Systems     Physical Exam  GENERAL: Healthy, alert and no distress  EYES: Eyes grossly normal to inspection.  No discharge or erythema, or obvious scleral/conjunctival abnormalities.  RESP: No audible wheeze, cough, or visible cyanosis.  No visible retractions or increased work of breathing.  SKIN: Visible skin clear. No significant rash, abnormal pigmentation or lesions.  NEURO: Cranial nerves grossly intact.  Mentation and speech appropriate for age.  PSYCH: Mentation appears normal, affect normal/bright, judgement and insight intact, normal speech and appearance well-groomed.    Video-Visit Details    Type of service:  Video Visit   Video Start Time: 10:59am  Video End Time: 11:07am  Originating Location (pt. Location): Home    Distant Location (provider location):  Off-site  Platform used for Video Visit: Kalani

## 2024-10-02 ENCOUNTER — VIRTUAL VISIT (OUTPATIENT)
Dept: INFECTIOUS DISEASES | Facility: CLINIC | Age: 64
End: 2024-10-02
Attending: STUDENT IN AN ORGANIZED HEALTH CARE EDUCATION/TRAINING PROGRAM
Payer: COMMERCIAL

## 2024-10-02 ENCOUNTER — OFFICE VISIT (OUTPATIENT)
Dept: VASCULAR SURGERY | Facility: CLINIC | Age: 64
End: 2024-10-02
Attending: NURSE PRACTITIONER
Payer: COMMERCIAL

## 2024-10-02 VITALS
SYSTOLIC BLOOD PRESSURE: 162 MMHG | DIASTOLIC BLOOD PRESSURE: 78 MMHG | HEART RATE: 64 BPM | TEMPERATURE: 98.2 F | OXYGEN SATURATION: 98 %

## 2024-10-02 VITALS — HEIGHT: 69 IN | BODY MASS INDEX: 39.99 KG/M2 | WEIGHT: 270 LBS

## 2024-10-02 DIAGNOSIS — A49.02 MRSA INFECTION: ICD-10-CM

## 2024-10-02 DIAGNOSIS — Z89.512 STATUS POST BELOW-KNEE AMPUTATION OF LEFT LOWER EXTREMITY (H): ICD-10-CM

## 2024-10-02 DIAGNOSIS — E66.01 MORBID OBESITY (H): ICD-10-CM

## 2024-10-02 DIAGNOSIS — T81.31XA POSTOPERATIVE WOUND DEHISCENCE, INITIAL ENCOUNTER: ICD-10-CM

## 2024-10-02 DIAGNOSIS — Z89.512 STATUS POST BELOW-KNEE AMPUTATION OF LEFT LOWER EXTREMITY (H): Primary | ICD-10-CM

## 2024-10-02 DIAGNOSIS — E11.628 TYPE 2 DIABETES MELLITUS WITH OTHER SKIN COMPLICATION, UNSPECIFIED WHETHER LONG TERM INSULIN USE (H): ICD-10-CM

## 2024-10-02 PROCEDURE — 97608 NEG PRS WND THER NDME>50SQCM: CPT

## 2024-10-02 PROCEDURE — 99215 OFFICE O/P EST HI 40 MIN: CPT | Mod: 95 | Performed by: STUDENT IN AN ORGANIZED HEALTH CARE EDUCATION/TRAINING PROGRAM

## 2024-10-02 ASSESSMENT — PAIN SCALES - GENERAL
PAINLEVEL: NO PAIN (0)
PAINLEVEL: NO PAIN (0)

## 2024-10-02 NOTE — NURSING NOTE
Patient reviewed medications and allergies in [employer] during e-check in and said everything looked correct.      Current patient location: [address] [place] [address] [address]  [facility] [address]    Is the patient currently in the state of [place]? YES    Visit mode:VIDEO    If the visit is dropped, the patient can be reconnected by: VIDEO VISIT: Text to cell phone:   Telephone Information:   Mobile [phone]    and VIDEO VISIT: Send to e-mail at: [email or web address]    Will anyone else be joining the visit? JimiPt's Wife   (If patient encounters technical issues they should call [phone]    Are changes needed to the allergy or medication list? Pt declined med review    Are refills needed on medications prescribed by this physician? NO    Rooming Documentation:  Not applicable    Reason for visit: SANDRA [facility]

## 2024-10-02 NOTE — LETTER
10/2/2024       RE: Juan Balderrama  [address]  [place] [address]     Dear Colleague,    Thank you for referring your patient, Juan Balderrama, to the [facility]. Please see a copy of my visit note below.    [facility] (VIDEO VISIT)  Dr. Sohan Reyes, [facility], [address]  [address] [place] [address]   Patient:  Juan Balderrama, Date of birth 1960, Medical record number [id number]  Date of Visit:  10/02/2024           Assessment and Recommendations:   ID Problem List:  Surgical site infection, superficial - left BKA site  Cultures from 7/9/24 BKA revision with MRSA, Alcaligenes faecalis (R - Aztreonam), Pseudomonas fluorescens/putida (R - TMP/SMX), Finegoldia Magna  Prior BKA c/b redundant mass and wound dehiscence  Initial BKA 9/28/23  I&D 10/27/23  Revision #1 6/20/24 (redundant mass removed)  Revision #2 7/9/24  DM2 c/b Charcot foot, chronic left lower extremity ulcers s/p BKA    Recommendations:  Swab culture of wounds is generally not very high yield - this will typically grow various skin stefanie, not indicative of true infection in the absence of other concerning signs or symptoms.  Defer to wound care NP on doxycycline - given lack of purulence or arie signs of wound infection, not sure there is any benefit in treating the Staph aureus from the surface swab of his wound.  RTC PRN    Discussion:  64yo M with h/o HLD, HTN, DM2 with chronic diabetic ulcers s/p left BKA (9/28/23) c/b wound dehiscence requiring I&D (10/27/23) and revisions (6/20/24, 7/9/24) who presents for evaluation of surgical site infection.    Doing well - denies fevers, purulent drainage/erythema/swelling at BKA site. At the tail end of a doxycycline prescription - plans to finish this out and then stop. Ok from my standpoint, but would avoid further abx  unless arie signs of infection develop. A recent skin swab from the BKA site grew MRSA x 2 and Morganella - hard to discern utility/importance of a surface swab, particularly without overt signs of infection, so will not provide further abx to target the Morganella at this time.    Will plan on PRN follow-up. Happy to see this pt back anytime assistance is needed!     Sohan Reyes MD  Division of Infectious Diseases and International Medicine  P: [phone]     I spent at least 40 minutes on the day of this encounter on chart review, patient exam/interview, and note preparation.        History of Infectious Disease Illness:     64yo M with h/o HLD, HTN, DM2 with chronic diabetic ulcers s/p left BKA (9/28/23) c/b wound dehiscence requiring I&D (10/27/23) and revisions (6/20/24, 7/9/24) who presents for evaluation of surgical site infection.    Pt's BKA was performed 9/28/23 due to worsening chronic diabetic foot ulcers with SSTI and osteomyelitis (Charcot complications). About one month later, on 10/27/23, an I&D was performed due to dehiscence and wound breakdown at the site - cultures from this procedure grew MRSA, Acinetobacter baumanii, Stenotrophomonas maltophila, and Finegoldia Magna. Ultimately, due to a redundant tissue mass at the site making walking with his prosthesis difficult, a revision BKA was performed on 6/20/24. The operative report from this procedure notes the mass was largely scar tissue, with no arie purulence noted. Near the beginning of July, his surgical wound dehisced. Pt denied any fevers or chills at the time, and described the drainage as serosanguinous rather than purulent. He was prescribed cephalexin on 7/4/24, and then taken to the OR for a further revision on 7/9/24.There is no description of any infected-appearing material in this operative report, though swab cultures from the site were collected and have since grown MRSA, Alcaligenes faecalis (R - Aztreonam), Pseudomonas  fluorescens/putida (R - TMP-SMX), and Finegoldia Magna.      At a check-up one week post-op on 7/19/24 with [facility], pt was feeling well - no fevers/chills, wound hadn't had any drainage. His wound was described as healing well with no signs of dehiscence. By that time, the above cultures had returned with growth. He was still on the previously prescribed cephalexin, so this was changed to doxycycline and clindamycin. Outpatient ID referral was placed to evaluate antibiotic plan.    Review of Systems     Physical Exam  GENERAL: Healthy, alert and no distress  EYES: Eyes grossly normal to inspection.  No discharge or erythema, or obvious scleral/conjunctival abnormalities.  RESP: No audible wheeze, cough, or visible cyanosis.  No visible retractions or increased work of breathing.  SKIN: Visible skin clear. No significant rash, abnormal pigmentation or lesions.  NEURO: Cranial nerves grossly intact.  Mentation and speech appropriate for age.  PSYCH: Mentation appears normal, affect normal/bright, judgement and insight intact, normal speech and appearance well-groomed.    Video-Visit Details    Type of service:  Video Visit   Video Start Time: 10:59am  Video End Time: 11:07am  Originating Location (pt. Location): Home    Distant Location (provider location):  Off-site  Platform used for Video Visit: AmWell      Again, thank you for allowing me to participate in the care of your patient.      Sincerely,    Sohan Reyes MD

## 2024-10-04 ENCOUNTER — HOSPITAL ENCOUNTER (OUTPATIENT)
Dept: WOUND CARE | Facility: CLINIC | Age: 64
Discharge: HOME | End: 2024-10-04
Attending: NURSE PRACTITIONER | Admitting: NURSE PRACTITIONER
Payer: COMMERCIAL

## 2024-10-04 VITALS
SYSTOLIC BLOOD PRESSURE: 120 MMHG | DIASTOLIC BLOOD PRESSURE: 70 MMHG | OXYGEN SATURATION: 97 % | TEMPERATURE: 98.4 F | HEART RATE: 72 BPM

## 2024-10-04 DIAGNOSIS — Z89.512 STATUS POST BELOW-KNEE AMPUTATION OF LEFT LOWER EXTREMITY (H): Primary | ICD-10-CM

## 2024-10-04 PROCEDURE — 97605 NEG PRS WND THER DME<=50SQCM: CPT

## 2024-10-04 NOTE — PROGRESS NOTES
[facility]  -  Nurse Visit              Close up of open wound surrounded by epithelium      Date of Service:  2024     Requesting Provider: AKHIL Andres    Diagnosis:     ICD-10-CM    1. Status post below-knee amputation of left lower extremity (H)  Z89.512           Chief Complaint: Juan is being seen today at [facility] for his wound(s) dressing change. Reports pain of 0.  Denies any fevers, chills, or generalized ill feeling.     Dressing on Arrival: NPWT, Pt is currently using short stretch for compression and did tolerate well. Upon removal of dressings heavy Serosanguinous drainage is noted.    New Wounds noted: No    Vital Signs: /70   Pulse 72   Temp 98.4  F (36.9  C)   SpO2 97%     Assessment:      General:  Patient presents to clinic in no apparent distress.   Psychiatric:  Alert and oriented x3.   Lower extremity:  edema is present.    Integumentary:  Skin is WNL    Circumferential volume measures:       No data to display                Wound info:  VASC Wound Left bka (Active)   Pre Size Length 6.6 10/04/24 1400   Pre Size Width 17 10/04/24 1400   Pre Size Depth 1.2 10/04/24 1400   Pre Total Sq cm 112.2 10/04/24 1400       Incision/Surgical Site 24 Left Tibial (Active)       Undermining is not present.    The periwound skin is  see above photo; 1 open area surrounded by epithelium otherwise slightly flaky/dry      Plan:         1. Patient will return in 3 days for nurse visit.            2. As listed below, treatment provided irrigation, mechanical cleansing, and dressings to promote autolytic debridement.             Cleansed with: Vashe for 5+ minutes    Protected skin with: 3M Cavilon    Dressings Applied to wound: Negative wound vac therapy:    Pieces of foam removed from previous dressin    Applied vac drape to the periwound skin to protect from the drainage and sponge; window pane fashion   Cut the black vac foam to  fit the size of the wound. Used 2 piece(s) of black sponge total.   Applied vac drape to wherever the bridging is going to be (NEVER apply black sponge to the intact skin)   Covered with vac drape for air tight seal. Cut a hole the size of a quarter and applied suction pad. Be mindful of the direction of the tubing.  Connected tubing and turned vac machine to 125mmHG continuous suction.   No alarm or leaks noted.    Compression Applied to the right leg: None  Compression Applied to the left leg: Tubular compression and Short stretch    Tubigrip Size G     Offloading used: Wheelchair    Trial Products: No    Provider notified regarding concerns: No    Treatment Changes: No    Tolerated Dressing Change:  Yes    Taught Regarding: follow up appointment(s), wound cares, wound care supplies, signs of infection, and antibiotics    Educational Barriers: No barriers    Pt declined AVS    Magda Braga RN, CWOCN

## 2024-10-07 ENCOUNTER — HOSPITAL ENCOUNTER (OUTPATIENT)
Dept: WOUND CARE | Facility: CLINIC | Age: 64
Discharge: HOME | End: 2024-10-07
Attending: NURSE PRACTITIONER | Admitting: NURSE PRACTITIONER
Payer: COMMERCIAL

## 2024-10-07 DIAGNOSIS — Z89.512 STATUS POST BELOW-KNEE AMPUTATION OF LEFT LOWER EXTREMITY (H): Primary | ICD-10-CM

## 2024-10-07 DIAGNOSIS — T81.30XA WOUND DEHISCENCE: ICD-10-CM

## 2024-10-07 PROCEDURE — 97606 NEG PRS WND THER DME>50 SQCM: CPT

## 2024-10-07 NOTE — PROGRESS NOTES
[facility]  -  Nurse Visit          Medial, lateral view  Date of Service:  2024     Requesting Provider: AKHIL Andres    Diagnosis:     ICD-10-CM    1. Status post below-knee amputation of left lower extremity (H)  Z89.512       2. Wound dehiscence  T81.30XA           Chief Complaint: Juan is being seen today at [facility] for his wound(s) dressing change. Reports pain of 0.  Denies any fevers, chills, or generalized ill feeling.     Dressing on Arrival: NPWT, Pt is currently using short stretch for compression and did tolerate well. Upon removal of dressings heavy serosanguinous and some creamy drainage is noted in canister, 250ml over 3 days.    New Wounds noted: No    Vital Signs: There were no vitals taken for this visit.    Assessment:      General:  Patient presents to clinic in no apparent distress.   Psychiatric:  Alert and oriented x3.   Lower extremity:  edema is present.    Integumentary:  Skin is WNL aside from pink skin rash with scant crusted serous drainage distal to adhesive edge anterior stump    Circumferential volume measures:       No data to display                Wound info:  VASC Wound Left bka (Active)   Pre Size Length 6.3 10/07/24 1500   Pre Size Width 17 10/07/24 1500   Pre Size Depth 1.3 10/07/24 1500   Pre Total Sq cm 112.2 10/04/24 1400       Incision/Surgical Site 24 Left Tibial (Active)       Undermining is not present.    The periwoundskin is  with rash distally      Plan:         1. Patient will return in 2 days for nurse visit.            2. As listed below, treatment provided irrigation, mechanical cleansing, and dressings to promote autolytic debridement.             Cleansed with: Dilute hibiclens 30cc in 500cc NS and Vashe soak, 10 min    Protected skin with: 3M Cavilon    Dressings Applied to wound: Negative wound vac therapy:    Pieces of foam removed from previous dressin    Applied vac drape to the  periwound skin to protect from the drainage and sponge; window pane fashion   Cut the black vac foam to fit the size of the wound. Used 2 piece(s) of black sponge total.   Applied vac drape to wherever the bridging is going to be (NEVER apply black sponge to the intact skin)   Covered with vac drape for air tight seal. Cut a hole the size of a quarter and applied suction pad. Be mindful of the direction of the tubing.  Connected tubing and turned vac machine to 125mmHG continuous suction.   No alarm or leaks noted.    Compression Applied to the right leg: None  Compression Applied to the left leg: Short stretch with tubular beneath (pt supplied)    Short Stretch              Offloading used: Wheelchair    Trial Products: No    Provider notified regarding concerns: No    Treatment Changes: No    Tolerated Dressing Change:  Yes    Taught Regarding: follow up appointment(s) and wound cares    Educational Barriers: No barriers      Krista Pittman RN, CWOCN

## 2024-10-09 ENCOUNTER — OFFICE VISIT (OUTPATIENT)
Dept: VASCULAR SURGERY | Facility: CLINIC | Age: 64
End: 2024-10-09
Attending: NURSE PRACTITIONER
Payer: COMMERCIAL

## 2024-10-09 VITALS
TEMPERATURE: 98.4 F | HEART RATE: 67 BPM | SYSTOLIC BLOOD PRESSURE: 162 MMHG | OXYGEN SATURATION: 98 % | DIASTOLIC BLOOD PRESSURE: 79 MMHG

## 2024-10-09 DIAGNOSIS — E11.628 TYPE 2 DIABETES MELLITUS WITH OTHER SKIN COMPLICATION, UNSPECIFIED WHETHER LONG TERM INSULIN USE (H): ICD-10-CM

## 2024-10-09 DIAGNOSIS — E66.01 MORBID OBESITY (H): ICD-10-CM

## 2024-10-09 DIAGNOSIS — Z89.512 STATUS POST BELOW-KNEE AMPUTATION OF LEFT LOWER EXTREMITY (H): Primary | ICD-10-CM

## 2024-10-09 DIAGNOSIS — A49.02 MRSA INFECTION: ICD-10-CM

## 2024-10-09 PROCEDURE — 11042 DBRDMT SUBQ TIS 1ST 20SQCM/<: CPT | Performed by: NURSE PRACTITIONER

## 2024-10-09 PROCEDURE — 11045 DBRDMT SUBQ TISS EACH ADDL: CPT | Performed by: NURSE PRACTITIONER

## 2024-10-09 RX ORDER — MUPIROCIN 20 MG/G
OINTMENT TOPICAL 3 TIMES DAILY
Qty: 30 G | Refills: 3 | Status: SHIPPED | OUTPATIENT
Start: 2024-10-09

## 2024-10-09 RX ORDER — LIDOCAINE 50 MG/G
OINTMENT TOPICAL DAILY PRN
Status: ACTIVE | OUTPATIENT
Start: 2024-10-09

## 2024-10-09 RX ADMIN — LIDOCAINE: 50 OINTMENT TOPICAL at 13:12

## 2024-10-09 ASSESSMENT — PAIN SCALES - GENERAL: PAINLEVEL: NO PAIN (0)

## 2024-10-09 NOTE — LETTER
[facility]  [address] [place] [address] [address]  [place], [place] [address]  [phone]      Fax [phone]    [facility]           FAX: [phone]            Customer Service: [phone]        Account #: [id number]    Wound Dressing Rx and Order Form  Order Status: new  Verbal: Mirella  Date: 2024     Juan Balderrama  Gender: male  : 1960  [id number] [place] [address] [address]  [facility] [address]  [phone] (home)     Medical Record: [id number]  Primary Care Provider: Kristie Oliver      ICD-10-CM    1. Status post below-knee amputation of left lower extremity (H)  Z89.512 lidocaine (XYLOCAINE) 5 % ointment     mupirocin (BACTROBAN) 2 % external ointment     DEBRIDE SKIN/SUBQ TISSUE     ID DEBRIDEMENT,SUB-Q TISSUE, EA ADDL 20 SQ CM      2. Type 2 diabetes mellitus with other skin complication, unspecified whether long term insulin use (H)  E11.628 lidocaine (XYLOCAINE) 5 % ointment     mupirocin (BACTROBAN) 2 % external ointment     DEBRIDE SKIN/SUBQ TISSUE     ID DEBRIDEMENT,SUB-Q TISSUE, EA ADDL 20 SQ CM      3. Morbid obesity (H)  E66.01 mupirocin (BACTROBAN) 2 % external ointment     DEBRIDE SKIN/SUBQ TISSUE     ID DEBRIDEMENT,SUB-Q TISSUE, EA ADDL 20 SQ CM      4. MRSA infection  A49.02 mupirocin (BACTROBAN) 2 % external ointment     DEBRIDE SKIN/SUBQ TISSUE     ID DEBRIDEMENT,SUB-Q TISSUE, EA ADDL 20 SQ CM            Insurance Info:  INSURER: Payor: [employer] / Plan: [employer] COMMERCIAL / Product Type: HMO /   Policy ID#:  [id number]  SECONDARY INSURANCE:    Secondary Policy ID#:  N/A        Physician Info:   Name:  JOAQUIN GROSSMAN     Dept Address/Phones:   [address] [place] [address], [address]  [facility] [address] [phone]  Fax: [phone]    Lymphedema circumferential measurements (in cm):       No data to display                  Wound info:  VASC Wound Left bka (Active)   Pre Size Length 6.5 10/09/24 1300   Pre Size Width 17 10/09/24 1300  "  Pre Size Depth 1.2 10/09/24 1300   Pre Total Sq cm 110.5 10/09/24 1300   Post Size Length 6.1 10/09/24 1300   Post Size Width 17 10/09/24 1300   Post Size Depth 0.7 10/09/24 1300   Post Total Sq cm 103.7 10/09/24 1300   Tunneling 4.5cm at 12 o'clock 08/14/24 0800   Number of days: 56       Incision/Surgical Site 07/09/24 Left Tibial (Active)   Number of days: 92        Drainage: moderate  Thickness:  full  Duration of Need: 30 DAYS  Days Supply: 30 DAYS  Start Date: 10/9/2024  Starter Kit, Ancillary Kit (saline, gloves, gauze): No   Qualifying wound/Debridement: Yes     NO SUBSTITUTIONS. Call [phone]. Please call patient for out-of-pocket costs and options.      Dressing Type Brand Size Frequency of change  Quantity   Primary Aquacel ag  4\"x5\" 3 TIMES PER WEEK and as needed 15   secondary ABD  8\"x10\" 3 TIMES PER WEEK and as needed 15    Sof form roll gauze  4\"x75\" 3 TIMES PER WEEK and as needed 15   tape paper  1\" 3 TIMES PER WEEK and as needed 1     NO SUBSTITUTIONS. Call [phone] with questions.     Wound Care Instructions    3 TIMES PER WEEK and as needed, Cleanse your left stump wound(s) with Dilute hibiclens 30cc in 500cc NS.    Primary Dressing: Apply mupirocin ointment onto the aquacel ag 4\"x5\" dressing; then apply into/onto the wounds    Secondary dressing: Cover with ABD 8\"x10\"    Secure with non-sterile roll gauze (4\" x 75\" roll) and tape (1\" roll tape) as needed; avoid adhesive directly on the skin    Compression: tubular compression and short stretch    It is not ok to get your wound wet in the bath or shower  OK to forward to covered supplier.    Electronically Signed Physician:  JOAQUIN GROSSMAN             Date: October 9, 2024    "

## 2024-10-09 NOTE — PATIENT INSTRUCTIONS
"I sent prescription for ointment to your pharmacy    Stop wound vac and return to [facility]    We will check on the status of the Placental graft tissue approval    Wound care supplies were ordered today through [employer] and if you are not receiving your supplies or have a question on your bill please contact Jes Quintana at [phone]. Please allow 2-5 business days for delivery of supplies. You may get a call from a [phone] if there are additional information Shara needs. It is important to  or return their call. PLEASE NOTE: If you need to return your supplies, you MUST call customer service within 15 days of delivery date.         Wound Care Instructions    3 TIMES PER WEEK and as needed, Cleanse your left stump wound(s) with Dilute hibiclens 30cc in 500cc NS.    Pat Dry with non-sterile gauze    Apply Lotion to the intact skin surrounding your wound and other dry skin locations. Some good lotions include: Remedy Skin Repair Cream, Sarna, Vanicream or Cetaphil    Primary Dressing: Apply mupirocin ointment onto the aquacel ag dressing; then apply into/onto the wounds    Secondary dressing: Cover with ABD    Secure with non-sterile roll gauze (4\" x 75\" roll) and tape (1\" roll tape) as needed; avoid adhesive directly on the skin    Compression: tubular compression and short stretch    It is not ok to get your wound wet in the bath or shower      If for some reason you are not able to get your dressing(s) changed as outlined above (due to illness, lack of supplies, lack of help) please do the following: remove old, soiled dressings; wash the wounds with saline; pat dry; apply ABD pad or other absorbant pad and secure with rolled gauze; avoid tape directly on your skin; Call the clinic as soon as possible to let us know what the current issues are in receiving wound care [phone].      SEEK MEDICAL CARE IF:  You have an increase in swelling, pain, or redness around the wound.  You have an increase in the " amount of pus coming from the wound.  There is a bad smell coming from the wound.  The wound appears to be worsening/enlarging  You have a fever greater than 101.5 F      It is ok to continue current wound care treatment/products for the next 2-3 days until new wound care supplies are ordered and arrive. If longer than this please contact our office at [phone].    If you have a 2 layer or 4 layer compression wrap on these are safe to have on for ONLY 7 days. If for some reason you are not able to get the wrap(s) changed (due to illness; lack of supplies, lack of help, lack of transportation) please do the following: unwrap the old 2 or 4 layer compression wrap; avoid using scissors as you could cut your skin and cause wounds; use tubular compression when available. Call to reschedule your home care or clinic visit appointment as soon as possible.    Please NOTE: if you are 15 minutes late to your clinic appointment you will have to be rescheduled. Please call our clinic as soon as possible if you know you will not be able to get to your appointment at [phone].    If you fail to show up to 3 scheduled clinic appointments you will be dismissed from our clinic.              We want to hear from you!  In the next few weeks, you should receive a call or email to complete a survey about your visit at [facility]. Please help us improve your appointment experience by letting us know how we did today. We strive to make your experience good and value any ways in which we could do better.      We value your input and suggestions.    Thank you for choosing the [facility]!           It is recommended that you do not get your ulcer wet when showering.  Listed below are several ways of keeping it dry when you shower.     1. Wrap it with Press and Seal plastic wrap.  It can be found in the stores where the plastic wraps or tin foil is kept.               2.  Some people take a bath  and hang their leg/foot out of the tub.                        3  Put your leg in a plastic bag and tape it on.           4. You can purchase a shower cover for casts at some pharmacies and through the Internet.            5. Take a Bed Bath or wash up at the sink     High Protein Foods  Chicken  -Chicken breast, 3.5oz.-30 grams protein  -Chicken thigh-10 grams(average size)  -Drumstick-11 grams  -Wing- 6 grams  -Chicken meat, cooked, 4 oz.  Beef  -Hamburger jerald, 4 oz-28 grams protein  -Steak, 6 oz-42 grams  -Most cuts of beef- 7 grams of protein per ounce  Fish  -Most fish fillets or steaks are about 22 grams of protein for 3 1/2 oz(100 grams) of cooked fish, or 6 grams per ounce  -Tuna, 6 oz can-40 grams of protein  Pork  -Pork chop, average-22 grams protein  -Pork loin or tenderloin, 4 oz.-29 grams  -Ham, 3oz serving- 19 grams  -Ground pork 3oz cooked-22 grams  -Donovan, 1 slice-3 grams  -Ethiopian-style donovan(black donovan), slice-5-6 grams  Eggs and Dairy  -Egg, large-7 grams  -Milk, 1 cup-8 grams  -Cottage cheese, 1/2 cup-15 grams  -Greek yogurt, 1 cup-usually 8-12 grams, check label  -Soft cheeses (Mozzarella, Brie, Camembert)- 6 grams  -Medium cheeses(cheddar, swiss)- 7 or 8 grams per oz  -Hard cheeses(parmesan)- 10 grams per oz  Beans  -Tofu, 1/2 cup 20 grams  -Tofu, 1 oz., 2.3 grams  -Soy milk, 1 cup-6-10 grams  -Most beans(black, qiu, lentils, etc.) about 7-10 grams protein per half cup of cooked beans  -soy beans, 1/2 cup cooked-14 grams  -Split peas, 1/2 cup cooked- 8 grams  Nuts and Seeds  -Peanut butter, 2 Tablespoons- 8 grams protein  -Almonds, 1/4 cup- 8 grams  -Peanuts, 1/4 cup-9 grams  -Cashews, 1/4 cup- 5 grams  -Pecans, 1/4 cup- 2.5 grams  -Sunflower seeds, 1/4 cup- 6 grams  -Pumpkin seeds, 1/4 cup-8 grams  -Flax seeds- 1/4 cup- 8 grams  Protein Supplements  -Ensure  -Boost  -Glucerna, if diabetic  When you have an open ulcer, your bodies protein needs are much higher, so it is recommended eat  good sources of protein

## 2024-10-09 NOTE — PROGRESS NOTES
Clinic Administered Medication Documentation    Patient was applied Lidocaine to open wound prior to debridement. to Prior to medication administration, verified patient's identity using patient's name and date of birth.    Lizette Espinoza LPN

## 2024-10-09 NOTE — PROGRESS NOTES
Follow up Vascular Visit       Date of Service:10/09/24      Chief Complaint: left BKA revision non-healing wound      Pt returns to [facility] with regards to their left BKA revision; non-healing wound.  They arrive today with sopuse. They are currently using wound vac to the wounds. This is being done by staff at the clinic M, W, F. . They are using short stretch for compression. They are feeling well today. Denies fevers, chills. No shortness of breath. I had referred the patient to ID for MDR morganella they did not feel this warranted IV antibiotics as this time.     Allergies:   Allergies   Allergen Reactions    Bees     Sulfamethoxazole Swelling     throat       Medications:   Current Outpatient Medications:     acetaminophen (TYLENOL) 325 MG tablet, Take 2 tablets (650 mg) by mouth every 4 hours as needed for other (mild pain), Disp: 100 tablet, Rfl: 0    amLODIPine (NORVASC) 2.5 MG tablet, Take 1 tablet (2.5 mg) by mouth daily, Disp: 90 tablet, Rfl: 1    aspirin 81 MG EC tablet, Take 1 tablet (81 mg) by mouth 2 times daily, Disp: 60 tablet, Rfl: 0    atorvastatin (LIPITOR) 20 MG tablet, Take 1 tablet (20 mg) by mouth every evening, Disp: 90 tablet, Rfl: 1    BD PEN NEEDLE YENNY 2ND GEN 32G X 4 MM miscellaneous, USE 1 PEN NEEDLES DAILY OR AS DIRECTED., Disp: 100 each, Rfl: 2    blood glucose (ACCU-CHEK GANGA) test strip, Use to test blood sugar 1 times daily or as directed., Disp: 100 strip, Rfl: 3    blood glucose monitoring (ACCU-CHEK GANGA PLUS) meter device kit, USE TO TEST BLOOD SUGAR 1 TIMES DAILY OR AS DIRECTED., Disp: , Rfl: 0    blood glucose monitoring (ACCU-CHEK MULTICLIX) lancets, USE TO TEST BLOOD SUGAR 1 TIMES DAILY OR AS DIRECTED., Disp: , Rfl: 3    EPINEPHrine (ANY BX GENERIC EQUIV) 0.3 MG/0.3ML injection 2-pack, Inject 0.3 mLs (0.3 mg) into the muscle once as needed for anaphylaxis, Disp: 1 each, Rfl: 1    hydrochlorothiazide (HYDRODIURIL) 25 MG tablet, Take 1  tablet (25 mg) by mouth daily, Disp: 90 tablet, Rfl: 1    Insulin Glargine-yfgn 100 UNIT/ML SOPN, Inject 64 Units Subcutaneous at bedtime SCHEDULE FASTING LAB APPOINTMENT, THEN AN IN-CLINIC PROVIDER APPOINTMENT., Disp: 30 mL, Rfl: 1    losartan (COZAAR) 100 MG tablet, Take 1 tablet (100 mg) by mouth daily, Disp: 90 tablet, Rfl: 1    metFORMIN (GLUCOPHAGE XR) 500 MG 24 hr tablet, TAKE 2 TABLETS BY MOUTH TWICE A DAY WITH MEALS., Disp: 360 tablet, Rfl: 1    metoprolol succinate ER (TOPROL XL) 200 MG 24 hr tablet, Take 1 tablet (200 mg) by mouth daily, Disp: 90 tablet, Rfl: 1    Current Facility-Administered Medications:     lidocaine (XYLOCAINE) 5 % ointment, , Topical, Daily PRN, Dmitry, Joyce, NP, Given at 10/09/24 1312    lidocaine (XYLOCAINE) 5 % ointment, , Topical, Daily PRN, Joyce Andres NP    History:   Past Medical History:   Diagnosis Date    Diabetes (H) 1-2017    Hypertension 1-2017    Septic arthritis of right foot (H) 9/26/2023       Physical Exam:    BP (!) 162/79   Pulse 67   Temp 98.4  F (36.9  C)   SpO2 98%     General:  Patient presents to clinic in no apparent distress.  Head: normocephalic atraumatic  Psychiatric:  Alert and oriented x3.   Respiratory: unlabored breathing; no cough  Integumentary:  Skin is uniformly warm, dry and pink.    Ulcer #1 Location: left stump  Size: 6.1L x 17W x 0.7depth.  No sinus tract present, Wound base: granulation  no undermining present. Ulcer is full thickness. There is moderate drainage. Periwound: no denudement, erythema, induration, maceration or warmth.      VASC Wound Left bka (Active)   Pre Size Length 6.5 10/09/24 1300   Pre Size Width 17 10/09/24 1300   Pre Size Depth 1.2 10/09/24 1300   Pre Total Sq cm 110.5 10/09/24 1300   Tunneling 4.5cm at 12 o'clock 08/14/24 0800   Number of days: 56       Incision/Surgical Site 07/09/24 Left Tibial (Active)   Number of days: 92            Circumferential volume measures:           No data to display                 Labs:    I personally reviewed the following lab results today and those on care everywhere    CRP Inflammation   Date Value Ref Range Status   11/08/2019 22.2 (H) 0.0 - 8.0 mg/L Final      Sed Rate   Date Value Ref Range Status   11/08/2019 33 (H) 0 - 20 mm/h Final     Erythrocyte Sedimentation Rate   Date Value Ref Range Status   09/25/2023 81 (H) 0 - 20 mm/hr Final      Last Renal Panel:  Sodium   Date Value Ref Range Status   08/29/2024 132 (L) 135 - 145 mmol/L Final   12/04/2020 138 133 - 144 mmol/L Final     Potassium   Date Value Ref Range Status   08/29/2024 4.7 3.4 - 5.3 mmol/L Final   08/09/2021 4.9 3.4 - 5.3 mmol/L Final   12/04/2020 4.5 3.4 - 5.3 mmol/L Final     Chloride   Date Value Ref Range Status   08/29/2024 94 (L) 98 - 107 mmol/L Final   07/23/2021 101 98 - 107 mmol/L Final     Comment:     Last Lab Result: LDL Cholesterol Calculated (mg/dL   12/04/2020 103 94 - 109 mmol/L Final     Carbon Dioxide   Date Value Ref Range Status   12/04/2020 28 20 - 32 mmol/L Final     Carbon Dioxide (CO2)   Date Value Ref Range Status   08/29/2024 26 22 - 29 mmol/L Final   07/23/2021 26 22 - 31 mmol/L Final     Comment:     Last Lab Result: LDL Cholesterol Calculated (mg/dL     Anion Gap   Date Value Ref Range Status   08/29/2024 12 7 - 15 mmol/L Final   07/23/2021 12 5 - 18 mmol/L Final   12/04/2020 7 3 - 14 mmol/L Final     Glucose   Date Value Ref Range Status   08/29/2024 143 (H) 70 - 99 mg/dL Final   07/23/2021 137 (H) 70 - 125 mg/dL Final     Comment:     Last Lab Result: LDL Cholesterol Calculated (mg/dL   12/04/2020 269 (H) 70 - 99 mg/dL Final     Comment:     Fasting specimen     GLUCOSE BY METER POCT   Date Value Ref Range Status   07/09/2024 70 70 - 99 mg/dL Final     Urea Nitrogen   Date Value Ref Range Status   08/29/2024 25.2 (H) 8.0 - 23.0 mg/dL Final   07/23/2021 18 8 - 22 mg/dL Final     Comment:     Last Lab Result: LDL Cholesterol Calculated (mg/dL   12/04/2020 25 7 - 30 mg/dL Final  "    Creatinine   Date Value Ref Range Status   08/29/2024 0.69 0.67 - 1.17 mg/dL Final   12/04/2020 0.92 0.66 - 1.25 mg/dL Final     GFR Estimate   Date Value Ref Range Status   08/29/2024 >90 >60 mL/min/1.73m2 Final     Comment:     eGFR calculated using 2021 CKD-EPI equation.   12/04/2020 >90 >60 mL/min/[1.73_m2] Final     Comment:     Non  GFR Calc  Starting 12/18/2018, serum creatinine based estimated GFR (eGFR) will be   calculated using the Chronic Kidney Disease Epidemiology Collaboration   (CKD-EPI) equation.       Calcium   Date Value Ref Range Status   08/29/2024 9.5 8.8 - 10.4 mg/dL Final     Comment:     Reference intervals for this test were updated on 7/16/2024 to reflect our healthy population more accurately. There may be differences in the flagging of prior results with similar values performed with this method. Those prior results can be interpreted in the context of the updated reference intervals.   12/04/2020 8.8 8.5 - 10.1 mg/dL Final     Albumin   Date Value Ref Range Status   09/25/2023 3.2 (L) 3.5 - 5.2 g/dL Final   07/23/2021 4.1 3.5 - 5.0 g/dL Final     Comment:     Last Lab Result: LDL Cholesterol Calculated (mg/dL   12/04/2020 3.4 3.4 - 5.0 g/dL Final      Lab Results   Component Value Date    WBC 14.4 09/20/2024    WBC 11.1 11/08/2019     Lab Results   Component Value Date    RBC 4.46 09/20/2024    RBC 4.68 11/08/2019     Lab Results   Component Value Date    HGB 11.5 09/20/2024    HGB 12.8 11/08/2019     Lab Results   Component Value Date    HCT 35.7 09/20/2024    HCT 40.0 11/08/2019     No components found for: \"MCT\"  Lab Results   Component Value Date    MCV 80 09/20/2024    MCV 86 11/08/2019     Lab Results   Component Value Date    MCH 25.8 09/20/2024    MCH 27.4 11/08/2019     Lab Results   Component Value Date    MCHC 32.2 09/20/2024    MCHC 32.0 11/08/2019     Lab Results   Component Value Date    RDW 14.5 09/20/2024    RDW 13.8 11/08/2019     Lab Results " "  Component Value Date     09/20/2024     11/08/2019      Lab Results   Component Value Date    A1C 5.0 08/29/2024    A1C 6.7 09/25/2023    A1C 6.6 08/01/2022    A1C 7.5 03/25/2022    A1C 7.3 07/23/2021    A1C 9.6 12/04/2020    A1C 9.0 07/21/2020    A1C 7.8 11/08/2019    A1C 6.5 11/29/2018    A1C 7.3 06/28/2017      TSH   Date Value Ref Range Status   11/29/2018 3.07 0.40 - 4.00 mU/L Final      No results found for: \"VITDT\"                Impression:  Encounter Diagnoses   Name Primary?    Status post below-knee amputation of left lower extremity (H) Yes    Type 2 diabetes mellitus with other skin complication, unspecified whether long term insulin use (H)     Morbid obesity (H)                    Are any of these ulcers new today: No; Location: na    Assessment/Plan:          1. Debridement: Nursing staff removed the old dressing and cleanse the wound(s) with specified solution. After discussion of risk factors and verbal consent was obtained 2% Lidocaine HCL jelly was applied, under clean conditions, the left stump ulceration(s) were debrided using currette. Devitalized and nonviable tissue, along with any fibrin and slough, was removed to improve granulation tissue formation, stimulate wound healing, decrease overall bacteria load, disrupt biofilm formation and decrease edge senescence.  Total excisional debridement was 103.7 sq cm from the epidermis/dermis area and into the subcutaneous tissue with a depth of 0.7 cm.   Ulcers were improved afterwards and .  Measures were as noted on the flow sheet.       2.  Ulcer treatment: ulcer treatment will include irrigation and dressings to promote autolytic debridement which will include:will stop the vac as the wound has filled in; will go to mupirocin ointment; aquacel ag; ABD; rolled gauze; wife to change M, W F If for some reason the patient is not able to get their dressing(s) changed as outlined above (due to illness, lack of supplies, lack of " help) please do the following: remove old, soiled dressings; wash the ulcers with saline; pat dry; apply ABD pad or other absorbant pad and secure with rolled gauze; avoid tape directly on your skin; patient instructed to call the clinic as soon as possible to let us know what the current issues are in receiving ulcer care.            3. Edema: continue elevation; tubular compression and short stretch. The compression wraps were applied today in clinic.     If a 2 layer or 4 layer compression wrap is being used; these are safe to have on for ONLY 7 days. If for some reason the patient is not able to get the wrap(s) changed (due to illness; lack of supplies, lack of help, lack of transportation) please do the following: unwrap the old 2 or 4 layer compression wrap; avoid using scissors as you could cut your skin and cause ulcers; use tubular compression when available. Call to reschedule your home care or clinic visit appointment as soon as possible.  Stable            4. Nutrition: focus on protein; diabetic control           5. Offloading: no prosthesis wearing until well healed          6. Wound Etiology: surgical     Patient will follow up with me in 3 weeks for reevaluation. They were instructed to call the clinic sooner with any signs or symptoms of infection or any further questions/concerns. Answered all questions.          Joyce Andres DNP, RN, CNP, CWOCN, CFCN, CLT  [facility]   [phone]        This note was electronically signed by Joyce Andres NP

## 2024-10-15 ENCOUNTER — TELEPHONE (OUTPATIENT)
Dept: VASCULAR SURGERY | Facility: CLINIC | Age: 64
End: 2024-10-15
Payer: COMMERCIAL

## 2024-10-29 ENCOUNTER — OFFICE VISIT (OUTPATIENT)
Dept: VASCULAR SURGERY | Facility: CLINIC | Age: 64
End: 2024-10-29
Attending: NURSE PRACTITIONER
Payer: COMMERCIAL

## 2024-10-29 VITALS — DIASTOLIC BLOOD PRESSURE: 84 MMHG | TEMPERATURE: 98 F | SYSTOLIC BLOOD PRESSURE: 128 MMHG | HEART RATE: 65 BPM

## 2024-10-29 DIAGNOSIS — T81.31XA POSTOPERATIVE WOUND DEHISCENCE, INITIAL ENCOUNTER: ICD-10-CM

## 2024-10-29 DIAGNOSIS — I10 BENIGN HYPERTENSION: ICD-10-CM

## 2024-10-29 DIAGNOSIS — Z89.512 STATUS POST BELOW-KNEE AMPUTATION OF LEFT LOWER EXTREMITY (H): Primary | ICD-10-CM

## 2024-10-29 DIAGNOSIS — E11.628 TYPE 2 DIABETES MELLITUS WITH OTHER SKIN COMPLICATION, UNSPECIFIED WHETHER LONG TERM INSULIN USE (H): ICD-10-CM

## 2024-10-29 DIAGNOSIS — E66.01 MORBID OBESITY (H): ICD-10-CM

## 2024-10-29 DIAGNOSIS — A49.02 MRSA INFECTION: ICD-10-CM

## 2024-10-29 DIAGNOSIS — Z91.030 BEE STING ALLERGY: ICD-10-CM

## 2024-10-29 DIAGNOSIS — Z79.4 CONTROLLED TYPE 2 DIABETES MELLITUS WITH COMPLICATION, WITH LONG-TERM CURRENT USE OF INSULIN (H): ICD-10-CM

## 2024-10-29 DIAGNOSIS — E11.8 CONTROLLED TYPE 2 DIABETES MELLITUS WITH COMPLICATION, WITH LONG-TERM CURRENT USE OF INSULIN (H): ICD-10-CM

## 2024-10-29 PROCEDURE — 999N000000 HC CHARGE NOT FOUND: Performed by: NURSE PRACTITIONER

## 2024-10-29 PROCEDURE — 15271 SKIN SUB GRAFT TRNK/ARM/LEG: CPT | Performed by: NURSE PRACTITIONER

## 2024-10-29 PROCEDURE — 15002 WOUND PREP TRK/ARM/LEG: CPT | Performed by: NURSE PRACTITIONER

## 2024-10-29 RX ORDER — LIDOCAINE 50 MG/G
OINTMENT TOPICAL DAILY PRN
Status: ACTIVE | OUTPATIENT
Start: 2024-10-29

## 2024-10-29 RX ADMIN — LIDOCAINE: 50 OINTMENT TOPICAL at 08:07

## 2024-10-29 ASSESSMENT — PAIN SCALES - GENERAL: PAINLEVEL_OUTOF10: NO PAIN (0)

## 2024-10-29 NOTE — PROGRESS NOTES
Follow up Vascular Visit       Date of Service:10/29/24      Chief Complaint: left stump surgical revision non-healing wound      Pt returns to [facility] with regards to their left stump surgical revision non-healing wound.  They arrive today with wife. They are currently using aquacel ag; abd; rolled gauze to the wounds. This is being done by the wife every other day. They are using short stretch and compression sleeve for compression. They are feeling well today. Denies fevers, chills. No shortness of breath. We ran him for epifix we have not received approval on this yet.     Allergies:   Allergies   Allergen Reactions    Bees     Sulfamethoxazole Swelling     throat       Medications:   Current Outpatient Medications:     acetaminophen (TYLENOL) 325 MG tablet, Take 2 tablets (650 mg) by mouth every 4 hours as needed for other (mild pain), Disp: 100 tablet, Rfl: 0    amLODIPine (NORVASC) 2.5 MG tablet, Take 1 tablet (2.5 mg) by mouth daily, Disp: 90 tablet, Rfl: 1    aspirin 81 MG EC tablet, Take 1 tablet (81 mg) by mouth 2 times daily, Disp: 60 tablet, Rfl: 0    atorvastatin (LIPITOR) 20 MG tablet, Take 1 tablet (20 mg) by mouth every evening, Disp: 90 tablet, Rfl: 1    BD PEN NEEDLE YENNY 2ND GEN 32G X 4 MM miscellaneous, USE 1 PEN NEEDLES DAILY OR AS DIRECTED., Disp: 100 each, Rfl: 2    blood glucose (ACCU-CHEK GANGA) test strip, Use to test blood sugar 1 times daily or as directed., Disp: 100 strip, Rfl: 3    blood glucose monitoring (ACCU-CHEK GANGA PLUS) meter device kit, USE TO TEST BLOOD SUGAR 1 TIMES DAILY OR AS DIRECTED., Disp: , Rfl: 0    blood glucose monitoring (ACCU-CHEK MULTICLIX) lancets, USE TO TEST BLOOD SUGAR 1 TIMES DAILY OR AS DIRECTED., Disp: , Rfl: 3    EPINEPHrine (ANY BX GENERIC EQUIV) 0.3 MG/0.3ML injection 2-pack, Inject 0.3 mLs (0.3 mg) into the muscle once as needed for anaphylaxis, Disp: 1 each, Rfl: 1    hydrochlorothiazide (HYDRODIURIL) 25 MG tablet,  Take 1 tablet (25 mg) by mouth daily, Disp: 90 tablet, Rfl: 1    Insulin Glargine-yfgn 100 UNIT/ML SOPN, Inject 64 Units Subcutaneous at bedtime SCHEDULE FASTING LAB APPOINTMENT, THEN AN IN-CLINIC PROVIDER APPOINTMENT., Disp: 30 mL, Rfl: 1    losartan (COZAAR) 100 MG tablet, Take 1 tablet (100 mg) by mouth daily, Disp: 90 tablet, Rfl: 1    metFORMIN (GLUCOPHAGE XR) 500 MG 24 hr tablet, TAKE 2 TABLETS BY MOUTH TWICE A DAY WITH MEALS., Disp: 360 tablet, Rfl: 1    metoprolol succinate ER (TOPROL XL) 200 MG 24 hr tablet, Take 1 tablet (200 mg) by mouth daily, Disp: 90 tablet, Rfl: 1    mupirocin (BACTROBAN) 2 % external ointment, Apply topically 3 times daily., Disp: 30 g, Rfl: 3    Current Facility-Administered Medications:     lidocaine (XYLOCAINE) 5 % ointment, , Topical, Daily PRN, Joyce Andres, NP, Given at 10/29/24 0807    lidocaine (XYLOCAINE) 5 % ointment, , Topical, Daily PRN, Joyce Andres, NP, Given at 10/09/24 1312    lidocaine (XYLOCAINE) 5 % ointment, , Topical, Daily PRN, Joyce Andres NP    History:   Past Medical History:   Diagnosis Date    Diabetes (H) 1-2017    Hypertension 1-2017    Septic arthritis of right foot (H) 9/26/2023       Physical Exam:    /84   Pulse 65   Temp 98  F (36.7  C)     General:  Patient presents to clinic in no apparent distress.  Head: normocephalic atraumatic  Psychiatric:  Alert and oriented x3.   Respiratory: unlabored breathing; no cough  Integumentary:  Skin is uniformly warm, dry and pink.    Ulcer #1 Location: left stump  Size: 6.2W x 14.2x0.5depth.  no sinus tract present, Wound base: slough; underneath 5% slough 95% granulation  no undermining present. Ulcer is full thickness. There is moderate drainage. Periwound: no denudement, erythema, induration, maceration or warmth.      VASC Wound Left bka (Active)   Pre Size Length 7 10/29/24 0800   Pre Size Width 13.5 10/29/24 0800   Pre Size Depth 0.2 10/29/24 0800   Pre Total Sq cm 94.5 10/29/24 0800   Post Size  Length 6.1 10/09/24 1300   Post Size Width 17 10/09/24 1300   Post Size Depth 0.7 10/09/24 1300   Post Total Sq cm 103.7 10/09/24 1300   Tunneling 4.5cm at 12 o'clock 08/14/24 0800   Number of days: 76       Incision/Surgical Site 07/09/24 Left Tibial (Active)   Number of days: 112            Circumferential volume measures:           No data to display                Labs:    I personally reviewed the following lab results today and those on care everywhere    CRP Inflammation   Date Value Ref Range Status   11/08/2019 22.2 (H) 0.0 - 8.0 mg/L Final      Sed Rate   Date Value Ref Range Status   11/08/2019 33 (H) 0 - 20 mm/h Final     Erythrocyte Sedimentation Rate   Date Value Ref Range Status   09/25/2023 81 (H) 0 - 20 mm/hr Final      Last Renal Panel:  Sodium   Date Value Ref Range Status   08/29/2024 132 (L) 135 - 145 mmol/L Final   12/04/2020 138 133 - 144 mmol/L Final     Potassium   Date Value Ref Range Status   08/29/2024 4.7 3.4 - 5.3 mmol/L Final   08/09/2021 4.9 3.4 - 5.3 mmol/L Final   12/04/2020 4.5 3.4 - 5.3 mmol/L Final     Chloride   Date Value Ref Range Status   08/29/2024 94 (L) 98 - 107 mmol/L Final   07/23/2021 101 98 - 107 mmol/L Final     Comment:     Last Lab Result: LDL Cholesterol Calculated (mg/dL   12/04/2020 103 94 - 109 mmol/L Final     Carbon Dioxide   Date Value Ref Range Status   12/04/2020 28 20 - 32 mmol/L Final     Carbon Dioxide (CO2)   Date Value Ref Range Status   08/29/2024 26 22 - 29 mmol/L Final   07/23/2021 26 22 - 31 mmol/L Final     Comment:     Last Lab Result: LDL Cholesterol Calculated (mg/dL     Anion Gap   Date Value Ref Range Status   08/29/2024 12 7 - 15 mmol/L Final   07/23/2021 12 5 - 18 mmol/L Final   12/04/2020 7 3 - 14 mmol/L Final     Glucose   Date Value Ref Range Status   08/29/2024 143 (H) 70 - 99 mg/dL Final   07/23/2021 137 (H) 70 - 125 mg/dL Final     Comment:     Last Lab Result: LDL Cholesterol Calculated (mg/dL   12/04/2020 269 (H) 70 - 99 mg/dL Final      Comment:     Fasting specimen     GLUCOSE BY METER POCT   Date Value Ref Range Status   07/09/2024 70 70 - 99 mg/dL Final     Urea Nitrogen   Date Value Ref Range Status   08/29/2024 25.2 (H) 8.0 - 23.0 mg/dL Final   07/23/2021 18 8 - 22 mg/dL Final     Comment:     Last Lab Result: LDL Cholesterol Calculated (mg/dL   12/04/2020 25 7 - 30 mg/dL Final     Creatinine   Date Value Ref Range Status   08/29/2024 0.69 0.67 - 1.17 mg/dL Final   12/04/2020 0.92 0.66 - 1.25 mg/dL Final     GFR Estimate   Date Value Ref Range Status   08/29/2024 >90 >60 mL/min/1.73m2 Final     Comment:     eGFR calculated using 2021 CKD-EPI equation.   12/04/2020 >90 >60 mL/min/[1.73_m2] Final     Comment:     Non  GFR Calc  Starting 12/18/2018, serum creatinine based estimated GFR (eGFR) will be   calculated using the Chronic Kidney Disease Epidemiology Collaboration   (CKD-EPI) equation.       Calcium   Date Value Ref Range Status   08/29/2024 9.5 8.8 - 10.4 mg/dL Final     Comment:     Reference intervals for this test were updated on 7/16/2024 to reflect our healthy population more accurately. There may be differences in the flagging of prior results with similar values performed with this method. Those prior results can be interpreted in the context of the updated reference intervals.   12/04/2020 8.8 8.5 - 10.1 mg/dL Final     Albumin   Date Value Ref Range Status   09/25/2023 3.2 (L) 3.5 - 5.2 g/dL Final   07/23/2021 4.1 3.5 - 5.0 g/dL Final     Comment:     Last Lab Result: LDL Cholesterol Calculated (mg/dL   12/04/2020 3.4 3.4 - 5.0 g/dL Final      Lab Results   Component Value Date    WBC 14.4 09/20/2024    WBC 11.1 11/08/2019     Lab Results   Component Value Date    RBC 4.46 09/20/2024    RBC 4.68 11/08/2019     Lab Results   Component Value Date    HGB 11.5 09/20/2024    HGB 12.8 11/08/2019     Lab Results   Component Value Date    HCT 35.7 09/20/2024    HCT 40.0 11/08/2019     No components found for:  "\"MCT\"  Lab Results   Component Value Date    MCV 80 09/20/2024    MCV 86 11/08/2019     Lab Results   Component Value Date    MCH 25.8 09/20/2024    MCH 27.4 11/08/2019     Lab Results   Component Value Date    MCHC 32.2 09/20/2024    MCHC 32.0 11/08/2019     Lab Results   Component Value Date    RDW 14.5 09/20/2024    RDW 13.8 11/08/2019     Lab Results   Component Value Date     09/20/2024     11/08/2019      Lab Results   Component Value Date    A1C 5.0 08/29/2024    A1C 6.7 09/25/2023    A1C 6.6 08/01/2022    A1C 7.5 03/25/2022    A1C 7.3 07/23/2021    A1C 9.6 12/04/2020    A1C 9.0 07/21/2020    A1C 7.8 11/08/2019    A1C 6.5 11/29/2018    A1C 7.3 06/28/2017      TSH   Date Value Ref Range Status   11/29/2018 3.07 0.40 - 4.00 mU/L Final      No results found for: \"VITDT\"                Impression:  Encounter Diagnoses   Name Primary?    Status post below-knee amputation of left lower extremity (H) Yes    Type 2 diabetes mellitus with other skin complication, unspecified whether long term insulin use (H)     Morbid obesity (H)     MRSA infection     Postoperative wound dehiscence, initial encounter                    Are any of these ulcers new today: No; Location: na    Assessment/Plan:          1. Debridement: Nursing staff removed the old dressing and cleanse the wound(s) with specified solution. After discussion of risk factors and verbal consent was obtained 2% Lidocaine HCL jelly was applied, under clean conditions, the left stump ulceration(s) were debrided using currette. Devitalized and nonviable tissue, along with any fibrin and slough, was removed to improve granulation tissue formation, stimulate wound healing, decrease overall bacteria load, disrupt biofilm formation and decrease edge senescence.  Total excisional debridement was 88.04 sq cm from the epidermis/dermis area and into the subcutaneous tissue with a depth of 0.5 cm.   Ulcers were improved afterwards and .  Measures " were as noted on the flow sheet.       2.  Ulcer treatment: ulcer treatment will include irrigation and dressings to promote autolytic debridement which will include:will begin to graft portion of the wound with epifix; pt received approval for this; made copies of the documents and will scan into  If for some reason the patient is not able to get their dressing(s) changed as outlined above (due to illness, lack of supplies, lack of help) please do the following: remove old, soiled dressings; wash the ulcers with saline; pat dry; apply ABD pad or other absorbant pad and secure with rolled gauze; avoid tape directly on your skin; patient instructed to call the clinic as soon as possible to let us know what the current issues are in receiving ulcer care. Stable       Due to the slow healing rate of the ulcer and the diagnosis of Atherosclerosis and Peripheral Neuropathy due to Atherosclerosis and Peripheral Neuropathy and is free of infection and osteomyelitis and has adequate blood flow for healing.   After risks were reviewed including infection, allergic reaction and scarring consent was received the left leg Diabetic ulceration(s) was/ were debrided with clean technique.  curette used to remove slough and fibrin deposition along with devitalized tissue.  This was to improve granulation tissue formation, stimulate wound healing, decrease biofilm formation, decrease bacterial load, decrease senescent edges and prepare for bioengineered graft placement.  A total of 88.04 square centimeters was debrided from the from the epidermis/dermis area and into the subcutaneous tissue. Epifix was recommended and consent was obtained for the application.  This is application # 1.       For the procedure, the patient was placed in a comfortable position with the wound bed exposed. Epifix application is being performed in a staged procedure in an attempt to achieve rapid wound closure. The Tissue Identification Number is  [id number] with an expiration date of 2027-05-01 as indicated on the consent form dated 10/29/2024      The graft size is 4x4.5 , total wound size is 88.04 with total applied area was 18sq cm and the estimated wastage was 0; any leftover product was applied in a layered fashion.   The graft was prepared and applied following the  guidelines.  The graft was covered with sorbact then with a non-adherent contact layer and secured with steristrips .  Then,  a secondary aquacel ag double layer dressing was applied. Endoform collagen was applied to the remainder of the wound which the graft did not cover. The patient will continue to utilize tubular compression and short stretch for compression.  Patient tolerated the procedure without any complications and was educated and expressed an understanding of the proper wound treatment until their follow up.             3. Edema: tubular compression and short stretch. The compression wraps were applied today in clinic.     If a 2 layer or 4 layer compression wrap is being used; these are safe to have on for ONLY 7 days. If for some reason the patient is not able to get the wrap(s) changed (due to illness; lack of supplies, lack of help, lack of transportation) please do the following: unwrap the old 2 or 4 layer compression wrap; avoid using scissors as you could cut your skin and cause ulcers; use tubular compression when available. Call to reschedule your home care or clinic visit appointment as soon as possible.  Stable            4. Nutrition: focus on diabetes control and increase protein intake           5. Offloading: no prosthesis wearing until well healed          6. Wound Etiology: surgical     Patient will follow up with me in 1 weeks for reevaluation. They were instructed to call the clinic sooner with any signs or symptoms of infection or any further questions/concerns. Answered all questions.          Joyce Andres DNP, RN, CNP, CWOCN, CFCN,  [facility]  [facility]   [phone]        This note was electronically signed by Joyce Andres NP

## 2024-10-29 NOTE — PATIENT INSTRUCTIONS
Today your wound was grafted with epifix  Endoform collagen was applied to the portion of the wound not covered by epifix  Covered by sorbact  Secured with steristrips  Covered with x2 layers of aquacel ag  ABD  Rolled gauze  Compression sleeve and wrap    Change the aquacel ag and abd if there is strikethrough or bleeding through the dressing  Be very cautious that you don't remove the graft area    Do not get wet in the shower    Continue to focus on tight glucose control and increase protein intake    Keep pressure off the wound area

## 2024-10-29 NOTE — PROGRESS NOTES
Clinic Administered Medication Documentation    Patient was given Lidocaine to left stump wound prior to debridement. Prior to medication administration, verified patient's identity using patient's name and date of birth.    Lizette Espinoza LPN

## 2024-10-30 NOTE — TELEPHONE ENCOUNTER
Requested Prescriptions   Pending Prescriptions Disp Refills    EPINEPHrine (ANY BX GENERIC EQUIV) 0.3 MG/0.3ML injection 2-pack [Pharmacy Med Name: EPINEPHrine Injection Solution Auto-injector 0.3 MG/0.3ML] 2 each 0     Sig: INJECT 0.3MG DOSE INTRAMUSCULARLY INTO THE OUTER THIGH MUSCLE ONCE AS DIRECTED AS NEEDED FOR ANAPHYLAXIS       Anaphylaxis Kits Protocol Failed - 10/30/2024 11:55 AM        Failed - Medication incated for associated diagnosis     The medicaiton is prescribed for one or more of the following conditions:    Anaphylaxis   Allergy (grouper)   Angioedema   Mast cell disease    Allergic reaction caused by insect bite and/or insect sting             Passed - Patient is age 5 or older        Passed - Medication is active on med list        Passed - Recent (12 mo) or future (90 days) visit witin the authorizing provider's specialty     The patient must have completed an in-person or virtual visit within the past 12 months or has a future visit scheduled within the next 90 days with the authorizing provider s specialty.  Urgent care and e-visits do not quality as an office visit for this protocol.            Insulin Glargine-yfgn 100 UNIT/ML SOPN [Pharmacy Med Name: Insulin Glargine-yfgn Subcutaneous Solution Pen-injector 100 UNIT/ML] 30 mL 0     Sig: INJECT 64 UNITS SUBCUTANEOUSLY AT BEDTIME. SCHEDULE FASTING LAB APPOINTMENT, THEN AN IN CLINIC PROVIDER APPOINTMENT       Insulin Protocol Failed - 10/30/2024 11:55 AM        Failed - Chart review required     Review Chart.    Do not approve if insulin is used in a pump.  Instead, direct refill request to the patient's endocrinologist.  If the patient doesn't have an endocrinologist, then send the refill to the patient's PCP for review            Passed - HgbA1C in past 3 or 6 months     If HgbA1C is 8 or greater, it needs to be on file within the past 3 months.  If less than 8, must be on file within the past 6 months.     Recent Labs   Lab Test  08/29/24  0806   A1C 5.0             Passed - Medication is active on med list        Passed - Has GFR on file in past 12 months and most recent value is normal        Passed - Recent (6 mo) or future (90 days) visit within the authorizing provider's specialty     The patient must have completed an in-person or virtual visit within the past 6 months or has a future visit scheduled within the next 90 days with the authorizing provider s specialty.  Urgent care and e-visits do not quality as an office visit for this protocol.          Passed - Medication indicated for associated diagnosis     Medication is associated with one or more of the following diagnoses:   - Type 1 diabetes mellitus  - Type 2 diabetes mellitus  - Diabetic nephropathy; Prophylaxis  - Neuropathy due to diabetes mellitus; Prophylaxis  - Retinopathy due to diabetes mellitus; Prophylaxis  - Diabetes mellitus associated with cystic fibrosis  - Disorder of cardiovascular system; Prophylaxis - Type 1 diabetes mellitus   - Disorder of cardiovascular system; Prophylaxis - Type 2 diabetes mellitus            Passed - Patient is 18 years of age or older          metFORMIN (GLUCOPHAGE XR) 500 MG 24 hr tablet [Pharmacy Med Name: metFORMIN HCl ER Oral Tablet Extended Release 24 Hour 500 MG] 360 tablet 0     Sig: TAKE TWO TABLETS BY MOUTH TWICE DAILY WITH MEALS       Biguanide Agents Passed - 10/30/2024 11:55 AM        Passed - Patient is age 10 or older        Passed - Patient has documented A1c within the specified period of time.     If HgbA1C is 8 or greater, it needs to be on file within the past 3 months.  If less than 8, must be on file within the past 6 months.     Recent Labs   Lab Test 08/29/24  0806   A1C 5.0             Passed - Patient does NOT have a diagnosis of CHF.        Passed - Medication is active on med list        Passed - Medication indicated for associated diagnosis     Medication is associated with one or more of the following  diagnoses:     Gestational diabetes mellitus     Hyperinsulinar obesity     Hypersecretion of ovarian androgens    Non-alcoholic fatty liver    Polycystic ovarian syndrome               Pre-diabetes (DM 2 prevention)    Type 2 diabetes mellitus     Weight gain, antipsychotic therapy-induced    Impaired fasting glucose          Passed - Has GFR on file in past 12 months and most recent value is normal        Passed - Recent (6 mo) or future (90 days) visit within the authorizing provider's specialty     The patient must have completed an in-person or virtual visit within the past 6 months or has a future visit scheduled within the next 90 days with the authorizing provider s specialty.  Urgent care and e-visits do not quality as an office visit for this protocol.            hydrochlorothiazide (HYDRODIURIL) 25 MG tablet [Pharmacy Med Name: hydroCHLOROthiazide Oral Tablet 25 MG] 90 tablet 0     Sig: TAKE ONE TABLET (25 mg)  BY MOUTH ONCE DAILY       Diuretics (Including Combos) Protocol Passed - 10/30/2024 11:55 AM        Passed - Most recent blood pressure under 140/90 in past 12 months     BP Readings from Last 3 Encounters:   10/29/24 128/84   10/09/24 (!) 162/79   10/04/24 120/70       No data recorded            Passed - Medication is active on med list        Passed - Medication indicated for associated diagnosis     Medication is associated with one or more of the following diagnoses:     Edema   Hypertension   Heart Failure   Meniere's Disease   Bilateral localized swelling of lower limbs   Pulmonary Hypertension          Passed - Has GFR on file in past 12 months and most recent value is normal        Passed - Recent (12 mo) or future (90 days) visit within the authorizing provider's specialty     The patient must have completed an in-person or virtual visit within the past 12 months or has a future visit scheduled within the next 90 days with the authorizing provider s specialty.  Urgent care and e-visits do  not quality as an office visit for this protocol.          Passed - Patient is age 18 or older          losartan (COZAAR) 100 MG tablet [Pharmacy Med Name: Losartan Potassium Oral Tablet 100 MG] 90 tablet 0     Sig: TAKE ONE TABLET BY MOUTH ONCE DAILY       Angiotensin-II Receptors Passed - 10/30/2024 11:55 AM        Passed - Most recent blood pressure under 140/90 in past 12 months     BP Readings from Last 3 Encounters:   10/29/24 128/84   10/09/24 (!) 162/79   10/04/24 120/70       No data recorded            Passed - Medication is active on med list        Passed - Has GFR on file in past 12 months and most recent value is normal        Passed - Medication indicated for associated diagnosis     The medication is prescribed for one or more of the following conditions:    Chronic Kidney Disease (CDK)    Heart Failure (HF)    Diabetes, Nephropathy   Hypertension    Coronary Artery Disease (CAD)   Raynaud's Disease   Ischemic cardiomyopathy   Cardiomyopathy   Pulmonary Hypertension          Passed - Recent (12 mo) or future (90days) visit within the authorizing provider's specialty     The patient must have completed an in-person or virtual visit within the past 12 months or has a future visit scheduled within the next 90 days with the authorizing provider s specialty.  Urgent care and e-visits do not quality as an office visit for this protocol.          Passed - Patient is age 18 or older        Passed - Normal serum potassium on file in past 12 months     Recent Labs   Lab Test 08/29/24  0806   POTASSIUM 4.7                      metoprolol succinate ER (TOPROL XL) 200 MG 24 hr tablet [Pharmacy Med Name: Metoprolol Succinate ER Oral Tablet Extended Release 24 Hour 200 MG] 90 tablet 0     Sig: TAKE ONE TABLET BY MOUTH ONCE DAILY       Beta-Blockers Protocol Passed - 10/30/2024 11:55 AM        Passed - Most recent blood pressure under 140/90 in past 12 months     BP Readings from Last 3 Encounters:   10/29/24 128/84    10/09/24 (!) 162/79   10/04/24 120/70       No data recorded            Passed - Patient is age 6 or older        Passed - Medication is active on med list        Passed - Medication indicated for associated diagnosis     Medication is associated with one or more of the following diagnoses:     Hypertension (HTN)   Atrial fibrillation/flutter   Angina   ASCVD   Migraine   Heart Failure   Tremor   Anxiety   Ocular hypertension   Glaucoma   PTSD   Obstructive hypertrophic cardiomyopathy   History of myocarditis   Palpitations   POTS (postural orthostatic tachycardia syndrome)   SVT (supraventricular tachycardia)   Hyperthyroidism   Tachycardia   Acute non-st segment elevation myocardial infarction   Subsequent non-st segment elevation myocardial infarction   Ischemic myocardial dysfunction          Passed - Recent (12 mo) or future (90 days) visit within the authorizing provider's specialty     The patient must have completed an in-person or virtual visit within the past 12 months or has a future visit scheduled within the next 90 days with the authorizing provider s specialty.  Urgent care and e-visits do not quality as an office visit for this protocol.            atorvastatin (LIPITOR) 20 MG tablet [Pharmacy Med Name: Atorvastatin Calcium Oral Tablet 20 MG] 90 tablet 0     Sig: TAKE ONE TABLET BY MOUTH ONCE DAILY IN THE EVENING       Antihyperlipidemic agents Passed - 10/30/2024 11:55 AM        Passed - LDL on file in the past 12 months        Passed - Medication is active on med list        Passed - Recent (12 mo) or future (90 days) visit within the authorizing provider's specialty     The patient must have completed an in-person or virtual visit within the past 12 months or has a future visit scheduled within the next 90 days with the authorizing provider s specialty.  Urgent care and e-visits do not quality as an office visit for this protocol.          Passed - Patient is age 18 years or older           amLODIPine (NORVASC) 2.5 MG tablet [Pharmacy Med Name: amLODIPine Besylate Oral Tablet 2.5 MG] 90 tablet 0     Sig: TAKE ONE TABLET BY MOUTH ONCE DAILY       Calcium Channel Blockers Protocol  Passed - 10/30/2024 11:55 AM        Passed - Most recent blood pressure under 140/90 in past 12 months     BP Readings from Last 3 Encounters:   10/29/24 128/84   10/09/24 (!) 162/79   10/04/24 120/70       No data recorded            Passed - Medication is active on med list        Passed - GFR is on file in the past 12 months and most recent GFR is normal        Passed - Recent (12 mo) or future (90 days) visit within the authorizing provider's specialty     The patient must have completed an in-person or virtual visit within the past 12 months or has a future visit scheduled within the next 90 days with the authorizing provider s specialty.  Urgent care and e-visits do not quality as an office visit for this protocol.          Passed - Patient is age 18 or older

## 2024-10-31 RX ORDER — AMLODIPINE BESYLATE 2.5 MG/1
2.5 TABLET ORAL DAILY
Qty: 90 TABLET | Refills: 3 | Status: SHIPPED | OUTPATIENT
Start: 2024-10-31

## 2024-10-31 RX ORDER — METFORMIN HYDROCHLORIDE 500 MG/1
TABLET, EXTENDED RELEASE ORAL
Qty: 360 TABLET | Refills: 3 | Status: SHIPPED | OUTPATIENT
Start: 2024-10-31

## 2024-10-31 RX ORDER — ATORVASTATIN CALCIUM 20 MG/1
20 TABLET, FILM COATED ORAL EVERY EVENING
Qty: 90 TABLET | Refills: 3 | Status: SHIPPED | OUTPATIENT
Start: 2024-10-31

## 2024-10-31 RX ORDER — METOPROLOL SUCCINATE 200 MG/1
200 TABLET, EXTENDED RELEASE ORAL DAILY
Qty: 90 TABLET | Refills: 3 | Status: SHIPPED | OUTPATIENT
Start: 2024-10-31

## 2024-10-31 RX ORDER — INSULIN GLARGINE-YFGN 100 [IU]/ML
64 INJECTION, SOLUTION SUBCUTANEOUS AT BEDTIME
Qty: 60 ML | Refills: 3 | Status: SHIPPED | OUTPATIENT
Start: 2024-10-31

## 2024-10-31 RX ORDER — HYDROCHLOROTHIAZIDE 25 MG/1
TABLET ORAL
Qty: 90 TABLET | Refills: 3 | Status: SHIPPED | OUTPATIENT
Start: 2024-10-31

## 2024-10-31 RX ORDER — LOSARTAN POTASSIUM 100 MG/1
100 TABLET ORAL DAILY
Qty: 90 TABLET | Refills: 3 | Status: SHIPPED | OUTPATIENT
Start: 2024-10-31

## 2024-10-31 RX ORDER — EPINEPHRINE 0.3 MG/.3ML
INJECTION SUBCUTANEOUS
Qty: 2 EACH | Refills: 1 | Status: SHIPPED | OUTPATIENT
Start: 2024-10-31

## 2024-11-06 ENCOUNTER — OFFICE VISIT (OUTPATIENT)
Dept: VASCULAR SURGERY | Facility: CLINIC | Age: 64
End: 2024-11-06
Attending: NURSE PRACTITIONER
Payer: COMMERCIAL

## 2024-11-06 VITALS
TEMPERATURE: 98.7 F | DIASTOLIC BLOOD PRESSURE: 83 MMHG | OXYGEN SATURATION: 97 % | HEART RATE: 73 BPM | SYSTOLIC BLOOD PRESSURE: 155 MMHG

## 2024-11-06 DIAGNOSIS — E66.01 MORBID OBESITY (H): ICD-10-CM

## 2024-11-06 DIAGNOSIS — Z89.512 STATUS POST BELOW-KNEE AMPUTATION OF LEFT LOWER EXTREMITY (H): Primary | ICD-10-CM

## 2024-11-06 DIAGNOSIS — T81.31XA POSTOPERATIVE WOUND DEHISCENCE, INITIAL ENCOUNTER: ICD-10-CM

## 2024-11-06 DIAGNOSIS — A49.02 MRSA INFECTION: ICD-10-CM

## 2024-11-06 DIAGNOSIS — E11.628 TYPE 2 DIABETES MELLITUS WITH OTHER SKIN COMPLICATION, UNSPECIFIED WHETHER LONG TERM INSULIN USE (H): ICD-10-CM

## 2024-11-06 PROCEDURE — 15271 SKIN SUB GRAFT TRNK/ARM/LEG: CPT | Performed by: NURSE PRACTITIONER

## 2024-11-06 PROCEDURE — 15002 WOUND PREP TRK/ARM/LEG: CPT | Performed by: NURSE PRACTITIONER

## 2024-11-06 ASSESSMENT — PAIN SCALES - GENERAL: PAINLEVEL_OUTOF10: NO PAIN (0)

## 2024-11-06 NOTE — PROGRESS NOTES
Follow up Vascular Visit       Date of Service:11/06/24      Chief Complaint: left stump non-healing surgical site      Pt returns to [facility] with regards to their left stump non-healing surgical site.  They arrive today with wife. They are currently using epifix to portion of the wound; collagen; sorbact; aquacel ag, abd; rolled gauze to the wounds. This is being done by staff at the clinic weekly. They are using short stretch for compression. They are feeling well today. Denies fevers, chills. No shortness of breath.     Allergies:   Allergies   Allergen Reactions    Bees     Sulfamethoxazole Swelling     throat       Medications:   Current Outpatient Medications:     acetaminophen (TYLENOL) 325 MG tablet, Take 2 tablets (650 mg) by mouth every 4 hours as needed for other (mild pain), Disp: 100 tablet, Rfl: 0    amLODIPine (NORVASC) 2.5 MG tablet, TAKE ONE TABLET BY MOUTH ONCE DAILY, Disp: 90 tablet, Rfl: 3    aspirin 81 MG EC tablet, Take 1 tablet (81 mg) by mouth 2 times daily, Disp: 60 tablet, Rfl: 0    atorvastatin (LIPITOR) 20 MG tablet, TAKE ONE TABLET BY MOUTH ONCE DAILY IN THE EVENING, Disp: 90 tablet, Rfl: 3    BD PEN NEEDLE YENNY 2ND GEN 32G X 4 MM miscellaneous, USE 1 PEN NEEDLES DAILY OR AS DIRECTED., Disp: 100 each, Rfl: 2    blood glucose (ACCU-CHEK GANGA) test strip, Use to test blood sugar 1 times daily or as directed., Disp: 100 strip, Rfl: 3    blood glucose monitoring (ACCU-CHEK GANGA PLUS) meter device kit, USE TO TEST BLOOD SUGAR 1 TIMES DAILY OR AS DIRECTED., Disp: , Rfl: 0    blood glucose monitoring (ACCU-CHEK MULTICLIX) lancets, USE TO TEST BLOOD SUGAR 1 TIMES DAILY OR AS DIRECTED., Disp: , Rfl: 3    EPINEPHrine (ANY BX GENERIC EQUIV) 0.3 MG/0.3ML injection 2-pack, INJECT 0.3MG DOSE INTRAMUSCULARLY INTO THE OUTER THIGH MUSCLE ONCE AS DIRECTED AS NEEDED FOR ANAPHYLAXIS, Disp: 2 each, Rfl: 1    hydrochlorothiazide (HYDRODIURIL) 25 MG tablet, TAKE ONE TABLET (25  mg)  BY MOUTH ONCE DAILY, Disp: 90 tablet, Rfl: 3    Insulin Glargine-yfgn 100 UNIT/ML SOPN, Inject 64 Units subcutaneously at bedtime., Disp: 60 mL, Rfl: 3    losartan (COZAAR) 100 MG tablet, TAKE ONE TABLET BY MOUTH ONCE DAILY, Disp: 90 tablet, Rfl: 3    metFORMIN (GLUCOPHAGE XR) 500 MG 24 hr tablet, TAKE TWO TABLETS BY MOUTH TWICE DAILY WITH MEALS, Disp: 360 tablet, Rfl: 3    metoprolol succinate ER (TOPROL XL) 200 MG 24 hr tablet, TAKE ONE TABLET BY MOUTH ONCE DAILY, Disp: 90 tablet, Rfl: 3    mupirocin (BACTROBAN) 2 % external ointment, Apply topically 3 times daily., Disp: 30 g, Rfl: 3    Current Facility-Administered Medications:     lidocaine (XYLOCAINE) 5 % ointment, , Topical, Daily PRN, Joyce Andres, NP, Given at 10/29/24 0807    lidocaine (XYLOCAINE) 5 % ointment, , Topical, Daily PRN, Joyce Andres, NP, Given at 10/09/24 1312    lidocaine (XYLOCAINE) 5 % ointment, , Topical, Daily PRN, Joyce Andres, NP    History:   Past Medical History:   Diagnosis Date    Diabetes (H) 1-2017    Hypertension 1-2017    Septic arthritis of right foot (H) 9/26/2023       Physical Exam:    BP (!) 155/83   Pulse 73   Temp 98.7  F (37.1  C)   SpO2 97%     General:  Patient presents to clinic in no apparent distress.  Head: normocephalic atraumatic  Psychiatric:  Alert and oriented x3.   Respiratory: unlabored breathing; no cough  Integumentary:  Skin is uniformly warm, dry and pink.    Ulcer #1 Location: left stump  Size: 5.8L x 13W x 0.5depth.  no sinus tract present, Wound base: slough; underneath 95% granulation tissue  no undermining present. Ulcer is full thickness. There is moderate drainage. Periwound: no denudement, erythema, induration, maceration or warmth.      VASC Wound Left bka (Active)   Pre Size Length 6.5 11/06/24 1200   Pre Size Width 13 11/06/24 1200   Pre Size Depth 0.4 11/06/24 1200   Pre Total Sq cm 84.5 11/06/24 1200   Post Size Length 5.8 11/06/24 1200   Post Size Width 13 11/06/24 1200   Post  Size Depth 0.5 11/06/24 1200   Post Total Sq cm 75.4 11/06/24 1200   Tunneling 4.5cm at 12 o'clock 08/14/24 0800   Number of days: 84       Incision/Surgical Site 07/09/24 Left Tibial (Active)   Number of days: 120            Circumferential volume measures:           No data to display                Labs:    I personally reviewed the following lab results today and those on care everywhere    CRP Inflammation   Date Value Ref Range Status   11/08/2019 22.2 (H) 0.0 - 8.0 mg/L Final      Sed Rate   Date Value Ref Range Status   11/08/2019 33 (H) 0 - 20 mm/h Final     Erythrocyte Sedimentation Rate   Date Value Ref Range Status   09/25/2023 81 (H) 0 - 20 mm/hr Final      Last Renal Panel:  Sodium   Date Value Ref Range Status   08/29/2024 132 (L) 135 - 145 mmol/L Final   12/04/2020 138 133 - 144 mmol/L Final     Potassium   Date Value Ref Range Status   08/29/2024 4.7 3.4 - 5.3 mmol/L Final   08/09/2021 4.9 3.4 - 5.3 mmol/L Final   12/04/2020 4.5 3.4 - 5.3 mmol/L Final     Chloride   Date Value Ref Range Status   08/29/2024 94 (L) 98 - 107 mmol/L Final   07/23/2021 101 98 - 107 mmol/L Final     Comment:     Last Lab Result: LDL Cholesterol Calculated (mg/dL   12/04/2020 103 94 - 109 mmol/L Final     Carbon Dioxide   Date Value Ref Range Status   12/04/2020 28 20 - 32 mmol/L Final     Carbon Dioxide (CO2)   Date Value Ref Range Status   08/29/2024 26 22 - 29 mmol/L Final   07/23/2021 26 22 - 31 mmol/L Final     Comment:     Last Lab Result: LDL Cholesterol Calculated (mg/dL     Anion Gap   Date Value Ref Range Status   08/29/2024 12 7 - 15 mmol/L Final   07/23/2021 12 5 - 18 mmol/L Final   12/04/2020 7 3 - 14 mmol/L Final     Glucose   Date Value Ref Range Status   08/29/2024 143 (H) 70 - 99 mg/dL Final   07/23/2021 137 (H) 70 - 125 mg/dL Final     Comment:     Last Lab Result: LDL Cholesterol Calculated (mg/dL   12/04/2020 269 (H) 70 - 99 mg/dL Final     Comment:     Fasting specimen     GLUCOSE BY METER POCT   Date  "Value Ref Range Status   07/09/2024 70 70 - 99 mg/dL Final     Urea Nitrogen   Date Value Ref Range Status   08/29/2024 25.2 (H) 8.0 - 23.0 mg/dL Final   07/23/2021 18 8 - 22 mg/dL Final     Comment:     Last Lab Result: LDL Cholesterol Calculated (mg/dL   12/04/2020 25 7 - 30 mg/dL Final     Creatinine   Date Value Ref Range Status   08/29/2024 0.69 0.67 - 1.17 mg/dL Final   12/04/2020 0.92 0.66 - 1.25 mg/dL Final     GFR Estimate   Date Value Ref Range Status   08/29/2024 >90 >60 mL/min/1.73m2 Final     Comment:     eGFR calculated using 2021 CKD-EPI equation.   12/04/2020 >90 >60 mL/min/[1.73_m2] Final     Comment:     Non  GFR Calc  Starting 12/18/2018, serum creatinine based estimated GFR (eGFR) will be   calculated using the Chronic Kidney Disease Epidemiology Collaboration   (CKD-EPI) equation.       Calcium   Date Value Ref Range Status   08/29/2024 9.5 8.8 - 10.4 mg/dL Final     Comment:     Reference intervals for this test were updated on 7/16/2024 to reflect our healthy population more accurately. There may be differences in the flagging of prior results with similar values performed with this method. Those prior results can be interpreted in the context of the updated reference intervals.   12/04/2020 8.8 8.5 - 10.1 mg/dL Final     Albumin   Date Value Ref Range Status   09/25/2023 3.2 (L) 3.5 - 5.2 g/dL Final   07/23/2021 4.1 3.5 - 5.0 g/dL Final     Comment:     Last Lab Result: LDL Cholesterol Calculated (mg/dL   12/04/2020 3.4 3.4 - 5.0 g/dL Final      Lab Results   Component Value Date    WBC 14.4 09/20/2024    WBC 11.1 11/08/2019     Lab Results   Component Value Date    RBC 4.46 09/20/2024    RBC 4.68 11/08/2019     Lab Results   Component Value Date    HGB 11.5 09/20/2024    HGB 12.8 11/08/2019     Lab Results   Component Value Date    HCT 35.7 09/20/2024    HCT 40.0 11/08/2019     No components found for: \"MCT\"  Lab Results   Component Value Date    MCV 80 09/20/2024    MCV 86 " "11/08/2019     Lab Results   Component Value Date    MCH 25.8 09/20/2024    MCH 27.4 11/08/2019     Lab Results   Component Value Date    MCHC 32.2 09/20/2024    MCHC 32.0 11/08/2019     Lab Results   Component Value Date    RDW 14.5 09/20/2024    RDW 13.8 11/08/2019     Lab Results   Component Value Date     09/20/2024     11/08/2019      Lab Results   Component Value Date    A1C 5.0 08/29/2024    A1C 6.7 09/25/2023    A1C 6.6 08/01/2022    A1C 7.5 03/25/2022    A1C 7.3 07/23/2021    A1C 9.6 12/04/2020    A1C 9.0 07/21/2020    A1C 7.8 11/08/2019    A1C 6.5 11/29/2018    A1C 7.3 06/28/2017      TSH   Date Value Ref Range Status   11/29/2018 3.07 0.40 - 4.00 mU/L Final      No results found for: \"VITDT\"                Impression:  Encounter Diagnoses   Name Primary?    Status post below-knee amputation of left lower extremity (H) Yes    Type 2 diabetes mellitus with other skin complication, unspecified whether long term insulin use (H)     Morbid obesity (H)     MRSA infection     Postoperative wound dehiscence, initial encounter           11/6/2024 left stump         8/30/24 left stump    Are any of these ulcers new today: No; Location: na    Assessment/Plan:          1. Debridement: Nursing staff removed the old dressing and cleanse the wound(s) with specified solution. After discussion of risk factors and verbal consent was obtained 2% Lidocaine HCL jelly was applied, under clean conditions, the left stump ulceration(s) were debrided using currette. Devitalized and nonviable tissue, along with any fibrin and slough, was removed to improve granulation tissue formation, stimulate wound healing, decrease overall bacteria load, disrupt biofilm formation and decrease edge senescence.  Total excisional debridement was 75.4 sq cm from the epidermis/dermis area and into the subcutaneous tissue with a depth of 0.5 cm.   Ulcers were improved afterwards and .  Measures were as noted on the flow " sheet.       2.  Ulcer treatment: ulcer treatment will include irrigation and dressings to promote autolytic debridement which will include:will continue to graft a portion of the ulcer with epfix; then endoform; then aquacel ag; then abd; then rolled gauze.   If for some reason the patient is not able to get their dressing(s) changed as outlined above (due to illness, lack of supplies, lack of help) please do the following: remove old, soiled dressings; wash the ulcers with saline; pat dry; apply ABD pad or other absorbant pad and secure with rolled gauze; avoid tape directly on your skin; patient instructed to call the clinic as soon as possible to let us know what the current issues are in receiving ulcer care.         Due to the slow healing rate of the ulcer and the diagnosis of surgical non-healing ulcer; complicated by diabetes and Peripheral Neuropathy and is free of infection and osteomyelitis and has adequate blood flow for healing.   After risks were reviewed including infection, allergic reaction and scarring consent was received the left leg Diabetic surgical ulceration(s) was/ were debrided with clean technique.  curette used to remove slough and fibrin deposition along with devitalized tissue.  This was to improve granulation tissue formation, stimulate wound healing, decrease biofilm formation, decrease bacterial load, decrease senescent edges and prepare for bioengineered graft placement.  A total of 75.4square centimeters was debrided from the from the epidermis/dermis area and into the subcutaneous tissue. Epifix was recommended and consent was obtained for the application.  This is application # 2.       For the procedure, the patient was placed in a comfortable position with the wound bed exposed. Epifix application is being performed in a staged procedure in an attempt to achieve rapid wound closure. The Tissue Identification Number is [id number] with an expiration date of 2029-05-01 as  indicated on the consent form dated 11/6/2024      The graft size is 4x4.5 , total wound size is 75.4 with total applied area was 18sq cm and the estimated wastage was 0; any leftover product was applied in a layered fashion.   The graft was prepared and applied following the  guidelines.  The graft was covered with endoform then with a non-adherent contact layer and secured with sorbact .  Then,  a secondary aquacel ag; abd; rolled gauze dressing was applied.  The patient will continue to utilize short stretch for compression.  Patient tolerated the procedure without any complications and was educated and expressed an understanding of the proper wound treatment until their follow up.           3. Edema: continue with short stretch and elevation. The compression wraps were applied today in clinic.     If a 2 layer or 4 layer compression wrap is being used; these are safe to have on for ONLY 7 days. If for some reason the patient is not able to get the wrap(s) changed (due to illness; lack of supplies, lack of help, lack of transportation) please do the following: unwrap the old 2 or 4 layer compression wrap; avoid using scissors as you could cut your skin and cause ulcers; use tubular compression when available. Call to reschedule your home care or clinic visit appointment as soon as possible.  Stable            4. Nutrition: focus on diabetes management           5. Offloading: no prosthesis wearing or fitting until well healed          6. Wound Etiology: surgical complicated by diabetes     Patient will follow up with clinic in 1  weeks for reevaluation. They were instructed to call the clinic sooner with any signs or symptoms of infection or any further questions/concerns. Answered all questions.          Joyce Andres DNP, RN, CNP, CWOCN, CFCN, CLT  [facility]   [phone]        This note was electronically signed by Joyce Andres NP

## 2024-11-13 ENCOUNTER — OFFICE VISIT (OUTPATIENT)
Dept: VASCULAR SURGERY | Facility: CLINIC | Age: 64
End: 2024-11-13
Payer: COMMERCIAL

## 2024-11-13 DIAGNOSIS — A49.02 MRSA INFECTION: ICD-10-CM

## 2024-11-13 DIAGNOSIS — T81.31XA POSTOPERATIVE WOUND DEHISCENCE, INITIAL ENCOUNTER: ICD-10-CM

## 2024-11-13 DIAGNOSIS — E11.628 TYPE 2 DIABETES MELLITUS WITH OTHER SKIN COMPLICATION, UNSPECIFIED WHETHER LONG TERM INSULIN USE (H): ICD-10-CM

## 2024-11-13 DIAGNOSIS — E66.01 MORBID OBESITY, UNSPECIFIED OBESITY TYPE (H): ICD-10-CM

## 2024-11-13 DIAGNOSIS — Z89.512 STATUS POST BELOW-KNEE AMPUTATION OF LEFT LOWER EXTREMITY (H): Primary | ICD-10-CM

## 2024-11-13 PROCEDURE — 15271 SKIN SUB GRAFT TRNK/ARM/LEG: CPT

## 2024-11-13 PROCEDURE — 15002 WOUND PREP TRK/ARM/LEG: CPT

## 2024-11-13 PROCEDURE — 99215 OFFICE O/P EST HI 40 MIN: CPT

## 2024-11-13 RX ADMIN — LIDOCAINE: 50 OINTMENT TOPICAL at 14:44

## 2024-11-13 NOTE — PROGRESS NOTES
Follow up Vascular Visit       Date of Service:11/13/24      Chief Complaint: left stump non-healing surgical site      Pt returns to [facility] with regards to their left stump non-healing surgical site.  They arrive today with wife. They are currently using epifix to portion of the wound; collagen; sorbact; aquacel ag, abd; rolled gauze to the wounds. This is being done by staff at the clinic weekly. They are using short stretch for compression. They are feeling well today. Denies fevers, chills. No shortness of breath.     Allergies:   Allergies   Allergen Reactions    Bees     Sulfamethoxazole Swelling     throat       Medications:   Current Outpatient Medications:     acetaminophen (TYLENOL) 325 MG tablet, Take 2 tablets (650 mg) by mouth every 4 hours as needed for other (mild pain), Disp: 100 tablet, Rfl: 0    amLODIPine (NORVASC) 2.5 MG tablet, TAKE ONE TABLET BY MOUTH ONCE DAILY, Disp: 90 tablet, Rfl: 3    aspirin 81 MG EC tablet, Take 1 tablet (81 mg) by mouth 2 times daily, Disp: 60 tablet, Rfl: 0    atorvastatin (LIPITOR) 20 MG tablet, TAKE ONE TABLET BY MOUTH ONCE DAILY IN THE EVENING, Disp: 90 tablet, Rfl: 3    BD PEN NEEDLE YENNY 2ND GEN 32G X 4 MM miscellaneous, USE 1 PEN NEEDLES DAILY OR AS DIRECTED., Disp: 100 each, Rfl: 2    blood glucose (ACCU-CHEK GANGA) test strip, Use to test blood sugar 1 times daily or as directed., Disp: 100 strip, Rfl: 3    blood glucose monitoring (ACCU-CHEK GANGA PLUS) meter device kit, USE TO TEST BLOOD SUGAR 1 TIMES DAILY OR AS DIRECTED., Disp: , Rfl: 0    blood glucose monitoring (ACCU-CHEK MULTICLIX) lancets, USE TO TEST BLOOD SUGAR 1 TIMES DAILY OR AS DIRECTED., Disp: , Rfl: 3    EPINEPHrine (ANY BX GENERIC EQUIV) 0.3 MG/0.3ML injection 2-pack, INJECT 0.3MG DOSE INTRAMUSCULARLY INTO THE OUTER THIGH MUSCLE ONCE AS DIRECTED AS NEEDED FOR ANAPHYLAXIS, Disp: 2 each, Rfl: 1    hydrochlorothiazide (HYDRODIURIL) 25 MG tablet, TAKE ONE TABLET (25  mg)  BY MOUTH ONCE DAILY, Disp: 90 tablet, Rfl: 3    Insulin Glargine-yfgn 100 UNIT/ML SOPN, Inject 64 Units subcutaneously at bedtime., Disp: 60 mL, Rfl: 3    losartan (COZAAR) 100 MG tablet, TAKE ONE TABLET BY MOUTH ONCE DAILY, Disp: 90 tablet, Rfl: 3    metFORMIN (GLUCOPHAGE XR) 500 MG 24 hr tablet, TAKE TWO TABLETS BY MOUTH TWICE DAILY WITH MEALS, Disp: 360 tablet, Rfl: 3    metoprolol succinate ER (TOPROL XL) 200 MG 24 hr tablet, TAKE ONE TABLET BY MOUTH ONCE DAILY, Disp: 90 tablet, Rfl: 3    mupirocin (BACTROBAN) 2 % external ointment, Apply topically 3 times daily., Disp: 30 g, Rfl: 3    Current Facility-Administered Medications:     lidocaine (XYLOCAINE) 5 % ointment, , Topical, Daily PRN, Joyce Andres, NP, Given at 11/13/24 1444    lidocaine (XYLOCAINE) 5 % ointment, , Topical, Daily PRN, Joyce Andres, NP, Given at 10/09/24 1312    lidocaine (XYLOCAINE) 5 % ointment, , Topical, Daily PRN, Joyce Andres, NP    History:   Past Medical History:   Diagnosis Date    Diabetes (H) 1-2017    Hypertension 1-2017    Septic arthritis of right foot (H) 9/26/2023       Physical Exam:    There were no vitals taken for this visit.    General:  Patient presents to clinic in no apparent distress.  Head: normocephalic atraumatic  Psychiatric:  Alert and oriented x3.   Respiratory: unlabored breathing; no cough  Integumentary:  Skin is uniformly warm, dry and pink.    Ulcer #1 Location: left stump  Size: 6L x 12.5W x 0.4 depth. no sinus tract present, Wound base: slough; underneath 95% granulation tissue  no undermining present. Ulcer is full thickness. There is moderate drainage. Periwound: no denudement, erythema, induration, maceration or warmth.  Superficial skin tear (1.5 x 3.5 x 0.1 cm) on proximal aspect suspect from tape, per pt gauze was rubbing on the area.     VASC Wound Left bka (Active)   Pre Size Length 6 11/13/24 1400   Pre Size Width 12.5 11/13/24 1400   Pre Size Depth 0.4 11/13/24 1400   Pre Total Sq cm 75  11/13/24 1400   Post Size Length 5.8 11/06/24 1200   Post Size Width 13 11/06/24 1200   Post Size Depth 0.5 11/06/24 1200   Post Total Sq cm 75.4 11/06/24 1200   Tunneling 4.5cm at 12 o'clock 08/14/24 0800   Number of days: 91       Incision/Surgical Site 07/09/24 Left Tibial (Active)   Number of days: 127        Circumferential volume measures:           No data to display                Labs:    I personally reviewed the following lab results today and those on care everywhere    CRP Inflammation   Date Value Ref Range Status   11/08/2019 22.2 (H) 0.0 - 8.0 mg/L Final      Sed Rate   Date Value Ref Range Status   11/08/2019 33 (H) 0 - 20 mm/h Final     Erythrocyte Sedimentation Rate   Date Value Ref Range Status   09/25/2023 81 (H) 0 - 20 mm/hr Final      Last Renal Panel:  Sodium   Date Value Ref Range Status   08/29/2024 132 (L) 135 - 145 mmol/L Final   12/04/2020 138 133 - 144 mmol/L Final     Potassium   Date Value Ref Range Status   08/29/2024 4.7 3.4 - 5.3 mmol/L Final   08/09/2021 4.9 3.4 - 5.3 mmol/L Final   12/04/2020 4.5 3.4 - 5.3 mmol/L Final     Chloride   Date Value Ref Range Status   08/29/2024 94 (L) 98 - 107 mmol/L Final   07/23/2021 101 98 - 107 mmol/L Final     Comment:     Last Lab Result: LDL Cholesterol Calculated (mg/dL   12/04/2020 103 94 - 109 mmol/L Final     Carbon Dioxide   Date Value Ref Range Status   12/04/2020 28 20 - 32 mmol/L Final     Carbon Dioxide (CO2)   Date Value Ref Range Status   08/29/2024 26 22 - 29 mmol/L Final   07/23/2021 26 22 - 31 mmol/L Final     Comment:     Last Lab Result: LDL Cholesterol Calculated (mg/dL     Anion Gap   Date Value Ref Range Status   08/29/2024 12 7 - 15 mmol/L Final   07/23/2021 12 5 - 18 mmol/L Final   12/04/2020 7 3 - 14 mmol/L Final     Glucose   Date Value Ref Range Status   08/29/2024 143 (H) 70 - 99 mg/dL Final   07/23/2021 137 (H) 70 - 125 mg/dL Final     Comment:     Last Lab Result: LDL Cholesterol Calculated (mg/dL   12/04/2020 269  (H) 70 - 99 mg/dL Final     Comment:     Fasting specimen     GLUCOSE BY METER POCT   Date Value Ref Range Status   07/09/2024 70 70 - 99 mg/dL Final     Urea Nitrogen   Date Value Ref Range Status   08/29/2024 25.2 (H) 8.0 - 23.0 mg/dL Final   07/23/2021 18 8 - 22 mg/dL Final     Comment:     Last Lab Result: LDL Cholesterol Calculated (mg/dL   12/04/2020 25 7 - 30 mg/dL Final     Creatinine   Date Value Ref Range Status   08/29/2024 0.69 0.67 - 1.17 mg/dL Final   12/04/2020 0.92 0.66 - 1.25 mg/dL Final     GFR Estimate   Date Value Ref Range Status   08/29/2024 >90 >60 mL/min/1.73m2 Final     Comment:     eGFR calculated using 2021 CKD-EPI equation.   12/04/2020 >90 >60 mL/min/[1.73_m2] Final     Comment:     Non  GFR Calc  Starting 12/18/2018, serum creatinine based estimated GFR (eGFR) will be   calculated using the Chronic Kidney Disease Epidemiology Collaboration   (CKD-EPI) equation.       Calcium   Date Value Ref Range Status   08/29/2024 9.5 8.8 - 10.4 mg/dL Final     Comment:     Reference intervals for this test were updated on 7/16/2024 to reflect our healthy population more accurately. There may be differences in the flagging of prior results with similar values performed with this method. Those prior results can be interpreted in the context of the updated reference intervals.   12/04/2020 8.8 8.5 - 10.1 mg/dL Final     Albumin   Date Value Ref Range Status   09/25/2023 3.2 (L) 3.5 - 5.2 g/dL Final   07/23/2021 4.1 3.5 - 5.0 g/dL Final     Comment:     Last Lab Result: LDL Cholesterol Calculated (mg/dL   12/04/2020 3.4 3.4 - 5.0 g/dL Final      Lab Results   Component Value Date    WBC 14.4 09/20/2024    WBC 11.1 11/08/2019     Lab Results   Component Value Date    RBC 4.46 09/20/2024    RBC 4.68 11/08/2019     Lab Results   Component Value Date    HGB 11.5 09/20/2024    HGB 12.8 11/08/2019     Lab Results   Component Value Date    HCT 35.7 09/20/2024    HCT 40.0 11/08/2019     No  "components found for: \"MCT\"  Lab Results   Component Value Date    MCV 80 09/20/2024    MCV 86 11/08/2019     Lab Results   Component Value Date    MCH 25.8 09/20/2024    MCH 27.4 11/08/2019     Lab Results   Component Value Date    MCHC 32.2 09/20/2024    MCHC 32.0 11/08/2019     Lab Results   Component Value Date    RDW 14.5 09/20/2024    RDW 13.8 11/08/2019     Lab Results   Component Value Date     09/20/2024     11/08/2019      Lab Results   Component Value Date    A1C 5.0 08/29/2024    A1C 6.7 09/25/2023    A1C 6.6 08/01/2022    A1C 7.5 03/25/2022    A1C 7.3 07/23/2021    A1C 9.6 12/04/2020    A1C 9.0 07/21/2020    A1C 7.8 11/08/2019    A1C 6.5 11/29/2018    A1C 7.3 06/28/2017      TSH   Date Value Ref Range Status   11/29/2018 3.07 0.40 - 4.00 mU/L Final      No results found for: \"VITDT\"        Impression:  Encounter Diagnoses   Name Primary?    Status post below-knee amputation of left lower extremity (H) Yes    Type 2 diabetes mellitus with other skin complication, unspecified whether long term insulin use (H)     MRSA infection     Postoperative wound dehiscence, initial encounter     Morbid obesity, unspecified obesity type (H)           11/6/2024 left stump                                            11/13 8/30/24 left stump      Are any of these ulcers new today: Yes; Location: L) stump (proximal aspect)- skin tear     Assessment/Plan:          1. Debridement: Nursing staff removed the old dressing and cleanse the wound(s) with specified solution. After discussion of risk factors and verbal consent was obtained 2% Lidocaine HCL jelly was applied, under clean conditions, the left stump ulceration(s) were debrided using currette. Devitalized and nonviable tissue, along with any fibrin and slough, was removed to improve granulation tissue formation, stimulate wound healing, decrease overall bacteria load, disrupt biofilm formation and decrease edge senescence.  Total excisional " debridement was 75 sq cm from the epidermis/dermis area and into the subcutaneous tissue with a depth of 0.4 cm.   Ulcers were improved afterwards and .  Measures were as noted on the flow sheet.       2.  Ulcer treatment: ulcer treatment will include irrigation and dressings to promote autolytic debridement which will include: will continue to graft a portion of the ulcer with epfix; then endoform on rest; sorbact/adaptic then aquacel ag double layer; then abd; then rolled gauze.   If for some reason the patient is not able to get their dressing(s) changed as outlined above (due to illness, lack of supplies, lack of help) please do the following: remove old, soiled dressings; wash the ulcers with saline; pat dry; apply ABD pad or other absorbant pad and secure with rolled gauze; avoid tape directly on your skin; patient instructed to call the clinic as soon as possible to let us know what the current issues are in receiving ulcer care.     Due to the slow healing rate of the ulcer and the diagnosis of surgical non-healing ulcer; complicated by diabetes and Peripheral Neuropathy and is free of infection and osteomyelitis and has adequate blood flow for healing.   After risks were reviewed including infection, allergic reaction and scarring consent was received the left leg Diabetic surgical ulceration(s) was/ were debrided with clean technique.  curette used to remove slough and fibrin deposition along with devitalized tissue. This was to improve granulation tissue formation, stimulate wound healing, decrease biofilm formation, decrease bacterial load, decrease senescent edges and prepare for bioengineered graft placement.  A total of 75 square centimeters was debrided from the from the epidermis/dermis area and into the subcutaneous tissue. Epifix was recommended and consent was obtained for the application. This is application # 3.       For the procedure, the patient was placed in a comfortable position  with the wound bed exposed. Epifix application is being performed in a staged procedure in an attempt to achieve rapid wound closure. The Tissue Identification Number is [id number] with an expiration date of 2029-04-01 as indicated on the consent form dated 11/13/2024      The graft size is 4x4.5 , total wound size is 75 sqcm with total applied area was 18sq cm and the estimated wastage was 0; any leftover product was applied in a layered fashion.   The graft was prepared and applied following the  guidelines.  The graft was covered with endoform then with a non-adherent contact layer and secured with sorbact .  Then, a secondary aquacel ag; abd; rolled gauze dressing was applied.  The patient will continue to utilize short stretch for compression.  Patient tolerated the procedure without any complications and was educated and expressed an understanding of the proper wound treatment until their follow up.           3. Edema: continue with short stretch and elevation. The compression wraps were applied today in clinic.     If a 2 layer or 4 layer compression wrap is being used; these are safe to have on for ONLY 7 days. If for some reason the patient is not able to get the wrap(s) changed (due to illness; lack of supplies, lack of help, lack of transportation) please do the following: unwrap the old 2 or 4 layer compression wrap; avoid using scissors as you could cut your skin and cause ulcers; use tubular compression when available. Call to reschedule your home care or clinic visit appointment as soon as possible.  Stable            4. Nutrition: focus on diabetes management           5. Offloading: no prosthesis wearing or fitting until wound is completely healed          6. Wound Etiology: surgical complicated by diabetes     Patient will follow up in the clinic in 1  weeks for reevaluation. They were instructed to call the clinic sooner with any signs or symptoms of infection or any further  questions/concerns. Answered all questions.

## 2024-11-13 NOTE — PATIENT INSTRUCTIONS
Today your wound was grafted with epifix  Endoform collagen was applied to the portion (right side) of the wound not covered by epifix  Covered by sorbact  Secured with steristrips  Covered with x2 layers of aquacel ag  ABD  Rolled gauze  Compression sleeve and wrap     Change the aquacel ag and abd pad if there is strikethrough or bleeding through the dressing  Be very cautious that you don't remove the graft area     Do not get the wound wet in the shower     Continue to focus on tight glucose control and increase protein intake     Keep pressure off the wound area

## 2024-11-13 NOTE — PROGRESS NOTES
Clinic Administered Medication Documentation    Patient was given Lidocaine 5% prior to wound debridement. Prior to medication administration, verified patient's identity using patient's name and date of birth.    Kaylan Conklin RN    Compression Applied to Left  Tubigrip Size F and short stretch

## 2024-11-20 ENCOUNTER — OFFICE VISIT (OUTPATIENT)
Dept: VASCULAR SURGERY | Facility: CLINIC | Age: 64
End: 2024-11-20
Attending: NURSE PRACTITIONER
Payer: COMMERCIAL

## 2024-11-20 VITALS — HEART RATE: 63 BPM | OXYGEN SATURATION: 99 % | TEMPERATURE: 98.3 F

## 2024-11-20 DIAGNOSIS — Z89.512 STATUS POST BELOW-KNEE AMPUTATION OF LEFT LOWER EXTREMITY (H): ICD-10-CM

## 2024-11-20 DIAGNOSIS — T81.31XA POSTOPERATIVE WOUND DEHISCENCE, INITIAL ENCOUNTER: Primary | ICD-10-CM

## 2024-11-20 PROCEDURE — 15002 WOUND PREP TRK/ARM/LEG: CPT

## 2024-11-20 PROCEDURE — 15271 SKIN SUB GRAFT TRNK/ARM/LEG: CPT

## 2024-11-20 PROCEDURE — 99215 OFFICE O/P EST HI 40 MIN: CPT

## 2024-11-20 RX ORDER — LIDOCAINE 50 MG/G
OINTMENT TOPICAL DAILY PRN
Status: DISCONTINUED | OUTPATIENT
Start: 2024-11-20 | End: 2024-11-20

## 2024-11-20 RX ADMIN — LIDOCAINE: 50 OINTMENT TOPICAL at 11:28

## 2024-11-20 ASSESSMENT — PAIN SCALES - GENERAL: PAINLEVEL_OUTOF10: NO PAIN (0)

## 2024-11-20 NOTE — PROGRESS NOTES
Follow up Vascular Visit       Date of Service:11/20/24      Chief Complaint: left stump non-healing surgical site      Pt returns to [facility] with regards to their left stump non-healing surgical site.  They arrive today with wife. They are currently using epifix to portion of the wound; collagen; sorbact; aquacel ag, abd; rolled gauze to the wounds. This is being done by staff in the clinic weekly. They are using tubi and short stretch for compression. They are feeling well today. Denies fevers, chills. No shortness of breath.     Allergies:   Allergies   Allergen Reactions    Bees     Sulfamethoxazole Swelling     throat       Medications:   Current Outpatient Medications:     acetaminophen (TYLENOL) 325 MG tablet, Take 2 tablets (650 mg) by mouth every 4 hours as needed for other (mild pain), Disp: 100 tablet, Rfl: 0    amLODIPine (NORVASC) 2.5 MG tablet, TAKE ONE TABLET BY MOUTH ONCE DAILY, Disp: 90 tablet, Rfl: 3    aspirin 81 MG EC tablet, Take 1 tablet (81 mg) by mouth 2 times daily, Disp: 60 tablet, Rfl: 0    atorvastatin (LIPITOR) 20 MG tablet, TAKE ONE TABLET BY MOUTH ONCE DAILY IN THE EVENING, Disp: 90 tablet, Rfl: 3    BD PEN NEEDLE YENNY 2ND GEN 32G X 4 MM miscellaneous, USE 1 PEN NEEDLES DAILY OR AS DIRECTED., Disp: 100 each, Rfl: 2    blood glucose (ACCU-CHEK GANGA) test strip, Use to test blood sugar 1 times daily or as directed., Disp: 100 strip, Rfl: 3    blood glucose monitoring (ACCU-CHEK GANGA PLUS) meter device kit, USE TO TEST BLOOD SUGAR 1 TIMES DAILY OR AS DIRECTED., Disp: , Rfl: 0    blood glucose monitoring (ACCU-CHEK MULTICLIX) lancets, USE TO TEST BLOOD SUGAR 1 TIMES DAILY OR AS DIRECTED., Disp: , Rfl: 3    EPINEPHrine (ANY BX GENERIC EQUIV) 0.3 MG/0.3ML injection 2-pack, INJECT 0.3MG DOSE INTRAMUSCULARLY INTO THE OUTER THIGH MUSCLE ONCE AS DIRECTED AS NEEDED FOR ANAPHYLAXIS, Disp: 2 each, Rfl: 1    hydrochlorothiazide (HYDRODIURIL) 25 MG tablet, TAKE ONE  TABLET (25 mg)  BY MOUTH ONCE DAILY, Disp: 90 tablet, Rfl: 3    Insulin Glargine-yfgn 100 UNIT/ML SOPN, Inject 64 Units subcutaneously at bedtime., Disp: 60 mL, Rfl: 3    losartan (COZAAR) 100 MG tablet, TAKE ONE TABLET BY MOUTH ONCE DAILY, Disp: 90 tablet, Rfl: 3    metFORMIN (GLUCOPHAGE XR) 500 MG 24 hr tablet, TAKE TWO TABLETS BY MOUTH TWICE DAILY WITH MEALS, Disp: 360 tablet, Rfl: 3    metoprolol succinate ER (TOPROL XL) 200 MG 24 hr tablet, TAKE ONE TABLET BY MOUTH ONCE DAILY, Disp: 90 tablet, Rfl: 3    mupirocin (BACTROBAN) 2 % external ointment, Apply topically 3 times daily., Disp: 30 g, Rfl: 3    Current Facility-Administered Medications:     lidocaine (XYLOCAINE) 5 % ointment, , Topical, Daily PRN, Joyce Andres, NP, Given at 11/13/24 1444    lidocaine (XYLOCAINE) 5 % ointment, , Topical, Daily PRN, Joyce Andres, NP, Given at 10/09/24 1312    lidocaine (XYLOCAINE) 5 % ointment, , Topical, Daily PRN, Joyce Andres, NP    History:   Past Medical History:   Diagnosis Date    Diabetes (H) 1-2017    Hypertension 1-2017    Septic arthritis of right foot (H) 9/26/2023       Physical Exam:    There were no vitals taken for this visit.    General:  Patient presents to clinic in no apparent distress.  Head: normocephalic atraumatic  Psychiatric:  Alert and oriented x3.   Respiratory: unlabored breathing; no cough  Integumentary:  Skin is uniformly warm, dry and pink.    Ulcer #1 Location: left stump  Size: 5.5L x 11W x 0.3 depth. no sinus tract present, Wound base: slough; underneath 95% granulation tissue  no undermining present. Ulcer is full thickness. There is moderate drainage. Periwound: no denudement, erythema, induration, maceration or warmth.  Superficial skin tear (1.5 x 3.5 x 0.1 cm) on proximal aspect suspect from tape, per pt gauze was rubbing on the area.     VASC Wound Left bka (Active)   Pre Size Length 5.5 11/20/24 0900   Pre Size Width 11 11/20/24 0900   Pre Size Depth 0.3 11/20/24 0900   Pre  Total Sq cm 60.5 11/20/24 0900   Post Size Length 5.8 11/06/24 1200   Post Size Width 13 11/06/24 1200   Post Size Depth 0.5 11/06/24 1200   Post Total Sq cm 75.4 11/06/24 1200   Tunneling 4.5cm at 12 o'clock 08/14/24 0800   Number of days: 98       Incision/Surgical Site 07/09/24 Left Tibial (Active)   Number of days: 134        Circumferential volume measures:           No data to display                Labs:    I personally reviewed the following lab results today and those on care everywhere    CRP Inflammation   Date Value Ref Range Status   11/08/2019 22.2 (H) 0.0 - 8.0 mg/L Final      Sed Rate   Date Value Ref Range Status   11/08/2019 33 (H) 0 - 20 mm/h Final     Erythrocyte Sedimentation Rate   Date Value Ref Range Status   09/25/2023 81 (H) 0 - 20 mm/hr Final      Last Renal Panel:  Sodium   Date Value Ref Range Status   08/29/2024 132 (L) 135 - 145 mmol/L Final   12/04/2020 138 133 - 144 mmol/L Final     Potassium   Date Value Ref Range Status   08/29/2024 4.7 3.4 - 5.3 mmol/L Final   08/09/2021 4.9 3.4 - 5.3 mmol/L Final   12/04/2020 4.5 3.4 - 5.3 mmol/L Final     Chloride   Date Value Ref Range Status   08/29/2024 94 (L) 98 - 107 mmol/L Final   07/23/2021 101 98 - 107 mmol/L Final     Comment:     Last Lab Result: LDL Cholesterol Calculated (mg/dL   12/04/2020 103 94 - 109 mmol/L Final     Carbon Dioxide   Date Value Ref Range Status   12/04/2020 28 20 - 32 mmol/L Final     Carbon Dioxide (CO2)   Date Value Ref Range Status   08/29/2024 26 22 - 29 mmol/L Final   07/23/2021 26 22 - 31 mmol/L Final     Comment:     Last Lab Result: LDL Cholesterol Calculated (mg/dL     Anion Gap   Date Value Ref Range Status   08/29/2024 12 7 - 15 mmol/L Final   07/23/2021 12 5 - 18 mmol/L Final   12/04/2020 7 3 - 14 mmol/L Final     Glucose   Date Value Ref Range Status   08/29/2024 143 (H) 70 - 99 mg/dL Final   07/23/2021 137 (H) 70 - 125 mg/dL Final     Comment:     Last Lab Result: LDL Cholesterol Calculated (mg/dL    12/04/2020 269 (H) 70 - 99 mg/dL Final     Comment:     Fasting specimen     GLUCOSE BY METER POCT   Date Value Ref Range Status   07/09/2024 70 70 - 99 mg/dL Final     Urea Nitrogen   Date Value Ref Range Status   08/29/2024 25.2 (H) 8.0 - 23.0 mg/dL Final   07/23/2021 18 8 - 22 mg/dL Final     Comment:     Last Lab Result: LDL Cholesterol Calculated (mg/dL   12/04/2020 25 7 - 30 mg/dL Final     Creatinine   Date Value Ref Range Status   08/29/2024 0.69 0.67 - 1.17 mg/dL Final   12/04/2020 0.92 0.66 - 1.25 mg/dL Final     GFR Estimate   Date Value Ref Range Status   08/29/2024 >90 >60 mL/min/1.73m2 Final     Comment:     eGFR calculated using 2021 CKD-EPI equation.   12/04/2020 >90 >60 mL/min/[1.73_m2] Final     Comment:     Non  GFR Calc  Starting 12/18/2018, serum creatinine based estimated GFR (eGFR) will be   calculated using the Chronic Kidney Disease Epidemiology Collaboration   (CKD-EPI) equation.       Calcium   Date Value Ref Range Status   08/29/2024 9.5 8.8 - 10.4 mg/dL Final     Comment:     Reference intervals for this test were updated on 7/16/2024 to reflect our healthy population more accurately. There may be differences in the flagging of prior results with similar values performed with this method. Those prior results can be interpreted in the context of the updated reference intervals.   12/04/2020 8.8 8.5 - 10.1 mg/dL Final     Albumin   Date Value Ref Range Status   09/25/2023 3.2 (L) 3.5 - 5.2 g/dL Final   07/23/2021 4.1 3.5 - 5.0 g/dL Final     Comment:     Last Lab Result: LDL Cholesterol Calculated (mg/dL   12/04/2020 3.4 3.4 - 5.0 g/dL Final      Lab Results   Component Value Date    WBC 14.4 09/20/2024    WBC 11.1 11/08/2019     Lab Results   Component Value Date    RBC 4.46 09/20/2024    RBC 4.68 11/08/2019     Lab Results   Component Value Date    HGB 11.5 09/20/2024    HGB 12.8 11/08/2019     Lab Results   Component Value Date    HCT 35.7 09/20/2024    HCT 40.0  "11/08/2019     No components found for: \"MCT\"  Lab Results   Component Value Date    MCV 80 09/20/2024    MCV 86 11/08/2019     Lab Results   Component Value Date    MCH 25.8 09/20/2024    MCH 27.4 11/08/2019     Lab Results   Component Value Date    MCHC 32.2 09/20/2024    MCHC 32.0 11/08/2019     Lab Results   Component Value Date    RDW 14.5 09/20/2024    RDW 13.8 11/08/2019     Lab Results   Component Value Date     09/20/2024     11/08/2019      Lab Results   Component Value Date    A1C 5.0 08/29/2024    A1C 6.7 09/25/2023    A1C 6.6 08/01/2022    A1C 7.5 03/25/2022    A1C 7.3 07/23/2021    A1C 9.6 12/04/2020    A1C 9.0 07/21/2020    A1C 7.8 11/08/2019    A1C 6.5 11/29/2018    A1C 7.3 06/28/2017      TSH   Date Value Ref Range Status   11/29/2018 3.07 0.40 - 4.00 mU/L Final      No results found for: \"VITDT\"        Impression:  Encounter Diagnoses   Name Primary?    Postoperative wound dehiscence, initial encounter Yes    Status post below-knee amputation of left lower extremity (H)             11/6/2024 left stump                                            11/13 8/30/24 left stump                                                    11/20      Are any of these ulcers new today: No; Location: L) stump (proximal aspect)- skin tear     Assessment/Plan:          1. Debridement: Nursing staff removed the old dressing and cleanse the wound(s) with specified solution. After discussion of risk factors and verbal consent was obtained 2% Lidocaine HCL jelly was applied, under clean conditions, the left stump ulceration(s) were debrided using currette. Devitalized and nonviable tissue, along with any fibrin and slough, was removed to improve granulation tissue formation, stimulate wound healing, decrease overall bacteria load, disrupt biofilm formation and decrease edge senescence.  Total excisional debridement was 60.5 sq cm from the epidermis/dermis area and into the subcutaneous tissue with a depth " of 0.3 cm.   Ulcers were improved afterwards and .  Measures were as noted on the flow sheet.       2.  Ulcer treatment: ulcer treatment will include irrigation and dressings to promote autolytic debridement which will include: will continue to graft a portion of the ulcer with epfix; then endoform on rest; sorbact then aquacel ag double layer; then abd; then rolled gauze.   If for some reason the patient is not able to get their dressing(s) changed as outlined above (due to illness, lack of supplies, lack of help) please do the following: remove old, soiled dressings; wash the ulcers with saline; pat dry; apply ABD pad or other absorbant pad and secure with rolled gauze; avoid tape directly on your skin; patient instructed to call the clinic as soon as possible to let us know what the current issues are in receiving ulcer care.     Due to the slow healing rate of the ulcer and the diagnosis of surgical non-healing ulcer; complicated by diabetes and Peripheral Neuropathy and is free of infection and osteomyelitis and has adequate blood flow for healing.   After risks were reviewed including infection, allergic reaction and scarring consent was received the left leg Diabetic surgical ulceration(s) was/ were debrided with clean technique.  curette used to remove slough and fibrin deposition along with devitalized tissue. This was to improve granulation tissue formation, stimulate wound healing, decrease biofilm formation, decrease bacterial load, decrease senescent edges and prepare for bioengineered graft placement.  A total of 60.5 square centimeters was debrided from the from the epidermis/dermis area and into the subcutaneous tissue. Epifix was recommended and consent was obtained for the application. This is application # 4.       For the procedure, the patient was placed in a comfortable position with the wound bed exposed. Epifix application is being performed in a staged procedure in an attempt to  achieve rapid wound closure. The Tissue Identification Number is [id number] with an expiration date of 2029-05-01 as indicated on the consent form dated 11/20/2024      The graft size is 4x4.5 , total wound size is 60.5 sqcm with total applied area was 18sq cm and the estimated wastage was 0; any leftover product was applied in a layered fashion.   The graft was prepared and applied following the  guidelines. The graft was covered with endoform then secured  with a non-adherent contact layer with sorbact .  Then, a secondary aquacel ag; abd; rolled gauze dressing was applied.  The patient will continue to utilize short stretch for compression.  Patient tolerated the procedure without any complications and was educated and expressed an understanding of the proper wound treatment until their follow up.           3. Edema: continue with short stretch and elevation. The compression wraps were applied today in clinic.     If a 2 layer or 4 layer compression wrap is being used; these are safe to have on for ONLY 7 days. If for some reason the patient is not able to get the wrap(s) changed (due to illness; lack of supplies, lack of help, lack of transportation) please do the following: unwrap the old 2 or 4 layer compression wrap; avoid using scissors as you could cut your skin and cause ulcers; use tubular compression when available. Call to reschedule your home care or clinic visit appointment as soon as possible.  Stable            4. Nutrition: focus on diabetes management           5. Offloading: no prosthesis wearing or fitting until wound is completely healed          6. Wound Etiology: surgical complicated by diabetes     Patient will follow up in the clinic in 1  weeks for reevaluation. They were instructed to call the clinic sooner with any signs or symptoms of infection or any further questions/concerns. Answered all questions.

## 2024-11-20 NOTE — PROGRESS NOTES
Clinic Administered Medication Documentation    Patient was given Lidocaine 5% to open wounds prior to debridement. Prior to medication administration, verified patient's identity using patient's name and date of birth.    Lizette Espinoza LPN    {

## 2024-11-27 ENCOUNTER — OFFICE VISIT (OUTPATIENT)
Dept: VASCULAR SURGERY | Facility: CLINIC | Age: 64
End: 2024-11-27
Attending: NURSE PRACTITIONER
Payer: COMMERCIAL

## 2024-11-27 VITALS — HEART RATE: 60 BPM | DIASTOLIC BLOOD PRESSURE: 85 MMHG | SYSTOLIC BLOOD PRESSURE: 150 MMHG | OXYGEN SATURATION: 98 %

## 2024-11-27 DIAGNOSIS — E11.628 TYPE 2 DIABETES MELLITUS WITH OTHER SKIN COMPLICATION, UNSPECIFIED WHETHER LONG TERM INSULIN USE (H): ICD-10-CM

## 2024-11-27 DIAGNOSIS — Z89.512 STATUS POST BELOW-KNEE AMPUTATION OF LEFT LOWER EXTREMITY (H): ICD-10-CM

## 2024-11-27 DIAGNOSIS — T81.31XA POSTOPERATIVE WOUND DEHISCENCE, INITIAL ENCOUNTER: Primary | ICD-10-CM

## 2024-11-27 PROCEDURE — 15002 WOUND PREP TRK/ARM/LEG: CPT | Performed by: NURSE PRACTITIONER

## 2024-11-27 PROCEDURE — 15271 SKIN SUB GRAFT TRNK/ARM/LEG: CPT | Performed by: NURSE PRACTITIONER

## 2024-11-27 RX ADMIN — LIDOCAINE: 50 OINTMENT TOPICAL at 08:50

## 2024-11-27 ASSESSMENT — PAIN SCALES - GENERAL: PAINLEVEL_OUTOF10: NO PAIN (0)

## 2024-11-27 NOTE — PROGRESS NOTES
Clinic Administered Medication Documentation    Patient was given lidocaine 5% ointment . Prior to medication administration, verified patient's identity using patient's name and date of birth.    Leonie Pittman

## 2024-11-27 NOTE — PATIENT INSTRUCTIONS
Today your wound was grafted with epifix (placed in the center of the wound)  Endoform collagen was applied to the portion of the wound not covered by epifix  Covered by sorbact  Secured with steristrips  Covered with aquacel ag  ABD  Rolled gauze  Compression sleeve and wrap    Change the aquacel ag and abd if there is strikethrough or bleeding through the dressing  Be very cautious that you don't remove the graft area    Do not get wet in the shower    Continue to focus on tight glucose control and increase protein intake    Keep pressure off the wound area

## 2024-11-27 NOTE — PROGRESS NOTES
Follow up Vascular Visit       Date of Service:11/27/24      Chief Complaint: left stump ulcer      Pt returns to [facility] with regards to their left stump ulcer.  They arrive today with spouse. They are currently using epifix graft; sorbact; endoform; aquacel ag; ABD; rolled gauze to the wounds. This is being done by staff at the clinic weekly. They are using tubular compression with short stretch for compression. They are feeling well today. Denies fevers, chills. No shortness of breath.     Allergies:   Allergies   Allergen Reactions    Bees     Sulfamethoxazole Swelling     throat       Medications:   Current Outpatient Medications:     acetaminophen (TYLENOL) 325 MG tablet, Take 2 tablets (650 mg) by mouth every 4 hours as needed for other (mild pain), Disp: 100 tablet, Rfl: 0    amLODIPine (NORVASC) 2.5 MG tablet, TAKE ONE TABLET BY MOUTH ONCE DAILY, Disp: 90 tablet, Rfl: 3    aspirin 81 MG EC tablet, Take 1 tablet (81 mg) by mouth 2 times daily, Disp: 60 tablet, Rfl: 0    atorvastatin (LIPITOR) 20 MG tablet, TAKE ONE TABLET BY MOUTH ONCE DAILY IN THE EVENING, Disp: 90 tablet, Rfl: 3    BD PEN NEEDLE YENNY 2ND GEN 32G X 4 MM miscellaneous, USE 1 PEN NEEDLES DAILY OR AS DIRECTED., Disp: 100 each, Rfl: 2    blood glucose (ACCU-CHEK GANGA) test strip, Use to test blood sugar 1 times daily or as directed., Disp: 100 strip, Rfl: 3    blood glucose monitoring (ACCU-CHEK GANGA PLUS) meter device kit, USE TO TEST BLOOD SUGAR 1 TIMES DAILY OR AS DIRECTED., Disp: , Rfl: 0    blood glucose monitoring (ACCU-CHEK MULTICLIX) lancets, USE TO TEST BLOOD SUGAR 1 TIMES DAILY OR AS DIRECTED., Disp: , Rfl: 3    EPINEPHrine (ANY BX GENERIC EQUIV) 0.3 MG/0.3ML injection 2-pack, INJECT 0.3MG DOSE INTRAMUSCULARLY INTO THE OUTER THIGH MUSCLE ONCE AS DIRECTED AS NEEDED FOR ANAPHYLAXIS, Disp: 2 each, Rfl: 1    hydrochlorothiazide (HYDRODIURIL) 25 MG tablet, TAKE ONE TABLET (25 mg)  BY MOUTH ONCE DAILY, Disp:  90 tablet, Rfl: 3    Insulin Glargine-yfgn 100 UNIT/ML SOPN, Inject 64 Units subcutaneously at bedtime., Disp: 60 mL, Rfl: 3    losartan (COZAAR) 100 MG tablet, TAKE ONE TABLET BY MOUTH ONCE DAILY, Disp: 90 tablet, Rfl: 3    metFORMIN (GLUCOPHAGE XR) 500 MG 24 hr tablet, TAKE TWO TABLETS BY MOUTH TWICE DAILY WITH MEALS, Disp: 360 tablet, Rfl: 3    metoprolol succinate ER (TOPROL XL) 200 MG 24 hr tablet, TAKE ONE TABLET BY MOUTH ONCE DAILY, Disp: 90 tablet, Rfl: 3    mupirocin (BACTROBAN) 2 % external ointment, Apply topically 3 times daily., Disp: 30 g, Rfl: 3    Current Facility-Administered Medications:     lidocaine (XYLOCAINE) 5 % ointment, , Topical, Daily PRN, Joyce Andres, NP, Given at 11/20/24 1128    lidocaine (XYLOCAINE) 5 % ointment, , Topical, Daily PRN, Joyce Andres, NP, Given at 10/09/24 1312    lidocaine (XYLOCAINE) 5 % ointment, , Topical, Daily PRN, Joyce Andres, NP    History:   Past Medical History:   Diagnosis Date    Diabetes (H) 1-2017    Hypertension 1-2017    Septic arthritis of right foot (H) 9/26/2023       Physical Exam:    BP (!) 150/85   Pulse 60   SpO2 98%     General:  Patient presents to clinic in no apparent distress.  Head: normocephalic atraumatic  Psychiatric:  Alert and oriented x3.   Respiratory: unlabored breathing; no cough  Integumentary:  Skin is uniformly warm, dry and pink.    Ulcer #1 Location: left stump ulcer  Size: 4L x 9.5W x 0.3depth.  no sinus tract present, Wound base: slough; hypergranulation  no undermining present. Ulcer is full thickness. There is heavy drainage. Periwound: no denudement, erythema, induration, maceration or warmth.      VASC Wound Left bka (Active)   Pre Size Length 4 11/27/24 0800   Pre Size Width 9.5 11/27/24 0800   Pre Size Depth 0.4 11/27/24 0800   Pre Total Sq cm 38 11/27/24 0800   Post Size Length 5.8 11/06/24 1200   Post Size Width 13 11/06/24 1200   Post Size Depth 0.5 11/06/24 1200   Post Total Sq cm 75.4 11/06/24 1200   Tunneling  4.5cm at 12 o'clock 08/14/24 0800   Number of days: 105       Incision/Surgical Site 07/09/24 Left Tibial (Active)   Number of days: 141            Circumferential volume measures:           No data to display                Labs:    I personally reviewed the following lab results today and those on care everywhere    CRP Inflammation   Date Value Ref Range Status   11/08/2019 22.2 (H) 0.0 - 8.0 mg/L Final      Sed Rate   Date Value Ref Range Status   11/08/2019 33 (H) 0 - 20 mm/h Final     Erythrocyte Sedimentation Rate   Date Value Ref Range Status   09/25/2023 81 (H) 0 - 20 mm/hr Final      Last Renal Panel:  Sodium   Date Value Ref Range Status   08/29/2024 132 (L) 135 - 145 mmol/L Final   12/04/2020 138 133 - 144 mmol/L Final     Potassium   Date Value Ref Range Status   08/29/2024 4.7 3.4 - 5.3 mmol/L Final   08/09/2021 4.9 3.4 - 5.3 mmol/L Final   12/04/2020 4.5 3.4 - 5.3 mmol/L Final     Chloride   Date Value Ref Range Status   08/29/2024 94 (L) 98 - 107 mmol/L Final   07/23/2021 101 98 - 107 mmol/L Final     Comment:     Last Lab Result: LDL Cholesterol Calculated (mg/dL   12/04/2020 103 94 - 109 mmol/L Final     Carbon Dioxide   Date Value Ref Range Status   12/04/2020 28 20 - 32 mmol/L Final     Carbon Dioxide (CO2)   Date Value Ref Range Status   08/29/2024 26 22 - 29 mmol/L Final   07/23/2021 26 22 - 31 mmol/L Final     Comment:     Last Lab Result: LDL Cholesterol Calculated (mg/dL     Anion Gap   Date Value Ref Range Status   08/29/2024 12 7 - 15 mmol/L Final   07/23/2021 12 5 - 18 mmol/L Final   12/04/2020 7 3 - 14 mmol/L Final     Glucose   Date Value Ref Range Status   08/29/2024 143 (H) 70 - 99 mg/dL Final   07/23/2021 137 (H) 70 - 125 mg/dL Final     Comment:     Last Lab Result: LDL Cholesterol Calculated (mg/dL   12/04/2020 269 (H) 70 - 99 mg/dL Final     Comment:     Fasting specimen     GLUCOSE BY METER POCT   Date Value Ref Range Status   07/09/2024 70 70 - 99 mg/dL Final     Urea Nitrogen  "  Date Value Ref Range Status   08/29/2024 25.2 (H) 8.0 - 23.0 mg/dL Final   07/23/2021 18 8 - 22 mg/dL Final     Comment:     Last Lab Result: LDL Cholesterol Calculated (mg/dL   12/04/2020 25 7 - 30 mg/dL Final     Creatinine   Date Value Ref Range Status   08/29/2024 0.69 0.67 - 1.17 mg/dL Final   12/04/2020 0.92 0.66 - 1.25 mg/dL Final     GFR Estimate   Date Value Ref Range Status   08/29/2024 >90 >60 mL/min/1.73m2 Final     Comment:     eGFR calculated using 2021 CKD-EPI equation.   12/04/2020 >90 >60 mL/min/[1.73_m2] Final     Comment:     Non  GFR Calc  Starting 12/18/2018, serum creatinine based estimated GFR (eGFR) will be   calculated using the Chronic Kidney Disease Epidemiology Collaboration   (CKD-EPI) equation.       Calcium   Date Value Ref Range Status   08/29/2024 9.5 8.8 - 10.4 mg/dL Final     Comment:     Reference intervals for this test were updated on 7/16/2024 to reflect our healthy population more accurately. There may be differences in the flagging of prior results with similar values performed with this method. Those prior results can be interpreted in the context of the updated reference intervals.   12/04/2020 8.8 8.5 - 10.1 mg/dL Final     Albumin   Date Value Ref Range Status   09/25/2023 3.2 (L) 3.5 - 5.2 g/dL Final   07/23/2021 4.1 3.5 - 5.0 g/dL Final     Comment:     Last Lab Result: LDL Cholesterol Calculated (mg/dL   12/04/2020 3.4 3.4 - 5.0 g/dL Final      Lab Results   Component Value Date    WBC 14.4 09/20/2024    WBC 11.1 11/08/2019     Lab Results   Component Value Date    RBC 4.46 09/20/2024    RBC 4.68 11/08/2019     Lab Results   Component Value Date    HGB 11.5 09/20/2024    HGB 12.8 11/08/2019     Lab Results   Component Value Date    HCT 35.7 09/20/2024    HCT 40.0 11/08/2019     No components found for: \"MCT\"  Lab Results   Component Value Date    MCV 80 09/20/2024    MCV 86 11/08/2019     Lab Results   Component Value Date    MCH 25.8 09/20/2024    " "MCH 27.4 11/08/2019     Lab Results   Component Value Date    MCHC 32.2 09/20/2024    MCHC 32.0 11/08/2019     Lab Results   Component Value Date    RDW 14.5 09/20/2024    RDW 13.8 11/08/2019     Lab Results   Component Value Date     09/20/2024     11/08/2019      Lab Results   Component Value Date    A1C 5.0 08/29/2024    A1C 6.7 09/25/2023    A1C 6.6 08/01/2022    A1C 7.5 03/25/2022    A1C 7.3 07/23/2021    A1C 9.6 12/04/2020    A1C 9.0 07/21/2020    A1C 7.8 11/08/2019    A1C 6.5 11/29/2018    A1C 7.3 06/28/2017      TSH   Date Value Ref Range Status   11/29/2018 3.07 0.40 - 4.00 mU/L Final      No results found for: \"VITDT\"                Impression:  Encounter Diagnoses   Name Primary?    Postoperative wound dehiscence, initial encounter Yes    Status post below-knee amputation of left lower extremity (H)     Type 2 diabetes mellitus with other skin complication, unspecified whether long term insulin use (H)           11/27/2024 left stump    9/6/24 left stump         Are any of these ulcers new today: No; Location: na    Assessment/Plan:          1. Debridement: Nursing staff removed the old dressing and cleanse the wound(s) with specified solution. After discussion of risk factors and verbal consent was obtained 2% Lidocaine HCL jelly was applied, under clean conditions, the left stump ulceration(s) were debrided using currette. Devitalized and nonviable tissue, along with any fibrin and slough, was removed to improve granulation tissue formation, stimulate wound healing, decrease overall bacteria load, disrupt biofilm formation and decrease edge senescence.  Total excisional debridement was 38 sq cm from the epidermis/dermis area and into the subcutaneous tissue with a depth of 0.3 cm.   Ulcers were improved afterwards and .  Measures were unchanged after debridement. Additionally the hypergranulation was treated with silver nitrate.       2.  Ulcer treatment: ulcer treatment will " include irrigation and dressings to promote autolytic debridement which will include:  Will continue to graft with epifix; endoform applied to the rest of the wound; sorbact; aquacel ag; abd; rolled gauze; change weekly at the clinic; ok for wife to change the outermost dressing if there is strikethrough. If for some reason the patient is not able to get their dressing(s) changed as outlined above (due to illness, lack of supplies, lack of help) please do the following: remove old, soiled dressings; wash the ulcers with saline; pat dry; apply ABD pad or other absorbant pad and secure with rolled gauze; avoid tape directly on your skin; patient instructed to call the clinic as soon as possible to let us know what the current issues are in receiving ulcer care. Stable       Due to the slow healing rate of the ulcer and the diagnosis of Peripheral Neuropathy due to type2 diabetes Peripheral Neuropathy  and is free of infection and osteomyelitis and has adequate blood flow for healing.   After risks were reviewed including infection, allergic reaction and scarring consent was received the left leg Diabetic ulceration(s) was/ were debrided with clean technique.  curette used to remove slough and fibrin deposition along with devitalized tissue.  This was to improve granulation tissue formation, stimulate wound healing, decrease biofilm formation, decrease bacterial load, decrease senescent edges and prepare for bioengineered graft placement.  A total of 38square centimeters was debrided from the from the epidermis/dermis area and into the subcutaneous tissue. Epifix was recommended and consent was obtained for the application.  This is application # 5.       For the procedure, the patient was placed in a comfortable position with the wound bed exposed. Epifix application is being performed in a staged procedure in an attempt to achieve rapid wound closure. The Tissue Identification Number is [id number] with an  expiration date of 2029-05-01 as indicated on the consent form dated 11/27/2024      The graft size is 4x4.5 , total wound size is 38 with total applied area was 38sq cm and the estimated wastage was 0; any leftover product was applied in a layered fashion.   The graft was prepared and applied following the  guidelines.  The graft was covered with endoform; sorbact then with a non-adherent contact layer and secured with steristrips .  Then,  a secondary aquacel ag; abd dressing was applied.  The patient will continue to utilize tubular compression short stretch for compression.  Patient tolerated the procedure without any complications and was educated and expressed an understanding of the proper wound treatment until their follow up.             3. Edema: tubular compression and short stretch; education on the proper application technique appear that it got too tight in the thigh region. The compression wraps were applied today in clinic.     If a 2 layer or 4 layer compression wrap is being used; these are safe to have on for ONLY 7 days. If for some reason the patient is not able to get the wrap(s) changed (due to illness; lack of supplies, lack of help, lack of transportation) please do the following: unwrap the old 2 or 4 layer compression wrap; avoid using scissors as you could cut your skin and cause ulcers; use tubular compression when available. Call to reschedule your home care or clinic visit appointment as soon as possible.  Stable            4. Nutrition: focus on diabetes control and protein           5. Offloading: no prosthesis wearing until well healed          6. Wound Etiology: surgical; diabetic     Patient will follow up with me in 1 weeks for reevaluation. They were instructed to call the clinic sooner with any signs or symptoms of infection or any further questions/concerns. Answered all questions.          Joyce Andres DNP, RN, CNP, CWOCN, CFCN, CLT  [facility]    [phone]        This note was electronically signed by Joyce Andres NP

## 2024-12-04 ENCOUNTER — OFFICE VISIT (OUTPATIENT)
Dept: VASCULAR SURGERY | Facility: CLINIC | Age: 64
End: 2024-12-04
Attending: NURSE PRACTITIONER
Payer: COMMERCIAL

## 2024-12-04 VITALS — TEMPERATURE: 98.1 F | HEART RATE: 67 BPM | OXYGEN SATURATION: 97 % | RESPIRATION RATE: 18 BRPM

## 2024-12-04 DIAGNOSIS — E66.01 MORBID OBESITY, UNSPECIFIED OBESITY TYPE (H): ICD-10-CM

## 2024-12-04 DIAGNOSIS — E11.628 TYPE 2 DIABETES MELLITUS WITH OTHER SKIN COMPLICATION, UNSPECIFIED WHETHER LONG TERM INSULIN USE (H): ICD-10-CM

## 2024-12-04 DIAGNOSIS — Z89.512 STATUS POST BELOW-KNEE AMPUTATION OF LEFT LOWER EXTREMITY (H): ICD-10-CM

## 2024-12-04 DIAGNOSIS — T81.31XA POSTOPERATIVE WOUND DEHISCENCE, INITIAL ENCOUNTER: Primary | ICD-10-CM

## 2024-12-04 DIAGNOSIS — A49.02 MRSA INFECTION: ICD-10-CM

## 2024-12-04 PROCEDURE — 15002 WOUND PREP TRK/ARM/LEG: CPT | Performed by: NURSE PRACTITIONER

## 2024-12-04 PROCEDURE — 15271 SKIN SUB GRAFT TRNK/ARM/LEG: CPT | Performed by: NURSE PRACTITIONER

## 2024-12-04 ASSESSMENT — PAIN SCALES - GENERAL: PAINLEVEL_OUTOF10: NO PAIN (0)

## 2024-12-04 NOTE — PATIENT INSTRUCTIONS
Today your wound was grafted with epifix   Endoform collagen was applied to the portion of the wound not covered by epifix  Covered by sorbact  Secured with steristrips  Covered with aquacel ag  ABD  Adaptic to any other abrasions surrounding the main wound  Rolled gauze  Compression sleeve and wrap    Change the aquacel ag and abd if there is strikethrough or bleeding through the dressing  Be very cautious that you don't remove the graft area    Do not get wet in the shower    Continue to focus on tight glucose control and increase protein intake    Keep pressure off the wound area

## 2024-12-04 NOTE — PROGRESS NOTES
Follow up Vascular Visit       Date of Service:12/04/24      Chief Complaint: left stump ulcer      Pt returns to [facility] with regards to their left stump ulcer.  They arrive today with wife. They are currently using epifix graft to the wounds. This is being done by staff at the clinic weekly. They are using tubular compression and short stretch for compression. They are feeling well today. Denies fevers, chills. No shortness of breath.     Allergies:   Allergies   Allergen Reactions    Bees     Sulfamethoxazole Swelling     throat       Medications:   Current Outpatient Medications:     acetaminophen (TYLENOL) 325 MG tablet, Take 2 tablets (650 mg) by mouth every 4 hours as needed for other (mild pain), Disp: 100 tablet, Rfl: 0    amLODIPine (NORVASC) 2.5 MG tablet, TAKE ONE TABLET BY MOUTH ONCE DAILY, Disp: 90 tablet, Rfl: 3    aspirin 81 MG EC tablet, Take 1 tablet (81 mg) by mouth 2 times daily, Disp: 60 tablet, Rfl: 0    atorvastatin (LIPITOR) 20 MG tablet, TAKE ONE TABLET BY MOUTH ONCE DAILY IN THE EVENING, Disp: 90 tablet, Rfl: 3    BD PEN NEEDLE YENNY 2ND GEN 32G X 4 MM miscellaneous, USE 1 PEN NEEDLES DAILY OR AS DIRECTED., Disp: 100 each, Rfl: 2    blood glucose (ACCU-CHEK GANGA) test strip, Use to test blood sugar 1 times daily or as directed., Disp: 100 strip, Rfl: 3    blood glucose monitoring (ACCU-CHEK GANGA PLUS) meter device kit, USE TO TEST BLOOD SUGAR 1 TIMES DAILY OR AS DIRECTED., Disp: , Rfl: 0    blood glucose monitoring (ACCU-CHEK MULTICLIX) lancets, USE TO TEST BLOOD SUGAR 1 TIMES DAILY OR AS DIRECTED., Disp: , Rfl: 3    EPINEPHrine (ANY BX GENERIC EQUIV) 0.3 MG/0.3ML injection 2-pack, INJECT 0.3MG DOSE INTRAMUSCULARLY INTO THE OUTER THIGH MUSCLE ONCE AS DIRECTED AS NEEDED FOR ANAPHYLAXIS, Disp: 2 each, Rfl: 1    hydrochlorothiazide (HYDRODIURIL) 25 MG tablet, TAKE ONE TABLET (25 mg)  BY MOUTH ONCE DAILY, Disp: 90 tablet, Rfl: 3    Insulin Glargine-yfgn 100  UNIT/ML SOPN, Inject 64 Units subcutaneously at bedtime., Disp: 60 mL, Rfl: 3    losartan (COZAAR) 100 MG tablet, TAKE ONE TABLET BY MOUTH ONCE DAILY, Disp: 90 tablet, Rfl: 3    metFORMIN (GLUCOPHAGE XR) 500 MG 24 hr tablet, TAKE TWO TABLETS BY MOUTH TWICE DAILY WITH MEALS, Disp: 360 tablet, Rfl: 3    metoprolol succinate ER (TOPROL XL) 200 MG 24 hr tablet, TAKE ONE TABLET BY MOUTH ONCE DAILY, Disp: 90 tablet, Rfl: 3    mupirocin (BACTROBAN) 2 % external ointment, Apply topically 3 times daily., Disp: 30 g, Rfl: 3    Current Facility-Administered Medications:     lidocaine (XYLOCAINE) 5 % ointment, , Topical, Daily PRN, Joyce Andres, NP, Given at 11/27/24 0850    lidocaine (XYLOCAINE) 5 % ointment, , Topical, Daily PRN, Joyce Andres, NP, Given at 10/09/24 1312    lidocaine (XYLOCAINE) 5 % ointment, , Topical, Daily PRN, Joyce Andres NP    History:   Past Medical History:   Diagnosis Date    Diabetes (H) 1-2017    Hypertension 1-2017    Septic arthritis of right foot (H) 9/26/2023       Physical Exam:    Pulse 67   Temp 98.1  F (36.7  C)   Resp 18   SpO2 97%     General:  Patient presents to clinic in no apparent distress.  Head: normocephalic atraumatic  Psychiatric:  Alert and oriented x3.   Respiratory: unlabored breathing; no cough  Integumentary:  Skin is uniformly warm, dry and pink.    Ulcer #1 Location: left stumpSize: 3L x 8.5W x 0.1depth.  no sinus tract present, Wound base: slough; underneath 100% hypergranulation  no undermining present. Ulcer is full thickness. There is moderate drainage. Periwound: no denudement, erythema, induration, maceration or warmth.      VASC Wound Left bka (Active)   Pre Size Length 4 12/04/24 0700   Pre Size Width 10 12/04/24 0700   Pre Size Depth 0.2 12/04/24 0700   Pre Total Sq cm 40 12/04/24 0700   Post Size Length 3 12/04/24 0700   Post Size Width 8.5 12/04/24 0700   Post Size Depth 0.1 12/04/24 0700   Post Total Sq cm 25.5 12/04/24 0700   Tunneling 4.5cm at 12  o'clock 08/14/24 0800   Number of days: 112       Incision/Surgical Site 07/09/24 Left Tibial (Active)   Number of days: 148            Circumferential volume measures:           No data to display                Labs:    I personally reviewed the following lab results today and those on care everywhere    CRP Inflammation   Date Value Ref Range Status   11/08/2019 22.2 (H) 0.0 - 8.0 mg/L Final      Sed Rate   Date Value Ref Range Status   11/08/2019 33 (H) 0 - 20 mm/h Final     Erythrocyte Sedimentation Rate   Date Value Ref Range Status   09/25/2023 81 (H) 0 - 20 mm/hr Final      Last Renal Panel:  Sodium   Date Value Ref Range Status   08/29/2024 132 (L) 135 - 145 mmol/L Final   12/04/2020 138 133 - 144 mmol/L Final     Potassium   Date Value Ref Range Status   08/29/2024 4.7 3.4 - 5.3 mmol/L Final   08/09/2021 4.9 3.4 - 5.3 mmol/L Final   12/04/2020 4.5 3.4 - 5.3 mmol/L Final     Chloride   Date Value Ref Range Status   08/29/2024 94 (L) 98 - 107 mmol/L Final   07/23/2021 101 98 - 107 mmol/L Final     Comment:     Last Lab Result: LDL Cholesterol Calculated (mg/dL   12/04/2020 103 94 - 109 mmol/L Final     Carbon Dioxide   Date Value Ref Range Status   12/04/2020 28 20 - 32 mmol/L Final     Carbon Dioxide (CO2)   Date Value Ref Range Status   08/29/2024 26 22 - 29 mmol/L Final   07/23/2021 26 22 - 31 mmol/L Final     Comment:     Last Lab Result: LDL Cholesterol Calculated (mg/dL     Anion Gap   Date Value Ref Range Status   08/29/2024 12 7 - 15 mmol/L Final   07/23/2021 12 5 - 18 mmol/L Final   12/04/2020 7 3 - 14 mmol/L Final     Glucose   Date Value Ref Range Status   08/29/2024 143 (H) 70 - 99 mg/dL Final   07/23/2021 137 (H) 70 - 125 mg/dL Final     Comment:     Last Lab Result: LDL Cholesterol Calculated (mg/dL   12/04/2020 269 (H) 70 - 99 mg/dL Final     Comment:     Fasting specimen     GLUCOSE BY METER POCT   Date Value Ref Range Status   07/09/2024 70 70 - 99 mg/dL Final     Urea Nitrogen   Date Value  "Ref Range Status   08/29/2024 25.2 (H) 8.0 - 23.0 mg/dL Final   07/23/2021 18 8 - 22 mg/dL Final     Comment:     Last Lab Result: LDL Cholesterol Calculated (mg/dL   12/04/2020 25 7 - 30 mg/dL Final     Creatinine   Date Value Ref Range Status   08/29/2024 0.69 0.67 - 1.17 mg/dL Final   12/04/2020 0.92 0.66 - 1.25 mg/dL Final     GFR Estimate   Date Value Ref Range Status   08/29/2024 >90 >60 mL/min/1.73m2 Final     Comment:     eGFR calculated using 2021 CKD-EPI equation.   12/04/2020 >90 >60 mL/min/[1.73_m2] Final     Comment:     Non  GFR Calc  Starting 12/18/2018, serum creatinine based estimated GFR (eGFR) will be   calculated using the Chronic Kidney Disease Epidemiology Collaboration   (CKD-EPI) equation.       Calcium   Date Value Ref Range Status   08/29/2024 9.5 8.8 - 10.4 mg/dL Final     Comment:     Reference intervals for this test were updated on 7/16/2024 to reflect our healthy population more accurately. There may be differences in the flagging of prior results with similar values performed with this method. Those prior results can be interpreted in the context of the updated reference intervals.   12/04/2020 8.8 8.5 - 10.1 mg/dL Final     Albumin   Date Value Ref Range Status   09/25/2023 3.2 (L) 3.5 - 5.2 g/dL Final   07/23/2021 4.1 3.5 - 5.0 g/dL Final     Comment:     Last Lab Result: LDL Cholesterol Calculated (mg/dL   12/04/2020 3.4 3.4 - 5.0 g/dL Final      Lab Results   Component Value Date    WBC 14.4 09/20/2024    WBC 11.1 11/08/2019     Lab Results   Component Value Date    RBC 4.46 09/20/2024    RBC 4.68 11/08/2019     Lab Results   Component Value Date    HGB 11.5 09/20/2024    HGB 12.8 11/08/2019     Lab Results   Component Value Date    HCT 35.7 09/20/2024    HCT 40.0 11/08/2019     No components found for: \"MCT\"  Lab Results   Component Value Date    MCV 80 09/20/2024    MCV 86 11/08/2019     Lab Results   Component Value Date    MCH 25.8 09/20/2024    MCH 27.4 " "11/08/2019     Lab Results   Component Value Date    MCHC 32.2 09/20/2024    MCHC 32.0 11/08/2019     Lab Results   Component Value Date    RDW 14.5 09/20/2024    RDW 13.8 11/08/2019     Lab Results   Component Value Date     09/20/2024     11/08/2019      Lab Results   Component Value Date    A1C 5.0 08/29/2024    A1C 6.7 09/25/2023    A1C 6.6 08/01/2022    A1C 7.5 03/25/2022    A1C 7.3 07/23/2021    A1C 9.6 12/04/2020    A1C 9.0 07/21/2020    A1C 7.8 11/08/2019    A1C 6.5 11/29/2018    A1C 7.3 06/28/2017      TSH   Date Value Ref Range Status   11/29/2018 3.07 0.40 - 4.00 mU/L Final      No results found for: \"VITDT\"                Impression:  Encounter Diagnoses   Name Primary?    Postoperative wound dehiscence, initial encounter Yes    Status post below-knee amputation of left lower extremity (H)     Type 2 diabetes mellitus with other skin complication, unspecified whether long term insulin use (H)     MRSA infection     Morbid obesity, unspecified obesity type (H)           12/4/2024  Left stump         8/14/24 left stump          Are any of these ulcers new today: No; Location: na    Assessment/Plan:          1. Debridement: Nursing staff removed the old dressing and cleanse the wound(s) with specified solution. After discussion of risk factors and verbal consent was obtained 2% Lidocaine HCL jelly was applied, under clean conditions, the left stump ulceration(s) were debrided using currette. Devitalized and nonviable tissue, along with any fibrin and slough, was removed to improve granulation tissue formation, stimulate wound healing, decrease overall bacteria load, disrupt biofilm formation and decrease edge senescence.  Total excisional debridement was 25.5 sq cm from the epidermis/dermis area and into the subcutaneous tissue with a depth of 0.1 cm.   Ulcers were improved afterwards and .  Measures were unchanged after debridement.       2.  Ulcer treatment: ulcer treatment will " "include irrigation and dressings to promote autolytic debridement which will include:will continue with epifix. If for some reason the patient is not able to get their dressing(s) changed as outlined above (due to illness, lack of supplies, lack of help) please do the following: remove old, soiled dressings; wash the ulcers with saline; pat dry; apply ABD pad or other absorbant pad and secure with rolled gauze; avoid tape directly on your skin; patient instructed to call the clinic as soon as possible to let us know what the current issues are in receiving ulcer care. 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   After risks were reviewed including infection, allergic reaction and scarring consent was received the left ulceration(s) was/ were debrided with clean technique.  curette used to remove slough and fibrin deposition along with devitalized tissue.  This was to improve granulation tissue formation, stimulate wound healing, decrease biofilm formation, decrease bacterial load, decrease senescent edges and prepare for bioengineered graft placement.  A total of 25.5square centimeters was debrided from the epidermis/dermis tissue and subcutaneous tissue.   \"Epifix was recommended and consent was obtained for the application.  This is application # 6.       For the procedure, the patient was placed in a comfortable position with the wound bed exposed. Epifix  application is being performed in a staged procedure in an attempt to achieve rapid wound closure. The Tissue Identification Number is [id number] with an expiration date of 2029-05-01 as indicated on the consent form dated 12/4/2024    The graft size is 4x4.5cm , total wound size is 25.5 with total applied area was 18 sq cm and the estimated wastage " was 0; any leftover product was applied in a layered fashion.   The graft was prepared and applied following the  guidelines.  The graft was covered with endoform then with a non-adherent contact layer and secured with sorbact.  Then,  a secondary steristrips; aquacel ag dressing was applied.  The patient will continue to utilize tubular compression and short stretch for compression.  Patient tolerated the procedure without any complications and was educated and expressed an understanding of the proper wound treatment until their follow up.             3. Edema: continue elevation; tubular compression with short stretch. The compression wraps were applied today in clinic.     If a 2 layer or 4 layer compression wrap is being used; these are safe to have on for ONLY 7 days. If for some reason the patient is not able to get the wrap(s) changed (due to illness; lack of supplies, lack of help, lack of transportation) please do the following: unwrap the old 2 or 4 layer compression wrap; avoid using scissors as you could cut your skin and cause ulcers; use tubular compression when available. Call to reschedule your home care or clinic visit appointment as soon as possible.  Stable            4. Nutrition: focus on diabetes control           5. Offloading: no prosthesis wearing until well healed          6. Wound Etiology: surgical, complicated by diabetes     Patient will follow up with me in 1 weeks for reevaluation. They were instructed to call the clinic sooner with any signs or symptoms of infection or any further questions/concerns. Answered all questions.          Joyce Andres DNP, RN, CNP, CWOCN, CFCN, CLT  [facility]   [phone]        This note was electronically signed by Joyce Andres NP

## 2024-12-04 NOTE — LETTER
December 4, 2024      Juan Balderrama  [id number] [place] [address]            To Whom It May Concern,    Juan Balderrama is under my care for the treatment and management of his left stump ulcer. We anticipate this will take another 6-9 months to heal; be sized for prosthesis; fittings; and training with PT/OT. It is recommended that he work from home during this timeframe. Please contact our clinic if you have any further questions regarding this [phone].         Sincerely,      Joyce Andres DNP, CNP, RN, CWOCN

## 2024-12-11 ENCOUNTER — OFFICE VISIT (OUTPATIENT)
Dept: VASCULAR SURGERY | Facility: CLINIC | Age: 64
End: 2024-12-11
Attending: NURSE PRACTITIONER
Payer: COMMERCIAL

## 2024-12-11 VITALS
SYSTOLIC BLOOD PRESSURE: 162 MMHG | RESPIRATION RATE: 18 BRPM | HEART RATE: 63 BPM | TEMPERATURE: 98.2 F | DIASTOLIC BLOOD PRESSURE: 84 MMHG | OXYGEN SATURATION: 98 %

## 2024-12-11 DIAGNOSIS — E11.628 TYPE 2 DIABETES MELLITUS WITH OTHER SKIN COMPLICATION, UNSPECIFIED WHETHER LONG TERM INSULIN USE (H): ICD-10-CM

## 2024-12-11 DIAGNOSIS — E66.01 MORBID OBESITY, UNSPECIFIED OBESITY TYPE (H): ICD-10-CM

## 2024-12-11 DIAGNOSIS — A49.02 MRSA INFECTION: ICD-10-CM

## 2024-12-11 DIAGNOSIS — Z89.512 STATUS POST BELOW-KNEE AMPUTATION OF LEFT LOWER EXTREMITY (H): ICD-10-CM

## 2024-12-11 DIAGNOSIS — T81.31XA POSTOPERATIVE WOUND DEHISCENCE, INITIAL ENCOUNTER: Primary | ICD-10-CM

## 2024-12-11 PROCEDURE — 15271 SKIN SUB GRAFT TRNK/ARM/LEG: CPT | Performed by: NURSE PRACTITIONER

## 2024-12-11 PROCEDURE — 15002 WOUND PREP TRK/ARM/LEG: CPT | Performed by: NURSE PRACTITIONER

## 2024-12-11 ASSESSMENT — PAIN SCALES - GENERAL: PAINLEVEL_OUTOF10: NO PAIN (0)

## 2024-12-11 NOTE — PROGRESS NOTES
Follow up Vascular Visit       Date of Service:12/11/24      Chief Complaint: left stump surgical wound      Pt returns to [facility] with regards to their left stump surgical wound.  They arrive today with wife. They are currently using epifix graft to the wounds. This is being done by staff at the clinic weekly. They are using tubular compression and short stretch for compression. They are feeling well today. Denies fevers, chills. No shortness of breath.     Allergies:   Allergies   Allergen Reactions    Bees     Sulfamethoxazole Swelling     throat       Medications:   Current Outpatient Medications:     acetaminophen (TYLENOL) 325 MG tablet, Take 2 tablets (650 mg) by mouth every 4 hours as needed for other (mild pain), Disp: 100 tablet, Rfl: 0    amLODIPine (NORVASC) 2.5 MG tablet, TAKE ONE TABLET BY MOUTH ONCE DAILY, Disp: 90 tablet, Rfl: 3    aspirin 81 MG EC tablet, Take 1 tablet (81 mg) by mouth 2 times daily, Disp: 60 tablet, Rfl: 0    atorvastatin (LIPITOR) 20 MG tablet, TAKE ONE TABLET BY MOUTH ONCE DAILY IN THE EVENING, Disp: 90 tablet, Rfl: 3    BD PEN NEEDLE YENNY 2ND GEN 32G X 4 MM miscellaneous, USE 1 PEN NEEDLES DAILY OR AS DIRECTED., Disp: 100 each, Rfl: 2    blood glucose (ACCU-CHEK GANGA) test strip, Use to test blood sugar 1 times daily or as directed., Disp: 100 strip, Rfl: 3    blood glucose monitoring (ACCU-CHEK GANGA PLUS) meter device kit, USE TO TEST BLOOD SUGAR 1 TIMES DAILY OR AS DIRECTED., Disp: , Rfl: 0    blood glucose monitoring (ACCU-CHEK MULTICLIX) lancets, USE TO TEST BLOOD SUGAR 1 TIMES DAILY OR AS DIRECTED., Disp: , Rfl: 3    EPINEPHrine (ANY BX GENERIC EQUIV) 0.3 MG/0.3ML injection 2-pack, INJECT 0.3MG DOSE INTRAMUSCULARLY INTO THE OUTER THIGH MUSCLE ONCE AS DIRECTED AS NEEDED FOR ANAPHYLAXIS, Disp: 2 each, Rfl: 1    hydrochlorothiazide (HYDRODIURIL) 25 MG tablet, TAKE ONE TABLET (25 mg)  BY MOUTH ONCE DAILY, Disp: 90 tablet, Rfl: 3    Insulin  Glargine-yfgn 100 UNIT/ML SOPN, Inject 64 Units subcutaneously at bedtime., Disp: 60 mL, Rfl: 3    losartan (COZAAR) 100 MG tablet, TAKE ONE TABLET BY MOUTH ONCE DAILY, Disp: 90 tablet, Rfl: 3    metFORMIN (GLUCOPHAGE XR) 500 MG 24 hr tablet, TAKE TWO TABLETS BY MOUTH TWICE DAILY WITH MEALS, Disp: 360 tablet, Rfl: 3    metoprolol succinate ER (TOPROL XL) 200 MG 24 hr tablet, TAKE ONE TABLET BY MOUTH ONCE DAILY, Disp: 90 tablet, Rfl: 3    mupirocin (BACTROBAN) 2 % external ointment, Apply topically 3 times daily., Disp: 30 g, Rfl: 3    Current Facility-Administered Medications:     lidocaine (XYLOCAINE) 5 % ointment, , Topical, Daily PRN, Joyce Andres, NP, Given at 11/27/24 0850    lidocaine (XYLOCAINE) 5 % ointment, , Topical, Daily PRN, Joyce Andres, NP, Given at 10/09/24 1312    lidocaine (XYLOCAINE) 5 % ointment, , Topical, Daily PRN, Joyce Andres NP    History:   Past Medical History:   Diagnosis Date    Diabetes (H) 1-2017    Hypertension 1-2017    Septic arthritis of right foot (H) 9/26/2023       Physical Exam:    BP (!) 162/84   Pulse 63   Temp 98.2  F (36.8  C)   Resp 18   SpO2 98%     General:  Patient presents to clinic in no apparent distress.  Head: normocephalic atraumatic  Psychiatric:  Alert and oriented x3.   Respiratory: unlabored breathing; no cough  Integumentary:  Skin is uniformly warm, dry and pink.    Ulcer #1 Location: left BKA  Size: 2.5L x 7.5W x 0.1depth.  No sinus tract present, Wound base: slough; underneath 100% viable  no undermining present. Ulcer is full thickness. There is moderate drainage. Periwound: no denudement, erythema, induration, maceration or warmth.      VASC Wound Left bka (Active)   Pre Size Length 3 12/11/24 0800   Pre Size Width 8 12/11/24 0800   Pre Size Depth 0.2 12/11/24 0800   Pre Total Sq cm 24 12/11/24 0800   Post Size Length 3 12/04/24 0700   Post Size Width 8.5 12/04/24 0700   Post Size Depth 0.1 12/04/24 0700   Post Total Sq cm 25.5 12/04/24 0700    Tunneling 4.5cm at 12 o'clock 08/14/24 0800   Number of days: 119       Incision/Surgical Site 07/09/24 Left Tibial (Active)   Number of days: 155            Circumferential volume measures:           No data to display                Labs:    I personally reviewed the following lab results today and those on care everywhere    CRP Inflammation   Date Value Ref Range Status   11/08/2019 22.2 (H) 0.0 - 8.0 mg/L Final      Sed Rate   Date Value Ref Range Status   11/08/2019 33 (H) 0 - 20 mm/h Final     Erythrocyte Sedimentation Rate   Date Value Ref Range Status   09/25/2023 81 (H) 0 - 20 mm/hr Final      Last Renal Panel:  Sodium   Date Value Ref Range Status   08/29/2024 132 (L) 135 - 145 mmol/L Final   12/04/2020 138 133 - 144 mmol/L Final     Potassium   Date Value Ref Range Status   08/29/2024 4.7 3.4 - 5.3 mmol/L Final   08/09/2021 4.9 3.4 - 5.3 mmol/L Final   12/04/2020 4.5 3.4 - 5.3 mmol/L Final     Chloride   Date Value Ref Range Status   08/29/2024 94 (L) 98 - 107 mmol/L Final   07/23/2021 101 98 - 107 mmol/L Final     Comment:     Last Lab Result: LDL Cholesterol Calculated (mg/dL   12/04/2020 103 94 - 109 mmol/L Final     Carbon Dioxide   Date Value Ref Range Status   12/04/2020 28 20 - 32 mmol/L Final     Carbon Dioxide (CO2)   Date Value Ref Range Status   08/29/2024 26 22 - 29 mmol/L Final   07/23/2021 26 22 - 31 mmol/L Final     Comment:     Last Lab Result: LDL Cholesterol Calculated (mg/dL     Anion Gap   Date Value Ref Range Status   08/29/2024 12 7 - 15 mmol/L Final   07/23/2021 12 5 - 18 mmol/L Final   12/04/2020 7 3 - 14 mmol/L Final     Glucose   Date Value Ref Range Status   08/29/2024 143 (H) 70 - 99 mg/dL Final   07/23/2021 137 (H) 70 - 125 mg/dL Final     Comment:     Last Lab Result: LDL Cholesterol Calculated (mg/dL   12/04/2020 269 (H) 70 - 99 mg/dL Final     Comment:     Fasting specimen     GLUCOSE BY METER POCT   Date Value Ref Range Status   07/09/2024 70 70 - 99 mg/dL Final  "    Urea Nitrogen   Date Value Ref Range Status   08/29/2024 25.2 (H) 8.0 - 23.0 mg/dL Final   07/23/2021 18 8 - 22 mg/dL Final     Comment:     Last Lab Result: LDL Cholesterol Calculated (mg/dL   12/04/2020 25 7 - 30 mg/dL Final     Creatinine   Date Value Ref Range Status   08/29/2024 0.69 0.67 - 1.17 mg/dL Final   12/04/2020 0.92 0.66 - 1.25 mg/dL Final     GFR Estimate   Date Value Ref Range Status   08/29/2024 >90 >60 mL/min/1.73m2 Final     Comment:     eGFR calculated using 2021 CKD-EPI equation.   12/04/2020 >90 >60 mL/min/[1.73_m2] Final     Comment:     Non  GFR Calc  Starting 12/18/2018, serum creatinine based estimated GFR (eGFR) will be   calculated using the Chronic Kidney Disease Epidemiology Collaboration   (CKD-EPI) equation.       Calcium   Date Value Ref Range Status   08/29/2024 9.5 8.8 - 10.4 mg/dL Final     Comment:     Reference intervals for this test were updated on 7/16/2024 to reflect our healthy population more accurately. There may be differences in the flagging of prior results with similar values performed with this method. Those prior results can be interpreted in the context of the updated reference intervals.   12/04/2020 8.8 8.5 - 10.1 mg/dL Final     Albumin   Date Value Ref Range Status   09/25/2023 3.2 (L) 3.5 - 5.2 g/dL Final   07/23/2021 4.1 3.5 - 5.0 g/dL Final     Comment:     Last Lab Result: LDL Cholesterol Calculated (mg/dL   12/04/2020 3.4 3.4 - 5.0 g/dL Final      Lab Results   Component Value Date    WBC 14.4 09/20/2024    WBC 11.1 11/08/2019     Lab Results   Component Value Date    RBC 4.46 09/20/2024    RBC 4.68 11/08/2019     Lab Results   Component Value Date    HGB 11.5 09/20/2024    HGB 12.8 11/08/2019     Lab Results   Component Value Date    HCT 35.7 09/20/2024    HCT 40.0 11/08/2019     No components found for: \"MCT\"  Lab Results   Component Value Date    MCV 80 09/20/2024    MCV 86 11/08/2019     Lab Results   Component Value Date    MCH " "25.8 09/20/2024    MCH 27.4 11/08/2019     Lab Results   Component Value Date    MCHC 32.2 09/20/2024    MCHC 32.0 11/08/2019     Lab Results   Component Value Date    RDW 14.5 09/20/2024    RDW 13.8 11/08/2019     Lab Results   Component Value Date     09/20/2024     11/08/2019      Lab Results   Component Value Date    A1C 5.0 08/29/2024    A1C 6.7 09/25/2023    A1C 6.6 08/01/2022    A1C 7.5 03/25/2022    A1C 7.3 07/23/2021    A1C 9.6 12/04/2020    A1C 9.0 07/21/2020    A1C 7.8 11/08/2019    A1C 6.5 11/29/2018    A1C 7.3 06/28/2017      TSH   Date Value Ref Range Status   11/29/2018 3.07 0.40 - 4.00 mU/L Final      No results found for: \"VITDT\"                Impression:  No diagnosis found.                Are any of these ulcers new today: No; Location: na    Assessment/Plan:          1. Debridement: Nursing staff removed the old dressing and cleanse the wound(s) with specified solution. After discussion of risk factors and verbal consent was obtained 2% Lidocaine HCL jelly was applied, under clean conditions, the left stump ulceration(s) were debrided using currette. Devitalized and nonviable tissue, along with any fibrin and slough, was removed to improve granulation tissue formation, stimulate wound healing, decrease overall bacteria load, disrupt biofilm formation and decrease edge senescence.  Total excisional debridement was 18.75 sq cm from the epidermis/dermis area and into the subcutaneous tissue with a depth of 0.1 cm.   Ulcers were improved afterwards and .  Measures were as noted on the flow sheet.       2.  Ulcer treatment: ulcer treatment will include irrigation and dressings to promote autolytic debridement which will include:will continue to graft with epifix; endoform; sorbact; aquacel ag; ABD; rolled gauze; change weekly at the clinic; ok for wife to change outermost dressing as needed If for some reason the patient is not able to get their dressing(s) changed as outlined " above (due to illness, lack of supplies, lack of help) please do the following: remove old, soiled dressings; wash the ulcers with saline; pat dry; apply ABD pad or other absorbant pad and secure with rolled gauze; avoid tape directly on your skin; patient instructed to call the clinic as soon as possible to let us know what the current issues are in receiving ulcer care. Stable       Due to the slow healing rate of the ulcer and the diagnosis of Atherosclerosis and Peripheral Neuropathy due to Atherosclerosis and Peripheral Neuropathy  and is free of infection and osteomyelitis and has adequate blood flow for healing.   After risks were reviewed including infection, allergic reaction and scarring consent was received the left leg Diabetic ulceration(s) was/ were debrided with clean technique.  curette used to remove slough and fibrin deposition along with devitalized tissue.  This was to improve granulation tissue formation, stimulate wound healing, decrease biofilm formation, decrease bacterial load, decrease senescent edges and prepare for bioengineered graft placement.  A total of 18.75square centimeters was debrided from the from the epidermis/dermis area and into the subcutaneous tissue. Epifix was recommended and consent was obtained for the application.  This is application # 7.       For the procedure, the patient was placed in a comfortable position with the wound bed exposed. Epifix application is being performed in a staged procedure in an attempt to achieve rapid wound closure. The Tissue Identification Number is [id number] with an expiration date of 2029-05-01 as indicated on the consent form dated 12/11/2024      The graft size is 4x4.5 (we did not have a smaller graft available) , total wound size is 18.75 with total applied area was 18.75sq cm and the estimated wastage was 0; any leftover product was applied in a layered fashion.   The graft was prepared and applied following the   guidelines.  The graft was covered with endoform; sorbact then with a non-adherent contact layer and secured with steristrips .  Then,  a secondary aquacel ag; abd dressing was applied.  The patient will continue to utilize tubular compression and short stretch for compression.  Patient tolerated the procedure without any complications and was educated and expressed an understanding of the proper wound treatment until their follow up.            3. Edema: continue elevation; tubular compression and short stretch. The compression wraps were applied today in clinic.     If a 2 layer or 4 layer compression wrap is being used; these are safe to have on for ONLY 7 days. If for some reason the patient is not able to get the wrap(s) changed (due to illness; lack of supplies, lack of help, lack of transportation) please do the following: unwrap the old 2 or 4 layer compression wrap; avoid using scissors as you could cut your skin and cause ulcers; use tubular compression when available. Call to reschedule your home care or clinic visit appointment as soon as possible.  Stable            4. Nutrition: focus on diabetes control           5. Offloading: no prosthesis wearing until well healed          6. Wound Etiology: surgical     Patient will follow up with me in 1 weeks for reevaluation. They were instructed to call the clinic sooner with any signs or symptoms of infection or any further questions/concerns. Answered all questions.          Joyce Andres DNP, RN, CNP, CWOCN, CFCN, CLT  [facility]   [phone]        This note was electronically signed by Joyce Andres NP

## 2024-12-18 ENCOUNTER — OFFICE VISIT (OUTPATIENT)
Dept: VASCULAR SURGERY | Facility: CLINIC | Age: 64
End: 2024-12-18
Attending: NURSE PRACTITIONER
Payer: COMMERCIAL

## 2024-12-18 VITALS
TEMPERATURE: 98.3 F | OXYGEN SATURATION: 97 % | RESPIRATION RATE: 18 BRPM | SYSTOLIC BLOOD PRESSURE: 165 MMHG | HEART RATE: 65 BPM | DIASTOLIC BLOOD PRESSURE: 80 MMHG

## 2024-12-18 DIAGNOSIS — T81.31XA POSTOPERATIVE WOUND DEHISCENCE, INITIAL ENCOUNTER: Primary | ICD-10-CM

## 2024-12-18 DIAGNOSIS — E11.628 TYPE 2 DIABETES MELLITUS WITH OTHER SKIN COMPLICATION, UNSPECIFIED WHETHER LONG TERM INSULIN USE (H): ICD-10-CM

## 2024-12-18 DIAGNOSIS — Z89.512 STATUS POST BELOW-KNEE AMPUTATION OF LEFT LOWER EXTREMITY (H): ICD-10-CM

## 2024-12-18 PROCEDURE — 15002 WOUND PREP TRK/ARM/LEG: CPT | Performed by: NURSE PRACTITIONER

## 2024-12-18 PROCEDURE — 15271 SKIN SUB GRAFT TRNK/ARM/LEG: CPT | Performed by: NURSE PRACTITIONER

## 2024-12-18 ASSESSMENT — PAIN SCALES - GENERAL: PAINLEVEL_OUTOF10: NO PAIN (0)

## 2024-12-18 NOTE — PROGRESS NOTES
Follow up Vascular Visit       Date of Service:12/18/24      Chief Complaint: left stump ulcer      Pt returns to [facility] with regards to their left stump ulcer.  They arrive today with spouse. They are currently using epifix to the wounds. This is being done by staff at the clinic weekly. They are using tubular compression and short stretch for compression. They are feeling well today. Denies fevers, chills. No shortness of breath.     Allergies:   Allergies   Allergen Reactions    Bees     Sulfamethoxazole Swelling     throat       Medications:   Current Outpatient Medications:     acetaminophen (TYLENOL) 325 MG tablet, Take 2 tablets (650 mg) by mouth every 4 hours as needed for other (mild pain), Disp: 100 tablet, Rfl: 0    amLODIPine (NORVASC) 2.5 MG tablet, TAKE ONE TABLET BY MOUTH ONCE DAILY, Disp: 90 tablet, Rfl: 3    aspirin 81 MG EC tablet, Take 1 tablet (81 mg) by mouth 2 times daily, Disp: 60 tablet, Rfl: 0    atorvastatin (LIPITOR) 20 MG tablet, TAKE ONE TABLET BY MOUTH ONCE DAILY IN THE EVENING, Disp: 90 tablet, Rfl: 3    BD PEN NEEDLE YENNY 2ND GEN 32G X 4 MM miscellaneous, USE 1 PEN NEEDLES DAILY OR AS DIRECTED., Disp: 100 each, Rfl: 2    blood glucose (ACCU-CHEK GANGA) test strip, Use to test blood sugar 1 times daily or as directed., Disp: 100 strip, Rfl: 3    blood glucose monitoring (ACCU-CHEK GANGA PLUS) meter device kit, USE TO TEST BLOOD SUGAR 1 TIMES DAILY OR AS DIRECTED., Disp: , Rfl: 0    blood glucose monitoring (ACCU-CHEK MULTICLIX) lancets, USE TO TEST BLOOD SUGAR 1 TIMES DAILY OR AS DIRECTED., Disp: , Rfl: 3    EPINEPHrine (ANY BX GENERIC EQUIV) 0.3 MG/0.3ML injection 2-pack, INJECT 0.3MG DOSE INTRAMUSCULARLY INTO THE OUTER THIGH MUSCLE ONCE AS DIRECTED AS NEEDED FOR ANAPHYLAXIS, Disp: 2 each, Rfl: 1    hydrochlorothiazide (HYDRODIURIL) 25 MG tablet, TAKE ONE TABLET (25 mg)  BY MOUTH ONCE DAILY, Disp: 90 tablet, Rfl: 3    Insulin Glargine-yfgn 100 UNIT/ML  SOPN, Inject 64 Units subcutaneously at bedtime., Disp: 60 mL, Rfl: 3    losartan (COZAAR) 100 MG tablet, TAKE ONE TABLET BY MOUTH ONCE DAILY, Disp: 90 tablet, Rfl: 3    metFORMIN (GLUCOPHAGE XR) 500 MG 24 hr tablet, TAKE TWO TABLETS BY MOUTH TWICE DAILY WITH MEALS, Disp: 360 tablet, Rfl: 3    metoprolol succinate ER (TOPROL XL) 200 MG 24 hr tablet, TAKE ONE TABLET BY MOUTH ONCE DAILY, Disp: 90 tablet, Rfl: 3    mupirocin (BACTROBAN) 2 % external ointment, Apply topically 3 times daily., Disp: 30 g, Rfl: 3    Current Facility-Administered Medications:     lidocaine (XYLOCAINE) 5 % ointment, , Topical, Daily PRN, Joyce Andres, NP, Given at 11/27/24 0850    lidocaine (XYLOCAINE) 5 % ointment, , Topical, Daily PRN, Joyce Andres, NP, Given at 10/09/24 1312    lidocaine (XYLOCAINE) 5 % ointment, , Topical, Daily PRN, Joyce Andres NP    History:   Past Medical History:   Diagnosis Date    Diabetes (H) 1-2017    Hypertension 1-2017    Septic arthritis of right foot (H) 9/26/2023       Physical Exam:    BP (!) 165/80   Pulse 65   Temp 98.3  F (36.8  C)   Resp 18   SpO2 97%     General:  Patient presents to clinic in no apparent distress.  Head: normocephalic atraumatic  Psychiatric:  Alert and oriented x3.   Respiratory: unlabored breathing; no cough  Integumentary:  Skin is uniformly warm, dry and pink.    Ulcer #1 Location: left stump  Size: 2L x 5W x 0.1depth.  no sinus tract present, Wound base: slough; underneath 100% viable  no undermining present. Ulcer is full thickness. There is moderate drainage. Periwound: no denudement, erythema, induration, maceration or warmth.      VASC Wound Left bka (Active)   Pre Size Length 3 12/18/24 0700   Pre Size Width 7 12/18/24 0700   Pre Size Depth 0.1 12/18/24 0700   Pre Total Sq cm 21 12/18/24 0700   Post Size Length 2.5 12/11/24 0800   Post Size Width 7.5 12/11/24 0800   Post Size Depth 0.1 12/11/24 0800   Post Total Sq cm 18.75 12/11/24 0800   Tunneling 4.5cm at 12  o'clock 08/14/24 0800   Number of days: 126       Incision/Surgical Site 07/09/24 Left Tibial (Active)   Number of days: 162            Circumferential volume measures:           No data to display                Labs:    I personally reviewed the following lab results today and those on care everywhere    CRP Inflammation   Date Value Ref Range Status   11/08/2019 22.2 (H) 0.0 - 8.0 mg/L Final      Sed Rate   Date Value Ref Range Status   11/08/2019 33 (H) 0 - 20 mm/h Final     Erythrocyte Sedimentation Rate   Date Value Ref Range Status   09/25/2023 81 (H) 0 - 20 mm/hr Final      Last Renal Panel:  Sodium   Date Value Ref Range Status   08/29/2024 132 (L) 135 - 145 mmol/L Final   12/04/2020 138 133 - 144 mmol/L Final     Potassium   Date Value Ref Range Status   08/29/2024 4.7 3.4 - 5.3 mmol/L Final   08/09/2021 4.9 3.4 - 5.3 mmol/L Final   12/04/2020 4.5 3.4 - 5.3 mmol/L Final     Chloride   Date Value Ref Range Status   08/29/2024 94 (L) 98 - 107 mmol/L Final   07/23/2021 101 98 - 107 mmol/L Final     Comment:     Last Lab Result: LDL Cholesterol Calculated (mg/dL   12/04/2020 103 94 - 109 mmol/L Final     Carbon Dioxide   Date Value Ref Range Status   12/04/2020 28 20 - 32 mmol/L Final     Carbon Dioxide (CO2)   Date Value Ref Range Status   08/29/2024 26 22 - 29 mmol/L Final   07/23/2021 26 22 - 31 mmol/L Final     Comment:     Last Lab Result: LDL Cholesterol Calculated (mg/dL     Anion Gap   Date Value Ref Range Status   08/29/2024 12 7 - 15 mmol/L Final   07/23/2021 12 5 - 18 mmol/L Final   12/04/2020 7 3 - 14 mmol/L Final     Glucose   Date Value Ref Range Status   08/29/2024 143 (H) 70 - 99 mg/dL Final   07/23/2021 137 (H) 70 - 125 mg/dL Final     Comment:     Last Lab Result: LDL Cholesterol Calculated (mg/dL   12/04/2020 269 (H) 70 - 99 mg/dL Final     Comment:     Fasting specimen     GLUCOSE BY METER POCT   Date Value Ref Range Status   07/09/2024 70 70 - 99 mg/dL Final     Urea Nitrogen   Date Value  "Ref Range Status   08/29/2024 25.2 (H) 8.0 - 23.0 mg/dL Final   07/23/2021 18 8 - 22 mg/dL Final     Comment:     Last Lab Result: LDL Cholesterol Calculated (mg/dL   12/04/2020 25 7 - 30 mg/dL Final     Creatinine   Date Value Ref Range Status   08/29/2024 0.69 0.67 - 1.17 mg/dL Final   12/04/2020 0.92 0.66 - 1.25 mg/dL Final     GFR Estimate   Date Value Ref Range Status   08/29/2024 >90 >60 mL/min/1.73m2 Final     Comment:     eGFR calculated using 2021 CKD-EPI equation.   12/04/2020 >90 >60 mL/min/[1.73_m2] Final     Comment:     Non  GFR Calc  Starting 12/18/2018, serum creatinine based estimated GFR (eGFR) will be   calculated using the Chronic Kidney Disease Epidemiology Collaboration   (CKD-EPI) equation.       Calcium   Date Value Ref Range Status   08/29/2024 9.5 8.8 - 10.4 mg/dL Final     Comment:     Reference intervals for this test were updated on 7/16/2024 to reflect our healthy population more accurately. There may be differences in the flagging of prior results with similar values performed with this method. Those prior results can be interpreted in the context of the updated reference intervals.   12/04/2020 8.8 8.5 - 10.1 mg/dL Final     Albumin   Date Value Ref Range Status   09/25/2023 3.2 (L) 3.5 - 5.2 g/dL Final   07/23/2021 4.1 3.5 - 5.0 g/dL Final     Comment:     Last Lab Result: LDL Cholesterol Calculated (mg/dL   12/04/2020 3.4 3.4 - 5.0 g/dL Final      Lab Results   Component Value Date    WBC 14.4 09/20/2024    WBC 11.1 11/08/2019     Lab Results   Component Value Date    RBC 4.46 09/20/2024    RBC 4.68 11/08/2019     Lab Results   Component Value Date    HGB 11.5 09/20/2024    HGB 12.8 11/08/2019     Lab Results   Component Value Date    HCT 35.7 09/20/2024    HCT 40.0 11/08/2019     No components found for: \"MCT\"  Lab Results   Component Value Date    MCV 80 09/20/2024    MCV 86 11/08/2019     Lab Results   Component Value Date    MCH 25.8 09/20/2024    MCH 27.4 " "11/08/2019     Lab Results   Component Value Date    MCHC 32.2 09/20/2024    MCHC 32.0 11/08/2019     Lab Results   Component Value Date    RDW 14.5 09/20/2024    RDW 13.8 11/08/2019     Lab Results   Component Value Date     09/20/2024     11/08/2019      Lab Results   Component Value Date    A1C 5.0 08/29/2024    A1C 6.7 09/25/2023    A1C 6.6 08/01/2022    A1C 7.5 03/25/2022    A1C 7.3 07/23/2021    A1C 9.6 12/04/2020    A1C 9.0 07/21/2020    A1C 7.8 11/08/2019    A1C 6.5 11/29/2018    A1C 7.3 06/28/2017      TSH   Date Value Ref Range Status   11/29/2018 3.07 0.40 - 4.00 mU/L Final      No results found for: \"VITDT\"                Impression:  Encounter Diagnoses   Name Primary?    Postoperative wound dehiscence, initial encounter Yes    Status post below-knee amputation of left lower extremity (H)     Type 2 diabetes mellitus with other skin complication, unspecified whether long term insulin use (H)                    Are any of these ulcers new today: No; Location: na    Assessment/Plan:          1. Debridement: Nursing staff removed the old dressing and cleanse the wound(s) with specified solution. After discussion of risk factors and verbal consent was obtained 2% Lidocaine HCL jelly was applied, under clean conditions, the left stump ulceration(s) were debrided using currette. Devitalized and nonviable tissue, along with any fibrin and slough, was removed to improve granulation tissue formation, stimulate wound healing, decrease overall bacteria load, disrupt biofilm formation and decrease edge senescence.  Total excisional debridement was 10 sq cm from the epidermis/dermis area and into the subcutaneous tissue with a depth of 0.1 cm.   Ulcers were improved afterwards and .  Measures were as noted on the flow sheet.       2.  Ulcer treatment: ulcer treatment will include irrigation and dressings to promote autolytic debridement which will include:continue with epifix. If for some reason " the patient is not able to get their dressing(s) changed as outlined above (due to illness, lack of supplies, lack of help) please do the following: remove old, soiled dressings; wash the ulcers with saline; pat dry; apply ABD pad or other absorbant pad and secure with rolled gauze; avoid tape directly on your skin; patient instructed to call the clinic as soon as possible to let us know what the current issues are in receiving ulcer care. 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   After risks were reviewed including infection, allergic reaction and scarring consent was received the left leg Diabetic ulceration(s) was/ were debrided with clean technique.  curette used to remove slough and fibrin deposition along with devitalized tissue.  This was to improve granulation tissue formation, stimulate wound healing, decrease biofilm formation, decrease bacterial load, decrease senescent edges and prepare for bioengineered graft placement.  A total of 10square centimeters was debrided from the from the epidermis/dermis area and into the subcutaneous tissue. Epifix was recommended and consent was obtained for the application.  This is application # 8.       For the procedure, the patient was placed in a comfortable position with the wound bed exposed. Epifix application is being performed in a staged procedure in an attempt to achieve rapid wound closure. The Tissue Identification Number is [id number] with an expiration date of 2028-12-01 as indicated on the consent form dated 12/18/2024      The graft size is 2x3 , total wound size is 10 with total applied area was 10sq cm and the estimated wastage was 0; any leftover product was applied in a layered fashion.   The graft was prepared and applied following the   guidelines.  The graft was covered with sorbact then with a non-adherent contact layer and secured with steristrips .  Then,  a secondary aquacel ag dressing was applied.  The patient will continue to utilize tubular compression and short stretch for compression.  Patient tolerated the procedure without any complications and was educated and expressed an understanding of the proper wound treatment until their follow up.             3. Edema: tubular compression and short stretch. The compression wraps were applied today in clinic.     If a 2 layer or 4 layer compression wrap is being used; these are safe to have on for ONLY 7 days. If for some reason the patient is not able to get the wrap(s) changed (due to illness; lack of supplies, lack of help, lack of transportation) please do the following: unwrap the old 2 or 4 layer compression wrap; avoid using scissors as you could cut your skin and cause ulcers; use tubular compression when available. Call to reschedule your home care or clinic visit appointment as soon as possible.  Stable            4. Nutrition: focus on diabetes control           5. Offloading: no prosthesis wearing or fitting until well healed          6. Wound Etiology: surgical     Patient will follow up with me in 1 weeks for reevaluation. They were instructed to call the clinic sooner with any signs or symptoms of infection or any further questions/concerns. Answered all questions.          Joyce Andres DNP, RN, CNP, CWOCN, CFCN, CLT  [facility]   [phone]        This note was electronically signed by Joyce Andres NP

## 2024-12-24 ENCOUNTER — OFFICE VISIT (OUTPATIENT)
Dept: VASCULAR SURGERY | Facility: CLINIC | Age: 64
End: 2024-12-24
Payer: COMMERCIAL

## 2024-12-24 DIAGNOSIS — T81.31XA POSTOPERATIVE WOUND DEHISCENCE, INITIAL ENCOUNTER: Primary | ICD-10-CM

## 2024-12-24 DIAGNOSIS — Z89.512 STATUS POST BELOW-KNEE AMPUTATION OF LEFT LOWER EXTREMITY (H): ICD-10-CM

## 2024-12-24 DIAGNOSIS — E11.628 TYPE 2 DIABETES MELLITUS WITH OTHER SKIN COMPLICATION, UNSPECIFIED WHETHER LONG TERM INSULIN USE (H): ICD-10-CM

## 2024-12-24 PROCEDURE — 15002 WOUND PREP TRK/ARM/LEG: CPT

## 2024-12-24 PROCEDURE — 15271 SKIN SUB GRAFT TRNK/ARM/LEG: CPT

## 2024-12-24 ASSESSMENT — PAIN SCALES - GENERAL: PAINLEVEL_OUTOF10: NO PAIN (0)

## 2024-12-24 NOTE — PATIENT INSTRUCTIONS
Today your wound was grafted with epifix   Covered by sorbact  Secured with steristrips  Covered with aquacel ag  ABD  Adaptic to any other abrasions surrounding the main wound  Rolled gauze  Compression sleeve and wrap    Change the aquacel ag and abd if there is strikethrough or bleeding through the dressing  Be very cautious that you don't remove the graft area    Do not get wet in the shower    Continue to focus on tight glucose control and increase protein intake    Keep pressure off the wound area

## 2024-12-24 NOTE — PROGRESS NOTES
Follow up Vascular Visit       Date of Service:12/24/24      Chief Complaint: left stump ulcer      Pt returns to [facility] with regards to their left stump ulcer.  They arrive today with spouse. They are currently using epifix to the wounds. This is being done by staff at the clinic weekly. They are using tubular compression and short stretch for compression. They are feeling well today. Denies fevers, chills. No shortness of breath.     Allergies:   Allergies   Allergen Reactions    Bees     Sulfamethoxazole Swelling     throat       Medications:   Current Outpatient Medications:     acetaminophen (TYLENOL) 325 MG tablet, Take 2 tablets (650 mg) by mouth every 4 hours as needed for other (mild pain), Disp: 100 tablet, Rfl: 0    amLODIPine (NORVASC) 2.5 MG tablet, TAKE ONE TABLET BY MOUTH ONCE DAILY, Disp: 90 tablet, Rfl: 3    aspirin 81 MG EC tablet, Take 1 tablet (81 mg) by mouth 2 times daily, Disp: 60 tablet, Rfl: 0    atorvastatin (LIPITOR) 20 MG tablet, TAKE ONE TABLET BY MOUTH ONCE DAILY IN THE EVENING, Disp: 90 tablet, Rfl: 3    BD PEN NEEDLE YENNY 2ND GEN 32G X 4 MM miscellaneous, USE 1 PEN NEEDLES DAILY OR AS DIRECTED., Disp: 100 each, Rfl: 2    blood glucose (ACCU-CHEK GANGA) test strip, Use to test blood sugar 1 times daily or as directed., Disp: 100 strip, Rfl: 3    blood glucose monitoring (ACCU-CHEK GANGA PLUS) meter device kit, USE TO TEST BLOOD SUGAR 1 TIMES DAILY OR AS DIRECTED., Disp: , Rfl: 0    blood glucose monitoring (ACCU-CHEK MULTICLIX) lancets, USE TO TEST BLOOD SUGAR 1 TIMES DAILY OR AS DIRECTED., Disp: , Rfl: 3    EPINEPHrine (ANY BX GENERIC EQUIV) 0.3 MG/0.3ML injection 2-pack, INJECT 0.3MG DOSE INTRAMUSCULARLY INTO THE OUTER THIGH MUSCLE ONCE AS DIRECTED AS NEEDED FOR ANAPHYLAXIS, Disp: 2 each, Rfl: 1    hydrochlorothiazide (HYDRODIURIL) 25 MG tablet, TAKE ONE TABLET (25 mg)  BY MOUTH ONCE DAILY, Disp: 90 tablet, Rfl: 3    Insulin Glargine-yfgn 100 UNIT/ML  SOPN, Inject 64 Units subcutaneously at bedtime., Disp: 60 mL, Rfl: 3    losartan (COZAAR) 100 MG tablet, TAKE ONE TABLET BY MOUTH ONCE DAILY, Disp: 90 tablet, Rfl: 3    metFORMIN (GLUCOPHAGE XR) 500 MG 24 hr tablet, TAKE TWO TABLETS BY MOUTH TWICE DAILY WITH MEALS, Disp: 360 tablet, Rfl: 3    metoprolol succinate ER (TOPROL XL) 200 MG 24 hr tablet, TAKE ONE TABLET BY MOUTH ONCE DAILY, Disp: 90 tablet, Rfl: 3    mupirocin (BACTROBAN) 2 % external ointment, Apply topically 3 times daily., Disp: 30 g, Rfl: 3    Current Facility-Administered Medications:     lidocaine (XYLOCAINE) 5 % ointment, , Topical, Daily PRN, Joyce Andres, NP, Given at 11/27/24 0850    lidocaine (XYLOCAINE) 5 % ointment, , Topical, Daily PRN, Joyce Andres, NP, Given at 10/09/24 1312    lidocaine (XYLOCAINE) 5 % ointment, , Topical, Daily PRN, Joyce Andres NP    History:   Past Medical History:   Diagnosis Date    Diabetes (H) 1-2017    Hypertension 1-2017    Septic arthritis of right foot (H) 9/26/2023       Physical Exam:    There were no vitals taken for this visit.    General:  Patient presents to clinic in no apparent distress.  Head: normocephalic atraumatic  Psychiatric:  Alert and oriented x3.   Respiratory: unlabored breathing; no cough  Integumentary:  Skin is uniformly warm, dry and pink.    Ulcer #1 Location: left stump  Size: 2.3 L x 4.2 W x 0.1depth.  no sinus tract present, Wound base: slough; underneath 100% viable  no undermining present. Ulcer is full thickness. There is moderate drainage. Periwound: no denudement, erythema, induration, maceration or warmth.      VASC Wound Left bka (Active)   Pre Size Length 2.3 12/24/24 0749   Pre Size Width 4.2 12/24/24 0749   Pre Size Depth 0.1 12/24/24 0749   Pre Total Sq cm 9.66 12/24/24 0749   Post Size Length 2 12/18/24 0700   Post Size Width 5 12/18/24 0700   Post Size Depth 0.1 12/18/24 0700   Post Total Sq cm 10 12/18/24 0700   Tunneling 4.5cm at 12 o'clock 08/14/24 0800   Number  of days: 132       Incision/Surgical Site 07/09/24 Left Tibial (Active)   Number of days: 168            Circumferential volume measures:           No data to display                Labs:    I personally reviewed the following lab results today and those on care everywhere    CRP Inflammation   Date Value Ref Range Status   11/08/2019 22.2 (H) 0.0 - 8.0 mg/L Final      Sed Rate   Date Value Ref Range Status   11/08/2019 33 (H) 0 - 20 mm/h Final     Erythrocyte Sedimentation Rate   Date Value Ref Range Status   09/25/2023 81 (H) 0 - 20 mm/hr Final      Last Renal Panel:  Sodium   Date Value Ref Range Status   08/29/2024 132 (L) 135 - 145 mmol/L Final   12/04/2020 138 133 - 144 mmol/L Final     Potassium   Date Value Ref Range Status   08/29/2024 4.7 3.4 - 5.3 mmol/L Final   08/09/2021 4.9 3.4 - 5.3 mmol/L Final   12/04/2020 4.5 3.4 - 5.3 mmol/L Final     Chloride   Date Value Ref Range Status   08/29/2024 94 (L) 98 - 107 mmol/L Final   07/23/2021 101 98 - 107 mmol/L Final     Comment:     Last Lab Result: LDL Cholesterol Calculated (mg/dL   12/04/2020 103 94 - 109 mmol/L Final     Carbon Dioxide   Date Value Ref Range Status   12/04/2020 28 20 - 32 mmol/L Final     Carbon Dioxide (CO2)   Date Value Ref Range Status   08/29/2024 26 22 - 29 mmol/L Final   07/23/2021 26 22 - 31 mmol/L Final     Comment:     Last Lab Result: LDL Cholesterol Calculated (mg/dL     Anion Gap   Date Value Ref Range Status   08/29/2024 12 7 - 15 mmol/L Final   07/23/2021 12 5 - 18 mmol/L Final   12/04/2020 7 3 - 14 mmol/L Final     Glucose   Date Value Ref Range Status   08/29/2024 143 (H) 70 - 99 mg/dL Final   07/23/2021 137 (H) 70 - 125 mg/dL Final     Comment:     Last Lab Result: LDL Cholesterol Calculated (mg/dL   12/04/2020 269 (H) 70 - 99 mg/dL Final     Comment:     Fasting specimen     GLUCOSE BY METER POCT   Date Value Ref Range Status   07/09/2024 70 70 - 99 mg/dL Final     Urea Nitrogen   Date Value Ref Range Status   08/29/2024  "25.2 (H) 8.0 - 23.0 mg/dL Final   07/23/2021 18 8 - 22 mg/dL Final     Comment:     Last Lab Result: LDL Cholesterol Calculated (mg/dL   12/04/2020 25 7 - 30 mg/dL Final     Creatinine   Date Value Ref Range Status   08/29/2024 0.69 0.67 - 1.17 mg/dL Final   12/04/2020 0.92 0.66 - 1.25 mg/dL Final     GFR Estimate   Date Value Ref Range Status   08/29/2024 >90 >60 mL/min/1.73m2 Final     Comment:     eGFR calculated using 2021 CKD-EPI equation.   12/04/2020 >90 >60 mL/min/[1.73_m2] Final     Comment:     Non  GFR Calc  Starting 12/18/2018, serum creatinine based estimated GFR (eGFR) will be   calculated using the Chronic Kidney Disease Epidemiology Collaboration   (CKD-EPI) equation.       Calcium   Date Value Ref Range Status   08/29/2024 9.5 8.8 - 10.4 mg/dL Final     Comment:     Reference intervals for this test were updated on 7/16/2024 to reflect our healthy population more accurately. There may be differences in the flagging of prior results with similar values performed with this method. Those prior results can be interpreted in the context of the updated reference intervals.   12/04/2020 8.8 8.5 - 10.1 mg/dL Final     Albumin   Date Value Ref Range Status   09/25/2023 3.2 (L) 3.5 - 5.2 g/dL Final   07/23/2021 4.1 3.5 - 5.0 g/dL Final     Comment:     Last Lab Result: LDL Cholesterol Calculated (mg/dL   12/04/2020 3.4 3.4 - 5.0 g/dL Final      Lab Results   Component Value Date    WBC 14.4 09/20/2024    WBC 11.1 11/08/2019     Lab Results   Component Value Date    RBC 4.46 09/20/2024    RBC 4.68 11/08/2019     Lab Results   Component Value Date    HGB 11.5 09/20/2024    HGB 12.8 11/08/2019     Lab Results   Component Value Date    HCT 35.7 09/20/2024    HCT 40.0 11/08/2019     No components found for: \"MCT\"  Lab Results   Component Value Date    MCV 80 09/20/2024    MCV 86 11/08/2019     Lab Results   Component Value Date    MCH 25.8 09/20/2024    MCH 27.4 11/08/2019     Lab Results " "  Component Value Date    MCHC 32.2 09/20/2024    MCHC 32.0 11/08/2019     Lab Results   Component Value Date    RDW 14.5 09/20/2024    RDW 13.8 11/08/2019     Lab Results   Component Value Date     09/20/2024     11/08/2019      Lab Results   Component Value Date    A1C 5.0 08/29/2024    A1C 6.7 09/25/2023    A1C 6.6 08/01/2022    A1C 7.5 03/25/2022    A1C 7.3 07/23/2021    A1C 9.6 12/04/2020    A1C 9.0 07/21/2020    A1C 7.8 11/08/2019    A1C 6.5 11/29/2018    A1C 7.3 06/28/2017      TSH   Date Value Ref Range Status   11/29/2018 3.07 0.40 - 4.00 mU/L Final      No results found for: \"VITDT\"                Impression:  Encounter Diagnoses   Name Primary?    Postoperative wound dehiscence, initial encounter Yes    Status post below-knee amputation of left lower extremity (H)     Type 2 diabetes mellitus with other skin complication, unspecified whether long term insulin use (H)                  12/18 12/24    Are any of these ulcers new today: No; Location: na    Assessment/Plan:          1. Debridement: Nursing staff removed the old dressing and cleanse the wound(s) with specified solution. After discussion of risk factors and verbal consent was obtained 2% Lidocaine HCL jelly was applied, under clean conditions, the left stump ulceration(s) were debrided using currette. Devitalized and nonviable tissue, along with any fibrin and slough, was removed to improve granulation tissue formation, stimulate wound healing, decrease overall bacteria load, disrupt biofilm formation and decrease edge senescence.  Total excisional debridement was 9.66 sq cm from the epidermis/dermis area and into the subcutaneous tissue with a depth of 0.1 cm.   Ulcers were improved afterwards and .  Measures were as noted on the flow sheet.       2.  Ulcer treatment: ulcer treatment will include irrigation and dressings to promote autolytic " debridement which will include:continue with epifix. If for some reason the patient is not able to get their dressing(s) changed as outlined above (due to illness, lack of supplies, lack of help) please do the following: remove old, soiled dressings; wash the ulcers with saline; pat dry; apply ABD pad or other absorbant pad and secure with rolled gauze; avoid tape directly on your skin; patient instructed to call the clinic as soon as possible to let us know what the current issues are in receiving ulcer care. 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   After risks were reviewed including infection, allergic reaction and scarring consent was received the left leg Diabetic ulceration(s) was/ were debrided with clean technique.  curette used to remove slough and fibrin deposition along with devitalized tissue.  This was to improve granulation tissue formation, stimulate wound healing, decrease biofilm formation, decrease bacterial load, decrease senescent edges and prepare for bioengineered graft placement.  A total of 10square centimeters was debrided from the from the epidermis/dermis area and into the subcutaneous tissue. Epifix was recommended and consent was obtained for the application.  This is application # 9.       For the procedure, the patient was placed in a comfortable position with the wound bed exposed. Epifix application is being performed in a staged procedure in an attempt to achieve rapid wound closure. The Tissue Identification Number is [id number] with an expiration date of 2029-07-01 as indicated on the consent form dated 12/24/2024      The graft size is 2x3 , total wound size is 9.66 with total applied area was 9.66 sq cm and the estimated wastage was 0; any leftover product was applied in a layered  fashion.   The graft was prepared and applied following the  guidelines.  The graft was covered with sorbact and secured with steristrips .  Then,  a secondary aquacel ag dressing was applied.  The patient will continue to utilize tubular compression and short stretch for compression.  Patient tolerated the procedure without any complications and was educated and expressed an understanding of the proper wound treatment until their follow up.             3. Edema: tubular compression and short stretch. The compression wraps were applied today in clinic.     If a 2 layer or 4 layer compression wrap is being used; these are safe to have on for ONLY 7 days. If for some reason the patient is not able to get the wrap(s) changed (due to illness; lack of supplies, lack of help, lack of transportation) please do the following: unwrap the old 2 or 4 layer compression wrap; avoid using scissors as you could cut your skin and cause ulcers; use tubular compression when available. Call to reschedule your home care or clinic visit appointment as soon as possible.  Stable            4. Nutrition: focus on diabetes control           5. Offloading: no prosthesis wearing or fitting until well healed          6. Wound Etiology: surgical     Patient will follow up with me in 1 weeks for reevaluation. They were instructed to call the clinic sooner with any signs or symptoms of infection or any further questions/concerns. Answered all questions.

## 2024-12-31 ENCOUNTER — OFFICE VISIT (OUTPATIENT)
Dept: VASCULAR SURGERY | Facility: CLINIC | Age: 64
End: 2024-12-31
Attending: NURSE PRACTITIONER
Payer: COMMERCIAL

## 2024-12-31 VITALS
SYSTOLIC BLOOD PRESSURE: 181 MMHG | RESPIRATION RATE: 18 BRPM | TEMPERATURE: 98.1 F | DIASTOLIC BLOOD PRESSURE: 77 MMHG | OXYGEN SATURATION: 97 % | HEART RATE: 69 BPM

## 2024-12-31 DIAGNOSIS — A49.02 MRSA INFECTION: ICD-10-CM

## 2024-12-31 DIAGNOSIS — E11.628 TYPE 2 DIABETES MELLITUS WITH OTHER SKIN COMPLICATION, UNSPECIFIED WHETHER LONG TERM INSULIN USE (H): ICD-10-CM

## 2024-12-31 DIAGNOSIS — T81.31XA POSTOPERATIVE WOUND DEHISCENCE, INITIAL ENCOUNTER: Primary | ICD-10-CM

## 2024-12-31 DIAGNOSIS — B37.2 CANDIDIASIS OF SKIN: ICD-10-CM

## 2024-12-31 DIAGNOSIS — E66.01 MORBID OBESITY, UNSPECIFIED OBESITY TYPE (H): ICD-10-CM

## 2024-12-31 DIAGNOSIS — Z89.512 STATUS POST BELOW-KNEE AMPUTATION OF LEFT LOWER EXTREMITY (H): ICD-10-CM

## 2024-12-31 PROCEDURE — 15271 SKIN SUB GRAFT TRNK/ARM/LEG: CPT | Performed by: NURSE PRACTITIONER

## 2024-12-31 PROCEDURE — 15002 WOUND PREP TRK/ARM/LEG: CPT | Performed by: NURSE PRACTITIONER

## 2024-12-31 PROCEDURE — 99215 OFFICE O/P EST HI 40 MIN: CPT | Performed by: NURSE PRACTITIONER

## 2024-12-31 RX ORDER — CLOTRIMAZOLE 1 %
CREAM (GRAM) TOPICAL 2 TIMES DAILY
Qty: 60 G | Refills: 2 | Status: SHIPPED | OUTPATIENT
Start: 2024-12-31

## 2024-12-31 ASSESSMENT — PAIN SCALES - GENERAL: PAINLEVEL_OUTOF10: NO PAIN (0)

## 2024-12-31 NOTE — PATIENT INSTRUCTIONS
I have prescribed you clotrimazole cream; this is an antifungal cream; do your best to apply this to the rashy; scaling; crusty areas on the left stump without disrupting the graft area      Today your wound was grafted with epifix   Covered by sorbact  Secured with steristrips  Covered with aquacel ag  ABD  Adaptic to any other abrasions surrounding the main wound  Rolled gauze  Compression sleeve and wrap    Change the aquacel ag and abd if there is strikethrough or bleeding through the dressing  Be very cautious that you don't remove the graft area    Do not get wet in the shower    Continue to focus on tight glucose control and increase protein intake    Keep pressure off the wound area

## 2024-12-31 NOTE — PROGRESS NOTES
Follow up Vascular Visit       Date of Service:12/31/24      Chief Complaint: left stump ulcer      Pt returns to [facility] with regards to their left stump ulcer.  They arrive today with spouse; via wheelchair. They are currently using epifix to the wounds. This is being done by staff at the clinic [facility]. They are using tubular compression and short stretch for compression. They are feeling well today. Denies fevers, chills. No shortness of breath.     Allergies:   Allergies   Allergen Reactions    Bees     Sulfamethoxazole Swelling     throat       Medications:   Current Outpatient Medications:     acetaminophen (TYLENOL) 325 MG tablet, Take 2 tablets (650 mg) by mouth every 4 hours as needed for other (mild pain), Disp: 100 tablet, Rfl: 0    amLODIPine (NORVASC) 2.5 MG tablet, TAKE ONE TABLET BY MOUTH ONCE DAILY, Disp: 90 tablet, Rfl: 3    aspirin 81 MG EC tablet, Take 1 tablet (81 mg) by mouth 2 times daily, Disp: 60 tablet, Rfl: 0    atorvastatin (LIPITOR) 20 MG tablet, TAKE ONE TABLET BY MOUTH ONCE DAILY IN THE EVENING, Disp: 90 tablet, Rfl: 3    BD PEN NEEDLE YENNY 2ND GEN 32G X 4 MM miscellaneous, USE 1 PEN NEEDLES DAILY OR AS DIRECTED., Disp: 100 each, Rfl: 2    blood glucose (ACCU-CHEK GANGA) test strip, Use to test blood sugar 1 times daily or as directed., Disp: 100 strip, Rfl: 3    blood glucose monitoring (ACCU-CHEK GANGA PLUS) meter device kit, USE TO TEST BLOOD SUGAR 1 TIMES DAILY OR AS DIRECTED., Disp: , Rfl: 0    blood glucose monitoring (ACCU-CHEK MULTICLIX) lancets, USE TO TEST BLOOD SUGAR 1 TIMES DAILY OR AS DIRECTED., Disp: , Rfl: 3    EPINEPHrine (ANY BX GENERIC EQUIV) 0.3 MG/0.3ML injection 2-pack, INJECT 0.3MG DOSE INTRAMUSCULARLY INTO THE OUTER THIGH MUSCLE ONCE AS DIRECTED AS NEEDED FOR ANAPHYLAXIS, Disp: 2 each, Rfl: 1    hydrochlorothiazide (HYDRODIURIL) 25 MG tablet, TAKE ONE TABLET (25 mg)  BY MOUTH ONCE DAILY, Disp: 90 tablet, Rfl: 3    Insulin  Glargine-yfgn 100 UNIT/ML SOPN, Inject 64 Units subcutaneously at bedtime., Disp: 60 mL, Rfl: 3    losartan (COZAAR) 100 MG tablet, TAKE ONE TABLET BY MOUTH ONCE DAILY, Disp: 90 tablet, Rfl: 3    metFORMIN (GLUCOPHAGE XR) 500 MG 24 hr tablet, TAKE TWO TABLETS BY MOUTH TWICE DAILY WITH MEALS, Disp: 360 tablet, Rfl: 3    metoprolol succinate ER (TOPROL XL) 200 MG 24 hr tablet, TAKE ONE TABLET BY MOUTH ONCE DAILY, Disp: 90 tablet, Rfl: 3    mupirocin (BACTROBAN) 2 % external ointment, Apply topically 3 times daily., Disp: 30 g, Rfl: 3    Current Facility-Administered Medications:     lidocaine (XYLOCAINE) 5 % ointment, , Topical, Daily PRN, Joyce Andres, NP, Given at 11/27/24 0850    lidocaine (XYLOCAINE) 5 % ointment, , Topical, Daily PRN, Joyce Andres, NP, Given at 10/09/24 1312    lidocaine (XYLOCAINE) 5 % ointment, , Topical, Daily PRN, Joyce Andres NP    History:   Past Medical History:   Diagnosis Date    Diabetes (H) 1-2017    Hypertension 1-2017    Septic arthritis of right foot (H) 9/26/2023       Physical Exam:    BP (!) 181/77   Pulse 69   Temp 98.1  F (36.7  C)   Resp 18   SpO2 97%     General:  Patient presents to clinic in no apparent distress.  Head: normocephalic atraumatic  Psychiatric:  Alert and oriented x3.   Respiratory: unlabored breathing; no cough  Integumentary:  Skin is uniformly warm, dry and pink.    Ulcer #1 Location: left stump  Size: 1.4L x 3.3W x 0.1depth.  no sinus tract present, Wound base: slough; underneath 100% viable wound bed  no undermining present. Ulcer is full thickness. There is moderate drainage. Periwound: no denudement, erythema, induration, maceration or warmth.      There is extensive scaling and crusting on the stump; there are x2 small partial thickness ulcers on the stump; small drainage; where the wrap was rubbing    VASC Wound Left bka (Active)   Pre Size Length 2.6 12/31/24 0749   Pre Size Width 3.3 12/31/24 0749   Pre Size Depth 0.1 12/31/24 0749   Pre  Total Sq cm 8.58 12/31/24 0749   Post Size Length 1.4 12/31/24 0749   Post Size Width 3.3 12/31/24 0749   Post Size Depth 0.1 12/31/24 0749   Post Total Sq cm 4.62 12/31/24 0749   Tunneling 4.5cm at 12 o'clock 08/14/24 0800   Number of days: 139       VASC Wound left lateral bka site (Active)   Pre Size Length 1 12/31/24 0749   Pre Size Width 1 12/31/24 0749   Pre Size Depth 0.1 12/31/24 0749   Pre Total Sq cm 1 12/31/24 0749   Number of days:        VASC Wound left medial BKA site (Active)   Pre Size Length 1.2 12/31/24 0749   Pre Size Width 1 12/31/24 0749   Pre Size Depth 0.1 12/31/24 0749   Pre Total Sq cm 1.2 12/31/24 0749   Number of days:        Incision/Surgical Site 07/09/24 Left Tibial (Active)   Number of days: 175            Circumferential volume measures:           No data to display                Labs:    I personally reviewed the following lab results today and those on care everywhere    CRP Inflammation   Date Value Ref Range Status   11/08/2019 22.2 (H) 0.0 - 8.0 mg/L Final      Sed Rate   Date Value Ref Range Status   11/08/2019 33 (H) 0 - 20 mm/h Final     Erythrocyte Sedimentation Rate   Date Value Ref Range Status   09/25/2023 81 (H) 0 - 20 mm/hr Final      Last Renal Panel:  Sodium   Date Value Ref Range Status   08/29/2024 132 (L) 135 - 145 mmol/L Final   12/04/2020 138 133 - 144 mmol/L Final     Potassium   Date Value Ref Range Status   08/29/2024 4.7 3.4 - 5.3 mmol/L Final   08/09/2021 4.9 3.4 - 5.3 mmol/L Final   12/04/2020 4.5 3.4 - 5.3 mmol/L Final     Chloride   Date Value Ref Range Status   08/29/2024 94 (L) 98 - 107 mmol/L Final   07/23/2021 101 98 - 107 mmol/L Final     Comment:     Last Lab Result: LDL Cholesterol Calculated (mg/dL   12/04/2020 103 94 - 109 mmol/L Final     Carbon Dioxide   Date Value Ref Range Status   12/04/2020 28 20 - 32 mmol/L Final     Carbon Dioxide (CO2)   Date Value Ref Range Status   08/29/2024 26 22 - 29 mmol/L Final   07/23/2021 26 22 - 31 mmol/L Final      Comment:     Last Lab Result: LDL Cholesterol Calculated (mg/dL     Anion Gap   Date Value Ref Range Status   08/29/2024 12 7 - 15 mmol/L Final   07/23/2021 12 5 - 18 mmol/L Final   12/04/2020 7 3 - 14 mmol/L Final     Glucose   Date Value Ref Range Status   08/29/2024 143 (H) 70 - 99 mg/dL Final   07/23/2021 137 (H) 70 - 125 mg/dL Final     Comment:     Last Lab Result: LDL Cholesterol Calculated (mg/dL   12/04/2020 269 (H) 70 - 99 mg/dL Final     Comment:     Fasting specimen     GLUCOSE BY METER POCT   Date Value Ref Range Status   07/09/2024 70 70 - 99 mg/dL Final     Urea Nitrogen   Date Value Ref Range Status   08/29/2024 25.2 (H) 8.0 - 23.0 mg/dL Final   07/23/2021 18 8 - 22 mg/dL Final     Comment:     Last Lab Result: LDL Cholesterol Calculated (mg/dL   12/04/2020 25 7 - 30 mg/dL Final     Creatinine   Date Value Ref Range Status   08/29/2024 0.69 0.67 - 1.17 mg/dL Final   12/04/2020 0.92 0.66 - 1.25 mg/dL Final     GFR Estimate   Date Value Ref Range Status   08/29/2024 >90 >60 mL/min/1.73m2 Final     Comment:     eGFR calculated using 2021 CKD-EPI equation.   12/04/2020 >90 >60 mL/min/[1.73_m2] Final     Comment:     Non  GFR Calc  Starting 12/18/2018, serum creatinine based estimated GFR (eGFR) will be   calculated using the Chronic Kidney Disease Epidemiology Collaboration   (CKD-EPI) equation.       Calcium   Date Value Ref Range Status   08/29/2024 9.5 8.8 - 10.4 mg/dL Final     Comment:     Reference intervals for this test were updated on 7/16/2024 to reflect our healthy population more accurately. There may be differences in the flagging of prior results with similar values performed with this method. Those prior results can be interpreted in the context of the updated reference intervals.   12/04/2020 8.8 8.5 - 10.1 mg/dL Final     Albumin   Date Value Ref Range Status   09/25/2023 3.2 (L) 3.5 - 5.2 g/dL Final   07/23/2021 4.1 3.5 - 5.0 g/dL Final     Comment:     Last Lab  "Result: LDL Cholesterol Calculated (mg/dL   12/04/2020 3.4 3.4 - 5.0 g/dL Final      Lab Results   Component Value Date    WBC 14.4 09/20/2024    WBC 11.1 11/08/2019     Lab Results   Component Value Date    RBC 4.46 09/20/2024    RBC 4.68 11/08/2019     Lab Results   Component Value Date    HGB 11.5 09/20/2024    HGB 12.8 11/08/2019     Lab Results   Component Value Date    HCT 35.7 09/20/2024    HCT 40.0 11/08/2019     No components found for: \"MCT\"  Lab Results   Component Value Date    MCV 80 09/20/2024    MCV 86 11/08/2019     Lab Results   Component Value Date    MCH 25.8 09/20/2024    MCH 27.4 11/08/2019     Lab Results   Component Value Date    MCHC 32.2 09/20/2024    MCHC 32.0 11/08/2019     Lab Results   Component Value Date    RDW 14.5 09/20/2024    RDW 13.8 11/08/2019     Lab Results   Component Value Date     09/20/2024     11/08/2019      Lab Results   Component Value Date    A1C 5.0 08/29/2024    A1C 6.7 09/25/2023    A1C 6.6 08/01/2022    A1C 7.5 03/25/2022    A1C 7.3 07/23/2021    A1C 9.6 12/04/2020    A1C 9.0 07/21/2020    A1C 7.8 11/08/2019    A1C 6.5 11/29/2018    A1C 7.3 06/28/2017      TSH   Date Value Ref Range Status   11/29/2018 3.07 0.40 - 4.00 mU/L Final      No results found for: \"VITDT\"                Impression:  Encounter Diagnoses   Name Primary?    Postoperative wound dehiscence, initial encounter Yes    Status post below-knee amputation of left lower extremity (H)     Type 2 diabetes mellitus with other skin complication, unspecified whether long term insulin use (H)     MRSA infection     Morbid obesity, unspecified obesity type (H)                              Are any of these ulcers new today: No; Location: na    Assessment/Plan:          1. Debridement: Nursing staff removed the old dressing and cleanse the wound(s) with specified solution. After discussion of risk factors and verbal consent was obtained 2% Lidocaine HCL jelly was applied, under clean conditions, " the left stump ulceration(s) were debrided using currette. Devitalized and nonviable tissue, along with any fibrin and slough, was removed to improve granulation tissue formation, stimulate wound healing, decrease overall bacteria load, disrupt biofilm formation and decrease edge senescence.  Total excisional debridement was 4.62 sq cm from the epidermis/dermis area and into the subcutaneous tissue with a depth of 0.1 cm.   Ulcers were improved afterwards and .  Measures were as noted on the flow sheet.       2.  Ulcer treatment: ulcer treatment will include irrigation and dressings to promote autolytic debridement which will include:will continue to graft with epifix; responding well. If for some reason the patient is not able to get their dressing(s) changed as outlined above (due to illness, lack of supplies, lack of help) please do the following: remove old, soiled dressings; wash the ulcers with saline; pat dry; apply ABD pad or other absorbant pad and secure with rolled gauze; avoid tape directly on your skin; patient instructed to call the clinic as soon as possible to let us know what the current issues are in receiving ulcer care. 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   After risks were reviewed including infection, allergic reaction and scarring consent was received the left leg Diabetic ulceration(s) was/ were debrided with clean technique.  curette used to remove slough and fibrin deposition along with devitalized tissue.  This was to improve granulation tissue formation, stimulate wound healing, decrease biofilm formation, decrease bacterial load, decrease senescent edges and prepare for bioengineered graft placement.  A total of 4.62square centimeters was debrided from the from the epidermis/dermis area and into the  subcutaneous tissue. Epifix was recommended and consent was obtained for the application.  This is application # 10.       For the procedure, the patient was placed in a comfortable position with the wound bed exposed. Epifix application is being performed in a staged procedure in an attempt to achieve rapid wound closure. The Tissue Identification Number is [id number] with an expiration date of 2029-07-01 as indicated on the consent form dated 12/31/2024      The graft size is 2x2 , total wound size is 4.62 with total applied area was 4sq cm and the estimated wastage was 0; any leftover product was applied in a layered fashion.   The graft was prepared and applied following the  guidelines.  The graft was covered with sorbact then with a non-adherent contact layer and secured with steristrips .  Then,  a secondary aquacel ag dressing was applied.  The patient will continue to utilize tubular for compression.  Patient tolerated the procedure without any complications and was educated and expressed an understanding of the proper wound treatment until their follow up.             3. Edema: tubular compression and short stretch; elevation. The compression wraps were applied today in clinic.     If a 2 layer or 4 layer compression wrap is being used; these are safe to have on for ONLY 7 days. If for some reason the patient is not able to get the wrap(s) changed (due to illness; lack of supplies, lack of help, lack of transportation) please do the following: unwrap the old 2 or 4 layer compression wrap; avoid using scissors as you could cut your skin and cause ulcers; use tubular compression when available. Call to reschedule your home care or clinic visit appointment as soon as possible.  Stable            4. Nutrition: focus on diabetes control; sugars have been more elevated due to the holidays           5. Offloading: no prosthesis fitting until well healed          6. Wound Etiology: surgical;  complicated by diabetes          7. 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     Patient will follow up with me in 1 weeks for reevaluation. They were instructed to call the clinic sooner with any signs or symptoms of infection or any further questions/concerns. Answered all questions.          Joyce Andres DNP, RN, CNP, CWOCN, CFCN, CLT  [facility]   [phone]        This note was electronically signed by Joyce Andres NP

## 2025-01-07 ENCOUNTER — OFFICE VISIT (OUTPATIENT)
Dept: VASCULAR SURGERY | Facility: CLINIC | Age: 65
End: 2025-01-07
Attending: NURSE PRACTITIONER
Payer: COMMERCIAL

## 2025-01-07 VITALS — TEMPERATURE: 98.3 F | SYSTOLIC BLOOD PRESSURE: 144 MMHG | DIASTOLIC BLOOD PRESSURE: 82 MMHG | RESPIRATION RATE: 18 BRPM

## 2025-01-07 DIAGNOSIS — A49.02 MRSA INFECTION: ICD-10-CM

## 2025-01-07 DIAGNOSIS — E11.628 TYPE 2 DIABETES MELLITUS WITH OTHER SKIN COMPLICATION, UNSPECIFIED WHETHER LONG TERM INSULIN USE (H): ICD-10-CM

## 2025-01-07 DIAGNOSIS — T81.31XA POSTOPERATIVE WOUND DEHISCENCE, INITIAL ENCOUNTER: Primary | ICD-10-CM

## 2025-01-07 DIAGNOSIS — Z89.512 STATUS POST BELOW-KNEE AMPUTATION OF LEFT LOWER EXTREMITY (H): ICD-10-CM

## 2025-01-07 PROCEDURE — 15002 WOUND PREP TRK/ARM/LEG: CPT | Performed by: NURSE PRACTITIONER

## 2025-01-07 PROCEDURE — 15271 SKIN SUB GRAFT TRNK/ARM/LEG: CPT | Performed by: NURSE PRACTITIONER

## 2025-01-07 ASSESSMENT — PAIN SCALES - GENERAL: PAINLEVEL_OUTOF10: NO PAIN (0)

## 2025-01-07 NOTE — PROGRESS NOTES
Follow up Vascular Visit       Date of Service:01/07/25      Chief Complaint: left stump ulcer      Pt returns to [facility] with regards to their left stump ulcer.  They arrive today with spouse. They are currently using epifix graft to the wounds. This is being done by staff at the clinic weekly. They are using tubular compression with short stretch for compression. They are feeling well today. Denies fevers, chills. No shortness of breath. They have been applying the clotrimazole to the rash areas and this has helped significantly.    Allergies:   Allergies   Allergen Reactions    Bees     Sulfamethoxazole Swelling     throat       Medications:   Current Outpatient Medications:     acetaminophen (TYLENOL) 325 MG tablet, Take 2 tablets (650 mg) by mouth every 4 hours as needed for other (mild pain), Disp: 100 tablet, Rfl: 0    amLODIPine (NORVASC) 2.5 MG tablet, TAKE ONE TABLET BY MOUTH ONCE DAILY, Disp: 90 tablet, Rfl: 3    aspirin 81 MG EC tablet, Take 1 tablet (81 mg) by mouth 2 times daily, Disp: 60 tablet, Rfl: 0    atorvastatin (LIPITOR) 20 MG tablet, TAKE ONE TABLET BY MOUTH ONCE DAILY IN THE EVENING, Disp: 90 tablet, Rfl: 3    BD PEN NEEDLE YENNY 2ND GEN 32G X 4 MM miscellaneous, USE 1 PEN NEEDLES DAILY OR AS DIRECTED., Disp: 100 each, Rfl: 2    blood glucose (ACCU-CHEK GANGA) test strip, Use to test blood sugar 1 times daily or as directed., Disp: 100 strip, Rfl: 3    blood glucose monitoring (ACCU-CHEK GANGA PLUS) meter device kit, USE TO TEST BLOOD SUGAR 1 TIMES DAILY OR AS DIRECTED., Disp: , Rfl: 0    blood glucose monitoring (ACCU-CHEK MULTICLIX) lancets, USE TO TEST BLOOD SUGAR 1 TIMES DAILY OR AS DIRECTED., Disp: , Rfl: 3    clotrimazole (LOTRIMIN) 1 % external cream, Apply topically 2 times daily. Apply to left stump rash BID, Disp: 60 g, Rfl: 2    EPINEPHrine (ANY BX GENERIC EQUIV) 0.3 MG/0.3ML injection 2-pack, INJECT 0.3MG DOSE INTRAMUSCULARLY INTO THE OUTER THIGH  MUSCLE ONCE AS DIRECTED AS NEEDED FOR ANAPHYLAXIS, Disp: 2 each, Rfl: 1    hydrochlorothiazide (HYDRODIURIL) 25 MG tablet, TAKE ONE TABLET (25 mg)  BY MOUTH ONCE DAILY, Disp: 90 tablet, Rfl: 3    Insulin Glargine-yfgn 100 UNIT/ML SOPN, Inject 64 Units subcutaneously at bedtime., Disp: 60 mL, Rfl: 3    losartan (COZAAR) 100 MG tablet, TAKE ONE TABLET BY MOUTH ONCE DAILY, Disp: 90 tablet, Rfl: 3    metFORMIN (GLUCOPHAGE XR) 500 MG 24 hr tablet, TAKE TWO TABLETS BY MOUTH TWICE DAILY WITH MEALS, Disp: 360 tablet, Rfl: 3    metoprolol succinate ER (TOPROL XL) 200 MG 24 hr tablet, TAKE ONE TABLET BY MOUTH ONCE DAILY, Disp: 90 tablet, Rfl: 3    mupirocin (BACTROBAN) 2 % external ointment, Apply topically 3 times daily., Disp: 30 g, Rfl: 3    Current Facility-Administered Medications:     lidocaine (XYLOCAINE) 5 % ointment, , Topical, Daily PRN, Joyce Andres, NP, Given at 11/27/24 0850    lidocaine (XYLOCAINE) 5 % ointment, , Topical, Daily PRN, Joyce Andres, NP, Given at 10/09/24 1312    lidocaine (XYLOCAINE) 5 % ointment, , Topical, Daily PRN, Joyce Andres NP    History:   Past Medical History:   Diagnosis Date    Diabetes (H) 1-2017    Hypertension 1-2017    Septic arthritis of right foot (H) 9/26/2023       Physical Exam:    BP (!) 144/82   Temp 98.3  F (36.8  C)   Resp 18     General:  Patient presents to clinic in no apparent distress.  Head: normocephalic atraumatic  Psychiatric:  Alert and oriented x3.   Respiratory: unlabored breathing; no cough  Integumentary:  Skin is uniformly warm, dry and pink.    Ulcer #1 Location: left stump medial Size: 0.2L x 0.2W x 0.1depth.  no sinus tract present, Wound base: slough; underneath 100% viable  no undermining present. Ulcer is full thickness. There is moderate drainage. Periwound: no denudement, erythema, induration, maceration or warmth.      Ulcer #2 Location: left stump lateral Size: 1x1 x 0.1depth.  no sinus tract present, Wound base: slough; underneath 100%  viable  no undermining present. Ulcer is full thickness. There is moderate drainage. Periwound: no denudement, erythema, induration, maceration or warmth.        Ulcer #3 Location: left middle stump Size:3.4x0.7x 0.1depth.  no sinus tract present, Wound base: slough; underneath 100% viable  no undermining present. Ulcer is full thickness. There is moderate drainage. Periwound: no denudement, erythema, induration, maceration or warmth.      The one large wound is now  into 3 wound  by skin bridges      VASC Wound Left bka (Active)   Pre Size Length 1 01/07/25 0700   Pre Size Width 3 01/07/25 0700   Pre Size Depth 0.1 01/07/25 0700   Pre Total Sq cm 3 01/07/25 0700   Post Size Length 1.4 12/31/24 0749   Post Size Width 3.3 12/31/24 0749   Post Size Depth 0.1 12/31/24 0749   Post Total Sq cm 4.62 12/31/24 0749   Tunneling 4.5cm at 12 o'clock 08/14/24 0800   Number of days: 146       VASC Wound left lateral bka site (Active)   Pre Size Length 0.5 01/07/25 0700   Pre Size Width 0.5 01/07/25 0700   Pre Size Depth 0.1 01/07/25 0700   Pre Total Sq cm 0.25 01/07/25 0700   Number of days:        VASC Wound left medial BKA site (Active)   Pre Size Length 1 01/07/25 0700   Pre Size Width 1 01/07/25 0700   Pre Size Depth 0.1 01/07/25 0700   Pre Total Sq cm 1 01/07/25 0700   Number of days:        Incision/Surgical Site 07/09/24 Left Tibial (Active)   Number of days: 182            Circumferential volume measures:           No data to display                Labs:    I personally reviewed the following lab results today and those on care everywhere    CRP Inflammation   Date Value Ref Range Status   11/08/2019 22.2 (H) 0.0 - 8.0 mg/L Final      Sed Rate   Date Value Ref Range Status   11/08/2019 33 (H) 0 - 20 mm/h Final     Erythrocyte Sedimentation Rate   Date Value Ref Range Status   09/25/2023 81 (H) 0 - 20 mm/hr Final      Last Renal Panel:  Sodium   Date Value Ref Range Status   08/29/2024 132 (L) 135 - 145  mmol/L Final   12/04/2020 138 133 - 144 mmol/L Final     Potassium   Date Value Ref Range Status   08/29/2024 4.7 3.4 - 5.3 mmol/L Final   08/09/2021 4.9 3.4 - 5.3 mmol/L Final   12/04/2020 4.5 3.4 - 5.3 mmol/L Final     Chloride   Date Value Ref Range Status   08/29/2024 94 (L) 98 - 107 mmol/L Final   07/23/2021 101 98 - 107 mmol/L Final     Comment:     Last Lab Result: LDL Cholesterol Calculated (mg/dL   12/04/2020 103 94 - 109 mmol/L Final     Carbon Dioxide   Date Value Ref Range Status   12/04/2020 28 20 - 32 mmol/L Final     Carbon Dioxide (CO2)   Date Value Ref Range Status   08/29/2024 26 22 - 29 mmol/L Final   07/23/2021 26 22 - 31 mmol/L Final     Comment:     Last Lab Result: LDL Cholesterol Calculated (mg/dL     Anion Gap   Date Value Ref Range Status   08/29/2024 12 7 - 15 mmol/L Final   07/23/2021 12 5 - 18 mmol/L Final   12/04/2020 7 3 - 14 mmol/L Final     Glucose   Date Value Ref Range Status   08/29/2024 143 (H) 70 - 99 mg/dL Final   07/23/2021 137 (H) 70 - 125 mg/dL Final     Comment:     Last Lab Result: LDL Cholesterol Calculated (mg/dL   12/04/2020 269 (H) 70 - 99 mg/dL Final     Comment:     Fasting specimen     GLUCOSE BY METER POCT   Date Value Ref Range Status   07/09/2024 70 70 - 99 mg/dL Final     Urea Nitrogen   Date Value Ref Range Status   08/29/2024 25.2 (H) 8.0 - 23.0 mg/dL Final   07/23/2021 18 8 - 22 mg/dL Final     Comment:     Last Lab Result: LDL Cholesterol Calculated (mg/dL   12/04/2020 25 7 - 30 mg/dL Final     Creatinine   Date Value Ref Range Status   08/29/2024 0.69 0.67 - 1.17 mg/dL Final   12/04/2020 0.92 0.66 - 1.25 mg/dL Final     GFR Estimate   Date Value Ref Range Status   08/29/2024 >90 >60 mL/min/1.73m2 Final     Comment:     eGFR calculated using 2021 CKD-EPI equation.   12/04/2020 >90 >60 mL/min/[1.73_m2] Final     Comment:     Non  GFR Calc  Starting 12/18/2018, serum creatinine based estimated GFR (eGFR) will be   calculated using the Chronic  "Kidney Disease Epidemiology Collaboration   (CKD-EPI) equation.       Calcium   Date Value Ref Range Status   08/29/2024 9.5 8.8 - 10.4 mg/dL Final     Comment:     Reference intervals for this test were updated on 7/16/2024 to reflect our healthy population more accurately. There may be differences in the flagging of prior results with similar values performed with this method. Those prior results can be interpreted in the context of the updated reference intervals.   12/04/2020 8.8 8.5 - 10.1 mg/dL Final     Albumin   Date Value Ref Range Status   09/25/2023 3.2 (L) 3.5 - 5.2 g/dL Final   07/23/2021 4.1 3.5 - 5.0 g/dL Final     Comment:     Last Lab Result: LDL Cholesterol Calculated (mg/dL   12/04/2020 3.4 3.4 - 5.0 g/dL Final      Lab Results   Component Value Date    WBC 14.4 09/20/2024    WBC 11.1 11/08/2019     Lab Results   Component Value Date    RBC 4.46 09/20/2024    RBC 4.68 11/08/2019     Lab Results   Component Value Date    HGB 11.5 09/20/2024    HGB 12.8 11/08/2019     Lab Results   Component Value Date    HCT 35.7 09/20/2024    HCT 40.0 11/08/2019     No components found for: \"MCT\"  Lab Results   Component Value Date    MCV 80 09/20/2024    MCV 86 11/08/2019     Lab Results   Component Value Date    MCH 25.8 09/20/2024    MCH 27.4 11/08/2019     Lab Results   Component Value Date    MCHC 32.2 09/20/2024    MCHC 32.0 11/08/2019     Lab Results   Component Value Date    RDW 14.5 09/20/2024    RDW 13.8 11/08/2019     Lab Results   Component Value Date     09/20/2024     11/08/2019      Lab Results   Component Value Date    A1C 5.0 08/29/2024    A1C 6.7 09/25/2023    A1C 6.6 08/01/2022    A1C 7.5 03/25/2022    A1C 7.3 07/23/2021    A1C 9.6 12/04/2020    A1C 9.0 07/21/2020    A1C 7.8 11/08/2019    A1C 6.5 11/29/2018    A1C 7.3 06/28/2017      TSH   Date Value Ref Range Status   11/29/2018 3.07 0.40 - 4.00 mU/L Final      No results found for: \"VITDT\"                Impression:  Encounter " Diagnoses   Name Primary?    Postoperative wound dehiscence, initial encounter Yes    Status post below-knee amputation of left lower extremity (H)     MRSA infection     Type 2 diabetes mellitus with other skin complication, unspecified whether long term insulin use (H)           1/7/2025 left stump post debridement         Are any of these ulcers new today: No; Location: na    Assessment/Plan:          1. Debridement: Nursing staff removed the old dressing and cleanse the wound(s) with specified solution. After discussion of risk factors and verbal consent was obtained 2% Lidocaine HCL jelly was applied, under clean conditions, the left stump ulceration(s) were debrided using currette. Devitalized and nonviable tissue, along with any fibrin and slough, was removed to improve granulation tissue formation, stimulate wound healing, decrease overall bacteria load, disrupt biofilm formation and decrease edge senescence.  Total excisional debridement was 3.63 sq cm from the epidermis/dermis area and into the subcutaneous tissue with a depth of 0.1 cm.   Ulcers were improved afterwards and .  Measures were as noted on the flow sheet.       2.  Ulcer treatment: ulcer treatment will include irrigation and dressings to promote autolytic debridement which will include:continue to graft with epifix; I was alerted by [email or web address] that we are approved through 1/9/25 there is some additional paperwork to complete though before the next graft. If for some reason the patient is not able to get their dressing(s) changed as outlined above (due to illness, lack of supplies, lack of help) please do the following: remove old, soiled dressings; wash the ulcers with saline; pat dry; apply ABD pad or other absorbant pad and secure with rolled gauze; avoid tape directly on your skin; patient instructed to call the clinic as soon as possible to let us know what the current issues are in receiving ulcer care. Stable       Due to the slow  healing rate of the ulcer and the diagnosis of Peripheral Neuropathy due to Atherosclerosis and Peripheral Neuropathy  and is free of infection and osteomyelitis and has adequate blood flow for healing.   After risks were reviewed including infection, allergic reaction and scarring consent was received the left leg Diabetic ulceration(s) was/ were debrided with clean technique.  curette used to remove slough and fibrin deposition along with devitalized tissue.  This was to improve granulation tissue formation, stimulate wound healing, decrease biofilm formation, decrease bacterial load, decrease senescent edges and prepare for bioengineered graft placement.  A total of 3.63square centimeters was debrided from the from the epidermis/dermis area and into the subcutaneous tissue. Epifix was recommended and consent was obtained for the application.  This is application # 11.       For the procedure, the patient was placed in a comfortable position with the wound bed exposed. Epifix application is being performed in a staged procedure in an attempt to achieve rapid wound closure. The Tissue Identification Number is [id number] with an expiration date of 2029-08-01 as indicated on the consent form dated 1/7/2025      The graft size is 2x2 , total wound size is 3.63 with total applied area was 3.63sq cm and the estimated wastage was 0; any leftover product was applied in a layered fashion.   The graft was prepared and applied following the  guidelines.  The graft was covered with sorbact then with a non-adherent contact layer and secured with steristrips .  Then,  a secondary aquacel ag; abd dressing was applied.  The patient will continue to utilize tubular compression and short stretch for compression.  Patient tolerated the procedure without any complications and was educated and expressed an understanding of the proper wound treatment until their follow up.             3. Edema: continue elevation;  tubular compression with short stretch. The compression wraps were applied today in clinic.     If a 2 layer or 4 layer compression wrap is being used; these are safe to have on for ONLY 7 days. If for some reason the patient is not able to get the wrap(s) changed (due to illness; lack of supplies, lack of help, lack of transportation) please do the following: unwrap the old 2 or 4 layer compression wrap; avoid using scissors as you could cut your skin and cause ulcers; use tubular compression when available. Call to reschedule your home care or clinic visit appointment as soon as possible.  Stable            4. Nutrition: focus on diabetes control; weight loss           5. Offloading: no prosthesis fitting until well healed          6. Wound Etiology: surgical complicated with diabetes; peripheral neuropathy and PAD     Patient will follow up with me in 1 weeks for reevaluation. They were instructed to call the clinic sooner with any signs or symptoms of infection or any further questions/concerns. Answered all questions.          Joyce Andres DNP, RN, CNP, CWOCN, CFCN, CLT  [facility]   [phone]        This note was electronically signed by Joyce Andres NP

## 2025-01-14 NOTE — PROGRESS NOTES
Follow up Vascular Visit       Date of Service:01/14/25      Chief Complaint: left stump ulcer      Pt returns to [facility] with regards to their left stump ulcer.  They arrive today with spouse. They are currently using epifix graft to the wounds. This is being done by staff at the clinic. They are using tubular compression with short stretch for compression. They are feeling well today. Denies fevers, chills. No shortness of breath. We have submitted paperwork to insurance to get another PA for the new year; this is still pending.     Allergies:   Allergies   Allergen Reactions    Bees     Sulfamethoxazole Swelling     throat       Medications:   Current Outpatient Medications:     acetaminophen (TYLENOL) 325 MG tablet, Take 2 tablets (650 mg) by mouth every 4 hours as needed for other (mild pain), Disp: 100 tablet, Rfl: 0    amLODIPine (NORVASC) 2.5 MG tablet, TAKE ONE TABLET BY MOUTH ONCE DAILY, Disp: 90 tablet, Rfl: 3    aspirin 81 MG EC tablet, Take 1 tablet (81 mg) by mouth 2 times daily, Disp: 60 tablet, Rfl: 0    atorvastatin (LIPITOR) 20 MG tablet, TAKE ONE TABLET BY MOUTH ONCE DAILY IN THE EVENING, Disp: 90 tablet, Rfl: 3    BD PEN NEEDLE YENNY 2ND GEN 32G X 4 MM miscellaneous, USE 1 PEN NEEDLES DAILY OR AS DIRECTED., Disp: 100 each, Rfl: 2    blood glucose (ACCU-CHEK GANGA) test strip, Use to test blood sugar 1 times daily or as directed., Disp: 100 strip, Rfl: 3    blood glucose monitoring (ACCU-CHEK GANGA PLUS) meter device kit, USE TO TEST BLOOD SUGAR 1 TIMES DAILY OR AS DIRECTED., Disp: , Rfl: 0    blood glucose monitoring (ACCU-CHEK MULTICLIX) lancets, USE TO TEST BLOOD SUGAR 1 TIMES DAILY OR AS DIRECTED., Disp: , Rfl: 3    clotrimazole (LOTRIMIN) 1 % external cream, Apply topically 2 times daily. Apply to left stump rash BID, Disp: 60 g, Rfl: 2    EPINEPHrine (ANY BX GENERIC EQUIV) 0.3 MG/0.3ML injection 2-pack, INJECT 0.3MG DOSE INTRAMUSCULARLY INTO THE OUTER THIGH  MUSCLE ONCE AS DIRECTED AS NEEDED FOR ANAPHYLAXIS, Disp: 2 each, Rfl: 1    hydrochlorothiazide (HYDRODIURIL) 25 MG tablet, TAKE ONE TABLET (25 mg)  BY MOUTH ONCE DAILY, Disp: 90 tablet, Rfl: 3    Insulin Glargine-yfgn 100 UNIT/ML SOPN, Inject 64 Units subcutaneously at bedtime., Disp: 60 mL, Rfl: 3    losartan (COZAAR) 100 MG tablet, TAKE ONE TABLET BY MOUTH ONCE DAILY, Disp: 90 tablet, Rfl: 3    metFORMIN (GLUCOPHAGE XR) 500 MG 24 hr tablet, TAKE TWO TABLETS BY MOUTH TWICE DAILY WITH MEALS, Disp: 360 tablet, Rfl: 3    metoprolol succinate ER (TOPROL XL) 200 MG 24 hr tablet, TAKE ONE TABLET BY MOUTH ONCE DAILY, Disp: 90 tablet, Rfl: 3    mupirocin (BACTROBAN) 2 % external ointment, Apply topically 3 times daily., Disp: 30 g, Rfl: 3    Current Facility-Administered Medications:     lidocaine (XYLOCAINE) 5 % ointment, , Topical, Daily PRN, Joyce Andres, NP, Given at 11/27/24 0850    lidocaine (XYLOCAINE) 5 % ointment, , Topical, Daily PRN, Joyce Andres, NP, Given at 10/09/24 1312    lidocaine (XYLOCAINE) 5 % ointment, , Topical, Daily PRN, Joyce Andres NP    History:   Past Medical History:   Diagnosis Date    Diabetes (H) 1-2017    Hypertension 1-2017    Septic arthritis of right foot (H) 9/26/2023       Physical Exam:    Pulse 69   Resp 18   SpO2 99%     General:  Patient presents to clinic in no apparent distress.  Head: normocephalic atraumatic  Psychiatric:  Alert and oriented x3.   Respiratory: unlabored breathing; no cough  Integumentary:  Skin is uniformly warm, dry and pink.    Ulcer #1 Location: left stump --middle Size: 0.9L x 1.1W x 0.1depth.  No sinus tract present, Wound base: slough; underneath 100% viable  no undermining present. Ulcer is full thickness. There is moderate drainage. Periwound: no denudement, erythema, induration, maceration or warmth.      Ulcer #2Location: left stump lateral  Size: 0.3L x 0.7W x 0.1depth.  No sinus tract present, Wound base: slough; underneath 100% viable  no  undermining present. Ulcer is full thickness. There is moderate drainage. Periwound: no denudement, erythema, induration, maceration or warmth.      VASC Wound Left bka (Active)   Pre Size Length 0.8 01/15/25 0700   Pre Size Width 1.8 01/15/25 0700   Pre Size Depth 0.1 01/15/25 0700   Pre Total Sq cm 0.8 01/15/25 0700   Post Size Length 3.7 01/07/25 0700   Post Size Width 0.7 01/07/25 0700   Post Size Depth 0.1 01/07/25 0700   Post Total Sq cm 2.59 01/07/25 0700   Tunneling 4.5cm at 12 o'clock 08/14/24 0800   Number of days: 154       VASC Wound left lateral bka site (Active)   Pre Size Length 0.2 01/15/25 0700   Pre Size Width 0.2 01/15/25 0700   Pre Size Depth 0.1 01/15/25 0700   Pre Total Sq cm 0.04 01/15/25 0700   Post Size Length 1 01/07/25 0700   Post Size Width 1 01/07/25 0700   Post Size Depth 0.1 01/07/25 0700   Post Total Sq cm 1 01/07/25 0700   Number of days:        VASC Wound left medial BKA site (Active)   Pre Size Length 0 01/15/25 0700   Pre Size Width 0 01/15/25 0700   Pre Size Depth 0 01/15/25 0700   Pre Total Sq cm 0 01/15/25 0700   Post Size Length 0.2 01/07/25 0700   Post Size Width 0.2 01/07/25 0700   Post Size Depth 0.1 01/07/25 0700   Post Total Sq cm 0.04 01/07/25 0700   Number of days:        Incision/Surgical Site 07/09/24 Left Tibial (Active)   Number of days: 190            Circumferential volume measures:           No data to display                Labs:    I personally reviewed the following lab results today and those on care everywhere    CRP Inflammation   Date Value Ref Range Status   11/08/2019 22.2 (H) 0.0 - 8.0 mg/L Final      Sed Rate   Date Value Ref Range Status   11/08/2019 33 (H) 0 - 20 mm/h Final     Erythrocyte Sedimentation Rate   Date Value Ref Range Status   09/25/2023 81 (H) 0 - 20 mm/hr Final      Last Renal Panel:  Sodium   Date Value Ref Range Status   08/29/2024 132 (L) 135 - 145 mmol/L Final   12/04/2020 138 133 - 144 mmol/L Final     Potassium   Date Value Ref  Range Status   08/29/2024 4.7 3.4 - 5.3 mmol/L Final   08/09/2021 4.9 3.4 - 5.3 mmol/L Final   12/04/2020 4.5 3.4 - 5.3 mmol/L Final     Chloride   Date Value Ref Range Status   08/29/2024 94 (L) 98 - 107 mmol/L Final   07/23/2021 101 98 - 107 mmol/L Final     Comment:     Last Lab Result: LDL Cholesterol Calculated (mg/dL   12/04/2020 103 94 - 109 mmol/L Final     Carbon Dioxide   Date Value Ref Range Status   12/04/2020 28 20 - 32 mmol/L Final     Carbon Dioxide (CO2)   Date Value Ref Range Status   08/29/2024 26 22 - 29 mmol/L Final   07/23/2021 26 22 - 31 mmol/L Final     Comment:     Last Lab Result: LDL Cholesterol Calculated (mg/dL     Anion Gap   Date Value Ref Range Status   08/29/2024 12 7 - 15 mmol/L Final   07/23/2021 12 5 - 18 mmol/L Final   12/04/2020 7 3 - 14 mmol/L Final     Glucose   Date Value Ref Range Status   08/29/2024 143 (H) 70 - 99 mg/dL Final   07/23/2021 137 (H) 70 - 125 mg/dL Final     Comment:     Last Lab Result: LDL Cholesterol Calculated (mg/dL   12/04/2020 269 (H) 70 - 99 mg/dL Final     Comment:     Fasting specimen     GLUCOSE BY METER POCT   Date Value Ref Range Status   07/09/2024 70 70 - 99 mg/dL Final     Urea Nitrogen   Date Value Ref Range Status   08/29/2024 25.2 (H) 8.0 - 23.0 mg/dL Final   07/23/2021 18 8 - 22 mg/dL Final     Comment:     Last Lab Result: LDL Cholesterol Calculated (mg/dL   12/04/2020 25 7 - 30 mg/dL Final     Creatinine   Date Value Ref Range Status   08/29/2024 0.69 0.67 - 1.17 mg/dL Final   12/04/2020 0.92 0.66 - 1.25 mg/dL Final     GFR Estimate   Date Value Ref Range Status   08/29/2024 >90 >60 mL/min/1.73m2 Final     Comment:     eGFR calculated using 2021 CKD-EPI equation.   12/04/2020 >90 >60 mL/min/[1.73_m2] Final     Comment:     Non  GFR Calc  Starting 12/18/2018, serum creatinine based estimated GFR (eGFR) will be   calculated using the Chronic Kidney Disease Epidemiology Collaboration   (CKD-EPI) equation.       Calcium   Date  "Value Ref Range Status   08/29/2024 9.5 8.8 - 10.4 mg/dL Final     Comment:     Reference intervals for this test were updated on 7/16/2024 to reflect our healthy population more accurately. There may be differences in the flagging of prior results with similar values performed with this method. Those prior results can be interpreted in the context of the updated reference intervals.   12/04/2020 8.8 8.5 - 10.1 mg/dL Final     Albumin   Date Value Ref Range Status   09/25/2023 3.2 (L) 3.5 - 5.2 g/dL Final   07/23/2021 4.1 3.5 - 5.0 g/dL Final     Comment:     Last Lab Result: LDL Cholesterol Calculated (mg/dL   12/04/2020 3.4 3.4 - 5.0 g/dL Final      Lab Results   Component Value Date    WBC 14.4 09/20/2024    WBC 11.1 11/08/2019     Lab Results   Component Value Date    RBC 4.46 09/20/2024    RBC 4.68 11/08/2019     Lab Results   Component Value Date    HGB 11.5 09/20/2024    HGB 12.8 11/08/2019     Lab Results   Component Value Date    HCT 35.7 09/20/2024    HCT 40.0 11/08/2019     No components found for: \"MCT\"  Lab Results   Component Value Date    MCV 80 09/20/2024    MCV 86 11/08/2019     Lab Results   Component Value Date    MCH 25.8 09/20/2024    MCH 27.4 11/08/2019     Lab Results   Component Value Date    MCHC 32.2 09/20/2024    MCHC 32.0 11/08/2019     Lab Results   Component Value Date    RDW 14.5 09/20/2024    RDW 13.8 11/08/2019     Lab Results   Component Value Date     09/20/2024     11/08/2019      Lab Results   Component Value Date    A1C 5.0 08/29/2024    A1C 6.7 09/25/2023    A1C 6.6 08/01/2022    A1C 7.5 03/25/2022    A1C 7.3 07/23/2021    A1C 9.6 12/04/2020    A1C 9.0 07/21/2020    A1C 7.8 11/08/2019    A1C 6.5 11/29/2018    A1C 7.3 06/28/2017      TSH   Date Value Ref Range Status   11/29/2018 3.07 0.40 - 4.00 mU/L Final      No results found for: \"VITDT\"                Impression:  Encounter Diagnoses   Name Primary?    Postoperative wound dehiscence, subsequent encounter Yes "    Status post below-knee amputation of left lower extremity (H)     MRSA infection     Type 2 diabetes mellitus with other skin complication, unspecified whether long term insulin use (H)     Morbid obesity, unspecified obesity type (H)     Candidiasis of skin                        Are any of these ulcers new today: No; Location: na    Assessment/Plan:          1. Debridement: Nursing staff removed the old dressing and cleanse the wound(s) with specified solution. After discussion of risk factors and verbal consent was obtained 2% Lidocaine HCL jelly was applied, under clean conditions, the left stump ulceration(s) were debrided using currette. Devitalized and nonviable tissue, along with any fibrin and slough, was removed to improve granulation tissue formation, stimulate wound healing, decrease overall bacteria load, disrupt biofilm formation and decrease edge senescence.  Total excisional debridement was 1.2 sq cm from the epidermis/dermis area and into the subcutaneous tissue with a depth of 0.1 cm.   Ulcers were improved afterwards and .  Measures were as noted on the flow sheet.       2.  Ulcer treatment: ulcer treatment will include irrigation and dressings to promote autolytic debridement which will include:we cannot graft today as we have not heard back from insurance for coverage. Today we will wash with dilute hibiclens; vashe; gentamicin ointment; aquacel ag; abd; rolled gauze.  If for some reason the patient is not able to get their dressing(s) changed as outlined above (due to illness, lack of supplies, lack of help) please do the following: remove old, soiled dressings; wash the ulcers with saline; pat dry; apply ABD pad or other absorbant pad and secure with rolled gauze; avoid tape directly on your skin; patient instructed to call the clinic as soon as possible to let us know what the current issues are in receiving ulcer care. Improved            3. Edema: will continue with tubular  compression and short stretch; re-wrap as needed; the spouse can do this. The compression wraps were applied today in clinic.     If a 2 layer or 4 layer compression wrap is being used; these are safe to have on for ONLY 7 days. If for some reason the patient is not able to get the wrap(s) changed (due to illness; lack of supplies, lack of help, lack of transportation) please do the following: unwrap the old 2 or 4 layer compression wrap; avoid using scissors as you could cut your skin and cause ulcers; use tubular compression when available. Call to reschedule your home care or clinic visit appointment as soon as possible.  Stable            4. Nutrition: focus on diabetes control; will obtain updated A1c per [facility] requirements for epifix application process           5. Offloading: no prosthesis wearing until well healed          6. Wound Etiology: surgical     Patient will follow up with me in 1 weeks for reevaluation. They were instructed to call the clinic sooner with any signs or symptoms of infection or any further questions/concerns. Answered all questions.          Joyce Andres DNP, RN, CNP, CWOCN, CFCN, CLT  [facility]   [phone]        This note was electronically signed by Joyce Andres NP

## 2025-01-15 ENCOUNTER — TELEPHONE (OUTPATIENT)
Dept: VASCULAR SURGERY | Facility: CLINIC | Age: 65
End: 2025-01-15

## 2025-01-15 ENCOUNTER — LAB (OUTPATIENT)
Dept: LAB | Facility: CLINIC | Age: 65
End: 2025-01-15
Payer: COMMERCIAL

## 2025-01-15 ENCOUNTER — OFFICE VISIT (OUTPATIENT)
Dept: VASCULAR SURGERY | Facility: CLINIC | Age: 65
End: 2025-01-15
Attending: NURSE PRACTITIONER
Payer: COMMERCIAL

## 2025-01-15 VITALS — OXYGEN SATURATION: 99 % | RESPIRATION RATE: 18 BRPM | HEART RATE: 69 BPM

## 2025-01-15 DIAGNOSIS — A49.02 MRSA INFECTION: ICD-10-CM

## 2025-01-15 DIAGNOSIS — Z89.512 STATUS POST BELOW-KNEE AMPUTATION OF LEFT LOWER EXTREMITY (H): ICD-10-CM

## 2025-01-15 DIAGNOSIS — E11.628 TYPE 2 DIABETES MELLITUS WITH OTHER SKIN COMPLICATION, UNSPECIFIED WHETHER LONG TERM INSULIN USE (H): ICD-10-CM

## 2025-01-15 DIAGNOSIS — B37.2 CANDIDIASIS OF SKIN: ICD-10-CM

## 2025-01-15 DIAGNOSIS — E66.01 MORBID OBESITY, UNSPECIFIED OBESITY TYPE (H): ICD-10-CM

## 2025-01-15 DIAGNOSIS — T81.31XD POSTOPERATIVE WOUND DEHISCENCE, SUBSEQUENT ENCOUNTER: Primary | ICD-10-CM

## 2025-01-15 DIAGNOSIS — T81.31XD POSTOPERATIVE WOUND DEHISCENCE, SUBSEQUENT ENCOUNTER: ICD-10-CM

## 2025-01-15 LAB
EST. AVERAGE GLUCOSE BLD GHB EST-MCNC: 134 MG/DL
HBA1C MFR BLD: 6.3 % (ref 0–5.6)

## 2025-01-15 PROCEDURE — 83036 HEMOGLOBIN GLYCOSYLATED A1C: CPT

## 2025-01-15 PROCEDURE — 11042 DBRDMT SUBQ TIS 1ST 20SQCM/<: CPT | Performed by: NURSE PRACTITIONER

## 2025-01-15 PROCEDURE — 36415 COLL VENOUS BLD VENIPUNCTURE: CPT

## 2025-01-15 ASSESSMENT — PAIN SCALES - GENERAL: PAINLEVEL_OUTOF10: NO PAIN (0)

## 2025-01-15 NOTE — PATIENT INSTRUCTIONS
"After this appt go down to lab and get A1c done  This is for the epifix grafting application for [facility]    Wound care supplies were not ordered or needed today.        Wound Care Instructions    1 TIMES PER WEEK and as needed, Cleanse your left stump wound(s) with Dilute hibiclens 30cc in 500cc NS and then Vashe.    Pat Dry with non-sterile gauze    Apply lotrisone to rash areas    Apply Lotion to the intact skin surrounding your wound and other dry skin locations. Some good lotions include: Remedy Skin Repair Cream, Sarna, Vanicream or Cetaphil    Primary Dressing: Apply gentamicin ointment into/onto the wounds    Secondary dressing: Cover with aquacel ag; gauze    Secure with non-sterile roll gauze (4\" x 75\" roll) and tape (1\" roll tape) as needed; avoid adhesive directly on the skin    Compression: tubular compression and short stretch    It is not ok to get your wound wet in the bath or shower      If for some reason you are not able to get your dressing(s) changed as outlined above (due to illness, lack of supplies, lack of help) please do the following: remove old, soiled dressings; wash the wounds with saline; pat dry; apply ABD pad or other absorbant pad and secure with rolled gauze; avoid tape directly on your skin; Call the clinic as soon as possible to let us know what the current issues are in receiving wound care [phone].      SEEK MEDICAL CARE IF:  You have an increase in swelling, pain, or redness around the wound.  You have an increase in the amount of pus coming from the wound.  There is a bad smell coming from the wound.  The wound appears to be worsening/enlarging  You have a fever greater than 101.5 F      It is ok to continue current wound care treatment/products for the next 2-3 days until new wound care supplies are ordered and arrive. If longer than this please contact our office at [phone].    If you have a 2 layer or 4 layer compression wrap on these are safe to have on for ONLY 7 " days. If for some reason you are not able to get the wrap(s) changed (due to illness; lack of supplies, lack of help, lack of transportation) please do the following: unwrap the old 2 or 4 layer compression wrap; avoid using scissors as you could cut your skin and cause wounds; use tubular compression when available. Call to reschedule your home care or clinic visit appointment as soon as possible.    Please NOTE: if you are 15 minutes late to your arrival time, you will have to be rescheduled. Please call our clinic as soon as possible if you know you will not be able to get to your appointment at [phone]. If you fail to show up to 3 scheduled clinic appointments you will be dismissed from our clinic.              We want to hear from you!  In the next few weeks, you should receive a call or email to complete a survey about your visit at [facility]. Please help us improve your appointment experience by letting us know how we did today. We strive to make your experience good and value any ways in which we could do better.      We value your input and suggestions.    Thank you for choosing the [facility]!           It is recommended that you do not get your ulcer wet when showering.  Listed below are several ways of keeping it dry when you shower.     1. Wrap it with Press and Seal plastic wrap.  It can be found in the stores where the plastic wraps or tin foil is kept.               2.  Some people take a bath and hang their leg/foot out of the tub.                        3  Put your leg in a plastic bag and tape it on.           4. You can purchase a shower cover for casts at some pharmacies and through the Internet.            5. Take a Bed Bath or wash up at the sink     High Protein Foods  Chicken  -Chicken breast, 3.5oz.-30 grams protein  -Chicken thigh-10 grams(average size)  -Drumstick-11 grams  -Wing- 6 grams  -Chicken meat, cooked, 4 oz.  Beef  -Hamburger jerald, 4  oz-28 grams protein  -Steak, 6 oz-42 grams  -Most cuts of beef- 7 grams of protein per ounce  Fish  -Most fish fillets or steaks are about 22 grams of protein for 3 1/2 oz(100 grams) of cooked fish, or 6 grams per ounce  -Tuna, 6 oz can-40 grams of protein  Pork  -Pork chop, average-22 grams protein  -Pork loin or tenderloin, 4 oz.-29 grams  -Ham, 3oz serving- 19 grams  -Ground pork 3oz cooked-22 grams  -Donovan, 1 slice-3 grams  -[place]-style donovan(black donovan), slice-5-6 grams  Eggs and Dairy  -Egg, large-7 grams  -Milk, 1 cup-8 grams  -Cottage cheese, 1/2 cup-15 grams  -Greek yogurt, 1 cup-usually 8-12 grams, check label  -Soft cheeses (Mozzarella, Brie, Camembert)- 6 grams  -Medium cheeses(cheddar, swiss)- 7 or 8 grams per oz  -Hard cheeses(parmesan)- 10 grams per oz  Beans  -Tofu, 1/2 cup 20 grams  -Tofu, 1 oz., 2.3 grams  -Soy milk, 1 cup-6-10 grams  -Most beans(black, qiu, lentils, etc.) about 7-10 grams protein per half cup of cooked beans  -soy beans, 1/2 cup cooked-14 grams  -Split peas, 1/2 cup cooked- 8 grams  Nuts and Seeds  -Peanut butter, 2 Tablespoons- 8 grams protein  -Almonds, 1/4 cup- 8 grams  -Peanuts, 1/4 cup-9 grams  -Cashews, 1/4 cup- 5 grams  -Pecans, 1/4 cup- 2.5 grams  -Sunflower seeds, 1/4 cup- 6 grams  -Pumpkin seeds, 1/4 cup-8 grams  -Flax seeds- 1/4 cup- 8 grams  Protein Supplements  -Ensure  -Boost  -Glucerna, if diabetic  When you have an open ulcer, your bodies protein needs are much higher, so it is recommended eat good sources of protein

## 2025-01-29 ENCOUNTER — OFFICE VISIT (OUTPATIENT)
Dept: VASCULAR SURGERY | Facility: CLINIC | Age: 65
End: 2025-01-29
Attending: NURSE PRACTITIONER
Payer: COMMERCIAL

## 2025-01-29 VITALS
HEART RATE: 66 BPM | RESPIRATION RATE: 18 BRPM | DIASTOLIC BLOOD PRESSURE: 70 MMHG | SYSTOLIC BLOOD PRESSURE: 130 MMHG | OXYGEN SATURATION: 98 %

## 2025-01-29 DIAGNOSIS — E66.01 MORBID OBESITY, UNSPECIFIED OBESITY TYPE (H): ICD-10-CM

## 2025-01-29 DIAGNOSIS — Z89.512 STATUS POST BELOW-KNEE AMPUTATION OF LEFT LOWER EXTREMITY (H): ICD-10-CM

## 2025-01-29 DIAGNOSIS — B37.2 CANDIDIASIS OF SKIN: ICD-10-CM

## 2025-01-29 DIAGNOSIS — A49.02 MRSA INFECTION: ICD-10-CM

## 2025-01-29 DIAGNOSIS — E11.628 TYPE 2 DIABETES MELLITUS WITH OTHER SKIN COMPLICATION, UNSPECIFIED WHETHER LONG TERM INSULIN USE (H): ICD-10-CM

## 2025-01-29 DIAGNOSIS — E66.01 MORBID OBESITY (H): ICD-10-CM

## 2025-01-29 DIAGNOSIS — T81.31XD POSTOPERATIVE WOUND DEHISCENCE, SUBSEQUENT ENCOUNTER: Primary | ICD-10-CM

## 2025-01-29 PROCEDURE — 11042 DBRDMT SUBQ TIS 1ST 20SQCM/<: CPT | Performed by: NURSE PRACTITIONER

## 2025-01-29 RX ORDER — MUPIROCIN 20 MG/G
OINTMENT TOPICAL EVERY OTHER DAY
Qty: 30 G | Refills: 3 | Status: SHIPPED | OUTPATIENT
Start: 2025-01-29

## 2025-01-29 ASSESSMENT — PAIN SCALES - GENERAL: PAINLEVEL_OUTOF10: NO PAIN (0)

## 2025-01-29 NOTE — PATIENT INSTRUCTIONS
"  Wound care supplies were not ordered or needed today.        Wound Care Instructions    Every other day and as needed, Cleanse your left stump wound(s) with Dilute hibiclens 30cc in 500cc NS and then Vashe.    Pat Dry with non-sterile gauze    Then pour vashe onto gauze and lay on the wound for 5-10 minutes    Apply lotrisone to rash areas (dry skin areas)    Apply Lotion to the intact skin surrounding your wound and other dry skin locations. Some good lotions include: Remedy Skin Repair Cream, Sarna, Vanicream or Cetaphil    Primary Dressing: Apply mupirocin ointment into/onto the wounds    Secondary dressing: Cover with aquacel ag; gauze    Secure with non-sterile roll gauze (4\" x 75\" roll) and tape (1\" roll tape) as needed; avoid adhesive directly on the skin    Compression: tubular compression and short stretch    It is not ok to get your wound wet in the bath or shower      If for some reason you are not able to get your dressing(s) changed as outlined above (due to illness, lack of supplies, lack of help) please do the following: remove old, soiled dressings; wash the wounds with saline; pat dry; apply ABD pad or other absorbant pad and secure with rolled gauze; avoid tape directly on your skin; Call the clinic as soon as possible to let us know what the current issues are in receiving wound care [phone].      SEEK MEDICAL CARE IF:  You have an increase in swelling, pain, or redness around the wound.  You have an increase in the amount of pus coming from the wound.  There is a bad smell coming from the wound.  The wound appears to be worsening/enlarging  You have a fever greater than 101.5 F      It is ok to continue current wound care treatment/products for the next 2-3 days until new wound care supplies are ordered and arrive. If longer than this please contact our office at [phone].    If you have a 2 layer or 4 layer compression wrap on these are safe to have on for ONLY 7 days. If for some " reason you are not able to get the wrap(s) changed (due to illness; lack of supplies, lack of help, lack of transportation) please do the following: unwrap the old 2 or 4 layer compression wrap; avoid using scissors as you could cut your skin and cause wounds; use tubular compression when available. Call to reschedule your home care or clinic visit appointment as soon as possible.    Please NOTE: if you are 15 minutes late to your arrival time, you will have to be rescheduled. Please call our clinic as soon as possible if you know you will not be able to get to your appointment at [phone]. If you fail to show up to 3 scheduled clinic appointments you will be dismissed from our clinic.              We want to hear from you!  In the next few weeks, you should receive a call or email to complete a survey about your visit at [facility]. Please help us improve your appointment experience by letting us know how we did today. We strive to make your experience good and value any ways in which we could do better.      We value your input and suggestions.    Thank you for choosing the [facility]!           It is recommended that you do not get your ulcer wet when showering.  Listed below are several ways of keeping it dry when you shower.     1. Wrap it with Press and Seal plastic wrap.  It can be found in the stores where the plastic wraps or tin foil is kept.               2.  Some people take a bath and hang their leg/foot out of the tub.                        3  Put your leg in a plastic bag and tape it on.           4. You can purchase a shower cover for casts at some pharmacies and through the Internet.            5. Take a Bed Bath or wash up at the sink     High Protein Foods  Chicken  -Chicken breast, 3.5oz.-30 grams protein  -Chicken thigh-10 grams(average size)  -Drumstick-11 grams  -Wing- 6 grams  -Chicken meat, cooked, 4 oz.  Beef  -Hamburger jerald, 4 oz-28 grams  protein  -Steak, 6 oz-42 grams  -Most cuts of beef- 7 grams of protein per ounce  Fish  -Most fish fillets or steaks are about 22 grams of protein for 3 1/2 oz(100 grams) of cooked fish, or 6 grams per ounce  -Tuna, 6 oz can-40 grams of protein  Pork  -Pork chop, average-22 grams protein  -Pork loin or tenderloin, 4 oz.-29 grams  -Ham, 3oz serving- 19 grams  -Ground pork 3oz cooked-22 grams  -Donovan, 1 slice-3 grams  -[place]-style donovan(black donovan), slice-5-6 grams  Eggs and Dairy  -Egg, large-7 grams  -Milk, 1 cup-8 grams  -Cottage cheese, 1/2 cup-15 grams  -Greek yogurt, 1 cup-usually 8-12 grams, check label  -Soft cheeses (Mozzarella, Brie, Camembert)- 6 grams  -Medium cheeses(cheddar, swiss)- 7 or 8 grams per oz  -Hard cheeses(parmesan)- 10 grams per oz  Beans  -Tofu, 1/2 cup 20 grams  -Tofu, 1 oz., 2.3 grams  -Soy milk, 1 cup-6-10 grams  -Most beans(black, qiu, lentils, etc.) about 7-10 grams protein per half cup of cooked beans  -soy beans, 1/2 cup cooked-14 grams  -Split peas, 1/2 cup cooked- 8 grams  Nuts and Seeds  -Peanut butter, 2 Tablespoons- 8 grams protein  -Almonds, 1/4 cup- 8 grams  -Peanuts, 1/4 cup-9 grams  -Cashews, 1/4 cup- 5 grams  -Pecans, 1/4 cup- 2.5 grams  -Sunflower seeds, 1/4 cup- 6 grams  -Pumpkin seeds, 1/4 cup-8 grams  -Flax seeds- 1/4 cup- 8 grams  Protein Supplements  -Ensure  -Boost  -Glucerna, if diabetic  When you have an open ulcer, your bodies protein needs are much higher, so it is recommended eat good sources of protein

## 2025-02-12 ENCOUNTER — OFFICE VISIT (OUTPATIENT)
Dept: VASCULAR SURGERY | Facility: CLINIC | Age: 65
End: 2025-02-12
Attending: NURSE PRACTITIONER
Payer: COMMERCIAL

## 2025-02-12 VITALS — RESPIRATION RATE: 18 BRPM

## 2025-02-12 DIAGNOSIS — E11.628 TYPE 2 DIABETES MELLITUS WITH OTHER SKIN COMPLICATION, UNSPECIFIED WHETHER LONG TERM INSULIN USE (H): ICD-10-CM

## 2025-02-12 DIAGNOSIS — E66.01 MORBID OBESITY, UNSPECIFIED OBESITY TYPE (H): ICD-10-CM

## 2025-02-12 DIAGNOSIS — Z89.512 STATUS POST BELOW-KNEE AMPUTATION OF LEFT LOWER EXTREMITY (H): ICD-10-CM

## 2025-02-12 DIAGNOSIS — B37.2 CANDIDIASIS OF SKIN: ICD-10-CM

## 2025-02-12 DIAGNOSIS — T81.31XD POSTOPERATIVE WOUND DEHISCENCE, SUBSEQUENT ENCOUNTER: Primary | ICD-10-CM

## 2025-02-12 DIAGNOSIS — A49.02 MRSA INFECTION: ICD-10-CM

## 2025-02-12 PROCEDURE — G2211 COMPLEX E/M VISIT ADD ON: HCPCS | Performed by: NURSE PRACTITIONER

## 2025-02-12 PROCEDURE — 99213 OFFICE O/P EST LOW 20 MIN: CPT | Performed by: NURSE PRACTITIONER

## 2025-02-12 PROCEDURE — 99215 OFFICE O/P EST HI 40 MIN: CPT | Performed by: NURSE PRACTITIONER

## 2025-02-12 ASSESSMENT — PAIN SCALES - GENERAL: PAINLEVEL_OUTOF10: NO PAIN (0)

## 2025-02-12 NOTE — PROGRESS NOTES
Follow up Vascular Visit       Date of Service:02/12/25      Chief Complaint: left stump ulcer      Pt returns to [facility] with regards to their left stump ulcer.  They arrive today with spouse; in a wheelchair; he self-transfers to the chair. They are currently using aquacel ag, abd; rolled gauze to the wounds. This is being done by wife every 3 days. They are using tubular compression for compression. They are feeling well today. Denies fevers, chills. No shortness of breath.     Allergies:   Allergies   Allergen Reactions    Bees     Sulfamethoxazole Swelling     throat       Medications:   Current Outpatient Medications:     acetaminophen (TYLENOL) 325 MG tablet, Take 2 tablets (650 mg) by mouth every 4 hours as needed for other (mild pain), Disp: 100 tablet, Rfl: 0    amLODIPine (NORVASC) 2.5 MG tablet, TAKE ONE TABLET BY MOUTH ONCE DAILY, Disp: 90 tablet, Rfl: 3    aspirin 81 MG EC tablet, Take 1 tablet (81 mg) by mouth 2 times daily, Disp: 60 tablet, Rfl: 0    atorvastatin (LIPITOR) 20 MG tablet, TAKE ONE TABLET BY MOUTH ONCE DAILY IN THE EVENING, Disp: 90 tablet, Rfl: 3    BD PEN NEEDLE YENNY 2ND GEN 32G X 4 MM miscellaneous, USE 1 PEN NEEDLES DAILY OR AS DIRECTED., Disp: 100 each, Rfl: 2    blood glucose (ACCU-CHEK GANGA) test strip, Use to test blood sugar 1 times daily or as directed., Disp: 100 strip, Rfl: 3    blood glucose monitoring (ACCU-CHEK GANGA PLUS) meter device kit, USE TO TEST BLOOD SUGAR 1 TIMES DAILY OR AS DIRECTED., Disp: , Rfl: 0    blood glucose monitoring (ACCU-CHEK MULTICLIX) lancets, USE TO TEST BLOOD SUGAR 1 TIMES DAILY OR AS DIRECTED., Disp: , Rfl: 3    clotrimazole (LOTRIMIN) 1 % external cream, Apply topically 2 times daily. Apply to left stump rash BID, Disp: 60 g, Rfl: 2    EPINEPHrine (ANY BX GENERIC EQUIV) 0.3 MG/0.3ML injection 2-pack, INJECT 0.3MG DOSE INTRAMUSCULARLY INTO THE OUTER THIGH MUSCLE ONCE AS DIRECTED AS NEEDED FOR ANAPHYLAXIS, Disp: 2  each, Rfl: 1    hydrochlorothiazide (HYDRODIURIL) 25 MG tablet, TAKE ONE TABLET (25 mg)  BY MOUTH ONCE DAILY, Disp: 90 tablet, Rfl: 3    Insulin Glargine-yfgn 100 UNIT/ML SOPN, Inject 64 Units subcutaneously at bedtime., Disp: 60 mL, Rfl: 3    losartan (COZAAR) 100 MG tablet, TAKE ONE TABLET BY MOUTH ONCE DAILY, Disp: 90 tablet, Rfl: 3    metFORMIN (GLUCOPHAGE XR) 500 MG 24 hr tablet, TAKE TWO TABLETS BY MOUTH TWICE DAILY WITH MEALS, Disp: 360 tablet, Rfl: 3    metoprolol succinate ER (TOPROL XL) 200 MG 24 hr tablet, TAKE ONE TABLET BY MOUTH ONCE DAILY, Disp: 90 tablet, Rfl: 3    mupirocin (BACTROBAN) 2 % external ointment, Apply topically every other day., Disp: 30 g, Rfl: 3    Current Facility-Administered Medications:     lidocaine (XYLOCAINE) 5 % ointment, , Topical, Daily PRN, Joyce Andres, NP, Given at 11/27/24 0850    lidocaine (XYLOCAINE) 5 % ointment, , Topical, Daily PRN, Joyce Andres, NP, Given at 10/09/24 1312    lidocaine (XYLOCAINE) 5 % ointment, , Topical, Daily PRN, Joyce Andres NP    History:   Past Medical History:   Diagnosis Date    Diabetes (H) 1-2017    Hypertension 1-2017    Septic arthritis of right foot (H) 9/26/2023       Physical Exam:    Resp 18     General:  Patient presents to clinic in no apparent distress.  Head: normocephalic atraumatic  Psychiatric:  Alert and oriented x3.   Respiratory: unlabored breathing; no cough  Integumentary:  Skin is uniformly warm, dry and pink.    Ulcer #1 Location: left stump lateral  Size: 0.3L x 0.3W x 0.1depth.  no sinus tract present, Wound base: red;   no undermining present. Ulcer is partial thickness. There is small drainage. Periwound: no denudement, erythema, induration, maceration or warmth.      Ulcer #2 Location: left stump central  Size: 0.5x1x 0.1depth.  no sinus tract present, Wound base: red;   no undermining present. Ulcer is partial thickness. There is small drainage. Periwound: no denudement, erythema, induration, maceration or  warmth.      VASC Wound Left bka (Active)   Pre Size Length 0.5 02/12/25 0700   Pre Size Width 0.5 02/12/25 0700   Pre Size Depth 0.1 02/12/25 0700   Pre Total Sq cm 0.25 02/12/25 0700   Post Size Length 0.5 02/12/25 0700   Post Size Width 1 02/12/25 0700   Post Size Depth 0.1 02/12/25 0700   Post Total Sq cm 0.5 02/12/25 0700   Tunneling 4.5cm at 12 o'clock 08/14/24 0800   Description scab 01/29/25 0700   Number of days: 182       VASC Wound left lateral bka site (Active)   Pre Size Length 0.2 01/15/25 0700   Pre Size Width 0.2 01/15/25 0700   Pre Size Depth 0.1 01/15/25 0700   Pre Total Sq cm 0.04 01/15/25 0700   Post Size Length 0.3 02/12/25 0700   Post Size Width 0.3 02/12/25 0700   Post Size Depth 0.1 02/12/25 0700   Post Total Sq cm 0.09 02/12/25 0700   Description scab 01/29/25 0700   Number of days:        VASC Wound left medial BKA site (Active)   Pre Size Length 0 01/15/25 0700   Pre Size Width 0 01/15/25 0700   Pre Size Depth 0 01/15/25 0700   Pre Total Sq cm 0 01/15/25 0700   Post Size Length 0.2 01/07/25 0700   Post Size Width 0.2 01/07/25 0700   Post Size Depth 0.1 01/07/25 0700   Post Total Sq cm 0.04 01/07/25 0700   Number of days:        Incision/Surgical Site 07/09/24 Left Tibial (Active)   Number of days: 218            Circumferential volume measures:           No data to display                Labs:    I personally reviewed the following lab results today and those on care everywhere    CRP Inflammation   Date Value Ref Range Status   11/08/2019 22.2 (H) 0.0 - 8.0 mg/L Final      Sed Rate   Date Value Ref Range Status   11/08/2019 33 (H) 0 - 20 mm/h Final     Erythrocyte Sedimentation Rate   Date Value Ref Range Status   09/25/2023 81 (H) 0 - 20 mm/hr Final      Last Renal Panel:  Sodium   Date Value Ref Range Status   08/29/2024 132 (L) 135 - 145 mmol/L Final   12/04/2020 138 133 - 144 mmol/L Final     Potassium   Date Value Ref Range Status   08/29/2024 4.7 3.4 - 5.3 mmol/L Final   08/09/2021  4.9 3.4 - 5.3 mmol/L Final   12/04/2020 4.5 3.4 - 5.3 mmol/L Final     Chloride   Date Value Ref Range Status   08/29/2024 94 (L) 98 - 107 mmol/L Final   07/23/2021 101 98 - 107 mmol/L Final     Comment:     Last Lab Result: LDL Cholesterol Calculated (mg/dL   12/04/2020 103 94 - 109 mmol/L Final     Carbon Dioxide   Date Value Ref Range Status   12/04/2020 28 20 - 32 mmol/L Final     Carbon Dioxide (CO2)   Date Value Ref Range Status   08/29/2024 26 22 - 29 mmol/L Final   07/23/2021 26 22 - 31 mmol/L Final     Comment:     Last Lab Result: LDL Cholesterol Calculated (mg/dL     Anion Gap   Date Value Ref Range Status   08/29/2024 12 7 - 15 mmol/L Final   07/23/2021 12 5 - 18 mmol/L Final   12/04/2020 7 3 - 14 mmol/L Final     Glucose   Date Value Ref Range Status   08/29/2024 143 (H) 70 - 99 mg/dL Final   07/23/2021 137 (H) 70 - 125 mg/dL Final     Comment:     Last Lab Result: LDL Cholesterol Calculated (mg/dL   12/04/2020 269 (H) 70 - 99 mg/dL Final     Comment:     Fasting specimen     GLUCOSE BY METER POCT   Date Value Ref Range Status   07/09/2024 70 70 - 99 mg/dL Final     Urea Nitrogen   Date Value Ref Range Status   08/29/2024 25.2 (H) 8.0 - 23.0 mg/dL Final   07/23/2021 18 8 - 22 mg/dL Final     Comment:     Last Lab Result: LDL Cholesterol Calculated (mg/dL   12/04/2020 25 7 - 30 mg/dL Final     Creatinine   Date Value Ref Range Status   08/29/2024 0.69 0.67 - 1.17 mg/dL Final   12/04/2020 0.92 0.66 - 1.25 mg/dL Final     GFR Estimate   Date Value Ref Range Status   08/29/2024 >90 >60 mL/min/1.73m2 Final     Comment:     eGFR calculated using 2021 CKD-EPI equation.   12/04/2020 >90 >60 mL/min/[1.73_m2] Final     Comment:     Non  GFR Calc  Starting 12/18/2018, serum creatinine based estimated GFR (eGFR) will be   calculated using the Chronic Kidney Disease Epidemiology Collaboration   (CKD-EPI) equation.       Calcium   Date Value Ref Range Status   08/29/2024 9.5 8.8 - 10.4 mg/dL Final      "Comment:     Reference intervals for this test were updated on 7/16/2024 to reflect our healthy population more accurately. There may be differences in the flagging of prior results with similar values performed with this method. Those prior results can be interpreted in the context of the updated reference intervals.   12/04/2020 8.8 8.5 - 10.1 mg/dL Final     Albumin   Date Value Ref Range Status   09/25/2023 3.2 (L) 3.5 - 5.2 g/dL Final   07/23/2021 4.1 3.5 - 5.0 g/dL Final     Comment:     Last Lab Result: LDL Cholesterol Calculated (mg/dL   12/04/2020 3.4 3.4 - 5.0 g/dL Final      Lab Results   Component Value Date    WBC 14.4 09/20/2024    WBC 11.1 11/08/2019     Lab Results   Component Value Date    RBC 4.46 09/20/2024    RBC 4.68 11/08/2019     Lab Results   Component Value Date    HGB 11.5 09/20/2024    HGB 12.8 11/08/2019     Lab Results   Component Value Date    HCT 35.7 09/20/2024    HCT 40.0 11/08/2019     No components found for: \"MCT\"  Lab Results   Component Value Date    MCV 80 09/20/2024    MCV 86 11/08/2019     Lab Results   Component Value Date    MCH 25.8 09/20/2024    MCH 27.4 11/08/2019     Lab Results   Component Value Date    MCHC 32.2 09/20/2024    MCHC 32.0 11/08/2019     Lab Results   Component Value Date    RDW 14.5 09/20/2024    RDW 13.8 11/08/2019     Lab Results   Component Value Date     09/20/2024     11/08/2019      Lab Results   Component Value Date    A1C 6.3 01/15/2025    A1C 5.0 08/29/2024    A1C 6.7 09/25/2023    A1C 6.6 08/01/2022    A1C 7.5 03/25/2022    A1C 9.6 12/04/2020    A1C 9.0 07/21/2020    A1C 7.8 11/08/2019    A1C 6.5 11/29/2018    A1C 7.3 06/28/2017      TSH   Date Value Ref Range Status   11/29/2018 3.07 0.40 - 4.00 mU/L Final      No results found for: \"VITDT\"                Impression:  Encounter Diagnoses   Name Primary?    Postoperative wound dehiscence, subsequent encounter Yes    Status post below-knee amputation of left lower extremity (H)     " MRSA infection     Type 2 diabetes mellitus with other skin complication, unspecified whether long term insulin use (H)     Morbid obesity, unspecified obesity type (H)     Candidiasis of skin                    Are any of these ulcers new today: No; Location: na    Assessment/Plan:          1. Debridement: na     2.  Ulcer treatment: ulcer treatment will include irrigation and dressings to promote autolytic debridement which will include:switch to endoform collagen; change twice per week by spouse; ok to use lotrisone prn to rash areas; stop mupirocin ointment; stop aquacel ag; If for some reason the patient is not able to get their dressing(s) changed as outlined above (due to illness, lack of supplies, lack of help) please do the following: remove old, soiled dressings; wash the ulcers with saline; pat dry; apply ABD pad or other absorbant pad and secure with rolled gauze; avoid tape directly on your skin; patient instructed to call the clinic as soon as possible to let us know what the current issues are in receiving ulcer care. Stable to improved           3. Edema: continue tubular compression; elevation.            4. Nutrition: focus on diabetes control           5. Offloading: no prosthesis fitting until well healed          6. Wound Etiology: surgical      The longitudinal plan of care for the diagnosis(es)/condition(s) as documented were addressed during this visit. Due to the added complexity in care, I will continue to support [employer] in the subsequent management and with ongoing continuity of care.     Patient will follow up with me in 3 weeks for reevaluation. They were instructed to call the clinic sooner with any signs or symptoms of infection or any further questions/concerns. Answered all questions.          Joyce Andres DNP, RN, CNP, CWOCN, CFCN, CLT  [facility]   [phone]        This note was electronically signed by Joyce Andres NP

## 2025-02-12 NOTE — PATIENT INSTRUCTIONS
"  Wound care supplies were not ordered or needed today.        Wound Care Instructions    Twice per week; Cleanse your left stump wound(s) with Dilute hibiclens 30cc in 500cc NS and then Vashe.    Pat Dry with non-sterile gauze    Then pour vashe onto gauze and lay on the wound for 5-10 minutes    Apply lotrisone to rash areas (dry skin areas)    Apply Lotion to the intact skin surrounding your wound and other dry skin locations. Some good lotions include: Remedy Skin Repair Cream, Sarna, Vanicream or Cetaphil    Primary Dressing: Apply endoform collagen onto the wounds; cut to the size of the wounds; hydrate the collagen with a few drops of saline    Secondary dressing: Cover with gauze    Secure with non-sterile roll gauze (4\" x 75\" roll) and tape (1\" roll tape) as needed; avoid adhesive directly on the skin    Compression: tubular compression and short stretch    It is not ok to get your wound wet in the bath or shower      If for some reason you are not able to get your dressing(s) changed as outlined above (due to illness, lack of supplies, lack of help) please do the following: remove old, soiled dressings; wash the wounds with saline; pat dry; apply ABD pad or other absorbant pad and secure with rolled gauze; avoid tape directly on your skin; Call the clinic as soon as possible to let us know what the current issues are in receiving wound care [phone].      SEEK MEDICAL CARE IF:  You have an increase in swelling, pain, or redness around the wound.  You have an increase in the amount of pus coming from the wound.  There is a bad smell coming from the wound.  The wound appears to be worsening/enlarging  You have a fever greater than 101.5 F      It is ok to continue current wound care treatment/products for the next 2-3 days until new wound care supplies are ordered and arrive. If longer than this please contact our office at [phone].    If you have a 2 layer or 4 layer compression wrap on these are " safe to have on for ONLY 7 days. If for some reason you are not able to get the wrap(s) changed (due to illness; lack of supplies, lack of help, lack of transportation) please do the following: unwrap the old 2 or 4 layer compression wrap; avoid using scissors as you could cut your skin and cause wounds; use tubular compression when available. Call to reschedule your home care or clinic visit appointment as soon as possible.    Please NOTE: if you are 15 minutes late to your arrival time, you will have to be rescheduled. Please call our clinic as soon as possible if you know you will not be able to get to your appointment at [phone]. If you fail to show up to 3 scheduled clinic appointments you will be dismissed from our clinic.              We want to hear from you!  In the next few weeks, you should receive a call or email to complete a survey about your visit at [facility]. Please help us improve your appointment experience by letting us know how we did today. We strive to make your experience good and value any ways in which we could do better.      We value your input and suggestions.    Thank you for choosing the [facility]!           It is recommended that you do not get your ulcer wet when showering.  Listed below are several ways of keeping it dry when you shower.     1. Wrap it with Press and Seal plastic wrap.  It can be found in the stores where the plastic wraps or tin foil is kept.               2.  Some people take a bath and hang their leg/foot out of the tub.                        3  Put your leg in a plastic bag and tape it on.           4. You can purchase a shower cover for casts at some pharmacies and through the Internet.            5. Take a Bed Bath or wash up at the sink     High Protein Foods  Chicken  -Chicken breast, 3.5oz.-30 grams protein  -Chicken thigh-10 grams(average size)  -Drumstick-11 grams  -Wing- 6 grams  -Chicken meat, cooked, 4  oz.  Beef  -Hamburger jerald, 4 oz-28 grams protein  -Steak, 6 oz-42 grams  -Most cuts of beef- 7 grams of protein per ounce  Fish  -Most fish fillets or steaks are about 22 grams of protein for 3 1/2 oz(100 grams) of cooked fish, or 6 grams per ounce  -Tuna, 6 oz can-40 grams of protein  Pork  -Pork chop, average-22 grams protein  -Pork loin or tenderloin, 4 oz.-29 grams  -Ham, 3oz serving- 19 grams  -Ground pork 3oz cooked-22 grams  -Donovan, 1 slice-3 grams  -[place]roleum-style donovan(black donovan), slice-5-6 grams  Eggs and Dairy  -Egg, large-7 grams  -Milk, 1 cup-8 grams  -Cottage cheese, 1/2 cup-15 grams  -Greek yogurt, 1 cup-usually 8-12 grams, check label  -Soft cheeses (Mozzarella, Brie, Camembert)- 6 grams  -Medium cheeses(cheddar, swiss)- 7 or 8 grams per oz  -Hard cheeses(parmesan)- 10 grams per oz  Beans  -Tofu, 1/2 cup 20 grams  -Tofu, 1 oz., 2.3 grams  -Soy milk, 1 cup-6-10 grams  -Most beans(black, qiu, lentils, etc.) about 7-10 grams protein per half cup of cooked beans  -soy beans, 1/2 cup cooked-14 grams  -Split peas, 1/2 cup cooked- 8 grams  Nuts and Seeds  -Peanut butter, 2 Tablespoons- 8 grams protein  -Almonds, 1/4 cup- 8 grams  -Peanuts, 1/4 cup-9 grams  -Cashews, 1/4 cup- 5 grams  -Pecans, 1/4 cup- 2.5 grams  -Sunflower seeds, 1/4 cup- 6 grams  -Pumpkin seeds, 1/4 cup-8 grams  -Flax seeds- 1/4 cup- 8 grams  Protein Supplements  -Ensure  -Boost  -Glucerna, if diabetic  When you have an open ulcer, your bodies protein needs are much higher, so it is recommended eat good sources of protein

## 2025-02-26 ENCOUNTER — OFFICE VISIT (OUTPATIENT)
Dept: VASCULAR SURGERY | Facility: CLINIC | Age: 65
End: 2025-02-26
Attending: NURSE PRACTITIONER
Payer: COMMERCIAL

## 2025-02-26 VITALS
HEART RATE: 62 BPM | OXYGEN SATURATION: 98 % | DIASTOLIC BLOOD PRESSURE: 76 MMHG | RESPIRATION RATE: 18 BRPM | SYSTOLIC BLOOD PRESSURE: 124 MMHG

## 2025-02-26 DIAGNOSIS — E11.628 TYPE 2 DIABETES MELLITUS WITH OTHER SKIN COMPLICATION, UNSPECIFIED WHETHER LONG TERM INSULIN USE (H): ICD-10-CM

## 2025-02-26 DIAGNOSIS — Z89.512 STATUS POST BELOW-KNEE AMPUTATION OF LEFT LOWER EXTREMITY (H): ICD-10-CM

## 2025-02-26 DIAGNOSIS — A49.02 MRSA INFECTION: ICD-10-CM

## 2025-02-26 DIAGNOSIS — T81.31XD POSTOPERATIVE WOUND DEHISCENCE, SUBSEQUENT ENCOUNTER: Primary | ICD-10-CM

## 2025-02-26 PROCEDURE — 99215 OFFICE O/P EST HI 40 MIN: CPT | Performed by: NURSE PRACTITIONER

## 2025-02-26 NOTE — PATIENT INSTRUCTIONS
"  Wound care supplies were not ordered or needed today.    Make appt with Eduardo at [facility] for after our next appt call [phone] to get this scheduled        Wound Care Instructions    Twice per week; Cleanse your left stump wound(s) with Dilute hibiclens 30cc in 500cc NS and then Vashe.    Pat Dry with non-sterile gauze    Apply lotrisone to rash areas (dry skin areas)    Apply Lotion to the intact skin surrounding your wound and other dry skin locations. Some good lotions include: Remedy Skin Repair Cream, Sarna, Vanicream or Cetaphil    Cover with gauze    Secure with non-sterile roll gauze (4\" x 75\" roll) and tape (1\" roll tape) as needed; avoid adhesive directly on the skin    Compression: tubular compression and short stretch    It is not ok to get your wound wet in the bath or shower      If for some reason you are not able to get your dressing(s) changed as outlined above (due to illness, lack of supplies, lack of help) please do the following: remove old, soiled dressings; wash the wounds with saline; pat dry; apply ABD pad or other absorbant pad and secure with rolled gauze; avoid tape directly on your skin; Call the clinic as soon as possible to let us know what the current issues are in receiving wound care [phone].      SEEK MEDICAL CARE IF:  You have an increase in swelling, pain, or redness around the wound.  You have an increase in the amount of pus coming from the wound.  There is a bad smell coming from the wound.  The wound appears to be worsening/enlarging  You have a fever greater than 101.5 F      It is ok to continue current wound care treatment/products for the next 2-3 days until new wound care supplies are ordered and arrive. If longer than this please contact our office at [phone].    If you have a 2 layer or 4 layer compression wrap on these are safe to have on for ONLY 7 days. If for some reason you are not able to get the wrap(s) changed " (due to illness; lack of supplies, lack of help, lack of transportation) please do the following: unwrap the old 2 or 4 layer compression wrap; avoid using scissors as you could cut your skin and cause wounds; use tubular compression when available. Call to reschedule your home care or clinic visit appointment as soon as possible.    Please NOTE: if you are 15 minutes late to your arrival time, you will have to be rescheduled. Please call our clinic as soon as possible if you know you will not be able to get to your appointment at [phone]. If you fail to show up to 3 scheduled clinic appointments you will be dismissed from our clinic.              We want to hear from you!  In the next few weeks, you should receive a call or email to complete a survey about your visit at [facility]. Please help us improve your appointment experience by letting us know how we did today. We strive to make your experience good and value any ways in which we could do better.      We value your input and suggestions.    Thank you for choosing the [facility]!           It is recommended that you do not get your ulcer wet when showering.  Listed below are several ways of keeping it dry when you shower.     1. Wrap it with Press and Seal plastic wrap.  It can be found in the stores where the plastic wraps or tin foil is kept.               2.  Some people take a bath and hang their leg/foot out of the tub.                        3  Put your leg in a plastic bag and tape it on.           4. You can purchase a shower cover for casts at some pharmacies and through the Internet.            5. Take a Bed Bath or wash up at the sink     High Protein Foods  Chicken  -Chicken breast, 3.5oz.-30 grams protein  -Chicken thigh-10 grams(average size)  -Drumstick-11 grams  -Wing- 6 grams  -Chicken meat, cooked, 4 oz.  Beef  -Hamburger jerald, 4 oz-28 grams protein  -Steak, 6 oz-42 grams  -Most cuts of beef- 7  grams of protein per ounce  Fish  -Most fish fillets or steaks are about 22 grams of protein for 3 1/2 oz(100 grams) of cooked fish, or 6 grams per ounce  -Tuna, 6 oz can-40 grams of protein  Pork  -Pork chop, average-22 grams protein  -Pork loin or tenderloin, 4 oz.-29 grams  -Ham, 3oz serving- 19 grams  -Ground pork 3oz cooked-22 grams  -Donovan, 1 slice-3 grams  -[place]-style donovan(black donovan), slice-5-6 grams  Eggs and Dairy  -Egg, large-7 grams  -Milk, 1 cup-8 grams  -Cottage cheese, 1/2 cup-15 grams  -Greek yogurt, 1 cup-usually 8-12 grams, check label  -Soft cheeses (Mozzarella, Brie, Camembert)- 6 grams  -Medium cheeses(cheddar, swiss)- 7 or 8 grams per oz  -Hard cheeses(parmesan)- 10 grams per oz  Beans  -Tofu, 1/2 cup 20 grams  -Tofu, 1 oz., 2.3 grams  -Soy milk, 1 cup-6-10 grams  -Most beans(black, qiu, lentils, etc.) about 7-10 grams protein per half cup of cooked beans  -soy beans, 1/2 cup cooked-14 grams  -Split peas, 1/2 cup cooked- 8 grams  Nuts and Seeds  -Peanut butter, 2 Tablespoons- 8 grams protein  -Almonds, 1/4 cup- 8 grams  -Peanuts, 1/4 cup-9 grams  -Cashews, 1/4 cup- 5 grams  -Pecans, 1/4 cup- 2.5 grams  -Sunflower seeds, 1/4 cup- 6 grams  -Pumpkin seeds, 1/4 cup-8 grams  -Flax seeds- 1/4 cup- 8 grams  Protein Supplements  -Ensure  -Boost  -Glucerna, if diabetic  When you have an open ulcer, your bodies protein needs are much higher, so it is recommended eat good sources of protein

## 2025-02-26 NOTE — PROGRESS NOTES
Follow up Vascular Visit       Date of Service:02/26/25      Chief Complaint: left stump ulcer      Pt returns to [facility] with regards to their left stump ulcer.  They arrive today with wife; he comes in a w/c. They are currently using endoform; gauze; rolled gauze to the wounds. This is being done by the wife twice per week and prn. They are using compression sleeve for compression. They are feeling well today. Denies fevers, chills. No shortness of breath.     Allergies:   Allergies   Allergen Reactions    Bees     Sulfamethoxazole Swelling     throat       Medications:   Current Outpatient Medications:     acetaminophen (TYLENOL) 325 MG tablet, Take 2 tablets (650 mg) by mouth every 4 hours as needed for other (mild pain), Disp: 100 tablet, Rfl: 0    amLODIPine (NORVASC) 2.5 MG tablet, TAKE ONE TABLET BY MOUTH ONCE DAILY, Disp: 90 tablet, Rfl: 3    aspirin 81 MG EC tablet, Take 1 tablet (81 mg) by mouth 2 times daily, Disp: 60 tablet, Rfl: 0    atorvastatin (LIPITOR) 20 MG tablet, TAKE ONE TABLET BY MOUTH ONCE DAILY IN THE EVENING, Disp: 90 tablet, Rfl: 3    BD PEN NEEDLE YENNY 2ND GEN 32G X 4 MM miscellaneous, USE 1 PEN NEEDLES DAILY OR AS DIRECTED., Disp: 100 each, Rfl: 2    blood glucose (ACCU-CHEK GANGA) test strip, Use to test blood sugar 1 times daily or as directed., Disp: 100 strip, Rfl: 3    blood glucose monitoring (ACCU-CHEK GANGA PLUS) meter device kit, USE TO TEST BLOOD SUGAR 1 TIMES DAILY OR AS DIRECTED., Disp: , Rfl: 0    blood glucose monitoring (ACCU-CHEK MULTICLIX) lancets, USE TO TEST BLOOD SUGAR 1 TIMES DAILY OR AS DIRECTED., Disp: , Rfl: 3    clotrimazole (LOTRIMIN) 1 % external cream, Apply topically 2 times daily. Apply to left stump rash BID, Disp: 60 g, Rfl: 2    EPINEPHrine (ANY BX GENERIC EQUIV) 0.3 MG/0.3ML injection 2-pack, INJECT 0.3MG DOSE INTRAMUSCULARLY INTO THE OUTER THIGH MUSCLE ONCE AS DIRECTED AS NEEDED FOR ANAPHYLAXIS, Disp: 2 each, Rfl: 1     hydrochlorothiazide (HYDRODIURIL) 25 MG tablet, TAKE ONE TABLET (25 mg)  BY MOUTH ONCE DAILY, Disp: 90 tablet, Rfl: 3    Insulin Glargine-yfgn 100 UNIT/ML SOPN, Inject 64 Units subcutaneously at bedtime., Disp: 60 mL, Rfl: 3    losartan (COZAAR) 100 MG tablet, TAKE ONE TABLET BY MOUTH ONCE DAILY, Disp: 90 tablet, Rfl: 3    metFORMIN (GLUCOPHAGE XR) 500 MG 24 hr tablet, TAKE TWO TABLETS BY MOUTH TWICE DAILY WITH MEALS, Disp: 360 tablet, Rfl: 3    metoprolol succinate ER (TOPROL XL) 200 MG 24 hr tablet, TAKE ONE TABLET BY MOUTH ONCE DAILY, Disp: 90 tablet, Rfl: 3    mupirocin (BACTROBAN) 2 % external ointment, Apply topically every other day., Disp: 30 g, Rfl: 3    Current Facility-Administered Medications:     lidocaine (XYLOCAINE) 5 % ointment, , Topical, Daily PRN, Joyce Andres, NP, Given at 11/27/24 0850    lidocaine (XYLOCAINE) 5 % ointment, , Topical, Daily PRN, Jocye Anrdes, NP, Given at 10/09/24 1312    lidocaine (XYLOCAINE) 5 % ointment, , Topical, Daily PRN, Joyce Andres, NP    History:   Past Medical History:   Diagnosis Date    Diabetes (H) 1-2017    Hypertension 1-2017    Septic arthritis of right foot (H) 9/26/2023       Physical Exam:    /76   Pulse 62   Resp 18   SpO2 98%     General:  Patient presents to clinic in no apparent distress.  Head: normocephalic atraumatic  Psychiatric:  Alert and oriented x3.   Respiratory: unlabored breathing; no cough  Integumentary:  Skin is uniformly warm, dry and pink.    Ulcer #1 Location: left stump  Size: 0L x 0W x 0depth.  No sinus tract present, Wound base: old endoform dressing; very adherent; left in place  no undermining present. Ulcer is healed thickness. There is no drainage. Periwound: no denudement, erythema, induration, maceration or warmth.      VASC Wound Left bka (Active)   Pre Size Length 0.5 02/12/25 0700   Pre Size Width 0.5 02/12/25 0700   Pre Size Depth 0.1 02/12/25 0700   Pre Total Sq cm 0.25 02/12/25 0700   Post Size Length 0.5  02/12/25 0700   Post Size Width 1 02/12/25 0700   Post Size Depth 0.1 02/12/25 0700   Post Total Sq cm 0.5 02/12/25 0700   Tunneling 4.5cm at 12 o'clock 08/14/24 0800   Description scab 01/29/25 0700   Number of days: 196       VASC Wound left lateral bka site (Active)   Pre Size Length 0.2 01/15/25 0700   Pre Size Width 0.2 01/15/25 0700   Pre Size Depth 0.1 01/15/25 0700   Pre Total Sq cm 0.04 01/15/25 0700   Post Size Length 0.3 02/12/25 0700   Post Size Width 0.3 02/12/25 0700   Post Size Depth 0.1 02/12/25 0700   Post Total Sq cm 0.09 02/12/25 0700   Description scab 01/29/25 0700   Number of days:        VASC Wound left medial BKA site (Active)   Pre Size Length 0 01/15/25 0700   Pre Size Width 0 01/15/25 0700   Pre Size Depth 0 01/15/25 0700   Pre Total Sq cm 0 01/15/25 0700   Post Size Length 0.2 01/07/25 0700   Post Size Width 0.2 01/07/25 0700   Post Size Depth 0.1 01/07/25 0700   Post Total Sq cm 0.04 01/07/25 0700   Number of days:        Incision/Surgical Site 07/09/24 Left Tibial (Active)   Number of days: 232            Circumferential volume measures:           No data to display                Labs:    I personally reviewed the following lab results today and those on care everywhere    CRP Inflammation   Date Value Ref Range Status   11/08/2019 22.2 (H) 0.0 - 8.0 mg/L Final      Sed Rate   Date Value Ref Range Status   11/08/2019 33 (H) 0 - 20 mm/h Final     Erythrocyte Sedimentation Rate   Date Value Ref Range Status   09/25/2023 81 (H) 0 - 20 mm/hr Final      Last Renal Panel:  Sodium   Date Value Ref Range Status   08/29/2024 132 (L) 135 - 145 mmol/L Final   12/04/2020 138 133 - 144 mmol/L Final     Potassium   Date Value Ref Range Status   08/29/2024 4.7 3.4 - 5.3 mmol/L Final   08/09/2021 4.9 3.4 - 5.3 mmol/L Final   12/04/2020 4.5 3.4 - 5.3 mmol/L Final     Chloride   Date Value Ref Range Status   08/29/2024 94 (L) 98 - 107 mmol/L Final   07/23/2021 101 98 - 107 mmol/L Final     Comment:      Last Lab Result: LDL Cholesterol Calculated (mg/dL   12/04/2020 103 94 - 109 mmol/L Final     Carbon Dioxide   Date Value Ref Range Status   12/04/2020 28 20 - 32 mmol/L Final     Carbon Dioxide (CO2)   Date Value Ref Range Status   08/29/2024 26 22 - 29 mmol/L Final   07/23/2021 26 22 - 31 mmol/L Final     Comment:     Last Lab Result: LDL Cholesterol Calculated (mg/dL     Anion Gap   Date Value Ref Range Status   08/29/2024 12 7 - 15 mmol/L Final   07/23/2021 12 5 - 18 mmol/L Final   12/04/2020 7 3 - 14 mmol/L Final     Glucose   Date Value Ref Range Status   08/29/2024 143 (H) 70 - 99 mg/dL Final   07/23/2021 137 (H) 70 - 125 mg/dL Final     Comment:     Last Lab Result: LDL Cholesterol Calculated (mg/dL   12/04/2020 269 (H) 70 - 99 mg/dL Final     Comment:     Fasting specimen     GLUCOSE BY METER POCT   Date Value Ref Range Status   07/09/2024 70 70 - 99 mg/dL Final     Urea Nitrogen   Date Value Ref Range Status   08/29/2024 25.2 (H) 8.0 - 23.0 mg/dL Final   07/23/2021 18 8 - 22 mg/dL Final     Comment:     Last Lab Result: LDL Cholesterol Calculated (mg/dL   12/04/2020 25 7 - 30 mg/dL Final     Creatinine   Date Value Ref Range Status   08/29/2024 0.69 0.67 - 1.17 mg/dL Final   12/04/2020 0.92 0.66 - 1.25 mg/dL Final     GFR Estimate   Date Value Ref Range Status   08/29/2024 >90 >60 mL/min/1.73m2 Final     Comment:     eGFR calculated using 2021 CKD-EPI equation.   12/04/2020 >90 >60 mL/min/[1.73_m2] Final     Comment:     Non  GFR Calc  Starting 12/18/2018, serum creatinine based estimated GFR (eGFR) will be   calculated using the Chronic Kidney Disease Epidemiology Collaboration   (CKD-EPI) equation.       Calcium   Date Value Ref Range Status   08/29/2024 9.5 8.8 - 10.4 mg/dL Final     Comment:     Reference intervals for this test were updated on 7/16/2024 to reflect our healthy population more accurately. There may be differences in the flagging of prior results with similar values  "performed with this method. Those prior results can be interpreted in the context of the updated reference intervals.   12/04/2020 8.8 8.5 - 10.1 mg/dL Final     Albumin   Date Value Ref Range Status   09/25/2023 3.2 (L) 3.5 - 5.2 g/dL Final   07/23/2021 4.1 3.5 - 5.0 g/dL Final     Comment:     Last Lab Result: LDL Cholesterol Calculated (mg/dL   12/04/2020 3.4 3.4 - 5.0 g/dL Final      Lab Results   Component Value Date    WBC 14.4 09/20/2024    WBC 11.1 11/08/2019     Lab Results   Component Value Date    RBC 4.46 09/20/2024    RBC 4.68 11/08/2019     Lab Results   Component Value Date    HGB 11.5 09/20/2024    HGB 12.8 11/08/2019     Lab Results   Component Value Date    HCT 35.7 09/20/2024    HCT 40.0 11/08/2019     No components found for: \"MCT\"  Lab Results   Component Value Date    MCV 80 09/20/2024    MCV 86 11/08/2019     Lab Results   Component Value Date    MCH 25.8 09/20/2024    MCH 27.4 11/08/2019     Lab Results   Component Value Date    MCHC 32.2 09/20/2024    MCHC 32.0 11/08/2019     Lab Results   Component Value Date    RDW 14.5 09/20/2024    RDW 13.8 11/08/2019     Lab Results   Component Value Date     09/20/2024     11/08/2019      Lab Results   Component Value Date    A1C 6.3 01/15/2025    A1C 5.0 08/29/2024    A1C 6.7 09/25/2023    A1C 6.6 08/01/2022    A1C 7.5 03/25/2022    A1C 9.6 12/04/2020    A1C 9.0 07/21/2020    A1C 7.8 11/08/2019    A1C 6.5 11/29/2018    A1C 7.3 06/28/2017      TSH   Date Value Ref Range Status   11/29/2018 3.07 0.40 - 4.00 mU/L Final      No results found for: \"VITDT\"                Impression:  Encounter Diagnoses   Name Primary?    Postoperative wound dehiscence, subsequent encounter Yes    Status post below-knee amputation of left lower extremity (H)     MRSA infection     Type 2 diabetes mellitus with other skin complication, unspecified whether long term insulin use (H)                    Are any of these ulcers new today: No; Location: " na    Assessment/Plan:          1. Debridement: na       2.  Ulcer treatment: ulcer treatment will include irrigation and dressings to promote autolytic debridement which will include:healed; will cover with gauze; rolled gauze; change twice per week and prn; can use clotrimazole as needed to any rash If for some reason the patient is not able to get their dressing(s) changed as outlined above (due to illness, lack of supplies, lack of help) please do the following: remove old, soiled dressings; wash the ulcers with saline; pat dry; apply ABD pad or other absorbant pad and secure with rolled gauze; avoid tape directly on your skin; patient instructed to call the clinic as soon as possible to let us know what the current issues are in receiving ulcer care. Stable            3. Edema: continue compression sleeve.            4. Nutrition: focus on weight loss and diabetes control           5. Offloading: will have them make appt with prosthetics department; schedule for after our next appt to ensure he remains healed          6. Wound Etiology: surgical    The longitudinal plan of care for the diagnosis(es)/condition(s) as documented were addressed during this visit. Due to the added complexity in care, I will continue to support [employer] in the subsequent management and with ongoing continuity of care.      Patient will follow up with me in 4 weeks for reevaluation. They were instructed to call the clinic sooner with any signs or symptoms of infection or any further questions/concerns. Answered all questions.          Joyce Andres DNP, RN, CNP, CWOCN, CFCN, CLT  [facility]   [phone]        This note was electronically signed by Joyce Andres NP

## 2025-03-31 ENCOUNTER — TELEPHONE (OUTPATIENT)
Dept: VASCULAR SURGERY | Facility: CLINIC | Age: 65
End: 2025-03-31
Payer: COMMERCIAL

## 2025-03-31 NOTE — TELEPHONE ENCOUNTER
----- Message from Joyce Andres sent at 3/31/2025  7:53 AM CDT -----  Regarding: culture results  Can you call the patient and/or his wife and let him know that his culture was negative; no need for oral antibiotics at this time. Could you inquire on what happened at his appt with Eduardo for his prosthesis evaluation? I cannot see Eduardo's notes and he was supposed to see him right after my appt on Friday.    Thanks    LK

## 2025-03-31 NOTE — TELEPHONE ENCOUNTER
Spoke with patient via phone. He was pleased to not have an infection. He said his appointment with Eduardo went great. He's going back in about 2 1/2 weeks to be measured. He said that Eduardo believed the swelling was just a fluid collection that would go away.

## 2025-04-15 ENCOUNTER — OFFICE VISIT (OUTPATIENT)
Dept: VASCULAR SURGERY | Facility: CLINIC | Age: 65
End: 2025-04-15
Attending: NURSE PRACTITIONER
Payer: COMMERCIAL

## 2025-04-15 VITALS
DIASTOLIC BLOOD PRESSURE: 74 MMHG | SYSTOLIC BLOOD PRESSURE: 116 MMHG | RESPIRATION RATE: 18 BRPM | OXYGEN SATURATION: 99 % | HEART RATE: 66 BPM

## 2025-04-15 DIAGNOSIS — E11.628 TYPE 2 DIABETES MELLITUS WITH OTHER SKIN COMPLICATION, UNSPECIFIED WHETHER LONG TERM INSULIN USE (H): ICD-10-CM

## 2025-04-15 DIAGNOSIS — T81.31XD POSTOPERATIVE WOUND DEHISCENCE, SUBSEQUENT ENCOUNTER: Primary | ICD-10-CM

## 2025-04-15 DIAGNOSIS — Z89.512 STATUS POST BELOW-KNEE AMPUTATION OF LEFT LOWER EXTREMITY (H): ICD-10-CM

## 2025-04-15 DIAGNOSIS — E66.01 MORBID OBESITY, UNSPECIFIED OBESITY TYPE (H): ICD-10-CM

## 2025-04-15 PROCEDURE — 11042 DBRDMT SUBQ TIS 1ST 20SQCM/<: CPT | Performed by: NURSE PRACTITIONER

## 2025-04-15 ASSESSMENT — PAIN SCALES - GENERAL: PAINLEVEL_OUTOF10: NO PAIN (0)

## 2025-04-15 NOTE — PROGRESS NOTES
Follow up Vascular Visit       Date of Service:04/15/25      Chief Complaint: left stump ulcer      Pt returns to [facility] with regards to their left stump ulcer.  They arrive today with spouse. They are currently using mupirocin ointment and endoform; gauze; rolled guaze to the wounds. This is being done by spouse twice per week. They are using compression sleeve for compression. They are feeling well today. Denies fevers, chills. No shortness of breath. Culture was done last visit and this was negative. No antibiotics were prescribed. He met with Eduardo at  [facility] and will have a fitting this upcoming Friday.     Allergies:   Allergies   Allergen Reactions    Bees     Sulfamethoxazole Swelling     throat       Medications:   Current Outpatient Medications:     acetaminophen (TYLENOL) 325 MG tablet, Take 2 tablets (650 mg) by mouth every 4 hours as needed for other (mild pain), Disp: 100 tablet, Rfl: 0    amLODIPine (NORVASC) 2.5 MG tablet, TAKE ONE TABLET BY MOUTH ONCE DAILY, Disp: 90 tablet, Rfl: 3    aspirin 81 MG EC tablet, Take 1 tablet (81 mg) by mouth 2 times daily, Disp: 60 tablet, Rfl: 0    atorvastatin (LIPITOR) 20 MG tablet, TAKE ONE TABLET BY MOUTH ONCE DAILY IN THE EVENING, Disp: 90 tablet, Rfl: 3    BD PEN NEEDLE YENNY 2ND GEN 32G X 4 MM miscellaneous, USE 1 PEN NEEDLES DAILY OR AS DIRECTED., Disp: 100 each, Rfl: 2    blood glucose (ACCU-CHEK GANGA) test strip, Use to test blood sugar 1 times daily or as directed., Disp: 100 strip, Rfl: 3    blood glucose monitoring (ACCU-CHEK GANGA PLUS) meter device kit, USE TO TEST BLOOD SUGAR 1 TIMES DAILY OR AS DIRECTED., Disp: , Rfl: 0    blood glucose monitoring (ACCU-CHEK MULTICLIX) lancets, USE TO TEST BLOOD SUGAR 1 TIMES DAILY OR AS DIRECTED., Disp: , Rfl: 3    clotrimazole (LOTRIMIN) 1 % external cream, Apply topically 2 times daily. Apply to left stump rash BID, Disp: 60 g, Rfl: 2    EPINEPHrine (ANY BX  GENERIC EQUIV) 0.3 MG/0.3ML injection 2-pack, INJECT 0.3MG DOSE INTRAMUSCULARLY INTO THE OUTER THIGH MUSCLE ONCE AS DIRECTED AS NEEDED FOR ANAPHYLAXIS, Disp: 2 each, Rfl: 1    hydrochlorothiazide (HYDRODIURIL) 25 MG tablet, TAKE ONE TABLET (25 mg)  BY MOUTH ONCE DAILY, Disp: 90 tablet, Rfl: 3    Insulin Glargine-yfgn 100 UNIT/ML SOPN, Inject 64 Units subcutaneously at bedtime., Disp: 60 mL, Rfl: 3    losartan (COZAAR) 100 MG tablet, TAKE ONE TABLET BY MOUTH ONCE DAILY, Disp: 90 tablet, Rfl: 3    metFORMIN (GLUCOPHAGE XR) 500 MG 24 hr tablet, TAKE TWO TABLETS BY MOUTH TWICE DAILY WITH MEALS, Disp: 360 tablet, Rfl: 3    metoprolol succinate ER (TOPROL XL) 200 MG 24 hr tablet, TAKE ONE TABLET BY MOUTH ONCE DAILY, Disp: 90 tablet, Rfl: 3    mupirocin (BACTROBAN) 2 % external ointment, Apply topically every other day., Disp: 30 g, Rfl: 3    Current Facility-Administered Medications:     lidocaine (XYLOCAINE) 5 % ointment, , Topical, Daily PRN, Joyce Andres, NP, Given at 11/27/24 0850    lidocaine (XYLOCAINE) 5 % ointment, , Topical, Daily PRN, Dmitry, Joyce, NP, Given at 10/09/24 1312    lidocaine (XYLOCAINE) 5 % ointment, , Topical, Daily PRN, Joyce Andres, NP    History:   Past Medical History:   Diagnosis Date    Diabetes (H) 1-2017    Hypertension 1-2017    Septic arthritis of right foot (H) 9/26/2023       Physical Exam:    /74   Pulse 66   Resp 18   SpO2 99%     General:  Patient presents to clinic in no apparent distress.  Head: normocephalic atraumatic  Psychiatric:  Alert and oriented x3.   Respiratory: unlabored breathing; no cough  Integumentary:  Skin is uniformly warm, dry and pink.    Ulcer #1 Location: left medial stump  Size: 0.5L x 1.5W x 0.1depth.  no sinus tract present, Wound base: initially with residual endoform and dried exudate this was removed to reveal x1 full thickness ulcer; red wound bed; viable  no undermining present. Ulcer is full thickness. There is moderate drainage.  Periwound: no denudement, erythema, induration, maceration or warmth.      VASC Wound Left bka (Active)   Pre Size Length 0.2 03/28/25 0700   Pre Size Width 0.2 03/28/25 0700   Pre Size Depth 0.1 03/28/25 0700   Pre Total Sq cm 0.04 03/28/25 0700   Post Size Length 1 03/28/25 0700   Post Size Width 3 03/28/25 0700   Post Size Depth 0.1 03/28/25 0700   Post Total Sq cm 3 03/28/25 0700   Tunneling 4.5cm at 12 o'clock 08/14/24 0800   Description scab 01/29/25 0700   Number of days: 244       VASC Wound left lateral bka site (Active)   Pre Size Length 0.2 03/28/25 0700   Pre Size Width 0.2 03/28/25 0700   Pre Size Depth 0.1 03/28/25 0700   Pre Total Sq cm 0.04 03/28/25 0700   Post Size Length 1 03/28/25 0700   Post Size Width 2 03/28/25 0700   Post Size Depth 0.1 03/28/25 0700   Post Total Sq cm 2 03/28/25 0700   Description scab 04/15/25 0700   Number of days:        VASC Wound left medial BKA site (Active)   Pre Size Length 0 01/15/25 0700   Pre Size Width 0 01/15/25 0700   Pre Size Depth 0 01/15/25 0700   Pre Total Sq cm 0 01/15/25 0700   Post Size Length 0.2 01/07/25 0700   Post Size Width 0.2 01/07/25 0700   Post Size Depth 0.1 01/07/25 0700   Post Total Sq cm 0.04 01/07/25 0700   Number of days:        Incision/Surgical Site 07/09/24 Left Tibial (Active)   Number of days: 280            Circumferential volume measures:           No data to display                Labs:    I personally reviewed the following lab results today and those on care everywhere    CRP Inflammation   Date Value Ref Range Status   11/08/2019 22.2 (H) 0.0 - 8.0 mg/L Final      Sed Rate   Date Value Ref Range Status   11/08/2019 33 (H) 0 - 20 mm/h Final     Erythrocyte Sedimentation Rate   Date Value Ref Range Status   09/25/2023 81 (H) 0 - 20 mm/hr Final      Last Renal Panel:  Sodium   Date Value Ref Range Status   08/29/2024 132 (L) 135 - 145 mmol/L Final   12/04/2020 138 133 - 144 mmol/L Final     Potassium   Date Value Ref Range Status    08/29/2024 4.7 3.4 - 5.3 mmol/L Final   08/09/2021 4.9 3.4 - 5.3 mmol/L Final   12/04/2020 4.5 3.4 - 5.3 mmol/L Final     Chloride   Date Value Ref Range Status   08/29/2024 94 (L) 98 - 107 mmol/L Final   07/23/2021 101 98 - 107 mmol/L Final     Comment:     Last Lab Result: LDL Cholesterol Calculated (mg/dL   12/04/2020 103 94 - 109 mmol/L Final     Carbon Dioxide   Date Value Ref Range Status   12/04/2020 28 20 - 32 mmol/L Final     Carbon Dioxide (CO2)   Date Value Ref Range Status   08/29/2024 26 22 - 29 mmol/L Final   07/23/2021 26 22 - 31 mmol/L Final     Comment:     Last Lab Result: LDL Cholesterol Calculated (mg/dL     Anion Gap   Date Value Ref Range Status   08/29/2024 12 7 - 15 mmol/L Final   07/23/2021 12 5 - 18 mmol/L Final   12/04/2020 7 3 - 14 mmol/L Final     Glucose   Date Value Ref Range Status   08/29/2024 143 (H) 70 - 99 mg/dL Final   07/23/2021 137 (H) 70 - 125 mg/dL Final     Comment:     Last Lab Result: LDL Cholesterol Calculated (mg/dL   12/04/2020 269 (H) 70 - 99 mg/dL Final     Comment:     Fasting specimen     GLUCOSE BY METER POCT   Date Value Ref Range Status   07/09/2024 70 70 - 99 mg/dL Final     Urea Nitrogen   Date Value Ref Range Status   08/29/2024 25.2 (H) 8.0 - 23.0 mg/dL Final   07/23/2021 18 8 - 22 mg/dL Final     Comment:     Last Lab Result: LDL Cholesterol Calculated (mg/dL   12/04/2020 25 7 - 30 mg/dL Final     Creatinine   Date Value Ref Range Status   08/29/2024 0.69 0.67 - 1.17 mg/dL Final   12/04/2020 0.92 0.66 - 1.25 mg/dL Final     GFR Estimate   Date Value Ref Range Status   08/29/2024 >90 >60 mL/min/1.73m2 Final     Comment:     eGFR calculated using 2021 CKD-EPI equation.   12/04/2020 >90 >60 mL/min/[1.73_m2] Final     Comment:     Non  GFR Calc  Starting 12/18/2018, serum creatinine based estimated GFR (eGFR) will be   calculated using the Chronic Kidney Disease Epidemiology Collaboration   (CKD-EPI) equation.       Calcium   Date Value Ref  "Range Status   08/29/2024 9.5 8.8 - 10.4 mg/dL Final     Comment:     Reference intervals for this test were updated on 7/16/2024 to reflect our healthy population more accurately. There may be differences in the flagging of prior results with similar values performed with this method. Those prior results can be interpreted in the context of the updated reference intervals.   12/04/2020 8.8 8.5 - 10.1 mg/dL Final     Albumin   Date Value Ref Range Status   09/25/2023 3.2 (L) 3.5 - 5.2 g/dL Final   07/23/2021 4.1 3.5 - 5.0 g/dL Final     Comment:     Last Lab Result: LDL Cholesterol Calculated (mg/dL   12/04/2020 3.4 3.4 - 5.0 g/dL Final      Lab Results   Component Value Date    WBC 14.4 09/20/2024    WBC 11.1 11/08/2019     Lab Results   Component Value Date    RBC 4.46 09/20/2024    RBC 4.68 11/08/2019     Lab Results   Component Value Date    HGB 11.5 09/20/2024    HGB 12.8 11/08/2019     Lab Results   Component Value Date    HCT 35.7 09/20/2024    HCT 40.0 11/08/2019     No components found for: \"MCT\"  Lab Results   Component Value Date    MCV 80 09/20/2024    MCV 86 11/08/2019     Lab Results   Component Value Date    MCH 25.8 09/20/2024    MCH 27.4 11/08/2019     Lab Results   Component Value Date    MCHC 32.2 09/20/2024    MCHC 32.0 11/08/2019     Lab Results   Component Value Date    RDW 14.5 09/20/2024    RDW 13.8 11/08/2019     Lab Results   Component Value Date     09/20/2024     11/08/2019      Lab Results   Component Value Date    A1C 6.3 01/15/2025    A1C 5.0 08/29/2024    A1C 6.7 09/25/2023    A1C 6.6 08/01/2022    A1C 7.5 03/25/2022    A1C 9.6 12/04/2020    A1C 9.0 07/21/2020    A1C 7.8 11/08/2019    A1C 6.5 11/29/2018    A1C 7.3 06/28/2017      TSH   Date Value Ref Range Status   11/29/2018 3.07 0.40 - 4.00 mU/L Final      No results found for: \"VITDT\"                Impression:  Encounter Diagnoses   Name Primary?    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 Debridement: Nursing staff removed the old dressing and cleanse the wound(s) with specified solution. After discussion of risk factors and verbal consent was obtained 2% Lidocaine HCL jelly was applied, under clean conditions, the left stump ulceration(s) were debrided using currette. Devitalized and nonviable tissue, along with any fibrin and slough, was removed to improve granulation tissue formation, stimulate wound healing, decrease overall bacteria load, disrupt biofilm formation and decrease edge senescence.  Total excisional debridement was 0.75 sq cm from the epidermis/dermis area and into the subcutaneous tissue with a depth of 0.1 cm.   Ulcers were improved afterwards and .  Measures were as noted on the flow sheet.       2.  Ulcer treatment: ulcer treatment will include irrigation and dressings to promote autolytic debridement which will include:will continue with mupirocin; endoform; gauze; rolled gauze. Change twice per week by spouse.  If for some reason the patient is not able to get their dressing(s) changed as outlined above (due to illness, lack of supplies, lack of help) please do the following: remove old, soiled dressings; wash the ulcers with saline; pat dry; apply ABD pad or other absorbant pad and secure with rolled gauze; avoid tape directly on your skin; patient instructed to call the clinic as soon as possible to let us know what the current issues are in receiving ulcer care. Stable            3. Edema: continue with compression sleeve. The compression wraps were applied today in clinic.            4. Nutrition: continue with weight loss; diabetes control           5.  Offloading: ok to continue working with Eduardo and [facility]; has appt on Friday          6. Wound Etiology: surgical     Patient will follow up with me in 4 weeks for reevaluation. They were instructed to call the clinic sooner with any signs or symptoms of infection or any further questions/concerns. Answered all questions.          Joyce Andres DNP, RN, CNP, CWOCN, CFCN, CLT  [facility]   [phone]        This note was electronically signed by Joyce Andres NP

## 2025-04-15 NOTE — PATIENT INSTRUCTIONS
"  Wound care supplies were not ordered or needed today.      Wound Care Instructions    Every other day Cleanse your left stump wound(s) with Dilute hibiclens 30cc in 500cc NS and or Vashe.    Pat Dry with non-sterile gauze    Apply lotrisone and mupirocin ointment to rash areas and open wounds    Apply Lotion to the intact skin surrounding your wound and other dry skin locations. Some good lotions include: Remedy Skin Repair Cream, Sarna, Vanicream or Cetaphil    Cover with endoform collagen and gauze    Secure with non-sterile roll gauze (4\" x 75\" roll) and tape (1\" roll tape) as needed; avoid adhesive directly on the skin    Compression: tubular compression and short stretch    It is not ok to get your wound wet in the bath or shower      If for some reason you are not able to get your dressing(s) changed as outlined above (due to illness, lack of supplies, lack of help) please do the following: remove old, soiled dressings; wash the wounds with saline; pat dry; apply ABD pad or other absorbant pad and secure with rolled gauze; avoid tape directly on your skin; Call the clinic as soon as possible to let us know what the current issues are in receiving wound care [phone].      SEEK MEDICAL CARE IF:  You have an increase in swelling, pain, or redness around the wound.  You have an increase in the amount of pus coming from the wound.  There is a bad smell coming from the wound.  The wound appears to be worsening/enlarging  You have a fever greater than 101.5 F      It is ok to continue current wound care treatment/products for the next 2-3 days until new wound care supplies are ordered and arrive. If longer than this please contact our office at [phone].    If you have a 2 layer or 4 layer compression wrap on these are safe to have on for ONLY 7 days. If for some reason you are not able to get the wrap(s) changed (due to illness; lack of supplies, lack of help, lack of transportation) please do the " following: unwrap the old 2 or 4 layer compression wrap; avoid using scissors as you could cut your skin and cause wounds; use tubular compression when available. Call to reschedule your home care or clinic visit appointment as soon as possible.    Please NOTE: if you are 15 minutes late to your arrival time, you will have to be rescheduled. Please call our clinic as soon as possible if you know you will not be able to get to your appointment at [phone]. If you fail to show up to 3 scheduled clinic appointments you will be dismissed from our clinic.              We want to hear from you!  In the next few weeks, you should receive a call or email to complete a survey about your visit at [facility]. Please help us improve your appointment experience by letting us know how we did today. We strive to make your experience good and value any ways in which we could do better.      We value your input and suggestions.    Thank you for choosing the [facility]!           It is recommended that you do not get your ulcer wet when showering.  Listed below are several ways of keeping it dry when you shower.     1. Wrap it with Press [employer] Seal plastic wrap.  It can be found in the stores where the plastic wraps or tin foil is kept.               2.  Some people take a bath and hang their leg/foot out of the tub.                        3  Put your leg in a plastic bag and tape it on.           4. You can purchase a shower cover for casts at some pharmacies and through the Internet.            5. Take a Bed Bath or wash up at the sink     High Protein Foods  Chicken  -Chicken breast, 3.5oz.-30 grams protein  -Chicken thigh-10 grams(average size)  -Drumstick-11 grams  -Wing- 6 grams  -Chicken meat, cooked, 4 oz.  Beef  -Hamburger jerald, 4 oz-28 grams protein  -Steak, 6 oz-42 grams  -Most cuts of beef- 7 grams of protein per ounce  Fish  -Most fish fillets or steaks are about 22 grams of  protein for 3 1/2 oz(100 grams) of cooked fish, or 6 grams per ounce  -Tuna, 6 oz can-40 grams of protein  Pork  -Pork chop, average-22 grams protein  -Pork loin or tenderloin, 4 oz.-29 grams  -Ham, 3oz serving- 19 grams  -Ground pork 3oz cooked-22 grams  -Donovan, 1 slice-3 grams  -Czech-style donovan(black donovan), slice-5-6 grams  Eggs and Dairy  -Egg, large-7 grams  -Milk, 1 cup-8 grams  -Cottage cheese, 1/2 cup-15 grams  -Greek yogurt, 1 cup-usually 8-12 grams, check label  -Soft cheeses (Mozzarella, Brie, Camembert)- 6 grams  -Medium cheeses(cheddar, swiss)- 7 or 8 grams per oz  -Hard cheeses(parmesan)- 10 grams per oz  Beans  -Tofu, 1/2 cup 20 grams  -Tofu, 1 oz., 2.3 grams  -Soy milk, 1 cup-6-10 grams  -Most beans(black, qiu, lentils, etc.) about 7-10 grams protein per half cup of cooked beans  -soy beans, 1/2 cup cooked-14 grams  -Split peas, 1/2 cup cooked- 8 grams  Nuts and Seeds  -Peanut butter, 2 Tablespoons- 8 grams protein  -Almonds, 1/4 cup- 8 grams  -Peanuts, 1/4 cup-9 grams  -Cashews, 1/4 cup- 5 grams  -Pecans, 1/4 cup- 2.5 grams  -Sunflower seeds, 1/4 cup- 6 grams  -Pumpkin seeds, 1/4 cup-8 grams  -Flax seeds- 1/4 cup- 8 grams  Protein Supplements  -Ensure  -Boost  -Glucerna, if diabetic  When you have an open ulcer, your bodies protein needs are much higher, so it is recommended eat good sources of protein

## 2025-04-22 ENCOUNTER — MYC REFILL (OUTPATIENT)
Dept: FAMILY MEDICINE | Facility: CLINIC | Age: 65
End: 2025-04-22
Payer: COMMERCIAL

## 2025-04-22 DIAGNOSIS — E11.8 CONTROLLED TYPE 2 DIABETES MELLITUS WITH COMPLICATION, WITH LONG-TERM CURRENT USE OF INSULIN (H): ICD-10-CM

## 2025-04-22 DIAGNOSIS — Z79.4 CONTROLLED TYPE 2 DIABETES MELLITUS WITH COMPLICATION, WITH LONG-TERM CURRENT USE OF INSULIN (H): ICD-10-CM

## 2025-04-22 RX ORDER — INSULIN GLARGINE 100 [IU]/ML
64 INJECTION, SOLUTION SUBCUTANEOUS AT BEDTIME
Qty: 60 ML | Refills: 1 | Status: SHIPPED | OUTPATIENT
Start: 2025-04-22

## 2025-04-22 RX ORDER — INSULIN GLARGINE-YFGN 100 [IU]/ML
64 INJECTION, SOLUTION SUBCUTANEOUS AT BEDTIME
Qty: 60 ML | Refills: 3 | Status: CANCELLED | OUTPATIENT
Start: 2025-04-22

## 2025-05-14 ENCOUNTER — OFFICE VISIT (OUTPATIENT)
Dept: VASCULAR SURGERY | Facility: CLINIC | Age: 65
End: 2025-05-14
Attending: NURSE PRACTITIONER
Payer: COMMERCIAL

## 2025-05-14 VITALS — OXYGEN SATURATION: 98 % | TEMPERATURE: 98.3 F | HEART RATE: 60 BPM | RESPIRATION RATE: 18 BRPM

## 2025-05-14 DIAGNOSIS — E11.628 TYPE 2 DIABETES MELLITUS WITH OTHER SKIN COMPLICATION, UNSPECIFIED WHETHER LONG TERM INSULIN USE (H): ICD-10-CM

## 2025-05-14 DIAGNOSIS — E11.621 TYPE 2 DIABETES MELLITUS WITH RIGHT DIABETIC FOOT ULCER (H): ICD-10-CM

## 2025-05-14 DIAGNOSIS — L97.519 TYPE 2 DIABETES MELLITUS WITH RIGHT DIABETIC FOOT ULCER (H): ICD-10-CM

## 2025-05-14 DIAGNOSIS — Z89.512 STATUS POST BELOW-KNEE AMPUTATION OF LEFT LOWER EXTREMITY (H): ICD-10-CM

## 2025-05-14 DIAGNOSIS — T81.31XD POSTOPERATIVE WOUND DEHISCENCE, SUBSEQUENT ENCOUNTER: Primary | ICD-10-CM

## 2025-05-14 DIAGNOSIS — E66.01 MORBID OBESITY, UNSPECIFIED OBESITY TYPE (H): ICD-10-CM

## 2025-05-14 PROCEDURE — 99213 OFFICE O/P EST LOW 20 MIN: CPT | Performed by: NURSE PRACTITIONER

## 2025-05-14 PROCEDURE — 99215 OFFICE O/P EST HI 40 MIN: CPT | Performed by: NURSE PRACTITIONER

## 2025-05-14 PROCEDURE — G2211 COMPLEX E/M VISIT ADD ON: HCPCS | Performed by: NURSE PRACTITIONER

## 2025-05-14 NOTE — PROGRESS NOTES
Follow up Vascular Visit       Date of Service:05/14/25      Chief Complaint: new right foot wounds; left stump wound      Pt returns to [facility] with regards to their left stump wound; and now reporting new right foot wounds.  They arrive today with wife. Pt is very irritable today; argumentative; would not let his wife speak. They feel the new wounds started about 1 week ago and are due to his shoes; possibly his compression sock; and being out in the yard more as they are putting in a new deck. His left stump remains healed; no issues. They are working with [facility] on getting a new prosthesis; they state this was lost in the mail and had to order another one. They have also ordered a new shoe. They are currently using mupirocin; telfa; gauze; rolled gauze to the wounds; wife began doing this last week. This is being done by the wife daily. They are using 2 piece compression stocking (foot piece and leg piece) on the right and compression sleeve for stump on the left  for compression. They are feeling well today. Denies fevers, chills. No shortness of breath.     Allergies:   Allergies   Allergen Reactions    Bees     Sulfamethoxazole Swelling     throat       Medications:   Current Outpatient Medications:     acetaminophen (TYLENOL) 325 MG tablet, Take 2 tablets (650 mg) by mouth every 4 hours as needed for other (mild pain), Disp: 100 tablet, Rfl: 0    amLODIPine (NORVASC) 2.5 MG tablet, TAKE ONE TABLET BY MOUTH ONCE DAILY, Disp: 90 tablet, Rfl: 3    aspirin 81 MG EC tablet, Take 1 tablet (81 mg) by mouth 2 times daily, Disp: 60 tablet, Rfl: 0    atorvastatin (LIPITOR) 20 MG tablet, TAKE ONE TABLET BY MOUTH ONCE DAILY IN THE EVENING, Disp: 90 tablet, Rfl: 3    BD PEN NEEDLE YENNY 2ND GEN 32G X 4 MM miscellaneous, USE 1 PEN NEEDLES DAILY OR AS DIRECTED., Disp: 100 each, Rfl: 2    blood glucose (ACCU-CHEK GANGA) test strip, Use to test blood sugar 1 times daily or as  directed., Disp: 100 strip, Rfl: 3    blood glucose monitoring (ACCU-CHEK GANGA PLUS) meter device kit, USE TO TEST BLOOD SUGAR 1 TIMES DAILY OR AS DIRECTED., Disp: , Rfl: 0    blood glucose monitoring (ACCU-CHEK MULTICLIX) lancets, USE TO TEST BLOOD SUGAR 1 TIMES DAILY OR AS DIRECTED., Disp: , Rfl: 3    clotrimazole (LOTRIMIN) 1 % external cream, Apply topically 2 times daily. Apply to left stump rash BID, Disp: 60 g, Rfl: 2    EPINEPHrine (ANY BX GENERIC EQUIV) 0.3 MG/0.3ML injection 2-pack, INJECT 0.3MG DOSE INTRAMUSCULARLY INTO THE OUTER THIGH MUSCLE ONCE AS DIRECTED AS NEEDED FOR ANAPHYLAXIS, Disp: 2 each, Rfl: 1    hydrochlorothiazide (HYDRODIURIL) 25 MG tablet, TAKE ONE TABLET (25 mg)  BY MOUTH ONCE DAILY, Disp: 90 tablet, Rfl: 3    insulin glargine 100 UNIT/ML pen, Inject 64 Units subcutaneously at bedtime., Disp: 60 mL, Rfl: 1    Insulin Glargine-yfgn 100 UNIT/ML SOPN, Inject 64 Units subcutaneously at bedtime., Disp: 60 mL, Rfl: 3    losartan (COZAAR) 100 MG tablet, TAKE ONE TABLET BY MOUTH ONCE DAILY, Disp: 90 tablet, Rfl: 3    metFORMIN (GLUCOPHAGE XR) 500 MG 24 hr tablet, TAKE TWO TABLETS BY MOUTH TWICE DAILY WITH MEALS, Disp: 360 tablet, Rfl: 3    metoprolol succinate ER (TOPROL XL) 200 MG 24 hr tablet, TAKE ONE TABLET BY MOUTH ONCE DAILY, Disp: 90 tablet, Rfl: 3    mupirocin (BACTROBAN) 2 % external ointment, Apply topically every other day., Disp: 30 g, Rfl: 3    Current Facility-Administered Medications:     lidocaine (XYLOCAINE) 5 % ointment, , Topical, Daily PRN, Joyce Andres, NP, Given at 11/27/24 0850    lidocaine (XYLOCAINE) 5 % ointment, , Topical, Daily PRN, Joyce nAdres, NP, Given at 10/09/24 1312    lidocaine (XYLOCAINE) 5 % ointment, , Topical, Daily PRN, Joyce Andres, NP    History:   Past Medical History:   Diagnosis Date    Benign essential hypertension 01/23/2017    Diabetes (H) 01/2017    Hyperlipidemia LDL goal <70 12/06/2017    Hypertension 01/2017    Morbid obesity, unspecified  obesity type (H) 01/25/2017    MRSA infection 11/13/2024    Postoperative wound dehiscence, initial encounter 09/25/2024    Septic arthritis of right foot (H) 09/26/2023    Status post below-knee amputation of left lower extremity (H) 10/30/2023    Tenosynovitis of foot 09/26/2023    Thrombocytosis 09/26/2023    Type 2 diabetes mellitus with hyperglycemia, with long-term current use of insulin (H) 08/26/2024    Type 2 diabetes mellitus with other skin complication, unspecified whether long term insulin use (H) 11/13/2024       Physical Exam:    Pulse 60   Temp 98.3  F (36.8  C)   Resp 18   SpO2 98%     General:  Patient presents to clinic in no apparent distress.  Head: normocephalic atraumatic  Psychiatric:  Alert and oriented x3.   Respiratory: unlabored breathing; no cough  Integumentary:  Skin is uniformly warm, dry and pink.    Extremities: left stump healed; dry, flakey; right dorsal foot with a 5x5cm area of blanchable erythema; +foot deformity; within this 5x5 area there were scattered pinpoint openings and eschar; not tender to touch; no fluctuance; no streaking; feels same temperature as the rest of the leg; swelling well controlled.      VASC Wound Left bka (Active)   Pre Size Length 0.2 03/28/25 0700   Pre Size Width 0.2 03/28/25 0700   Pre Size Depth 0.1 03/28/25 0700   Pre Total Sq cm 0.04 03/28/25 0700   Post Size Length 1 03/28/25 0700   Post Size Width 3 03/28/25 0700   Post Size Depth 0.1 03/28/25 0700   Post Total Sq cm 3 03/28/25 0700   Tunneling 4.5cm at 12 o'clock 08/14/24 0800   Description scab 01/29/25 0700   Number of days: 273       VASC Wound left lateral bka site (Active)   Pre Size Length 0 05/14/25 0700   Pre Size Width 0 05/14/25 0700   Pre Size Depth 0 05/14/25 0700   Pre Total Sq cm 0 05/14/25 0700   Post Size Length 0.5 04/15/25 0700   Post Size Width 1.5 04/15/25 0700   Post Size Depth 0.1 04/15/25 0700   Post Total Sq cm 0.75 04/15/25 0700   Description scab 04/15/25 0700    Number of days:        VASC Wound left medial BKA site (Active)   Pre Size Length 0 01/15/25 0700   Pre Size Width 0 01/15/25 0700   Pre Size Depth 0 01/15/25 0700   Pre Total Sq cm 0 01/15/25 0700   Post Size Length 0.2 01/07/25 0700   Post Size Width 0.2 01/07/25 0700   Post Size Depth 0.1 01/07/25 0700   Post Total Sq cm 0.04 01/07/25 0700   Number of days:        VASC Wound Rt foot (Active)   Number of days: 0       VASC Wound Rt anterior ankle (Active)   Pre Size Length 0.5 05/14/25 0700   Pre Size Width 0.5 05/14/25 0700   Pre Size Depth 0.1 05/14/25 0700   Pre Total Sq cm 0.25 05/14/25 0700   Number of days: 0       Incision/Surgical Site 07/09/24 Left Tibial (Active)   Number of days: 309            Circumferential volume measures:           No data to display                Labs:    I personally reviewed the following lab results today and those on care everywhere    CRP Inflammation   Date Value Ref Range Status   11/08/2019 22.2 (H) 0.0 - 8.0 mg/L Final      Sed Rate   Date Value Ref Range Status   11/08/2019 33 (H) 0 - 20 mm/h Final     Erythrocyte Sedimentation Rate   Date Value Ref Range Status   09/25/2023 81 (H) 0 - 20 mm/hr Final      Last Renal Panel:  Sodium   Date Value Ref Range Status   08/29/2024 132 (L) 135 - 145 mmol/L Final   12/04/2020 138 133 - 144 mmol/L Final     Potassium   Date Value Ref Range Status   08/29/2024 4.7 3.4 - 5.3 mmol/L Final   08/09/2021 4.9 3.4 - 5.3 mmol/L Final   12/04/2020 4.5 3.4 - 5.3 mmol/L Final     Chloride   Date Value Ref Range Status   08/29/2024 94 (L) 98 - 107 mmol/L Final   07/23/2021 101 98 - 107 mmol/L Final     Comment:     Last Lab Result: LDL Cholesterol Calculated (mg/dL   12/04/2020 103 94 - 109 mmol/L Final     Carbon Dioxide   Date Value Ref Range Status   12/04/2020 28 20 - 32 mmol/L Final     Carbon Dioxide (CO2)   Date Value Ref Range Status   08/29/2024 26 22 - 29 mmol/L Final   07/23/2021 26 22 - 31 mmol/L Final     Comment:     Last Lab  Result: LDL Cholesterol Calculated (mg/dL     Anion Gap   Date Value Ref Range Status   08/29/2024 12 7 - 15 mmol/L Final   07/23/2021 12 5 - 18 mmol/L Final   12/04/2020 7 3 - 14 mmol/L Final     Glucose   Date Value Ref Range Status   08/29/2024 143 (H) 70 - 99 mg/dL Final   07/23/2021 137 (H) 70 - 125 mg/dL Final     Comment:     Last Lab Result: LDL Cholesterol Calculated (mg/dL   12/04/2020 269 (H) 70 - 99 mg/dL Final     Comment:     Fasting specimen     GLUCOSE BY METER POCT   Date Value Ref Range Status   07/09/2024 70 70 - 99 mg/dL Final     Urea Nitrogen   Date Value Ref Range Status   08/29/2024 25.2 (H) 8.0 - 23.0 mg/dL Final   07/23/2021 18 8 - 22 mg/dL Final     Comment:     Last Lab Result: LDL Cholesterol Calculated (mg/dL   12/04/2020 25 7 - 30 mg/dL Final     Creatinine   Date Value Ref Range Status   08/29/2024 0.69 0.67 - 1.17 mg/dL Final   12/04/2020 0.92 0.66 - 1.25 mg/dL Final     GFR Estimate   Date Value Ref Range Status   08/29/2024 >90 >60 mL/min/1.73m2 Final     Comment:     eGFR calculated using 2021 CKD-EPI equation.   12/04/2020 >90 >60 mL/min/[1.73_m2] Final     Comment:     Non  GFR Calc  Starting 12/18/2018, serum creatinine based estimated GFR (eGFR) will be   calculated using the Chronic Kidney Disease Epidemiology Collaboration   (CKD-EPI) equation.       Calcium   Date Value Ref Range Status   08/29/2024 9.5 8.8 - 10.4 mg/dL Final     Comment:     Reference intervals for this test were updated on 7/16/2024 to reflect our healthy population more accurately. There may be differences in the flagging of prior results with similar values performed with this method. Those prior results can be interpreted in the context of the updated reference intervals.   12/04/2020 8.8 8.5 - 10.1 mg/dL Final     Albumin   Date Value Ref Range Status   09/25/2023 3.2 (L) 3.5 - 5.2 g/dL Final   07/23/2021 4.1 3.5 - 5.0 g/dL Final     Comment:     Last Lab Result: LDL Cholesterol  "Calculated (mg/dL   12/04/2020 3.4 3.4 - 5.0 g/dL Final      Lab Results   Component Value Date    WBC 14.4 09/20/2024    WBC 11.1 11/08/2019     Lab Results   Component Value Date    RBC 4.46 09/20/2024    RBC 4.68 11/08/2019     Lab Results   Component Value Date    HGB 11.5 09/20/2024    HGB 12.8 11/08/2019     Lab Results   Component Value Date    HCT 35.7 09/20/2024    HCT 40.0 11/08/2019     No components found for: \"MCT\"  Lab Results   Component Value Date    MCV 80 09/20/2024    MCV 86 11/08/2019     Lab Results   Component Value Date    MCH 25.8 09/20/2024    MCH 27.4 11/08/2019     Lab Results   Component Value Date    MCHC 32.2 09/20/2024    MCHC 32.0 11/08/2019     Lab Results   Component Value Date    RDW 14.5 09/20/2024    RDW 13.8 11/08/2019     Lab Results   Component Value Date     09/20/2024     11/08/2019      Lab Results   Component Value Date    A1C 6.3 01/15/2025    A1C 5.0 08/29/2024    A1C 6.7 09/25/2023    A1C 6.6 08/01/2022    A1C 7.5 03/25/2022    A1C 9.6 12/04/2020    A1C 9.0 07/21/2020    A1C 7.8 11/08/2019    A1C 6.5 11/29/2018    A1C 7.3 06/28/2017      TSH   Date Value Ref Range Status   11/29/2018 3.07 0.40 - 4.00 mU/L Final      No results found for: \"VITDT\"                Impression:  Encounter Diagnoses   Name Primary?    Postoperative wound dehiscence, subsequent encounter Yes    Status post below-knee amputation of left lower extremity (H)     Type 2 diabetes mellitus with other skin complication, unspecified whether long term insulin use (H)     Morbid obesity, unspecified obesity type (H)     Type 2 diabetes mellitus with right diabetic foot ulcer (H)                          Are any of these ulcers new today: Yes; Location: right foot    Assessment/Plan:          1. Debridement: not done     2.  Ulcer treatment: ulcer treatment will include irrigation and dressings to promote autolytic debridement which will include:pt understands the s/sx of infection to watch " out for; I do not suspect infection today; the redness appears from pressure from either his shoe or compression sock; will use mupirocin ointment preventatively; will use adaptic instead of telfa; gauze; rolled gauze; wife to change daily; left stump healed; no dressings needed.  If for some reason the patient is not able to get their dressing(s) changed as outlined above (due to illness, lack of supplies, lack of help) please do the following: remove old, soiled dressings; wash the ulcers with saline; pat dry; apply ABD pad or other absorbant pad and secure with rolled gauze; avoid tape directly on your skin; patient instructed to call the clinic as soon as possible to let us know what the current issues are in receiving ulcer care. Stable            3. Edema: the compression foot portion may be too tight will stop this and use tubular compression for now; will continue compression sleeve on the left tump; encouraged elevation; pt was very argumentative stating he cannot elevate as he is working so much in the yard and on his deck. The compression wraps were applied today in clinic.            4. Nutrition: focus on diabetic diet           5. Offloading: awaiting new prosthesis and new shoe for the right; explained to pt that it appears his current shoe is causing pressure ulcers to the right foot and he needs to stop wearing; suggested post op shoe he declined; again very argumentative; stating he has to wear a shoe to drive tractor and be out in the yard; I stressed to him that he should not be doing these things at this time; he is at risk for amputation of the right leg as well; there are consequences to his actions; he states he is under a deadline; he would not allow his wife to speak for much of the visit.           6. Wound Etiology: diabetic foot ulcer    The longitudinal plan of care for the diagnosis(es)/condition(s) as documented were addressed during this visit. Due to the added complexity in care, I  will continue to support [employer] in the subsequent management and with ongoing continuity of care.     Patient will follow up with me in 4 weeks for reevaluation. They were instructed to call the clinic sooner with any signs or symptoms of infection or any further questions/concerns. Answered all questions.          Joyce Andres DNP, RN, CNP, CWOCN, CFCN, CLT  [facility]   [phone]        This note was electronically signed by Joyce Andres, AKHIL

## 2025-05-14 NOTE — PATIENT INSTRUCTIONS
You have developed new wounds to your right foot  These appear to be from your shoe  You need to stop wearing a shoe on the right foot until these have healed  Your compression sleeve also may be too tight    Daily  Wash the right foot with dilute hibiclens  Pat dry  Apply small amount of Mupirocin ointment to the wounds  Cover with Adaptic  Then gauze  Then rolled gauze  Apply Tubular compression from base of toes to just below the knee  Elevate the leg throughout the day    The left stump remains healed  No dressings needed  Keep close eye on the stump and if anything begins to drain or open begin dressing again like you were before  Continue to wear compression sleeve on the left stump  Ok to apply lotion to the stump to keep moisturized; such as Cetaphile, CeraVe, Vanicream    You have a new prosthesis on order  You have new diabetic shoe on order    Follow up in 1 month

## 2025-05-17 ENCOUNTER — HEALTH MAINTENANCE LETTER (OUTPATIENT)
Age: 65
End: 2025-05-17

## 2025-06-02 ENCOUNTER — DOCUMENTATION ONLY (OUTPATIENT)
Dept: VASCULAR SURGERY | Facility: CLINIC | Age: 65
End: 2025-06-02
Payer: COMMERCIAL

## 2025-06-02 DIAGNOSIS — Z89.512 STATUS POST BELOW-KNEE AMPUTATION OF LEFT LOWER EXTREMITY (H): ICD-10-CM

## 2025-06-02 DIAGNOSIS — E11.621 TYPE 2 DIABETES MELLITUS WITH RIGHT DIABETIC FOOT ULCER (H): Primary | ICD-10-CM

## 2025-06-02 DIAGNOSIS — L97.519 TYPE 2 DIABETES MELLITUS WITH RIGHT DIABETIC FOOT ULCER (H): Primary | ICD-10-CM

## 2025-06-11 ENCOUNTER — OFFICE VISIT (OUTPATIENT)
Dept: VASCULAR SURGERY | Facility: CLINIC | Age: 65
End: 2025-06-11
Attending: NURSE PRACTITIONER
Payer: COMMERCIAL

## 2025-06-11 VITALS — HEART RATE: 65 BPM | OXYGEN SATURATION: 98 %

## 2025-06-11 DIAGNOSIS — Z89.512 STATUS POST BELOW-KNEE AMPUTATION OF LEFT LOWER EXTREMITY (H): ICD-10-CM

## 2025-06-11 DIAGNOSIS — E11.621 TYPE 2 DIABETES MELLITUS WITH RIGHT DIABETIC FOOT ULCER (H): Primary | ICD-10-CM

## 2025-06-11 DIAGNOSIS — T81.31XD POSTOPERATIVE WOUND DEHISCENCE, SUBSEQUENT ENCOUNTER: ICD-10-CM

## 2025-06-11 DIAGNOSIS — L97.519 TYPE 2 DIABETES MELLITUS WITH RIGHT DIABETIC FOOT ULCER (H): Primary | ICD-10-CM

## 2025-06-11 PROCEDURE — 1126F AMNT PAIN NOTED NONE PRSNT: CPT | Performed by: NURSE PRACTITIONER

## 2025-06-11 PROCEDURE — 11042 DBRDMT SUBQ TIS 1ST 20SQCM/<: CPT | Performed by: NURSE PRACTITIONER

## 2025-06-11 ASSESSMENT — PAIN SCALES - GENERAL: PAINLEVEL_OUTOF10: NO PAIN (0)

## 2025-06-11 NOTE — PROGRESS NOTES
Follow up Vascular Visit       Date of Service:06/11/25      Chief Complaint: right foot ulcer      Pt returns to [facility] with regards to their right foot ulcer.  They arrive today with spouse. They are currently using mupirocin; adaptic; gauze to the wounds. This is being done by spouse every 1-2 days. They are using tubular compression on the right leg for compression. They are feeling well today. Denies fevers, chills. No shortness of breath. He is wearing his prosthesis on the left; he would not let us look at this side; states it is fine.     Allergies:   Allergies   Allergen Reactions    Bees     Sulfamethoxazole Swelling     throat       Medications:   Current Outpatient Medications:     acetaminophen (TYLENOL) 325 MG tablet, Take 2 tablets (650 mg) by mouth every 4 hours as needed for other (mild pain), Disp: 100 tablet, Rfl: 0    amLODIPine (NORVASC) 2.5 MG tablet, TAKE ONE TABLET BY MOUTH ONCE DAILY, Disp: 90 tablet, Rfl: 3    aspirin 81 MG EC tablet, Take 1 tablet (81 mg) by mouth 2 times daily, Disp: 60 tablet, Rfl: 0    atorvastatin (LIPITOR) 20 MG tablet, TAKE ONE TABLET BY MOUTH ONCE DAILY IN THE EVENING, Disp: 90 tablet, Rfl: 3    BD PEN NEEDLE YENNY 2ND GEN 32G X 4 MM miscellaneous, USE 1 PEN NEEDLES DAILY OR AS DIRECTED., Disp: 100 each, Rfl: 2    blood glucose (ACCU-CHEK GANGA) test strip, Use to test blood sugar 1 times daily or as directed., Disp: 100 strip, Rfl: 3    blood glucose monitoring (ACCU-CHEK GANGA PLUS) meter device kit, USE TO TEST BLOOD SUGAR 1 TIMES DAILY OR AS DIRECTED., Disp: , Rfl: 0    blood glucose monitoring (ACCU-CHEK MULTICLIX) lancets, USE TO TEST BLOOD SUGAR 1 TIMES DAILY OR AS DIRECTED., Disp: , Rfl: 3    clotrimazole (LOTRIMIN) 1 % external cream, Apply topically 2 times daily. Apply to left stump rash BID, Disp: 60 g, Rfl: 2    EPINEPHrine (ANY BX GENERIC EQUIV) 0.3 MG/0.3ML injection 2-pack, INJECT 0.3MG DOSE INTRAMUSCULARLY INTO THE  OUTER THIGH MUSCLE ONCE AS DIRECTED AS NEEDED FOR ANAPHYLAXIS, Disp: 2 each, Rfl: 1    hydrochlorothiazide (HYDRODIURIL) 25 MG tablet, TAKE ONE TABLET (25 mg)  BY MOUTH ONCE DAILY, Disp: 90 tablet, Rfl: 3    insulin glargine 100 UNIT/ML pen, Inject 64 Units subcutaneously at bedtime., Disp: 60 mL, Rfl: 1    Insulin Glargine-yfgn 100 UNIT/ML SOPN, Inject 64 Units subcutaneously at bedtime., Disp: 60 mL, Rfl: 3    losartan (COZAAR) 100 MG tablet, TAKE ONE TABLET BY MOUTH ONCE DAILY, Disp: 90 tablet, Rfl: 3    metFORMIN (GLUCOPHAGE XR) 500 MG 24 hr tablet, TAKE TWO TABLETS BY MOUTH TWICE DAILY WITH MEALS, Disp: 360 tablet, Rfl: 3    metoprolol succinate ER (TOPROL XL) 200 MG 24 hr tablet, TAKE ONE TABLET BY MOUTH ONCE DAILY, Disp: 90 tablet, Rfl: 3    mupirocin (BACTROBAN) 2 % external ointment, Apply topically every other day., Disp: 30 g, Rfl: 3    Current Facility-Administered Medications:     lidocaine (XYLOCAINE) 5 % ointment, , Topical, Daily PRN, Joyce Andres, NP, Given at 11/27/24 0850    lidocaine (XYLOCAINE) 5 % ointment, , Topical, Daily PRN, Joyce Andres, NP, Given at 10/09/24 1312    lidocaine (XYLOCAINE) 5 % ointment, , Topical, Daily PRN, Joyce Andres, NP    History:   Past Medical History:   Diagnosis Date    Benign essential hypertension 01/23/2017    Diabetes (H) 01/2017    Hyperlipidemia LDL goal <70 12/06/2017    Hypertension 01/2017    Morbid obesity, unspecified obesity type (H) 01/25/2017    MRSA infection 11/13/2024    Postoperative wound dehiscence, initial encounter 09/25/2024    Septic arthritis of right foot (H) 09/26/2023    Status post below-knee amputation of left lower extremity (H) 10/30/2023    Tenosynovitis of foot 09/26/2023    Thrombocytosis 09/26/2023    Type 2 diabetes mellitus with hyperglycemia, with long-term current use of insulin (H) 08/26/2024    Type 2 diabetes mellitus with other skin complication, unspecified whether long term insulin use (H) 11/13/2024       Physical  Exam:    Pulse 65   SpO2 98%     General:  Patient presents to clinic in no apparent distress.  Head: normocephalic atraumatic  Psychiatric:  Alert and oriented x3.   Respiratory: unlabored breathing; no cough  Integumentary:  Skin is uniformly warm, dry and pink.    Ulcer #1 Location: right ankle/foot  Size: 0.8L x 0.8W x 0.2depth.  no sinus tract present, Wound base: slough  no undermining present. Ulcer is full thickness. There is moderate drainage. Periwound: no denudement, erythema, induration, maceration or warmth.      VASC Wound Left bka (Active)   Pre Size Length 0.2 03/28/25 0700   Pre Size Width 0.2 03/28/25 0700   Pre Size Depth 0.1 03/28/25 0700   Pre Total Sq cm 0.04 03/28/25 0700   Post Size Length 1 03/28/25 0700   Post Size Width 3 03/28/25 0700   Post Size Depth 0.1 03/28/25 0700   Post Total Sq cm 3 03/28/25 0700   Tunneling 4.5cm at 12 o'clock 08/14/24 0800   Description scab 01/29/25 0700   Number of days: 301       VASC Wound left lateral bka site (Active)   Pre Size Length 0 05/14/25 0700   Pre Size Width 0 05/14/25 0700   Pre Size Depth 0 05/14/25 0700   Pre Total Sq cm 0 05/14/25 0700   Post Size Length 0.5 04/15/25 0700   Post Size Width 1.5 04/15/25 0700   Post Size Depth 0.1 04/15/25 0700   Post Total Sq cm 0.75 04/15/25 0700   Description scab 04/15/25 0700   Number of days:        VASC Wound left medial BKA site (Active)   Pre Size Length 0 01/15/25 0700   Pre Size Width 0 01/15/25 0700   Pre Size Depth 0 01/15/25 0700   Pre Total Sq cm 0 01/15/25 0700   Post Size Length 0.2 01/07/25 0700   Post Size Width 0.2 01/07/25 0700   Post Size Depth 0.1 01/07/25 0700   Post Total Sq cm 0.04 01/07/25 0700   Number of days:        VASC Wound Rt foot (Active)   Number of days: 28       VASC Wound Rt anterior ankle (Active)   Pre Size Length 0.8 06/11/25 0727   Pre Size Width 0.8 06/11/25 0727   Pre Size Depth 0.2 06/11/25 0727   Pre Total Sq cm 0.64 06/11/25 0727   Number of days: 28        Incision/Surgical Site 07/09/24 Left Tibial (Active)   Number of days: 337            Circumferential volume measures:           No data to display                Labs:    I personally reviewed the following lab results today and those on care everywhere    CRP Inflammation   Date Value Ref Range Status   11/08/2019 22.2 (H) 0.0 - 8.0 mg/L Final      Sed Rate   Date Value Ref Range Status   11/08/2019 33 (H) 0 - 20 mm/h Final     Erythrocyte Sedimentation Rate   Date Value Ref Range Status   09/25/2023 81 (H) 0 - 20 mm/hr Final      Last Renal Panel:  Sodium   Date Value Ref Range Status   08/29/2024 132 (L) 135 - 145 mmol/L Final   12/04/2020 138 133 - 144 mmol/L Final     Potassium   Date Value Ref Range Status   08/29/2024 4.7 3.4 - 5.3 mmol/L Final   08/09/2021 4.9 3.4 - 5.3 mmol/L Final   12/04/2020 4.5 3.4 - 5.3 mmol/L Final     Chloride   Date Value Ref Range Status   08/29/2024 94 (L) 98 - 107 mmol/L Final   07/23/2021 101 98 - 107 mmol/L Final     Comment:     Last Lab Result: LDL Cholesterol Calculated (mg/dL   12/04/2020 103 94 - 109 mmol/L Final     Carbon Dioxide   Date Value Ref Range Status   12/04/2020 28 20 - 32 mmol/L Final     Carbon Dioxide (CO2)   Date Value Ref Range Status   08/29/2024 26 22 - 29 mmol/L Final   07/23/2021 26 22 - 31 mmol/L Final     Comment:     Last Lab Result: LDL Cholesterol Calculated (mg/dL     Anion Gap   Date Value Ref Range Status   08/29/2024 12 7 - 15 mmol/L Final   07/23/2021 12 5 - 18 mmol/L Final   12/04/2020 7 3 - 14 mmol/L Final     Glucose   Date Value Ref Range Status   08/29/2024 143 (H) 70 - 99 mg/dL Final   07/23/2021 137 (H) 70 - 125 mg/dL Final     Comment:     Last Lab Result: LDL Cholesterol Calculated (mg/dL   12/04/2020 269 (H) 70 - 99 mg/dL Final     Comment:     Fasting specimen     GLUCOSE BY METER POCT   Date Value Ref Range Status   07/09/2024 70 70 - 99 mg/dL Final     Urea Nitrogen   Date Value Ref Range Status   08/29/2024 25.2 (H) 8.0 -  "23.0 mg/dL Final   07/23/2021 18 8 - 22 mg/dL Final     Comment:     Last Lab Result: LDL Cholesterol Calculated (mg/dL   12/04/2020 25 7 - 30 mg/dL Final     Creatinine   Date Value Ref Range Status   08/29/2024 0.69 0.67 - 1.17 mg/dL Final   12/04/2020 0.92 0.66 - 1.25 mg/dL Final     GFR Estimate   Date Value Ref Range Status   08/29/2024 >90 >60 mL/min/1.73m2 Final     Comment:     eGFR calculated using 2021 CKD-EPI equation.   12/04/2020 >90 >60 mL/min/[1.73_m2] Final     Comment:     Non  GFR Calc  Starting 12/18/2018, serum creatinine based estimated GFR (eGFR) will be   calculated using the Chronic Kidney Disease Epidemiology Collaboration   (CKD-EPI) equation.       Calcium   Date Value Ref Range Status   08/29/2024 9.5 8.8 - 10.4 mg/dL Final     Comment:     Reference intervals for this test were updated on 7/16/2024 to reflect our healthy population more accurately. There may be differences in the flagging of prior results with similar values performed with this method. Those prior results can be interpreted in the context of the updated reference intervals.   12/04/2020 8.8 8.5 - 10.1 mg/dL Final     Albumin   Date Value Ref Range Status   09/25/2023 3.2 (L) 3.5 - 5.2 g/dL Final   07/23/2021 4.1 3.5 - 5.0 g/dL Final     Comment:     Last Lab Result: LDL Cholesterol Calculated (mg/dL   12/04/2020 3.4 3.4 - 5.0 g/dL Final      Lab Results   Component Value Date    WBC 14.4 09/20/2024    WBC 11.1 11/08/2019     Lab Results   Component Value Date    RBC 4.46 09/20/2024    RBC 4.68 11/08/2019     Lab Results   Component Value Date    HGB 11.5 09/20/2024    HGB 12.8 11/08/2019     Lab Results   Component Value Date    HCT 35.7 09/20/2024    HCT 40.0 11/08/2019     No components found for: \"MCT\"  Lab Results   Component Value Date    MCV 80 09/20/2024    MCV 86 11/08/2019     Lab Results   Component Value Date    MCH 25.8 09/20/2024    [facility] 27.4 11/08/2019     Lab Results   Component Value Date " "   MCHC 32.2 09/20/2024    MCHC 32.0 11/08/2019     Lab Results   Component Value Date    RDW 14.5 09/20/2024    RDW 13.8 11/08/2019     Lab Results   Component Value Date     09/20/2024     11/08/2019      Lab Results   Component Value Date    A1C 6.3 01/15/2025    A1C 5.0 08/29/2024    A1C 6.7 09/25/2023    A1C 6.6 08/01/2022    A1C 7.5 03/25/2022    A1C 9.6 12/04/2020    A1C 9.0 07/21/2020    A1C 7.8 11/08/2019    A1C 6.5 11/29/2018    A1C 7.3 06/28/2017      TSH   Date Value Ref Range Status   11/29/2018 3.07 0.40 - 4.00 mU/L Final      No results found for: \"VITDT\"                Impression:  No diagnosis found.       6/11/2025 right ankle         Are any of these ulcers new today: No; Location: right foot    Assessment/Plan:          1. Debridement: Nursing staff removed the old dressing and cleanse the wound(s) with specified solution. After discussion of risk factors and verbal consent was obtained 2% Lidocaine HCL jelly was applied, under clean conditions, the right foot ulceration(s) were debrided using currette. Devitalized and nonviable tissue, along with any fibrin and slough, was removed to improve granulation tissue formation, stimulate wound healing, decrease overall bacteria load, disrupt biofilm formation and decrease edge senescence.  Total excisional debridement was 0.64 sq cm from the epidermis/dermis area and into the subcutaneous tissue with a depth of 0.2 cm.   Ulcers were improved afterwards and .  Measures were unchanged after debridement.       2.  Ulcer treatment: ulcer treatment will include irrigation and dressings to promote autolytic debridement which will include:we discussed the serious nature of his right foot ulcer; risk for infection; surgery; amputation; he seems to be in denial about this; he wanted to leave the area open to the air; we again discussed the concept of moist wound healing; needs to be covered at all times; he did not appear to agree with " this. We will dress this mupirocin ointment; medihoney; mepilex; change every 2-3 days and prn  If for some reason the patient is not able to get their dressing(s) changed as outlined above (due to illness, lack of supplies, lack of help) please do the following: remove old, soiled dressings; wash the ulcers with saline; pat dry; apply ABD pad or other absorbant pad and secure with rolled gauze; avoid tape directly on your skin; patient instructed to call the clinic as soon as possible to let us know what the current issues are in receiving ulcer care. Stable            3. Edema: will use tubular compression on the right; continue stump compression to the left stump. The compression wraps were applied today in clinic.     Patient presents with moderate, bilateral  lower extremity secondary lymphedema.      Patient requires a standard-fit knee high compression stocking and/or velcro wraps    Secondary Lymphedema &Edema: continue elevation and velcro wraps; needs to wear these consistently; replace every 6 months. The compression wraps were applied today in clinic. Patient presents with severe, bilateral secondary lymphedema. Patient requires daytime and nighttime compression garments; I have prescribed velcro wraps to accommodate and deliver gradient compression throughout the affected extremities. Quantity of 3 is needed for each leg for proper wash and wear.         If a 2 layer or 4 layer compression wrap is being used; these are safe to have on for ONLY 7 days. If for some reason the patient is not able to get the wrap(s) changed (due to illness; lack of supplies, lack of help, lack of transportation) please do the following: unwrap the old 2 or 4 layer compression wrap; avoid using scissors as you could cut your skin and cause ulcers; use tubular compression when available. Call to reschedule your home care or clinic visit appointment as soon as possible.  Stable            4. Nutrition: focus on diabetes  control and weight loss           5. Offloading: I told him to be non-weight bearing to the right foot and stop wearing shoe; he did not agree with this; states he has things to do.          6. Wound Etiology: diabetic neuropathic     Patient will follow up with me in 3 weeks for reevaluation. They were instructed to call the clinic sooner with any signs or symptoms of infection or any further questions/concerns. Answered all questions.          Joyce Andres DNP, RN, CNP, CWOCN, CFCN, CLT  [facility]   [phone]        This note was electronically signed by Joyec Andres NP

## 2025-06-11 NOTE — PATIENT INSTRUCTIONS
Stop walking on the right foot  Stop wearing shoe      Every 2-3 days  Wash the right foot with dilute hibiclens  Pat dry  Apply small amount of Mupirocin ointment to the wounds  Cover with medihoney; cut to the size of the wound  Mepilex border  Apply Tubular compression from base of toes to just below the knee  Elevate the leg throughout the day    The left stump remains healed  No dressings needed  Keep close eye on the stump and if anything begins to drain or open begin dressing again like you were before  Continue to wear compression sleeve on the left stump  Ok to apply lotion to the stump to keep moisturized; such as Cetaphile, CeraVe, Vanicream    You have a new prosthesis on order  You have new diabetic shoe on order    Follow up in 1 month

## 2025-06-25 ENCOUNTER — DOCUMENTATION ONLY (OUTPATIENT)
Dept: VASCULAR SURGERY | Facility: CLINIC | Age: 65
End: 2025-06-25
Payer: COMMERCIAL

## 2025-06-25 DIAGNOSIS — Z89.512 STATUS POST BELOW-KNEE AMPUTATION OF LEFT LOWER EXTREMITY (H): Primary | ICD-10-CM

## 2025-07-02 ENCOUNTER — OFFICE VISIT (OUTPATIENT)
Dept: VASCULAR SURGERY | Facility: CLINIC | Age: 65
End: 2025-07-02
Attending: NURSE PRACTITIONER
Payer: COMMERCIAL

## 2025-07-02 DIAGNOSIS — Z89.512 STATUS POST BELOW-KNEE AMPUTATION OF LEFT LOWER EXTREMITY (H): Primary | ICD-10-CM

## 2025-07-02 DIAGNOSIS — L97.519 TYPE 2 DIABETES MELLITUS WITH RIGHT DIABETIC FOOT ULCER (H): ICD-10-CM

## 2025-07-02 DIAGNOSIS — E11.621 TYPE 2 DIABETES MELLITUS WITH RIGHT DIABETIC FOOT ULCER (H): ICD-10-CM

## 2025-07-02 PROCEDURE — G2211 COMPLEX E/M VISIT ADD ON: HCPCS | Performed by: NURSE PRACTITIONER

## 2025-07-02 PROCEDURE — 99213 OFFICE O/P EST LOW 20 MIN: CPT | Performed by: NURSE PRACTITIONER

## 2025-07-02 PROCEDURE — 1126F AMNT PAIN NOTED NONE PRSNT: CPT | Performed by: NURSE PRACTITIONER

## 2025-07-02 RX ADMIN — LIDOCAINE: 50 OINTMENT TOPICAL at 07:22

## 2025-07-02 ASSESSMENT — PAIN SCALES - GENERAL: PAINLEVEL_OUTOF10: NO PAIN (0)

## 2025-07-02 NOTE — PROGRESS NOTES
Compression Applied to Right  Tubigrip Size f

## 2025-07-02 NOTE — PROGRESS NOTES
Follow up Vascular Visit       Date of Service:07/02/25      Chief Complaint: right diabetic foot ulcer      Pt returns to [facility] with regards to their right diabetic foot ulcer; was previously being seen for left stump ulcer this has resolved.  They arrive today with spouse. They are currently using mupirocin ointment; medihoney and mepilex border to the wounds. This is being done by spouse every 2-3 days. They are using nothing for compression. Supposed to be wearing either compression stocking or tubular compression. He has not received his new diabetic shoe; now getting more pressure from his old shoe. They are feeling well today. Denies fevers, chills. No shortness of breath.     Allergies:   Allergies   Allergen Reactions    Bees     Sulfamethoxazole Swelling     throat       Medications:   Current Outpatient Medications:     acetaminophen (TYLENOL) 325 MG tablet, Take 2 tablets (650 mg) by mouth every 4 hours as needed for other (mild pain), Disp: 100 tablet, Rfl: 0    amLODIPine (NORVASC) 2.5 MG tablet, TAKE ONE TABLET BY MOUTH ONCE DAILY, Disp: 90 tablet, Rfl: 3    aspirin 81 MG EC tablet, Take 1 tablet (81 mg) by mouth 2 times daily, Disp: 60 tablet, Rfl: 0    atorvastatin (LIPITOR) 20 MG tablet, TAKE ONE TABLET BY MOUTH ONCE DAILY IN THE EVENING, Disp: 90 tablet, Rfl: 3    BD PEN NEEDLE YENNY 2ND GEN 32G X 4 MM miscellaneous, USE 1 PEN NEEDLES DAILY OR AS DIRECTED., Disp: 100 each, Rfl: 2    blood glucose (ACCU-CHEK GANGA) test strip, Use to test blood sugar 1 times daily or as directed., Disp: 100 strip, Rfl: 3    blood glucose monitoring (ACCU-CHEK GANGA PLUS) meter device kit, USE TO TEST BLOOD SUGAR 1 TIMES DAILY OR AS DIRECTED., Disp: , Rfl: 0    blood glucose monitoring (ACCU-CHEK MULTICLIX) lancets, USE TO TEST BLOOD SUGAR 1 TIMES DAILY OR AS DIRECTED., Disp: , Rfl: 3    clotrimazole (LOTRIMIN) 1 % external cream, Apply topically 2 times daily. Apply to left stump  rash BID, Disp: 60 g, Rfl: 2    EPINEPHrine (ANY BX GENERIC EQUIV) 0.3 MG/0.3ML injection 2-pack, INJECT 0.3MG DOSE INTRAMUSCULARLY INTO THE OUTER THIGH MUSCLE ONCE AS DIRECTED AS NEEDED FOR ANAPHYLAXIS, Disp: 2 each, Rfl: 1    hydrochlorothiazide (HYDRODIURIL) 25 MG tablet, TAKE ONE TABLET (25 mg)  BY MOUTH ONCE DAILY, Disp: 90 tablet, Rfl: 3    insulin glargine 100 UNIT/ML pen, Inject 64 Units subcutaneously at bedtime., Disp: 60 mL, Rfl: 1    Insulin Glargine-yfgn 100 UNIT/ML SOPN, Inject 64 Units subcutaneously at bedtime., Disp: 60 mL, Rfl: 3    losartan (COZAAR) 100 MG tablet, TAKE ONE TABLET BY MOUTH ONCE DAILY, Disp: 90 tablet, Rfl: 3    metFORMIN (GLUCOPHAGE XR) 500 MG 24 hr tablet, TAKE TWO TABLETS BY MOUTH TWICE DAILY WITH MEALS, Disp: 360 tablet, Rfl: 3    metoprolol succinate ER (TOPROL XL) 200 MG 24 hr tablet, TAKE ONE TABLET BY MOUTH ONCE DAILY, Disp: 90 tablet, Rfl: 3    mupirocin (BACTROBAN) 2 % external ointment, Apply topically every other day., Disp: 30 g, Rfl: 3    Current Facility-Administered Medications:     lidocaine (XYLOCAINE) 5 % ointment, , Topical, Daily PRN, Joyce Andres, NP, Given at 11/27/24 0850    lidocaine (XYLOCAINE) 5 % ointment, , Topical, Daily PRN, Joyce Andres, NP, Given at 10/09/24 1312    lidocaine (XYLOCAINE) 5 % ointment, , Topical, Daily PRN, Joyce Andres, NP, Given at 07/02/25 0722    History:   Past Medical History:   Diagnosis Date    Benign essential hypertension 01/23/2017    Diabetes (H) 01/2017    Hyperlipidemia LDL goal <70 12/06/2017    Hypertension 01/2017    Morbid obesity, unspecified obesity type (H) 01/25/2017    MRSA infection 11/13/2024    Postoperative wound dehiscence, initial encounter 09/25/2024    Septic arthritis of right foot (H) 09/26/2023    Status post below-knee amputation of left lower extremity (H) 10/30/2023    Tenosynovitis of foot 09/26/2023    Thrombocytosis 09/26/2023    Type 2 diabetes mellitus with hyperglycemia, with long-term  current use of insulin (H) 08/26/2024    Type 2 diabetes mellitus with other skin complication, unspecified whether long term insulin use (H) 11/13/2024       Physical Exam:    There were no vitals taken for this visit.    General:  Patient presents to clinic in no apparent distress.  Head: normocephalic atraumatic  Psychiatric:  Alert and oriented x3.   Respiratory: unlabored breathing; no cough  Integumentary:  Skin is uniformly warm, dry and pink.    Ulcer #1 Location: right anterior ankle  Size: 0.4L x 0.4W x 0.1depth.  no sinus tract present, Wound base: pin point red  no undermining present. Ulcer is full thickness. There is moderate drainage. Periwound: no denudement, erythema, induration, maceration or warmth.      There is a square area of blanchable erythema on the bony deformity of the foot; skin intact; lines up with the stiff leather portion of the shoe    VASC Wound Left bka (Active)   Pre Size Length 0.2 03/28/25 0700   Pre Size Width 0.2 03/28/25 0700   Pre Size Depth 0.1 03/28/25 0700   Pre Total Sq cm 0.04 03/28/25 0700   Post Size Length 1 03/28/25 0700   Post Size Width 3 03/28/25 0700   Post Size Depth 0.1 03/28/25 0700   Post Total Sq cm 3 03/28/25 0700   Tunneling 4.5cm at 12 o'clock 08/14/24 0800   Description scab 01/29/25 0700   Number of days: 322       VASC Wound left lateral bka site (Active)   Pre Size Length 0 05/14/25 0700   Pre Size Width 0 05/14/25 0700   Pre Size Depth 0 05/14/25 0700   Pre Total Sq cm 0 05/14/25 0700   Post Size Length 0.5 04/15/25 0700   Post Size Width 1.5 04/15/25 0700   Post Size Depth 0.1 04/15/25 0700   Post Total Sq cm 0.75 04/15/25 0700   Description scab 04/15/25 0700   Number of days:        VASC Wound left medial BKA site (Active)   Pre Size Length 0 01/15/25 0700   Pre Size Width 0 01/15/25 0700   Pre Size Depth 0 01/15/25 0700   Pre Total Sq cm 0 01/15/25 0700   Post Size Length 0.2 01/07/25 0700   Post Size Width 0.2 01/07/25 0700   Post Size Depth  0.1 01/07/25 0700   Post Total Sq cm 0.04 01/07/25 0700   Number of days:        VASC Wound Rt foot (Active)   Number of days: 49       VASC Wound Rt anterior ankle (Active)   Pre Size Length 0.4 07/02/25 0718   Pre Size Width 0.4 07/02/25 0718   Pre Size Depth 0.1 07/02/25 0718   Pre Total Sq cm 0.16 07/02/25 0718   Number of days: 49       Incision/Surgical Site 07/09/24 Left Tibial (Active)   Number of days: 358            Circumferential volume measures:           No data to display                Labs:    I personally reviewed the following lab results today and those on care everywhere    CRP Inflammation   Date Value Ref Range Status   11/08/2019 22.2 (H) 0.0 - 8.0 mg/L Final      Sed Rate   Date Value Ref Range Status   11/08/2019 33 (H) 0 - 20 mm/h Final     Erythrocyte Sedimentation Rate   Date Value Ref Range Status   09/25/2023 81 (H) 0 - 20 mm/hr Final      Last Renal Panel:  Sodium   Date Value Ref Range Status   08/29/2024 132 (L) 135 - 145 mmol/L Final   12/04/2020 138 133 - 144 mmol/L Final     Potassium   Date Value Ref Range Status   08/29/2024 4.7 3.4 - 5.3 mmol/L Final   08/09/2021 4.9 3.4 - 5.3 mmol/L Final   12/04/2020 4.5 3.4 - 5.3 mmol/L Final     Chloride   Date Value Ref Range Status   08/29/2024 94 (L) 98 - 107 mmol/L Final   07/23/2021 101 98 - 107 mmol/L Final     Comment:     Last Lab Result: LDL Cholesterol Calculated (mg/dL   12/04/2020 103 94 - 109 mmol/L Final     Carbon Dioxide   Date Value Ref Range Status   12/04/2020 28 20 - 32 mmol/L Final     Carbon Dioxide (CO2)   Date Value Ref Range Status   08/29/2024 26 22 - 29 mmol/L Final   07/23/2021 26 22 - 31 mmol/L Final     Comment:     Last Lab Result: LDL Cholesterol Calculated (mg/dL     Anion Gap   Date Value Ref Range Status   08/29/2024 12 7 - 15 mmol/L Final   07/23/2021 12 5 - 18 mmol/L Final   12/04/2020 7 3 - 14 mmol/L Final     Glucose   Date Value Ref Range Status   08/29/2024 143 (H) 70 - 99 mg/dL Final   07/23/2021  137 (H) 70 - 125 mg/dL Final     Comment:     Last Lab Result: LDL Cholesterol Calculated (mg/dL   12/04/2020 269 (H) 70 - 99 mg/dL Final     Comment:     Fasting specimen     GLUCOSE BY METER POCT   Date Value Ref Range Status   07/09/2024 70 70 - 99 mg/dL Final     Urea Nitrogen   Date Value Ref Range Status   08/29/2024 25.2 (H) 8.0 - 23.0 mg/dL Final   07/23/2021 18 8 - 22 mg/dL Final     Comment:     Last Lab Result: LDL Cholesterol Calculated (mg/dL   12/04/2020 25 7 - 30 mg/dL Final     Creatinine   Date Value Ref Range Status   08/29/2024 0.69 0.67 - 1.17 mg/dL Final   12/04/2020 0.92 0.66 - 1.25 mg/dL Final     GFR Estimate   Date Value Ref Range Status   08/29/2024 >90 >60 mL/min/1.73m2 Final     Comment:     eGFR calculated using 2021 CKD-EPI equation.   12/04/2020 >90 >60 mL/min/[1.73_m2] Final     Comment:     Non  GFR Calc  Starting 12/18/2018, serum creatinine based estimated GFR (eGFR) will be   calculated using the Chronic Kidney Disease Epidemiology Collaboration   (CKD-EPI) equation.       Calcium   Date Value Ref Range Status   08/29/2024 9.5 8.8 - 10.4 mg/dL Final     Comment:     Reference intervals for this test were updated on 7/16/2024 to reflect our healthy population more accurately. There may be differences in the flagging of prior results with similar values performed with this method. Those prior results can be interpreted in the context of the updated reference intervals.   12/04/2020 8.8 8.5 - 10.1 mg/dL Final     Albumin   Date Value Ref Range Status   09/25/2023 3.2 (L) 3.5 - 5.2 g/dL Final   07/23/2021 4.1 3.5 - 5.0 g/dL Final     Comment:     Last Lab Result: LDL Cholesterol Calculated (mg/dL   12/04/2020 3.4 3.4 - 5.0 g/dL Final      Lab Results   Component Value Date    WBC 14.4 09/20/2024    WBC 11.1 11/08/2019     Lab Results   Component Value Date    RBC 4.46 09/20/2024    RBC 4.68 11/08/2019     Lab Results   Component Value Date    HGB 11.5 09/20/2024    HGB  "12.8 11/08/2019     Lab Results   Component Value Date    HCT 35.7 09/20/2024    HCT 40.0 11/08/2019     No components found for: \"MCT\"  Lab Results   Component Value Date    MCV 80 09/20/2024    MCV 86 11/08/2019     Lab Results   Component Value Date    MCH 25.8 09/20/2024    MCH 27.4 11/08/2019     Lab Results   Component Value Date    MCHC 32.2 09/20/2024    MCHC 32.0 11/08/2019     Lab Results   Component Value Date    RDW 14.5 09/20/2024    RDW 13.8 11/08/2019     Lab Results   Component Value Date     09/20/2024     11/08/2019      Lab Results   Component Value Date    A1C 6.3 01/15/2025    A1C 5.0 08/29/2024    A1C 6.7 09/25/2023    A1C 6.6 08/01/2022    A1C 7.5 03/25/2022    A1C 9.6 12/04/2020    A1C 9.0 07/21/2020    A1C 7.8 11/08/2019    A1C 6.5 11/29/2018    A1C 7.3 06/28/2017      TSH   Date Value Ref Range Status   11/29/2018 3.07 0.40 - 4.00 mU/L Final      No results found for: \"VITDT\"                Impression:  Encounter Diagnoses   Name Primary?    Status post below-knee amputation of left lower extremity (H) Yes    Type 2 diabetes mellitus with right diabetic foot ulcer (H)                    Are any of these ulcers new today: No; Location: na    Assessment/Plan:          1. Debridement: Not done     2.  Ulcer treatment: ulcer treatment will include irrigation and dressings to promote autolytic debridement which will include:mepilex border; stop mupirocin; stop medihoney; change every 3 days.  If for some reason the patient is not able to get their dressing(s) changed as outlined above (due to illness, lack of supplies, lack of help) please do the following: remove old, soiled dressings; wash the ulcers with saline; pat dry; apply ABD pad or other absorbant pad and secure with rolled gauze; avoid tape directly on your skin; patient instructed to call the clinic as soon as possible to let us know what the current issues are in receiving ulcer care. Improved            3. Edema: " tubular compression to right leg and compression sleeve to left stump. The compression wraps were applied today in clinic.     Patient presents with moderate, bilateral  lower extremity secondary lymphedema.      Patient requires a standard-fit knee high compression stocking and/or velcro wraps    Secondary Lymphedema &Edema: continue elevation and velcro wraps; needs to wear these consistently; replace every 6 months. The compression wraps were applied today in clinic. Patient presents with severe, bilateral secondary lymphedema. Patient requires daytime and nighttime compression garments; I have prescribed velcro wraps to accommodate and deliver gradient compression throughout the affected extremities. Quantity of 3 is needed for each leg for proper wash and wear.         If a 2 layer or 4 layer compression wrap is being used; these are safe to have on for ONLY 7 days. If for some reason the patient is not able to get the wrap(s) changed (due to illness; lack of supplies, lack of help, lack of transportation) please do the following: unwrap the old 2 or 4 layer compression wrap; avoid using scissors as you could cut your skin and cause ulcers; use tubular compression when available. Call to reschedule your home care or clinic visit appointment as soon as possible.  Stable            4. Nutrition: focus on diabetes control           5. Offloading: would limit time in current shoe; awaiting new shoe; educated pt to slowly transition into new shoe          6. Wound Etiology: diabetic    The longitudinal plan of care for the diagnosis(es)/condition(s) as documented were addressed during this visit. Due to the added complexity in care, I will continue to support [employer] in the subsequent management and with ongoing continuity of care.     Patient will follow up with me in prn weeks for reevaluation. They were instructed to call the clinic sooner with any signs or symptoms of infection or any further questions/concerns.  Answered all questions.          Joyce Andres DNP, RN, CNP, CWOCN, CFCN, CLT  [facility]   [phone]        This note was electronically signed by Joyce Andres NP

## 2025-07-02 NOTE — PATIENT INSTRUCTIONS
Every 2-3 days  Wash the right foot with dilute hibiclens  Pat dry  Mepilex border  Apply Tubular compression from base of toes to just below the knee  Elevate the leg throughout the day    Limit time in shoe  Get new shoes from Orthotics department and slowly transition    The left stump remains healed  No dressings needed  Keep close eye on the stump and if anything begins to drain or open begin dressing again like you were before  Continue to wear compression sleeve on the left stump  Ok to apply lotion to the stump to keep moisturized; such as Cetaphile, CeraVe, Vanicream

## 2025-07-02 NOTE — PROGRESS NOTES
Clinic Administered Medication Documentation    Patient was given lidocaine. Prior to medication administration, verified patient's identity using patient's name and date of birth.    Estrella Hinlke RN

## 2025-07-08 DIAGNOSIS — Z91.030 BEE STING ALLERGY: ICD-10-CM

## 2025-07-09 RX ORDER — EPINEPHRINE 0.3 MG/.3ML
INJECTION SUBCUTANEOUS
Qty: 2 EACH | Refills: 0 | Status: SHIPPED | OUTPATIENT
Start: 2025-07-09

## 2025-07-19 ENCOUNTER — HEALTH MAINTENANCE LETTER (OUTPATIENT)
Age: 65
End: 2025-07-19

## 2025-08-15 ENCOUNTER — MYC MEDICAL ADVICE (OUTPATIENT)
Dept: FAMILY MEDICINE | Facility: CLINIC | Age: 65
End: 2025-08-15
Payer: COMMERCIAL

## 2025-08-15 DIAGNOSIS — I10 BENIGN HYPERTENSION: ICD-10-CM

## 2025-08-15 DIAGNOSIS — Z79.4 CONTROLLED TYPE 2 DIABETES MELLITUS WITH COMPLICATION, WITH LONG-TERM CURRENT USE OF INSULIN (H): Primary | ICD-10-CM

## 2025-08-15 DIAGNOSIS — E78.5 HYPERLIPIDEMIA LDL GOAL <70: ICD-10-CM

## 2025-08-15 DIAGNOSIS — E11.8 CONTROLLED TYPE 2 DIABETES MELLITUS WITH COMPLICATION, WITH LONG-TERM CURRENT USE OF INSULIN (H): Primary | ICD-10-CM

## 2025-08-21 ENCOUNTER — LAB (OUTPATIENT)
Dept: LAB | Facility: CLINIC | Age: 65
End: 2025-08-21
Payer: COMMERCIAL

## 2025-08-21 DIAGNOSIS — I10 BENIGN HYPERTENSION: ICD-10-CM

## 2025-08-21 DIAGNOSIS — Z79.4 CONTROLLED TYPE 2 DIABETES MELLITUS WITH COMPLICATION, WITH LONG-TERM CURRENT USE OF INSULIN (H): ICD-10-CM

## 2025-08-21 DIAGNOSIS — E11.8 CONTROLLED TYPE 2 DIABETES MELLITUS WITH COMPLICATION, WITH LONG-TERM CURRENT USE OF INSULIN (H): ICD-10-CM

## 2025-08-21 DIAGNOSIS — E78.5 HYPERLIPIDEMIA LDL GOAL <70: ICD-10-CM

## 2025-08-21 LAB
EST. AVERAGE GLUCOSE BLD GHB EST-MCNC: 128 MG/DL
HBA1C MFR BLD: 6.1 % (ref 0–5.6)

## (undated) DEVICE — PREP SKIN SCRUB TRAY 4461A

## (undated) DEVICE — GOWN LG DISP 9515

## (undated) DEVICE — SPONGE LAP 18X18" X8435

## (undated) DEVICE — GLOVE BIOGEL PI MICRO INDICATOR UNDERGLOVE SZ 8.0 48980

## (undated) DEVICE — SU SILK 2-0 SH 30" K833H

## (undated) DEVICE — SU SILK 2-0 TIE 12X30" A305H

## (undated) DEVICE — SYR 10ML LL W/O NDL

## (undated) DEVICE — DRAPE EXTREMITY W/ARMBOARD 29405

## (undated) DEVICE — PACK EXTREMITY LATEX FREE SOP32HFFCS

## (undated) DEVICE — CAST PADDING 6" STERILE 9046S

## (undated) DEVICE — SU MONOCRYL 2-0 SH 27" UND Y417H

## (undated) DEVICE — STPL SKIN 35W ROTATING HEAD PRW35

## (undated) DEVICE — CAST PADDING 6" UNSTERILE 9046

## (undated) DEVICE — BLADE SAW SAGITTAL STRK 13X90X1.19MM HD SYS 6 6113-119-090

## (undated) DEVICE — BLADE KNIFE SURG 10 371110

## (undated) DEVICE — NDL COUNTER 20CT 31142493

## (undated) DEVICE — TRAY PREP DRY SKIN SCRUB 067

## (undated) DEVICE — SPONGE LAP 18X18" 1515

## (undated) DEVICE — SUCTION MANIFOLD NEPTUNE 2 SYS 4 PORT 0702-020-000

## (undated) DEVICE — DEVICE RETRIEVER HEWSON 71111579

## (undated) DEVICE — BONE CLEANING TIP INTERPULSE  0210-010-000

## (undated) DEVICE — DRAPE SHEET REV FOLD 3/4 9349

## (undated) DEVICE — PREP CHLORAPREP 26ML TINTED ORANGE  260815

## (undated) DEVICE — DRSG ADAPTIC 3X8" 6113

## (undated) DEVICE — DRSG WND NEGATIVE PRESSURE PREVENA 20CM PRE1055US.S

## (undated) DEVICE — GLOVE BIOGEL PI MICRO SZ 6.5 48565

## (undated) DEVICE — DRSG ABDOMINAL 07 1/2X8" 7197D

## (undated) DEVICE — SU VICRYL 0 CT-1 36" J946H

## (undated) DEVICE — SU ETHILON 2-0 FS 18" 664G

## (undated) DEVICE — DRAPE STERI U 1015

## (undated) DEVICE — SU ETHIBOND 5 V-37 4X30" MB66G

## (undated) DEVICE — GLOVE BIOGEL PI MICRO INDICATOR UNDERGLOVE SZ 8.5 48985

## (undated) DEVICE — SOL WATER IRRIG 1000ML BOTTLE 07139-09

## (undated) DEVICE — ESU PENCIL SMOKE EVAC W/ROCKER SWITCH 0703-047-000

## (undated) DEVICE — GLOVE BIOGEL PI ULTRATOUCH G SZ 8.5 42185

## (undated) DEVICE — GLOVE BIOGEL PI MICRO SZ 8.0 48580

## (undated) DEVICE — SUTURE ABSORBABLE VICRYL CT-B1 L36 IN BRAID VIOLET JB947H

## (undated) DEVICE — TUBING IRRIG TUR Y TYPE 96" LF 6543-01

## (undated) DEVICE — SU SILK 3-0 SH-1 CR 8X18" C003D

## (undated) DEVICE — SOL NACL 0.9% IRRIG 1000ML BOTTLE 07138-09

## (undated) DEVICE — Device

## (undated) DEVICE — SOL NACL 0.9% IRRIG 3000ML BAG 07972-08

## (undated) DEVICE — GOWN IMPERVIOUS SPECIALTY XLG/XLONG 32474

## (undated) DEVICE — BLADE SAW SAGITTAL STRK 18X90X1.37MM HD SYS 6 6118-137-090

## (undated) DEVICE — DRSG GAUZE 4X4" TRAY

## (undated) DEVICE — PUMP UNIT NEGATIVE PRESSURE PREVENA PLUS PRE4010.S

## (undated) DEVICE — SU MONOCRYL 2-0 CT-1 36" UND Y945H

## (undated) DEVICE — DRSG WOUND VAC SPONGE MED BLACK M8275052/5

## (undated) DEVICE — CANISTER PREVENA NEGATIVE PRESSURE 150ML PRE4095.S

## (undated) DEVICE — SU VICRYL 1 CTB-1 36" UND JB947

## (undated) DEVICE — SUCTION IRR SYSTEM W/TIP INTERPULSE

## (undated) DEVICE — TOURNIQUET CUFF 34" STERILE

## (undated) DEVICE — DRSG XEROFORM 5X9" 8884431605

## (undated) DEVICE — NDL 18GA 1.5" 305196

## (undated) DEVICE — DRAPE CONVERTORS U-DRAPE 60X72" 8476

## (undated) DEVICE — GLOVE BIOGEL PI MICRO INDICATOR UNDERGLOVE SZ 6.5 48965

## (undated) DEVICE — BNDG ELASTIC 6"X5YDS UNSTERILE 6611-60

## (undated) DEVICE — SU PDS II 2-0 CT-1 27" Z339H

## (undated) DEVICE — SU ETHIBOND 1 OS-6 36" X538

## (undated) RX ORDER — PROPOFOL 10 MG/ML
INJECTION, EMULSION INTRAVENOUS
Status: DISPENSED
Start: 2023-09-28

## (undated) RX ORDER — FENTANYL CITRATE 50 UG/ML
INJECTION, SOLUTION INTRAMUSCULAR; INTRAVENOUS
Status: DISPENSED
Start: 2024-07-09

## (undated) RX ORDER — ACETAMINOPHEN 325 MG/1
TABLET ORAL
Status: DISPENSED
Start: 2024-06-20

## (undated) RX ORDER — DEXTROSE MONOHYDRATE 25 G/50ML
INJECTION, SOLUTION INTRAVENOUS
Status: DISPENSED
Start: 2023-09-28

## (undated) RX ORDER — GABAPENTIN 300 MG/1
CAPSULE ORAL
Status: DISPENSED
Start: 2023-10-27

## (undated) RX ORDER — ONDANSETRON 2 MG/ML
INJECTION INTRAMUSCULAR; INTRAVENOUS
Status: DISPENSED
Start: 2023-09-28

## (undated) RX ORDER — GABAPENTIN 300 MG/1
CAPSULE ORAL
Status: DISPENSED
Start: 2023-09-28

## (undated) RX ORDER — FENTANYL CITRATE 50 UG/ML
INJECTION, SOLUTION INTRAMUSCULAR; INTRAVENOUS
Status: DISPENSED
Start: 2023-09-28

## (undated) RX ORDER — LIDOCAINE HYDROCHLORIDE 10 MG/ML
INJECTION, SOLUTION EPIDURAL; INFILTRATION; INTRACAUDAL; PERINEURAL
Status: DISPENSED
Start: 2024-06-20

## (undated) RX ORDER — GABAPENTIN 300 MG/1
CAPSULE ORAL
Status: DISPENSED
Start: 2024-06-20

## (undated) RX ORDER — ACETAMINOPHEN 325 MG/1
TABLET ORAL
Status: DISPENSED
Start: 2024-07-09

## (undated) RX ORDER — CEFAZOLIN SODIUM 1 G/3ML
INJECTION, POWDER, FOR SOLUTION INTRAMUSCULAR; INTRAVENOUS
Status: DISPENSED
Start: 2024-06-20

## (undated) RX ORDER — GLYCOPYRROLATE 0.2 MG/ML
INJECTION, SOLUTION INTRAMUSCULAR; INTRAVENOUS
Status: DISPENSED
Start: 2024-06-20

## (undated) RX ORDER — HYDROXYZINE HYDROCHLORIDE 50 MG/1
TABLET, FILM COATED ORAL
Status: DISPENSED
Start: 2024-06-20

## (undated) RX ORDER — PROPOFOL 10 MG/ML
INJECTION, EMULSION INTRAVENOUS
Status: DISPENSED
Start: 2024-07-09

## (undated) RX ORDER — FENTANYL CITRATE 50 UG/ML
INJECTION, SOLUTION INTRAMUSCULAR; INTRAVENOUS
Status: DISPENSED
Start: 2023-10-27

## (undated) RX ORDER — OXYCODONE HCL 10 MG/1
TABLET, FILM COATED, EXTENDED RELEASE ORAL
Status: DISPENSED
Start: 2024-06-20

## (undated) RX ORDER — MAGNESIUM SULFATE HEPTAHYDRATE 40 MG/ML
INJECTION, SOLUTION INTRAVENOUS
Status: DISPENSED
Start: 2024-06-20

## (undated) RX ORDER — DEXAMETHASONE SODIUM PHOSPHATE 4 MG/ML
INJECTION, SOLUTION INTRA-ARTICULAR; INTRALESIONAL; INTRAMUSCULAR; INTRAVENOUS; SOFT TISSUE
Status: DISPENSED
Start: 2024-06-20

## (undated) RX ORDER — KETOROLAC TROMETHAMINE 30 MG/ML
INJECTION, SOLUTION INTRAMUSCULAR; INTRAVENOUS
Status: DISPENSED
Start: 2024-06-20

## (undated) RX ORDER — FENTANYL CITRATE 50 UG/ML
INJECTION, SOLUTION INTRAMUSCULAR; INTRAVENOUS
Status: DISPENSED
Start: 2024-06-20

## (undated) RX ORDER — ONDANSETRON 2 MG/ML
INJECTION INTRAMUSCULAR; INTRAVENOUS
Status: DISPENSED
Start: 2023-10-27

## (undated) RX ORDER — FENTANYL CITRATE-0.9 % NACL/PF 10 MCG/ML
PLASTIC BAG, INJECTION (ML) INTRAVENOUS
Status: DISPENSED
Start: 2023-09-28

## (undated) RX ORDER — CEFAZOLIN SODIUM/WATER 2 G/20 ML
SYRINGE (ML) INTRAVENOUS
Status: DISPENSED
Start: 2023-10-27

## (undated) RX ORDER — FENTANYL CITRATE-0.9 % NACL/PF 10 MCG/ML
PLASTIC BAG, INJECTION (ML) INTRAVENOUS
Status: DISPENSED
Start: 2024-06-20

## (undated) RX ORDER — CEFAZOLIN SODIUM/WATER 3 G/30 ML
SYRINGE (ML) INTRAVENOUS
Status: DISPENSED
Start: 2024-07-09

## (undated) RX ORDER — DEXAMETHASONE SODIUM PHOSPHATE 4 MG/ML
INJECTION, SOLUTION INTRA-ARTICULAR; INTRALESIONAL; INTRAMUSCULAR; INTRAVENOUS; SOFT TISSUE
Status: DISPENSED
Start: 2023-09-28

## (undated) RX ORDER — ACETAMINOPHEN 325 MG/1
TABLET ORAL
Status: DISPENSED
Start: 2023-10-27

## (undated) RX ORDER — ONDANSETRON 2 MG/ML
INJECTION INTRAMUSCULAR; INTRAVENOUS
Status: DISPENSED
Start: 2024-06-20

## (undated) RX ORDER — GABAPENTIN 300 MG/1
CAPSULE ORAL
Status: DISPENSED
Start: 2024-07-09

## (undated) RX ORDER — TRANEXAMIC ACID 650 MG/1
TABLET ORAL
Status: DISPENSED
Start: 2024-06-20

## (undated) RX ORDER — DEXTROSE MONOHYDRATE 25 G/50ML
INJECTION, SOLUTION INTRAVENOUS
Status: DISPENSED
Start: 2023-10-27

## (undated) RX ORDER — PROPOFOL 10 MG/ML
INJECTION, EMULSION INTRAVENOUS
Status: DISPENSED
Start: 2024-06-20

## (undated) RX ORDER — PROPOFOL 10 MG/ML
INJECTION, EMULSION INTRAVENOUS
Status: DISPENSED
Start: 2023-10-27

## (undated) RX ORDER — CEFAZOLIN SODIUM/WATER 3 G/30 ML
SYRINGE (ML) INTRAVENOUS
Status: DISPENSED
Start: 2023-10-27

## (undated) RX ORDER — LIDOCAINE HYDROCHLORIDE 10 MG/ML
INJECTION, SOLUTION EPIDURAL; INFILTRATION; INTRACAUDAL; PERINEURAL
Status: DISPENSED
Start: 2024-07-09

## (undated) RX ORDER — OXYCODONE HYDROCHLORIDE 5 MG/1
TABLET ORAL
Status: DISPENSED
Start: 2024-06-20